# Patient Record
Sex: FEMALE | Race: WHITE | NOT HISPANIC OR LATINO | Employment: UNEMPLOYED | URBAN - METROPOLITAN AREA
[De-identification: names, ages, dates, MRNs, and addresses within clinical notes are randomized per-mention and may not be internally consistent; named-entity substitution may affect disease eponyms.]

---

## 2017-07-05 ENCOUNTER — TRANSCRIBE ORDERS (OUTPATIENT)
Dept: ADMINISTRATIVE | Facility: HOSPITAL | Age: 47
End: 2017-07-05

## 2017-07-06 ENCOUNTER — TRANSCRIBE ORDERS (OUTPATIENT)
Dept: ADMINISTRATIVE | Facility: HOSPITAL | Age: 47
End: 2017-07-06

## 2017-07-06 ENCOUNTER — LAB (OUTPATIENT)
Dept: LAB | Facility: HOSPITAL | Age: 47
End: 2017-07-06

## 2017-07-06 DIAGNOSIS — E55.9 VITAMIN D DEFICIENCY: ICD-10-CM

## 2017-07-06 DIAGNOSIS — E55.9 VITAMIN D DEFICIENCY: Primary | ICD-10-CM

## 2017-07-06 LAB
25(OH)D3 SERPL-MCNC: 35.2 NG/ML (ref 30–100)
ALBUMIN SERPL-MCNC: 4.2 G/DL (ref 3.5–5.2)
ALBUMIN/GLOB SERPL: 1.4 G/DL
ALP SERPL-CCNC: 59 U/L (ref 39–117)
ALT SERPL W P-5'-P-CCNC: 30 U/L (ref 1–33)
ANION GAP SERPL CALCULATED.3IONS-SCNC: 9.7 MMOL/L
AST SERPL-CCNC: 21 U/L (ref 1–32)
BACTERIA UR QL AUTO: NORMAL /HPF
BASOPHILS # BLD AUTO: 0.07 10*3/MM3 (ref 0–0.2)
BASOPHILS NFR BLD AUTO: 0.9 % (ref 0–1.5)
BILIRUB SERPL-MCNC: 0.4 MG/DL (ref 0.1–1.2)
BILIRUB UR QL STRIP: NEGATIVE
BUN BLD-MCNC: 14 MG/DL (ref 6–20)
BUN/CREAT SERPL: 17.7 (ref 7–25)
CALCIUM SPEC-SCNC: 9.1 MG/DL (ref 8.6–10.5)
CHLORIDE SERPL-SCNC: 104 MMOL/L (ref 98–107)
CHOLEST SERPL-MCNC: 199 MG/DL (ref 0–200)
CLARITY UR: CLEAR
CO2 SERPL-SCNC: 23.3 MMOL/L (ref 22–29)
COLOR UR: YELLOW
CREAT BLD-MCNC: 0.79 MG/DL (ref 0.57–1)
DEPRECATED RDW RBC AUTO: 41.1 FL (ref 37–54)
EOSINOPHIL # BLD AUTO: 0.24 10*3/MM3 (ref 0–0.7)
EOSINOPHIL NFR BLD AUTO: 3.1 % (ref 0.3–6.2)
ERYTHROCYTE [DISTWIDTH] IN BLOOD BY AUTOMATED COUNT: 12.4 % (ref 11.7–13)
GFR SERPL CREATININE-BSD FRML MDRD: 79 ML/MIN/1.73
GLOBULIN UR ELPH-MCNC: 2.9 GM/DL
GLUCOSE BLD-MCNC: 105 MG/DL (ref 65–99)
GLUCOSE UR STRIP-MCNC: NEGATIVE MG/DL
HCT VFR BLD AUTO: 39.5 % (ref 35.6–45.5)
HDLC SERPL-MCNC: 71 MG/DL (ref 40–60)
HGB BLD-MCNC: 13.2 G/DL (ref 11.9–15.5)
HGB UR QL STRIP.AUTO: ABNORMAL
HYALINE CASTS UR QL AUTO: NORMAL /LPF
IMM GRANULOCYTES # BLD: 0.07 10*3/MM3 (ref 0–0.03)
IMM GRANULOCYTES NFR BLD: 0.9 % (ref 0–0.5)
KETONES UR QL STRIP: NEGATIVE
LDLC SERPL CALC-MCNC: 110 MG/DL (ref 0–100)
LDLC/HDLC SERPL: 1.55 {RATIO}
LEUKOCYTE ESTERASE UR QL STRIP.AUTO: ABNORMAL
LYMPHOCYTES # BLD AUTO: 2.43 10*3/MM3 (ref 0.9–4.8)
LYMPHOCYTES NFR BLD AUTO: 31.2 % (ref 19.6–45.3)
MCH RBC QN AUTO: 30.4 PG (ref 26.9–32)
MCHC RBC AUTO-ENTMCNC: 33.4 G/DL (ref 32.4–36.3)
MCV RBC AUTO: 91 FL (ref 80.5–98.2)
MONOCYTES # BLD AUTO: 0.54 10*3/MM3 (ref 0.2–1.2)
MONOCYTES NFR BLD AUTO: 6.9 % (ref 5–12)
NEUTROPHILS # BLD AUTO: 4.43 10*3/MM3 (ref 1.9–8.1)
NEUTROPHILS NFR BLD AUTO: 57 % (ref 42.7–76)
NITRITE UR QL STRIP: NEGATIVE
NRBC BLD MANUAL-RTO: 0 /100 WBC (ref 0–0)
PH UR STRIP.AUTO: 6 [PH] (ref 5–8)
PLATELET # BLD AUTO: 280 10*3/MM3 (ref 140–500)
PMV BLD AUTO: 9.6 FL (ref 6–12)
POTASSIUM BLD-SCNC: 4.4 MMOL/L (ref 3.5–5.2)
PROT SERPL-MCNC: 7.1 G/DL (ref 6–8.5)
PROT UR QL STRIP: NEGATIVE
RBC # BLD AUTO: 4.34 10*6/MM3 (ref 3.9–5.2)
RBC # UR: NORMAL /HPF
REF LAB TEST METHOD: NORMAL
SODIUM BLD-SCNC: 137 MMOL/L (ref 136–145)
SP GR UR STRIP: <=1.005 (ref 1–1.03)
SQUAMOUS #/AREA URNS HPF: NORMAL /HPF
TRIGL SERPL-MCNC: 89 MG/DL (ref 0–150)
TSH SERPL DL<=0.05 MIU/L-ACNC: 0.51 MIU/ML (ref 0.27–4.2)
UROBILINOGEN UR QL STRIP: ABNORMAL
VLDLC SERPL-MCNC: 17.8 MG/DL (ref 5–40)
WBC NRBC COR # BLD: 7.78 10*3/MM3 (ref 4.5–10.7)
WBC UR QL AUTO: NORMAL /HPF

## 2017-07-06 PROCEDURE — 81001 URINALYSIS AUTO W/SCOPE: CPT | Performed by: INTERNAL MEDICINE

## 2017-07-06 PROCEDURE — 85025 COMPLETE CBC W/AUTO DIFF WBC: CPT

## 2017-07-06 PROCEDURE — 80061 LIPID PANEL: CPT | Performed by: INTERNAL MEDICINE

## 2017-07-06 PROCEDURE — 84443 ASSAY THYROID STIM HORMONE: CPT

## 2017-07-06 PROCEDURE — 82306 VITAMIN D 25 HYDROXY: CPT | Performed by: INTERNAL MEDICINE

## 2017-07-06 PROCEDURE — 80053 COMPREHEN METABOLIC PANEL: CPT

## 2017-07-06 PROCEDURE — 36415 COLL VENOUS BLD VENIPUNCTURE: CPT

## 2018-01-19 ENCOUNTER — TRANSCRIBE ORDERS (OUTPATIENT)
Dept: ADMINISTRATIVE | Facility: HOSPITAL | Age: 48
End: 2018-01-19

## 2018-01-19 DIAGNOSIS — Z12.31 VISIT FOR SCREENING MAMMOGRAM: Primary | ICD-10-CM

## 2018-01-26 ENCOUNTER — HOSPITAL ENCOUNTER (OUTPATIENT)
Dept: MAMMOGRAPHY | Facility: HOSPITAL | Age: 48
Discharge: HOME | End: 2018-01-26
Admitting: SPECIALIST

## 2018-01-26 DIAGNOSIS — Z12.31 VISIT FOR SCREENING MAMMOGRAM: ICD-10-CM

## 2018-01-26 PROCEDURE — 77067 SCR MAMMO BI INCL CAD: CPT

## 2018-01-29 ENCOUNTER — TRANSCRIBE ORDERS (OUTPATIENT)
Dept: ADMINISTRATIVE | Facility: HOSPITAL | Age: 48
End: 2018-01-29

## 2018-01-29 DIAGNOSIS — R92.8 ABNORMAL MAMMOGRAM OF LEFT BREAST: Primary | ICD-10-CM

## 2018-02-02 ENCOUNTER — HOSPITAL ENCOUNTER (OUTPATIENT)
Dept: MAMMOGRAPHY | Facility: HOSPITAL | Age: 48
Discharge: HOME | End: 2018-02-02
Admitting: SPECIALIST

## 2018-02-02 DIAGNOSIS — R92.8 ABNORMAL MAMMOGRAM OF LEFT BREAST: ICD-10-CM

## 2018-02-02 PROCEDURE — G0279 TOMOSYNTHESIS, MAMMO: HCPCS

## 2018-02-02 PROCEDURE — 77065 DX MAMMO INCL CAD UNI: CPT

## 2018-02-08 ENCOUNTER — TRANSCRIBE ORDERS (OUTPATIENT)
Dept: ADMINISTRATIVE | Facility: HOSPITAL | Age: 48
End: 2018-02-08

## 2018-02-08 DIAGNOSIS — R92.8 ABNORMAL MAMMOGRAM: Primary | ICD-10-CM

## 2018-02-12 ENCOUNTER — HOSPITAL ENCOUNTER (OUTPATIENT)
Dept: MAMMOGRAPHY | Facility: HOSPITAL | Age: 48
Discharge: HOME | End: 2018-02-12
Admitting: SPECIALIST

## 2018-02-12 DIAGNOSIS — R92.8 ABNORMAL MAMMOGRAM: ICD-10-CM

## 2018-02-12 RX ORDER — LIDOCAINE HYDROCHLORIDE 10 MG/ML
10 INJECTION, SOLUTION INFILTRATION; PERINEURAL ONCE
Status: COMPLETED | OUTPATIENT
Start: 2018-02-12 | End: 2018-02-12

## 2018-02-12 RX ADMIN — LIDOCAINE HYDROCHLORIDE 10 ML: 10 INJECTION, SOLUTION INFILTRATION; PERINEURAL at 12:38

## 2018-02-14 ENCOUNTER — APPOINTMENT (OUTPATIENT)
Dept: WOMENS IMAGING | Facility: HOSPITAL | Age: 48
End: 2018-02-14

## 2018-02-14 PROCEDURE — 19081 BX BREAST 1ST LESION STRTCTC: CPT | Performed by: RADIOLOGY

## 2018-03-01 ENCOUNTER — OFFICE VISIT (OUTPATIENT)
Dept: MAMMOGRAPHY | Facility: CLINIC | Age: 48
End: 2018-03-01

## 2018-03-01 VITALS
WEIGHT: 148 LBS | RESPIRATION RATE: 16 BRPM | HEART RATE: 65 BPM | SYSTOLIC BLOOD PRESSURE: 90 MMHG | OXYGEN SATURATION: 98 % | TEMPERATURE: 98.6 F | BODY MASS INDEX: 27.23 KG/M2 | DIASTOLIC BLOOD PRESSURE: 64 MMHG | HEIGHT: 62 IN

## 2018-03-01 DIAGNOSIS — Z17.0 MALIGNANT NEOPLASM OF UPPER-INNER QUADRANT OF LEFT BREAST IN FEMALE, ESTROGEN RECEPTOR POSITIVE (HCC): Primary | ICD-10-CM

## 2018-03-01 DIAGNOSIS — C50.212 MALIGNANT NEOPLASM OF UPPER-INNER QUADRANT OF LEFT BREAST IN FEMALE, ESTROGEN RECEPTOR POSITIVE (HCC): Primary | ICD-10-CM

## 2018-03-01 PROCEDURE — 99205 OFFICE O/P NEW HI 60 MIN: CPT | Performed by: SURGERY

## 2018-03-01 RX ORDER — IBUPROFEN 600 MG/1
600 TABLET ORAL EVERY 6 HOURS PRN
COMMUNITY
End: 2022-03-07

## 2018-03-01 RX ORDER — ERGOCALCIFEROL 1.25 MG/1
50000 CAPSULE ORAL
COMMUNITY
End: 2021-03-22 | Stop reason: ALTCHOICE

## 2018-03-01 RX ORDER — ALPRAZOLAM 0.25 MG/1
0.25 TABLET ORAL 2 TIMES DAILY PRN
COMMUNITY
End: 2022-05-21 | Stop reason: HOSPADM

## 2018-03-01 NOTE — PROGRESS NOTES
Chief Complaint: Cesia Workman is a 47 y.o.. female here today for Breast Cancer (Left Breast)        History of Present Illness:  Patient presents with a newly diagnosed left breast cancer.  She has been obtaining her yearly mammograms.  Some suspicious microcalcifications were noted in the 11 o'clock position of the left breast.  There was also the suggestion of a 5 mm mass associated with these calcifications.  These had seemed to have increased from previous mammograms.  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  It was ER positive at 65%, IL positive at 96%, and HER-2 negative.  The Ki-67 was 1.9%.  The patient has no prior history of breast biopsies.  She does have 3 sons and is a nonsmoker.  She has not taken any hormone replacement therapy and is currently still having menstrual periods.      Review of Systems:  Review of Systems   Constitutional: Negative.    HENT:  Negative.    Eyes: Negative.    Respiratory: Negative.    Cardiovascular: Negative.    Gastrointestinal: Positive for abdominal pain.   Endocrine: Negative.    Genitourinary: Positive for bladder incontinence, frequency, nocturia and pelvic pain.    Musculoskeletal: Negative.    Skin:        The patient denies any noticeable changes to the skin of the breast.   Neurological: Negative.    Hematological: Negative.    Psychiatric/Behavioral: The patient is nervous/anxious.         Past Medical and Surgical History:  Breast Biopsy History:  Patient has had the following breast biopsies:Left Breast 2/2018  Breast Cancer HIstory:  Patient does not have a past medical history of breast cancer.  Breast Operations, and year:  None  History   Smoking Status   • Former Smoker   • Packs/day: 0.50   • Years: 3.00   • Types: Cigarettes   • Start date: 1987   • Quit date: 1990   Smokeless Tobacco   • Not on file     Obstetric History:  Patient is premenopausal, first day of last period:   2/24/2018  Number of pregnancies:3  Number of live births: 3  Number of abortions or miscarriages: 0  Age of delivery of first child: 28  Patient breast fed, for the following lenth of time: 20months  Length of time taking birth control pills:15-16years  Patient has never taken hormone replacement    History reviewed. No pertinent surgical history.    Past Medical History:   Diagnosis Date   • Pre-diabetes        Prior Hospitalizations, other than for surgery or childbirth, and year:  None    Social History:  Patient is .  Patient has 3 sons.    Family History:  Family History   Problem Relation Age of Onset   • Breast cancer Maternal Aunt    • Breast cancer Paternal Aunt    • Hyperlipidemia Mother    • Asthma Father    • Diabetes Father    • Hearing loss Father    • Heart disease Father    • Hypertension Father    • Thyroid cancer Father    • Alcohol abuse Sister    • Depression Sister        Vital Signs:  Vitals:    03/01/18 0918   BP: 90/64   Pulse: 65   Resp: 16   Temp: 98.6 °F (37 °C)   SpO2: 98%       Medications:    Current Outpatient Prescriptions:     Current Outpatient Prescriptions:   •  ALPRAZolam (XANAX) 0.25 MG tablet, Take 0.25 mg by mouth 2 (Two) Times a Day As Needed for Anxiety., Disp: , Rfl:   •  ibuprofen (ADVIL,MOTRIN) 600 MG tablet, Take 600 mg by mouth Every 6 (Six) Hours As Needed for Mild Pain ., Disp: , Rfl:   •  vitamin D (ERGOCALCIFEROL) 33114 units capsule capsule, Take 50,000 Units by mouth 1 (One) Time Per Week., Disp: , Rfl:     Physical Examination:  General Appearance:   Patient is in no distress.  She is well kept and has an average build.   Psychiatric:  Patient with appropriate mood and affect. Alert and oriented to self, time, and place.    Breast, RIGHT:  medium sized, symmetric with the contralateral side.  Breast skin is without erythema, edema, rashes.  There are no visible abnormalities upon inspection during the arm-raising maneuver or with hands on hips in the  sitting position. There is no nipple retraction, discharge or nipple/areolar skin changes.There are no masses palpable in the sitting or supine positions.    Breast, LEFT:  medium sized, symmetric with the contralateral side.  Breast skin is without erythema, edema, rashes.  There are no visible abnormalities upon inspection during the arm-raising maneuver or with hands on hips in the sitting position. There is no nipple retraction, discharge or nipple/areolar skin changes.There is a small incision in the 12 o'clock position.  Immediately inferior to this there is an ill-defined firmness likely related to the biopsy.    Lymphatic:  There is no axillary, cervical, infraclavicular, or supraclavicular adenopathy bilaterally.  Eyes:  Pupils are round and reactive to light.  Cardiovascular:  Heart rate and rhythm are regular.  Respiratory:  Lungs are clear bilaterally with no crackles or wheezes in any lung field.  Gastrointestinal:  Abdomen is soft, nondistended, and nontender.  There was no evidence of hepatosplenomegaly or abdominal mass.  There are no scars from previous surgery.    Musculoskeletal:  Good strength in all 4 extremities.   There is good range of motion in both shoulders.    Skin:  No new skin lesions or rashes on the skin excluding the breast (see breast exam above).    Assessment:  1. Malignant neoplasm of upper-inner quadrant of left breast in female, estrogen receptor positive          Plan:  She was accompanied today by her .  The face-to-face office visit lasted 1 hour with 50 minutes spent in consultation regarding her options.    We began the conversation discussing her pathology report.  We talked about the origin of most of breast cancers from either the ducts or the lobules.  The difference between invasive and in situ disease was explained and the visual was drawn for her.  When invasion has occurred, the lymph nodes need to be evaluated.  When there is in situ disease the lymph nodes  should be considered if the area of concern is widespread or it is high-grade.  We also discussed the significance of hormone receptors.  They often can give us some idea how the breast cancer will behave and potentially lead us to offer neoadjuvant chemotherapy.    Next we discussed the surgical options which include breast conserving therapy versus a mastectomy.  With breast conserving therapy we are talking about a lumpectomy with margins, lymph node evaluation, and radiation treatment.  The radiation treatment is generally given to the entire breast for 6 weeks.  The side effects consist of local skin change and potential injury to nearby structures such as the lung or the heart.  A mastectomy would involve removing the breast tissues with preservation of the pectoral muscles and evaluation of the lymph nodes if indicated.  The potential for reconstruction by either an implant or autologous tissue was discussed.  This could be performed on a delayed basis or immediately depending on a multitude of factors.  The survival rates for these 2 procedures are equivalent but there are incidences where one may be favored over the other.  There are times when a lumpectomy is not possible due to the large tumor to breast ratio or the location of the tumor.  Previous radiation to the chest wall or collagen disorders such as scleroderma would make radiation treatment and possible and therefore exclude breast conserving therapy as an option.    I do think that most of this mammogram abnormality is DCIS.  It measures approximately 2.5 cm on her studies.  I would like to obtain an MRI to see if that can help us better assess the extent of involvement.  The patient is currently leaning towards breast conserving surgery if that is her best option.  I will be speaking with her after the MRI returns.  CPT coding:    Next Appointment:  No Follow-up on file.            EMR Dragon/transcription disclaimer:    Much of this encounter  note is an electronic transcription/translocation of spoken language to printed text.  The electronic translation of spoken language may permit erroneous, or at times, nonsensical words or phrases to be inadvertently transcribed.  Although I have reviewed the note from such areas, some may still exist.

## 2018-03-05 ENCOUNTER — HOSPITAL ENCOUNTER (OUTPATIENT)
Dept: MRI IMAGING | Facility: HOSPITAL | Age: 48
Discharge: HOME | End: 2018-03-05
Attending: SURGERY | Admitting: SURGERY

## 2018-03-05 PROCEDURE — 0159T HC MRI BREAST COMPUTER ANALYSIS: CPT

## 2018-03-05 PROCEDURE — A9577 INJ MULTIHANCE: HCPCS | Performed by: SURGERY

## 2018-03-05 PROCEDURE — C8908 MRI W/O FOL W/CONT, BREAST,: HCPCS

## 2018-03-05 PROCEDURE — 0 GADOBENATE DIMEGLUMINE 529 MG/ML SOLUTION: Performed by: SURGERY

## 2018-03-05 RX ADMIN — GADOBENATE DIMEGLUMINE 13 ML: 529 INJECTION, SOLUTION INTRAVENOUS at 15:11

## 2018-03-07 ENCOUNTER — TELEPHONE (OUTPATIENT)
Dept: MAMMOGRAPHY | Facility: CLINIC | Age: 48
End: 2018-03-07

## 2018-03-09 ENCOUNTER — TELEPHONE (OUTPATIENT)
Dept: MAMMOGRAPHY | Facility: CLINIC | Age: 48
End: 2018-03-09

## 2018-03-09 DIAGNOSIS — Z17.0 MALIGNANT NEOPLASM OF UPPER-INNER QUADRANT OF LEFT BREAST IN FEMALE, ESTROGEN RECEPTOR POSITIVE (HCC): Primary | ICD-10-CM

## 2018-03-09 DIAGNOSIS — C50.212 MALIGNANT NEOPLASM OF UPPER-INNER QUADRANT OF LEFT BREAST IN FEMALE, ESTROGEN RECEPTOR POSITIVE (HCC): Primary | ICD-10-CM

## 2018-03-09 NOTE — TELEPHONE ENCOUNTER
We had a long discussion today and I answered many of her questions.  She would like to see a plastic surgeon and I have made a request for her to see Dr. Mchugh.

## 2018-03-14 ENCOUNTER — OFFICE VISIT (OUTPATIENT)
Dept: SURGERY | Facility: CLINIC | Age: 48
End: 2018-03-14

## 2018-03-14 VITALS — WEIGHT: 150 LBS | HEIGHT: 62 IN | BODY MASS INDEX: 27.6 KG/M2 | HEART RATE: 70 BPM | OXYGEN SATURATION: 99 %

## 2018-03-14 DIAGNOSIS — C50.212 MALIGNANT NEOPLASM OF UPPER-INNER QUADRANT OF LEFT BREAST IN FEMALE, ESTROGEN RECEPTOR POSITIVE (HCC): Primary | ICD-10-CM

## 2018-03-14 DIAGNOSIS — Z17.0 MALIGNANT NEOPLASM OF UPPER-INNER QUADRANT OF LEFT BREAST IN FEMALE, ESTROGEN RECEPTOR POSITIVE (HCC): Primary | ICD-10-CM

## 2018-03-14 PROBLEM — C50.919 BREAST CANCER: Status: ACTIVE | Noted: 2018-02-14

## 2018-03-14 PROCEDURE — 99215 OFFICE O/P EST HI 40 MIN: CPT | Performed by: SURGERY

## 2018-03-14 NOTE — PROGRESS NOTES
SURGERY  Cesia Workman   1970 03/14/18    Chief Complaint:  Left upper breast cancer    HPI    Patient is a very nice 47 y.o. female who comes in as a second opinion for a breast cancer diagnosis.  She's been seen Dr. Pereyra, and is very happy with his care.  She is still undecided however about a lumpectomy versus a mastectomy.    She went for a screening mammogram showing calcifications 1/26/18, possibly with a soft tissue mass in the superior left breast, to my review about 2 cm, pretty far posteriorly.  She had a diagnostic mammogram thereafter showing the area to be about 2.3 cm, followed by an attempted biopsy on 212 that was canceled due to a vagal response    [facility] where she was diagnosed to have a high-grade DCIS with comedonecrosis and an intermediate grade invasive mammary carcinoma, ER/OH positive, HER-2/natalie negative.  She wears a C cup bra.    She has both a paternal and maternal aunt with breast cancer.    Past Medical History:   Diagnosis Date   • Anxiety    • Asthma    • Breast cancer 02/14/2018    Left breast, Upper Inner Quadrant, Invasive Mammary Carcinoma, Intermediate grade, measuring at least 0.2 cm in dimension, with associated high grade solid and cribiform ductal carcinoma in situ with comedonecrosis, ER/OH positive, QNM0cok negative   • Migraines    • Pre-diabetes      Past Surgical History:   Procedure Laterality Date   • BREAST BIOPSY Left 02/14/2018    Left breast stereotactic biopsy w/ marker clip placement & positive radiograph-Dr. Lashaun Gamble, [facility]   • BREAST BIOPSY Left 02/12/2018    INCOMPLETE - Biopsy could not be completed as patient had severe vasovagal reaction & subsequent syncope-[facility]   • VAGINAL DELIVERY N/A 06/22/2005    Dr. Jered Lucero   • VAGINAL DELIVERY N/A 02/04/2002    Dr. Jessica Mcdaniel   • VAGINAL DELIVERY N/A 1998     Family History   Problem Relation Age of Onset   • Breast cancer  "Maternal Aunt    • Breast cancer Paternal Aunt    • Hyperlipidemia Mother    • Asthma Father    • Diabetes Father    • Hearing loss Father    • Heart disease Father    • Hypertension Father    • Thyroid cancer Father      Follicular Tyroid Cancer   • Alcohol abuse Sister    • Depression Sister      Social History     Social History   • Marital status:      Spouse name: Nikita Workman   • Number of children: 3   • Years of education: N/A     Occupational History   • Occupational Therapist      Social History Main Topics   • Smoking status: Former Smoker     Packs/day: 0.50     Years: 2.00     Types: Cigarettes     Start date: 1988     Quit date: 1990   • Smokeless tobacco: Never Used   • Alcohol use Yes      Comment: occassional   • Drug use: No   • Sexual activity: Defer     Other Topics Concern   • Not on file     Social History Narrative   • No narrative on file     Occupation/Additional Social Hx: Occupational therapist      Current Outpatient Prescriptions:   •  ALPRAZolam (XANAX) 0.25 MG tablet, Take 0.125-0.25 mg by mouth 2 (Two) Times a Day As Needed for Anxiety., Disp: , Rfl:   •  ibuprofen (ADVIL,MOTRIN) 600 MG tablet, Take 600 mg by mouth Every 6 (Six) Hours As Needed for Mild Pain ., Disp: , Rfl:   •  vitamin D (ERGOCALCIFEROL) 04933 units capsule capsule, Take 50,000 Units by mouth Every 7 (Seven) Days., Disp: , Rfl:     No Known Allergies  Preventative Medicine  Colonoscopy: Not applicable    Review of Systems   Constitutional: Positive for appetite change and unexpected weight change.   Genitourinary: Positive for urgency.   Psychiatric/Behavioral: Positive for sleep disturbance. The patient is nervous/anxious.    All other systems reviewed and are negative.      Vitals:    03/14/18 1216   Pulse: 70   SpO2: 99%   Weight: 68 kg (150 lb)   Height: 157.5 cm (62\")       PHYSICAL EXAM:    Pulse 70   Ht 157.5 cm (62\")   Wt 68 kg (150 lb)   SpO2 99%   BMI 27.44 kg/m²   Body mass index is 27.44 " kg/m².    Constitutional: well developed, well nourished, Appears stated age or younger  Eyes: sclera nonicteric, conjunctiva not injected or edematous  ENMT: Hearing intact, trachea midline, thyroid without masses  CVS: RRR, no murmur, peripheral edema not present  Respiratory: CTA, normal respiratory effort   Gastrointestinal: no hepatosplenomegaly, abdomen soft, abdominal hernia not present  Genitourinary: inguinal hernia not present  Musculoskeletal: gait normal, muscle mass normal  Skin: warm and dry, no rashes visible  Neurological: awake and alert, seems to have reasonable capacity for understanding for medical decision making  Psychiatric: appears to have reasonable judgement, pleasant, well composed  Lymphatics: no cervical adenopathy or axillary adenopathy  Breast: Firmness in the left upper breast, in an area where the breast tissue itself is not very thick, extending over an area about 2 cm    Radiographic Findings: I reviewed the films and the presence of the patient and showed her the area of the calcifications    Lab Findings: None    Pamphlet reviewed: None    IMPRESSION:  · Intermediate grade left breast cancer, with high-grade comedonecrosis and DCIS, ER/NE positive, HER-2/natalie negative.  The size of this is marginal for an ideal lumpectomy, but still possible.  · Anxiety, heightened with her recent diagnosis    PLAN:  · We discussed the option of a mastectomy versus a lumpectomy with radiation.  I told her that I thought a lumpectomy was her best option, but noted that it's very possible she may have to return for a reexcision of the margins due to the size.  She seems relieved by this and begins to cry.  At first I thought she was disappointed and quickly hastened that she could have a mastectomy if she desired, but she confides that she really just wanted to hear that it was okay to go in that direction.  She did reiterate that Dr. Pereyra had told her she could do either method.  · We talked  about the need for sentinel node biopsy  · She asked about taking her Xanax the day of surgery, and I suggested she take only half, as we commonly give her Valium on the day of surgery.      Jenna Wood M.D.  5:18 PM  3/14/18    Greater than 40 minutes spent with over half in counseling

## 2018-03-15 ENCOUNTER — TELEPHONE (OUTPATIENT)
Dept: MAMMOGRAPHY | Facility: CLINIC | Age: 48
End: 2018-03-15

## 2018-03-15 ENCOUNTER — PREP FOR SURGERY (OUTPATIENT)
Dept: OTHER | Facility: HOSPITAL | Age: 48
End: 2018-03-15

## 2018-03-15 DIAGNOSIS — C50.212 MALIGNANT NEOPLASM OF UPPER-INNER QUADRANT OF LEFT BREAST IN FEMALE, ESTROGEN RECEPTOR POSITIVE (HCC): Primary | ICD-10-CM

## 2018-03-15 DIAGNOSIS — Z17.0 MALIGNANT NEOPLASM OF UPPER-INNER QUADRANT OF LEFT BREAST IN FEMALE, ESTROGEN RECEPTOR POSITIVE (HCC): Primary | ICD-10-CM

## 2018-03-15 RX ORDER — DIAZEPAM 5 MG/1
10 TABLET ORAL ONCE
Status: CANCELLED | OUTPATIENT
Start: 2018-04-11 | End: 2018-04-11

## 2018-03-15 RX ORDER — LIDOCAINE AND PRILOCAINE 25; 25 MG/G; MG/G
CREAM TOPICAL ONCE
Status: CANCELLED | OUTPATIENT
Start: 2018-04-11 | End: 2018-04-11

## 2018-03-15 RX ORDER — CEFAZOLIN SODIUM 2 G/100ML
2 INJECTION, SOLUTION INTRAVENOUS ONCE
Status: CANCELLED | OUTPATIENT
Start: 2018-04-11 | End: 2018-04-11

## 2018-03-15 RX ORDER — ACETAMINOPHEN 500 MG
1000 TABLET ORAL ONCE
Status: CANCELLED | OUTPATIENT
Start: 2018-04-11 | End: 2018-04-11

## 2018-03-15 RX ORDER — CELECOXIB 200 MG/1
400 CAPSULE ORAL ONCE
Status: CANCELLED | OUTPATIENT
Start: 2018-04-11 | End: 2018-04-11

## 2018-03-15 NOTE — TELEPHONE ENCOUNTER
Spoke with patient she has made the decision to move forward with a lumpectomy - she feels confident in her decision and wants to move forward with the date we have held 4/11/18

## 2018-03-19 PROBLEM — Z17.0 MALIGNANT NEOPLASM OF UPPER-INNER QUADRANT OF LEFT BREAST IN FEMALE, ESTROGEN RECEPTOR POSITIVE: Status: ACTIVE | Noted: 2018-03-19

## 2018-03-19 PROBLEM — C50.212 MALIGNANT NEOPLASM OF UPPER-INNER QUADRANT OF LEFT BREAST IN FEMALE, ESTROGEN RECEPTOR POSITIVE (HCC): Status: ACTIVE | Noted: 2018-03-19

## 2018-03-28 ENCOUNTER — APPOINTMENT (OUTPATIENT)
Dept: PREADMISSION TESTING | Facility: HOSPITAL | Age: 48
End: 2018-03-28

## 2018-03-28 ENCOUNTER — HOSPITAL ENCOUNTER (OUTPATIENT)
Dept: GENERAL RADIOLOGY | Facility: HOSPITAL | Age: 48
Discharge: HOME | End: 2018-03-28
Admitting: SURGERY

## 2018-03-28 VITALS
OXYGEN SATURATION: 99 % | DIASTOLIC BLOOD PRESSURE: 68 MMHG | HEIGHT: 62 IN | HEART RATE: 65 BPM | WEIGHT: 150 LBS | TEMPERATURE: 97.6 F | RESPIRATION RATE: 18 BRPM | BODY MASS INDEX: 27.6 KG/M2 | SYSTOLIC BLOOD PRESSURE: 109 MMHG

## 2018-03-28 DIAGNOSIS — C50.212 MALIGNANT NEOPLASM OF UPPER-INNER QUADRANT OF LEFT BREAST IN FEMALE, ESTROGEN RECEPTOR POSITIVE (HCC): ICD-10-CM

## 2018-03-28 DIAGNOSIS — Z17.0 MALIGNANT NEOPLASM OF UPPER-INNER QUADRANT OF LEFT BREAST IN FEMALE, ESTROGEN RECEPTOR POSITIVE (HCC): ICD-10-CM

## 2018-03-28 LAB
ALBUMIN SERPL-MCNC: 4.1 G/DL (ref 3.5–5.2)
ALBUMIN/GLOB SERPL: 1.5 G/DL
ALP SERPL-CCNC: 58 U/L (ref 39–117)
ALT SERPL W P-5'-P-CCNC: 31 U/L (ref 1–33)
ANION GAP SERPL CALCULATED.3IONS-SCNC: 13.2 MMOL/L
AST SERPL-CCNC: 25 U/L (ref 1–32)
BILIRUB SERPL-MCNC: 0.3 MG/DL (ref 0.1–1.2)
BUN BLD-MCNC: 14 MG/DL (ref 6–20)
BUN/CREAT SERPL: 17.5 (ref 7–25)
CALCIUM SPEC-SCNC: 9.8 MG/DL (ref 8.6–10.5)
CHLORIDE SERPL-SCNC: 102 MMOL/L (ref 98–107)
CO2 SERPL-SCNC: 24.8 MMOL/L (ref 22–29)
CREAT BLD-MCNC: 0.8 MG/DL (ref 0.57–1)
DEPRECATED RDW RBC AUTO: 43 FL (ref 37–54)
ERYTHROCYTE [DISTWIDTH] IN BLOOD BY AUTOMATED COUNT: 12.7 % (ref 11.7–13)
GFR SERPL CREATININE-BSD FRML MDRD: 77 ML/MIN/1.73
GLOBULIN UR ELPH-MCNC: 2.7 GM/DL
GLUCOSE BLD-MCNC: 109 MG/DL (ref 65–99)
HCT VFR BLD AUTO: 40.3 % (ref 35.6–45.5)
HGB BLD-MCNC: 13.3 G/DL (ref 11.9–15.5)
MCH RBC QN AUTO: 30.5 PG (ref 26.9–32)
MCHC RBC AUTO-ENTMCNC: 33 G/DL (ref 32.4–36.3)
MCV RBC AUTO: 92.4 FL (ref 80.5–98.2)
PLATELET # BLD AUTO: 279 10*3/MM3 (ref 140–500)
PMV BLD AUTO: 10 FL (ref 6–12)
POTASSIUM BLD-SCNC: 4.2 MMOL/L (ref 3.5–5.2)
PROT SERPL-MCNC: 6.8 G/DL (ref 6–8.5)
RBC # BLD AUTO: 4.36 10*6/MM3 (ref 3.9–5.2)
SODIUM BLD-SCNC: 140 MMOL/L (ref 136–145)
WBC NRBC COR # BLD: 7.58 10*3/MM3 (ref 4.5–10.7)

## 2018-03-28 PROCEDURE — 36415 COLL VENOUS BLD VENIPUNCTURE: CPT

## 2018-03-28 PROCEDURE — 80053 COMPREHEN METABOLIC PANEL: CPT | Performed by: SURGERY

## 2018-03-28 PROCEDURE — 85027 COMPLETE CBC AUTOMATED: CPT | Performed by: SURGERY

## 2018-03-28 PROCEDURE — 71046 X-RAY EXAM CHEST 2 VIEWS: CPT

## 2018-03-28 PROCEDURE — 93010 ELECTROCARDIOGRAM REPORT: CPT | Performed by: INTERNAL MEDICINE

## 2018-03-28 PROCEDURE — 93005 ELECTROCARDIOGRAM TRACING: CPT

## 2018-03-28 NOTE — DISCHARGE INSTRUCTIONS
Take the following medications the morning of surgery with a small sip of water: NONE    ARRIVE     General Instructions:  • Do not eat solid food after midnight the night before surgery.  • You may drink clear liquids day of surgery but must stop at least one hour before your hospital arrival time.  • It is beneficial for you to have a clear drink that contains carbohydrates the day of surgery.  We suggest a 12 to 20 ounce bottle of Gatorade or Powerade for non-diabetic patients or a 12 to 20 ounce bottle of G2 or Powerade Zero for diabetic patients. (Pediatric patients, are not advised to drink a 12 to 20 ounce carbohydrate drink)    Clear liquids are liquids you can see through.  Nothing red in color.     Plain water                               Sports drinks  Sodas                                   Gelatin (Jell-O)  Fruit juices without pulp such as white grape juice and apple juice  Popsicles that contain no fruit or yogurt  Tea or coffee (no cream or milk added)  Gatorade / Powerade  G2 / Powerade Zero    • Infants may have breast milk up to four hours before surgery.  • Infants drinking formula may drink formula up to six hours before surgery.   • Patients who avoid smoking, chewing tobacco and alcohol for 4 weeks prior to surgery have a reduced risk of post-operative complications.  Quit smoking as many days before surgery as you can.  • Do not smoke, use chewing tobacco or drink alcohol the day of surgery.   • If applicable bring your C-PAP/ BI-PAP machine.  • Bring any papers given to you in the doctor’s office.  • Wear clean comfortable clothes and socks.  • Do not wear contact lenses or make-up.  Bring a case for your glasses.   • Bring crutches or walker if applicable.  • Remove all piercings.  Leave jewelry and any other valuables at home.  • Hair extensions with metal clips must be removed prior to surgery.  • The Pre-Admission Testing nurse will instruct you to bring medications if unable to  obtain an accurate list in Pre-Admission Testing.        If you were given a blood bank ID arm band remember to bring it with you the day of surgery.    Preventing a Surgical Site Infection:  • For 2 to 3 days before surgery, avoid shaving with a razor because the razor can irritate skin and make it easier to develop an infection.  • The night prior to surgery sleep in a clean bed with clean clothing.  Do not allow pets to sleep with you.  • Shower on the morning of surgery using a fresh bar of anti-bacterial soap (such as Dial) and clean washcloth.  Dry with a clean towel and dress in clean clothing.  • Ask your surgeon if you will be receiving antibiotics prior to surgery.  • Make sure you, your family, and all healthcare providers clean their hands with soap and water or an alcohol based hand  before caring for you or your wound.    Day of surgery:  Upon arrival, a Pre-op nurse and Anesthesiologist will review your health history, obtain vital signs, and answer questions you may have.  The only belongings needed at this time will be your home medications and if applicable your C-PAP/BI-PAP machine.  If you are staying overnight your family can leave the rest of your belongings in the car and bring them to your room later.  A Pre-op nurse will start an IV and you may receive medication in preparation for surgery, including something to help you relax.  Your family will be able to see you in the Pre-op area.  While you are in surgery your family should notify the waiting room  if they leave the waiting room area and provide a contact phone number.    Please be aware that surgery does come with discomfort.  We want to make every effort to control your discomfort so please discuss any uncontrolled symptoms with your nurse.   Your doctor will most likely have prescribed pain medications.      If you are going home after surgery you will receive individualized written care instructions before being  discharged.  A responsible adult must drive you to and from the hospital on the day of your surgery and stay with you for 24 hours.    If you are staying overnight following surgery, you will be transported to your hospital room following the recovery period.  [facility] has all private rooms.    If you have any questions please call Pre-Admission Testing at [phone].  Deductibles and co-payments are collected on the day of service. Please be prepared to pay the required co-pay, deductible or deposit on the day of service as defined by your plan.

## 2018-04-03 ENCOUNTER — APPOINTMENT (OUTPATIENT)
Dept: PREADMISSION TESTING | Facility: HOSPITAL | Age: 48
End: 2018-04-03

## 2018-04-11 ENCOUNTER — ANESTHESIA EVENT (OUTPATIENT)
Dept: PERIOP | Facility: HOSPITAL | Age: 48
End: 2018-04-11

## 2018-04-11 ENCOUNTER — HOSPITAL ENCOUNTER (OUTPATIENT)
Dept: MAMMOGRAPHY | Facility: HOSPITAL | Age: 48
Discharge: HOME | End: 2018-04-11
Attending: SURGERY

## 2018-04-11 ENCOUNTER — HOSPITAL ENCOUNTER (OUTPATIENT)
Dept: NUCLEAR MEDICINE | Facility: HOSPITAL | Age: 48
Discharge: HOME | End: 2018-04-11
Attending: SURGERY

## 2018-04-11 ENCOUNTER — ANESTHESIA (OUTPATIENT)
Dept: PERIOP | Facility: HOSPITAL | Age: 48
End: 2018-04-11

## 2018-04-11 ENCOUNTER — HOSPITAL ENCOUNTER (OUTPATIENT)
Facility: HOSPITAL | Age: 48
Setting detail: HOSPITAL OUTPATIENT SURGERY
Discharge: HOME | End: 2018-04-11
Attending: SURGERY | Admitting: SURGERY

## 2018-04-11 ENCOUNTER — APPOINTMENT (OUTPATIENT)
Dept: GENERAL RADIOLOGY | Facility: HOSPITAL | Age: 48
End: 2018-04-11

## 2018-04-11 VITALS
HEIGHT: 62 IN | TEMPERATURE: 98.1 F | BODY MASS INDEX: 27.46 KG/M2 | SYSTOLIC BLOOD PRESSURE: 128 MMHG | HEART RATE: 88 BPM | OXYGEN SATURATION: 100 % | RESPIRATION RATE: 16 BRPM | WEIGHT: 149.2 LBS | DIASTOLIC BLOOD PRESSURE: 88 MMHG

## 2018-04-11 DIAGNOSIS — C50.212 MALIGNANT NEOPLASM OF UPPER-INNER QUADRANT OF LEFT BREAST IN FEMALE, ESTROGEN RECEPTOR POSITIVE (HCC): ICD-10-CM

## 2018-04-11 DIAGNOSIS — C80.1 CANCER (HCC): ICD-10-CM

## 2018-04-11 DIAGNOSIS — Z17.0 MALIGNANT NEOPLASM OF UPPER-INNER QUADRANT OF LEFT BREAST IN FEMALE, ESTROGEN RECEPTOR POSITIVE (HCC): ICD-10-CM

## 2018-04-11 LAB
B-HCG UR QL: NEGATIVE
INTERNAL NEGATIVE CONTROL: NEGATIVE
INTERNAL POSITIVE CONTROL: POSITIVE
Lab: NORMAL

## 2018-04-11 PROCEDURE — A9541 TC99M SULFUR COLLOID: HCPCS | Performed by: SURGERY

## 2018-04-11 PROCEDURE — 25010000002 PROPOFOL 10 MG/ML EMULSION: Performed by: NURSE ANESTHETIST, CERTIFIED REGISTERED

## 2018-04-11 PROCEDURE — 0 TECHNETIUM FILTERED SULFUR COLLOID: Performed by: SURGERY

## 2018-04-11 PROCEDURE — 25010000002 MIDAZOLAM PER 1 MG: Performed by: ANESTHESIOLOGY

## 2018-04-11 PROCEDURE — 38792 RA TRACER ID OF SENTINL NODE: CPT

## 2018-04-11 PROCEDURE — 25010000002 DEXAMETHASONE PER 1 MG: Performed by: NURSE ANESTHETIST, CERTIFIED REGISTERED

## 2018-04-11 PROCEDURE — 88307 TISSUE EXAM BY PATHOLOGIST: CPT | Performed by: SURGERY

## 2018-04-11 PROCEDURE — 38525 BIOPSY/REMOVAL LYMPH NODES: CPT | Performed by: SURGERY

## 2018-04-11 PROCEDURE — 25010000002 ONDANSETRON PER 1 MG: Performed by: NURSE ANESTHETIST, CERTIFIED REGISTERED

## 2018-04-11 PROCEDURE — 25010000002 HYDROMORPHONE PER 4 MG: Performed by: NURSE ANESTHETIST, CERTIFIED REGISTERED

## 2018-04-11 PROCEDURE — 25010000003 CEFAZOLIN IN DEXTROSE 2-4 GM/100ML-% SOLUTION: Performed by: SURGERY

## 2018-04-11 PROCEDURE — 25010000002 PROMETHAZINE PER 50 MG: Performed by: NURSE ANESTHETIST, CERTIFIED REGISTERED

## 2018-04-11 PROCEDURE — 25010000002 FENTANYL CITRATE (PF) 100 MCG/2ML SOLUTION: Performed by: NURSE ANESTHETIST, CERTIFIED REGISTERED

## 2018-04-11 PROCEDURE — 38900 IO MAP OF SENT LYMPH NODE: CPT | Performed by: SURGERY

## 2018-04-11 PROCEDURE — 19301 PARTIAL MASTECTOMY: CPT | Performed by: SURGERY

## 2018-04-11 PROCEDURE — 76098 X-RAY EXAM SURGICAL SPECIMEN: CPT

## 2018-04-11 PROCEDURE — 25010000002 FENTANYL CITRATE (PF) 100 MCG/2ML SOLUTION: Performed by: ANESTHESIOLOGY

## 2018-04-11 RX ORDER — HYDROCODONE BITARTRATE AND ACETAMINOPHEN 7.5; 325 MG/1; MG/1
1 TABLET ORAL ONCE AS NEEDED
Status: DISCONTINUED | OUTPATIENT
Start: 2018-04-11 | End: 2018-04-11 | Stop reason: HOSPADM

## 2018-04-11 RX ORDER — HYDRALAZINE HYDROCHLORIDE 20 MG/ML
5 INJECTION INTRAMUSCULAR; INTRAVENOUS
Status: DISCONTINUED | OUTPATIENT
Start: 2018-04-11 | End: 2018-04-11 | Stop reason: HOSPADM

## 2018-04-11 RX ORDER — ONDANSETRON 2 MG/ML
4 INJECTION INTRAMUSCULAR; INTRAVENOUS ONCE AS NEEDED
Status: COMPLETED | OUTPATIENT
Start: 2018-04-11 | End: 2018-04-11

## 2018-04-11 RX ORDER — PROPOFOL 10 MG/ML
VIAL (ML) INTRAVENOUS AS NEEDED
Status: DISCONTINUED | OUTPATIENT
Start: 2018-04-11 | End: 2018-04-11 | Stop reason: SURG

## 2018-04-11 RX ORDER — HYDROCODONE BITARTRATE AND ACETAMINOPHEN 5; 325 MG/1; MG/1
TABLET ORAL
Qty: 25 TABLET | Refills: 0 | Status: SHIPPED | OUTPATIENT
Start: 2018-04-11 | End: 2019-03-28

## 2018-04-11 RX ORDER — LIDOCAINE AND PRILOCAINE 25; 25 MG/G; MG/G
CREAM TOPICAL ONCE
Status: DISCONTINUED | OUTPATIENT
Start: 2018-04-11 | End: 2018-04-11 | Stop reason: HOSPADM

## 2018-04-11 RX ORDER — PROMETHAZINE HYDROCHLORIDE 25 MG/1
25 SUPPOSITORY RECTAL ONCE AS NEEDED
Status: COMPLETED | OUTPATIENT
Start: 2018-04-11 | End: 2018-04-11

## 2018-04-11 RX ORDER — DIAZEPAM 5 MG/1
10 TABLET ORAL ONCE
Status: COMPLETED | OUTPATIENT
Start: 2018-04-11 | End: 2018-04-11

## 2018-04-11 RX ORDER — PROMETHAZINE HYDROCHLORIDE 25 MG/1
25 TABLET ORAL EVERY 6 HOURS PRN
Qty: 10 TABLET | Refills: 0 | Status: SHIPPED | OUTPATIENT
Start: 2018-04-11 | End: 2018-05-18 | Stop reason: SDUPTHER

## 2018-04-11 RX ORDER — PROMETHAZINE HYDROCHLORIDE 25 MG/1
25 TABLET ORAL ONCE AS NEEDED
Status: COMPLETED | OUTPATIENT
Start: 2018-04-11 | End: 2018-04-11

## 2018-04-11 RX ORDER — FENTANYL CITRATE 50 UG/ML
50 INJECTION, SOLUTION INTRAMUSCULAR; INTRAVENOUS
Status: DISCONTINUED | OUTPATIENT
Start: 2018-04-11 | End: 2018-04-11 | Stop reason: HOSPADM

## 2018-04-11 RX ORDER — MAGNESIUM HYDROXIDE 1200 MG/15ML
LIQUID ORAL AS NEEDED
Status: DISCONTINUED | OUTPATIENT
Start: 2018-04-11 | End: 2018-04-11 | Stop reason: HOSPADM

## 2018-04-11 RX ORDER — LIDOCAINE HYDROCHLORIDE 10 MG/ML
3 INJECTION, SOLUTION INFILTRATION; PERINEURAL ONCE
Status: COMPLETED | OUTPATIENT
Start: 2018-04-11 | End: 2018-04-11

## 2018-04-11 RX ORDER — HYDROMORPHONE HCL 110MG/55ML
0.5 PATIENT CONTROLLED ANALGESIA SYRINGE INTRAVENOUS
Status: DISCONTINUED | OUTPATIENT
Start: 2018-04-11 | End: 2018-04-11 | Stop reason: HOSPADM

## 2018-04-11 RX ORDER — SODIUM CHLORIDE, SODIUM LACTATE, POTASSIUM CHLORIDE, CALCIUM CHLORIDE 600; 310; 30; 20 MG/100ML; MG/100ML; MG/100ML; MG/100ML
9 INJECTION, SOLUTION INTRAVENOUS CONTINUOUS
Status: DISCONTINUED | OUTPATIENT
Start: 2018-04-11 | End: 2018-04-11 | Stop reason: HOSPADM

## 2018-04-11 RX ORDER — EPHEDRINE SULFATE 50 MG/ML
5 INJECTION, SOLUTION INTRAVENOUS ONCE AS NEEDED
Status: DISCONTINUED | OUTPATIENT
Start: 2018-04-11 | End: 2018-04-11 | Stop reason: HOSPADM

## 2018-04-11 RX ORDER — NALOXONE HCL 0.4 MG/ML
0.2 VIAL (ML) INJECTION AS NEEDED
Status: DISCONTINUED | OUTPATIENT
Start: 2018-04-11 | End: 2018-04-11 | Stop reason: HOSPADM

## 2018-04-11 RX ORDER — CEFAZOLIN SODIUM 2 G/100ML
2 INJECTION, SOLUTION INTRAVENOUS ONCE
Status: COMPLETED | OUTPATIENT
Start: 2018-04-11 | End: 2018-04-11

## 2018-04-11 RX ORDER — ACETAMINOPHEN 500 MG
1000 TABLET ORAL ONCE
Status: COMPLETED | OUTPATIENT
Start: 2018-04-11 | End: 2018-04-11

## 2018-04-11 RX ORDER — PROMETHAZINE HYDROCHLORIDE 25 MG/ML
12.5 INJECTION, SOLUTION INTRAMUSCULAR; INTRAVENOUS ONCE AS NEEDED
Status: COMPLETED | OUTPATIENT
Start: 2018-04-11 | End: 2018-04-11

## 2018-04-11 RX ORDER — LIDOCAINE HYDROCHLORIDE 20 MG/ML
INJECTION, SOLUTION INFILTRATION; PERINEURAL AS NEEDED
Status: DISCONTINUED | OUTPATIENT
Start: 2018-04-11 | End: 2018-04-11 | Stop reason: SURG

## 2018-04-11 RX ORDER — BUPIVACAINE HYDROCHLORIDE 2.5 MG/ML
INJECTION, SOLUTION INFILTRATION; PERINEURAL AS NEEDED
Status: DISCONTINUED | OUTPATIENT
Start: 2018-04-11 | End: 2018-04-11 | Stop reason: HOSPADM

## 2018-04-11 RX ORDER — CELECOXIB 200 MG/1
400 CAPSULE ORAL ONCE
Status: COMPLETED | OUTPATIENT
Start: 2018-04-11 | End: 2018-04-11

## 2018-04-11 RX ORDER — HYDROCODONE BITARTRATE AND ACETAMINOPHEN 5; 325 MG/1; MG/1
1-2 TABLET ORAL EVERY 4 HOURS PRN
Qty: 20 TABLET | Refills: 0 | Status: SHIPPED | OUTPATIENT
Start: 2018-04-11 | End: 2018-04-24

## 2018-04-11 RX ORDER — DIAZEPAM 5 MG/ML
5 INJECTION, SOLUTION INTRAMUSCULAR; INTRAVENOUS ONCE
Status: DISCONTINUED | OUTPATIENT
Start: 2018-04-11 | End: 2018-04-11 | Stop reason: HOSPADM

## 2018-04-11 RX ORDER — DIPHENHYDRAMINE HYDROCHLORIDE 50 MG/ML
12.5 INJECTION INTRAMUSCULAR; INTRAVENOUS
Status: DISCONTINUED | OUTPATIENT
Start: 2018-04-11 | End: 2018-04-11 | Stop reason: HOSPADM

## 2018-04-11 RX ORDER — SODIUM CHLORIDE 0.9 % (FLUSH) 0.9 %
1-10 SYRINGE (ML) INJECTION AS NEEDED
Status: DISCONTINUED | OUTPATIENT
Start: 2018-04-11 | End: 2018-04-11 | Stop reason: HOSPADM

## 2018-04-11 RX ORDER — LABETALOL HYDROCHLORIDE 5 MG/ML
5 INJECTION, SOLUTION INTRAVENOUS
Status: DISCONTINUED | OUTPATIENT
Start: 2018-04-11 | End: 2018-04-11 | Stop reason: HOSPADM

## 2018-04-11 RX ORDER — PROMETHAZINE HYDROCHLORIDE 25 MG/1
12.5 TABLET ORAL ONCE AS NEEDED
Status: DISCONTINUED | OUTPATIENT
Start: 2018-04-11 | End: 2018-04-11 | Stop reason: HOSPADM

## 2018-04-11 RX ORDER — MEPERIDINE HYDROCHLORIDE 25 MG/ML
12.5 INJECTION INTRAMUSCULAR; INTRAVENOUS; SUBCUTANEOUS
Status: DISCONTINUED | OUTPATIENT
Start: 2018-04-11 | End: 2018-04-11 | Stop reason: HOSPADM

## 2018-04-11 RX ORDER — OXYCODONE AND ACETAMINOPHEN 7.5; 325 MG/1; MG/1
1 TABLET ORAL ONCE AS NEEDED
Status: DISCONTINUED | OUTPATIENT
Start: 2018-04-11 | End: 2018-04-11 | Stop reason: HOSPADM

## 2018-04-11 RX ORDER — MIDAZOLAM HYDROCHLORIDE 1 MG/ML
2 INJECTION INTRAMUSCULAR; INTRAVENOUS
Status: DISCONTINUED | OUTPATIENT
Start: 2018-04-11 | End: 2018-04-11 | Stop reason: HOSPADM

## 2018-04-11 RX ORDER — LIDOCAINE HYDROCHLORIDE 10 MG/ML
0.5 INJECTION, SOLUTION EPIDURAL; INFILTRATION; INTRACAUDAL; PERINEURAL ONCE AS NEEDED
Status: DISCONTINUED | OUTPATIENT
Start: 2018-04-11 | End: 2018-04-11 | Stop reason: HOSPADM

## 2018-04-11 RX ORDER — MIDAZOLAM HYDROCHLORIDE 1 MG/ML
1 INJECTION INTRAMUSCULAR; INTRAVENOUS
Status: DISCONTINUED | OUTPATIENT
Start: 2018-04-11 | End: 2018-04-11 | Stop reason: HOSPADM

## 2018-04-11 RX ORDER — FLUMAZENIL 0.1 MG/ML
0.2 INJECTION INTRAVENOUS AS NEEDED
Status: DISCONTINUED | OUTPATIENT
Start: 2018-04-11 | End: 2018-04-11 | Stop reason: HOSPADM

## 2018-04-11 RX ORDER — DEXAMETHASONE SODIUM PHOSPHATE 10 MG/ML
INJECTION INTRAMUSCULAR; INTRAVENOUS AS NEEDED
Status: DISCONTINUED | OUTPATIENT
Start: 2018-04-11 | End: 2018-04-11 | Stop reason: SURG

## 2018-04-11 RX ORDER — FAMOTIDINE 10 MG/ML
20 INJECTION, SOLUTION INTRAVENOUS ONCE
Status: COMPLETED | OUTPATIENT
Start: 2018-04-11 | End: 2018-04-11

## 2018-04-11 RX ORDER — FENTANYL CITRATE 50 UG/ML
INJECTION, SOLUTION INTRAMUSCULAR; INTRAVENOUS AS NEEDED
Status: DISCONTINUED | OUTPATIENT
Start: 2018-04-11 | End: 2018-04-11 | Stop reason: SURG

## 2018-04-11 RX ADMIN — CEFAZOLIN SODIUM 2 G: 2 INJECTION, SOLUTION INTRAVENOUS at 11:45

## 2018-04-11 RX ADMIN — HYDROMORPHONE HYDROCHLORIDE 0.5 MG: 2 INJECTION INTRAMUSCULAR; INTRAVENOUS; SUBCUTANEOUS at 14:44

## 2018-04-11 RX ADMIN — PROPOFOL 200 MG: 10 INJECTION, EMULSION INTRAVENOUS at 11:41

## 2018-04-11 RX ADMIN — MIDAZOLAM 1 MG: 1 INJECTION INTRAMUSCULAR; INTRAVENOUS at 11:22

## 2018-04-11 RX ADMIN — HYDROMORPHONE HYDROCHLORIDE 0.5 MG: 2 INJECTION INTRAMUSCULAR; INTRAVENOUS; SUBCUTANEOUS at 14:15

## 2018-04-11 RX ADMIN — LIDOCAINE HYDROCHLORIDE 100 MG: 20 INJECTION, SOLUTION INFILTRATION; PERINEURAL at 11:41

## 2018-04-11 RX ADMIN — FENTANYL CITRATE 25 MCG: 50 INJECTION INTRAMUSCULAR; INTRAVENOUS at 12:01

## 2018-04-11 RX ADMIN — DIAZEPAM 10 MG: 5 TABLET ORAL at 09:23

## 2018-04-11 RX ADMIN — SODIUM CHLORIDE, POTASSIUM CHLORIDE, SODIUM LACTATE AND CALCIUM CHLORIDE 9 ML/HR: 600; 310; 30; 20 INJECTION, SOLUTION INTRAVENOUS at 17:54

## 2018-04-11 RX ADMIN — DEXAMETHASONE SODIUM PHOSPHATE 6 MG: 10 INJECTION INTRAMUSCULAR; INTRAVENOUS at 11:57

## 2018-04-11 RX ADMIN — LIDOCAINE HYDROCHLORIDE 3 ML: 10 INJECTION, SOLUTION INFILTRATION; PERINEURAL at 10:02

## 2018-04-11 RX ADMIN — FENTANYL CITRATE 50 MCG: 50 INJECTION INTRAMUSCULAR; INTRAVENOUS at 11:38

## 2018-04-11 RX ADMIN — FENTANYL CITRATE 25 MCG: 50 INJECTION INTRAMUSCULAR; INTRAVENOUS at 13:09

## 2018-04-11 RX ADMIN — HYDROMORPHONE HYDROCHLORIDE 0.5 MG: 2 INJECTION INTRAMUSCULAR; INTRAVENOUS; SUBCUTANEOUS at 14:38

## 2018-04-11 RX ADMIN — ACETAMINOPHEN 1000 MG: 500 TABLET ORAL at 09:23

## 2018-04-11 RX ADMIN — MEPERIDINE HYDROCHLORIDE 12.5 MG: 25 INJECTION INTRAMUSCULAR; INTRAVENOUS; SUBCUTANEOUS at 16:23

## 2018-04-11 RX ADMIN — FAMOTIDINE 20 MG: 10 INJECTION INTRAVENOUS at 10:56

## 2018-04-11 RX ADMIN — ONDANSETRON 4 MG: 2 INJECTION INTRAMUSCULAR; INTRAVENOUS at 14:45

## 2018-04-11 RX ADMIN — CELECOXIB 400 MG: 200 CAPSULE ORAL at 09:23

## 2018-04-11 RX ADMIN — SODIUM CHLORIDE, POTASSIUM CHLORIDE, SODIUM LACTATE AND CALCIUM CHLORIDE 9 ML/HR: 600; 310; 30; 20 INJECTION, SOLUTION INTRAVENOUS at 10:56

## 2018-04-11 RX ADMIN — FENTANYL CITRATE 50 MCG: 50 INJECTION INTRAMUSCULAR; INTRAVENOUS at 13:55

## 2018-04-11 RX ADMIN — PROMETHAZINE HYDROCHLORIDE 12.5 MG: 25 INJECTION INTRAMUSCULAR; INTRAVENOUS at 15:50

## 2018-04-11 RX ADMIN — FENTANYL CITRATE 50 MCG: 50 INJECTION INTRAMUSCULAR; INTRAVENOUS at 14:05

## 2018-04-11 RX ADMIN — TECHNETIUM TC 99M SULFUR COLLOID 1 DOSE: KIT at 10:10

## 2018-04-11 NOTE — ANESTHESIA POSTPROCEDURE EVALUATION
"Patient: Cesia Workman    Procedure Summary     Date:  04/11/18 Room / Location:  [facility] OR 06 / [facility] MAIN OR    Anesthesia Start:  1135 Anesthesia Stop:  1341    Procedure:  BREAST LUMPECTOMY WITH SENTINEL NODE BIOPSY AND NEEDLE LOCALIZATION (Left Breast) Diagnosis:       Malignant neoplasm of upper-inner quadrant of left breast in female, estrogen receptor positive      (Malignant neoplasm of upper-inner quadrant of left breast in female, estrogen receptor positive [C50.212, Z17.0])    Surgeon:  Adrián Pereyra MD Provider:  Jame Bagley MD    Anesthesia Type:  general ASA Status:  2          Anesthesia Type: general  Last vitals  BP   128/88 (04/11/18 1830)   Temp   36.7 °C (98.1 °F) (04/11/18 1800)   Pulse   88 (04/11/18 1830)   Resp   16 (04/11/18 1830)     SpO2   100 % (04/11/18 1830)     Post Anesthesia Care and Evaluation    Patient location during evaluation: bedside  Patient participation: complete - patient participated  Level of consciousness: awake and alert  Pain management: adequate  Airway patency: patent  Anesthetic complications: No anesthetic complications    Cardiovascular status: acceptable  Respiratory status: acceptable  Hydration status: acceptable    Comments: /88   Pulse 88   Temp 36.7 °C (98.1 °F) (Oral)   Resp 16   Ht 157.5 cm (62\")   Wt 67.7 kg (149 lb 3.2 oz)   LMP 03/26/2018   SpO2 100%   BMI 27.29 kg/m²           "

## 2018-04-11 NOTE — ANESTHESIA PREPROCEDURE EVALUATION
Anesthesia Evaluation     Patient summary reviewed and Nursing notes reviewed   no history of anesthetic complications:  NPO Solid Status: > 8 hours  NPO Liquid Status: > 2 hours           Airway   Mallampati: II  TM distance: >3 FB  Neck ROM: full  Dental - normal exam     Pulmonary - normal exam   (+) a smoker Former,   Cardiovascular - normal exam    ECG reviewed    (+) dysrhythmias PVC,       Neuro/Psych  (+) headaches,     GI/Hepatic/Renal/Endo - negative ROS     Musculoskeletal (-) negative ROS    Abdominal    Substance History      OB/GYN negative ob/gyn ROS         Other      history of cancer                    Anesthesia Plan    ASA 2     general     Anesthetic plan and risks discussed with patient.

## 2018-04-11 NOTE — H&P
History of Present Illness:  Patient presents with a newly diagnosed left breast cancer.  She has been obtaining her yearly mammograms.  Some suspicious microcalcifications were noted in the 11 o'clock position of the left breast.  There was also the suggestion of a 5 mm mass associated with these calcifications.  These had seemed to have increased from previous mammograms.  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  It was ER positive at 65%, MS positive at 96%, and HER-2 negative.  The Ki-67 was 1.9%.  The patient has no prior history of breast biopsies.  She does have 3 sons and is a nonsmoker.  She has not taken any hormone replacement therapy and is currently still having menstrual periods.        Review of Systems:      Skin:        The patient denies any noticeable changes to the skin of the breast.                    History   Smoking Status   • Former Smoker   • Packs/day: 0.50   • Years: 3.00   • Types: Cigarettes   • Start date: 1987   • Quit date: 1990   Smokeless Tobacco   • Not on file           Surgical History   History reviewed. No pertinent surgical history.        Medical History        Past Medical History:   Diagnosis Date   • Pre-diabetes              Prior Hospitalizations, other than for surgery or childbirth, and year:  None     Social History:  Patient is .  Patient has 3 sons.          Vital Signs:Blood pressure 104/71, pulse 69, temperature 97.9    Medications:     Current Outpatient Prescriptions:      Current Outpatient Prescriptions:   •  ALPRAZolam (XANAX) 0.25 MG tablet, Take 0.25 mg by mouth 2 (Two) Times a Day As Needed for Anxiety., Disp: , Rfl:   •  ibuprofen (ADVIL,MOTRIN) 600 MG tablet, Take 600 mg by mouth Every 6 (Six) Hours As Needed for Mild Pain ., Disp: , Rfl:   •  vitamin D (ERGOCALCIFEROL) 88565 units capsule capsule, Take 50,000 Units by mouth 1 (One) Time Per Week., Disp: , Rfl:      Physical  Examination:  General Appearance:   Patient is in no distress.  She is well kept and has an average build.   Psychiatric:  Patient with appropriate mood and affect. Alert and oriented to self, time, and place.          Breast, LEFT:  medium sized, she has a guidewire in place with a cup over this.  There is a small incision in the 12 o'clock position.  Immediately inferior to this there is an ill-defined firmness likely related to the biopsy.         Cardiovascular:  Heart rate and rhythm are regular.  Respiratory:  Lungs are clear bilaterally with no crackles or wheezes in any lung field.  Gastrointestinal:  Abdomen is soft, nondistended, and nontender.  There was no evidence of hepatosplenomegaly or abdominal mass.  There are no scars from previous surgery.          Assessment:  1. Malignant neoplasm of upper-inner quadrant of left breast in female, estrogen receptor positive -The patient has undergone an MRI which suggests suspicious enhancement measuring 1.6 cm in the superior to inferior direction.  There are no other masses in the left breast on the right side.  The axilla also looks okay.       Plan-the patient appears to be a candidate for breast conserving surgery which she desires.  The patient does understand the small chance of returning to surgery for positive margin or potentially taking more lymph nodes in several days after the path report returns.  She also understands the small risk of infection, bleeding, and anesthesia.

## 2018-04-11 NOTE — ANESTHESIA PROCEDURE NOTES
Airway  Urgency: elective    Airway not difficult    General Information and Staff    Patient location during procedure: OR  Anesthesiologist: SUBHASH VICENTE  CRNA: ANGY CABALLERO    Indications and Patient Condition  Indications for airway management: airway protection    Preoxygenated: yes  Mask difficulty assessment: 0 - not attempted    Final Airway Details  Final airway type: supraglottic airway      Successful airway: classic  Size 4    Number of attempts at approach: 1    Additional Comments  Smooth IV induction. LMA inserted with ease. Cuff up. LMA secured BEBS

## 2018-04-11 NOTE — OP NOTE
Needle Localization Breast lumpectomy, [place] Node Biopsy Procedure Note    Name: Cesia Workman  Age: 47 y.o.  Sex: female  :  1970  MRN: [id number]      Pre-operative Diagnosis:leftBreast cancer, clinical  stage IA    Post-operative Diagnosis: Same    Procedure:left Needle Localization Breast lumpectomy, [place] Node Biopsy with blue dye and radioactive sulfur colloid injection    Surgeon: Adrián Pereyra MD    Assistants: None    Anesthesia: General    Indications: This patient recently was diagnosed with left breast cancer after presenting with a mammogram abnormality.  Biopsy has revealed invasive ductal cancer measuring approximately 1.6 cm in greatest dimension by MRI.  She appears to be a good candidate for breast conserving surgery and is here for that.      Procedure Details   The patient was seen in the Holding Room. The risks, benefits, complications, treatment options, and expected outcomes were discussed with the patient. The possibilities of reaction to medication, pulmonary aspiration, bleeding, infection, the need for additional procedures, failure to diagnose a condition, and creating a complication requiring transfusion or operation were discussed with the patient. The patient concurred with the proposed plan, giving informed consent.  The site of surgery properly noted/marked.    The patient underwent preoperative guidewire localization of a mammographic abnormality in the  upper inner aspect of the left breast.  The patient was also taken to the nuclear med department where a radioisotope injection was performed in the periareolar area of the affected breast  in the usual fashion.     The patient was then taken to Operating Room; identified patient as Cesia Workman  and the procedure verified as leftNeedle Localization  Breast lumpectomy, with sentinel node biopsy, possible axillary dissection.   A Time Out was held and the above information confirmed.    5 cc of blue dye  were injected into the breast near the localization site.     The breast was prepped and draped in standard fashion. Marcaine  0.50% with epinephrine was used to anesthetize the skin at the site of the guidewire placement and in the axilla.  A total of 10 cc was used.  An axillary incision was made in the area of the hotspot, found with the neoprobe.  2 sentinel node(s) was/were found.  The lymph node with the highest counts was 1030 and it was blue.  The second lymph node was also blue and had counts as high as 121. . No other nodes were found with counts over 100 and there were no other blue nodes.  Frozen section was performed.   Skin closure was performed with vicryl.     A curvilinear incision was created on the breast near the localization site.  Dissection was carried down to the localized area which was completely excised.  A specimen radiograph confirmed inclusion of the preoperatively identified mammographic lesion/ clip.  Additional shave margins were obtained from areas thought to be close.  This included the anterior, superior, and lateral margins.. Hemostasis was achieved with cautery.  Closure was performed  with interrupted 3-0 Vicryl sutures for the deeper layers and a 4-0 Vicryl subcuticular closure.      Steri-Strips were applied. At the end of the operation, all sponge, instrument, and needle counts were correct.    Findings:  There were no unexpected findings.  Estimated Blood Loss:  Minimal           Drains: none          Specimens:   ID Type Source Tests Collected by Time   A : LEFT BREAST MASS Tissue Breast, Left TISSUE PATHOLOGY EXAM Adrián Pereyra MD 4/11/2018 1219   B : ANTERIOR MARGIN-STITCH ИРИНА NEW MARGIN Tissue Breast, Left TISSUE PATHOLOGY EXAM Adrián Pereyra MD 4/11/2018 1225   C : SUPERIOR MARGIN-STITCH ИРИНА NEW MARGIN Tissue Breast, Left TISSUE PATHOLOGY EXAM Adrián Pereyra MD 4/11/2018 1227   D : LATERAL MARGIN-STITCH ИРИНА NEW MARGIN Tissue Breast, Left TISSUE  PATHOLOGY EXAM Adrián Pereyra MD 4/11/2018 1229   E : SENTINEL NODE #1 Tissue [place] Lymph Node TISSUE PATHOLOGY EXAM Adrián Pereyra MD 4/11/2018 1304   F : SENTINEL NODE #2 Tissue [place] Lymph Node TISSUE PATHOLOGY EXAM Adrián Pereyra MD 4/11/2018 1310       Complications:  None; patient tolerated the procedure well.           Condition: stable

## 2018-04-13 ENCOUNTER — TELEPHONE (OUTPATIENT)
Dept: MAMMOGRAPHY | Facility: CLINIC | Age: 48
End: 2018-04-13

## 2018-04-16 ENCOUNTER — TELEPHONE (OUTPATIENT)
Dept: MAMMOGRAPHY | Facility: CLINIC | Age: 48
End: 2018-04-16

## 2018-04-16 DIAGNOSIS — C50.212 MALIGNANT NEOPLASM OF UPPER-INNER QUADRANT OF LEFT BREAST IN FEMALE, ESTROGEN RECEPTOR POSITIVE (HCC): Primary | ICD-10-CM

## 2018-04-16 DIAGNOSIS — Z17.0 MALIGNANT NEOPLASM OF UPPER-INNER QUADRANT OF LEFT BREAST IN FEMALE, ESTROGEN RECEPTOR POSITIVE (HCC): Primary | ICD-10-CM

## 2018-04-16 NOTE — TELEPHONE ENCOUNTER
We talked a little bit more about her path report today.  We have nice margins around her invasive cancer.  The medial margin for her DCIS is 5 mm.  I do not feel that it would be beneficial to improve upon that margin at the risk of creating a poor cosmetic outcome

## 2018-04-24 ENCOUNTER — OFFICE VISIT (OUTPATIENT)
Dept: MAMMOGRAPHY | Facility: CLINIC | Age: 48
End: 2018-04-24

## 2018-04-24 VITALS
TEMPERATURE: 98.2 F | OXYGEN SATURATION: 98 % | HEART RATE: 81 BPM | DIASTOLIC BLOOD PRESSURE: 65 MMHG | SYSTOLIC BLOOD PRESSURE: 125 MMHG

## 2018-04-24 DIAGNOSIS — C50.212 MALIGNANT NEOPLASM OF UPPER-INNER QUADRANT OF LEFT BREAST IN FEMALE, ESTROGEN RECEPTOR POSITIVE (HCC): Primary | ICD-10-CM

## 2018-04-24 DIAGNOSIS — Z17.0 MALIGNANT NEOPLASM OF UPPER-INNER QUADRANT OF LEFT BREAST IN FEMALE, ESTROGEN RECEPTOR POSITIVE (HCC): Primary | ICD-10-CM

## 2018-04-24 PROCEDURE — 99024 POSTOP FOLLOW-UP VISIT: CPT | Performed by: SURGERY

## 2018-04-24 NOTE — PROGRESS NOTES
Chief Complaint: Cesia Workman is a  47 y.o. female, initially referred by No ref. provider found , who is here today for a postoperative visit.    History of Present Illness:  In the interim,Cesia Workman has had the following procedure and resultant pathology report: She has undergone breast conserving surgery.  The lumpectomy specimen reveals an 8 mm grade 2 invasive ductal cancer.  The closest margin to the invasive cancer is the inferior margin at 1.5 mm.  Also has some high-grade associated DCIS spanning an area of 16 mm.  The medial and lateral margins were both close but clear.  An additional lateral margin was taken at the time of surgery and that was benign.  We have already taken out a 4 cm lumpectomy specimen in the upper inner quadrant.  I'm fearful that additional surgery will create a cosmetic deformity.  She continues to have a fair amount of discomfort at the operative site but is very slowly improving.    She has noted no redness, warmth,drainage, swelling at the incision site. Denies fever or chills.      Current Outpatient Prescriptions:   •  ALPRAZolam (XANAX) 0.25 MG tablet, Take 0.25 mg by mouth 2 (Two) Times a Day As Needed for Anxiety., Disp: , Rfl:   •  HYDROcodone-acetaminophen (NORCO) 5-325 MG per tablet, 1-2 tabs q 6 hours prn pain.  Maximum tabs 6 daily, Disp: 25 tablet, Rfl: 0  •  ibuprofen (ADVIL,MOTRIN) 600 MG tablet, Take 600 mg by mouth Every 6 (Six) Hours As Needed for Mild Pain . HELD FOR SURGERY, Disp: , Rfl:   •  promethazine (PHENERGAN) 25 MG tablet, Take 1 tablet by mouth Every 6 (Six) Hours As Needed for Nausea or Vomiting for up to 10 doses., Disp: 10 tablet, Rfl: 0  •  vitamin D (ERGOCALCIFEROL) 00030 units capsule capsule, Take 50,000 Units by mouth Every 7 (Seven) Days., Disp: , Rfl:   Physical examination  Left breast-the lumpectomy scar is healing nicely with the usual amount of firmness all around this site.  The axillary incision is also doing well again with  the typical firmness but no signs of infection.  Assessment:  Left breast cancer status post breast conserving surgery.  The patient will be sent to medical oncology and already has an appointment.  From there she will go to radiation.    Plan:  I would like to see her back in the office in 2 months.          EMR Dragon/transcription disclaimer:    Much of this encounter note is an electronic transcription/translocation of spoken language to printed text.  The electronic translation of spoken language may permit erroneous, or at times, nonsensical words or phrases to be inadvertently transcribed.  Although I have reviewed the note from such areas, some may still exist.

## 2018-04-26 ENCOUNTER — LAB (OUTPATIENT)
Dept: LAB | Facility: HOSPITAL | Age: 48
End: 2018-04-26

## 2018-04-26 ENCOUNTER — APPOINTMENT (OUTPATIENT)
Dept: ONCOLOGY | Facility: CLINIC | Age: 48
End: 2018-04-26

## 2018-04-26 ENCOUNTER — CONSULT (OUTPATIENT)
Dept: ONCOLOGY | Facility: CLINIC | Age: 48
End: 2018-04-26

## 2018-04-26 VITALS
OXYGEN SATURATION: 100 % | HEIGHT: 63 IN | TEMPERATURE: 97.8 F | SYSTOLIC BLOOD PRESSURE: 106 MMHG | BODY MASS INDEX: 26.75 KG/M2 | WEIGHT: 151 LBS | DIASTOLIC BLOOD PRESSURE: 70 MMHG | HEART RATE: 69 BPM

## 2018-04-26 DIAGNOSIS — Z17.0 MALIGNANT NEOPLASM OF UPPER-INNER QUADRANT OF LEFT BREAST IN FEMALE, ESTROGEN RECEPTOR POSITIVE (HCC): Primary | ICD-10-CM

## 2018-04-26 DIAGNOSIS — C50.212 MALIGNANT NEOPLASM OF UPPER-INNER QUADRANT OF LEFT BREAST IN FEMALE, ESTROGEN RECEPTOR POSITIVE (HCC): Primary | ICD-10-CM

## 2018-04-26 LAB
BASOPHILS # BLD AUTO: 0.09 10*3/MM3 (ref 0–0.1)
BASOPHILS NFR BLD AUTO: 0.7 % (ref 0–1.1)
DEPRECATED RDW RBC AUTO: 39.8 FL (ref 37–49)
EOSINOPHIL # BLD AUTO: 0.21 10*3/MM3 (ref 0–0.36)
EOSINOPHIL NFR BLD AUTO: 1.6 % (ref 1–5)
ERYTHROCYTE [DISTWIDTH] IN BLOOD BY AUTOMATED COUNT: 12.1 % (ref 11.7–14.5)
HCT VFR BLD AUTO: 39.3 % (ref 34–45)
HGB BLD-MCNC: 13 G/DL (ref 11.5–14.9)
IMM GRANULOCYTES # BLD: 0.06 10*3/MM3 (ref 0–0.03)
IMM GRANULOCYTES NFR BLD: 0.5 % (ref 0–0.5)
LYMPHOCYTES # BLD AUTO: 2.76 10*3/MM3 (ref 1–3.5)
LYMPHOCYTES NFR BLD AUTO: 21.6 % (ref 20–49)
MCH RBC QN AUTO: 29.9 PG (ref 27–33)
MCHC RBC AUTO-ENTMCNC: 33.1 G/DL (ref 32–35)
MCV RBC AUTO: 90.3 FL (ref 83–97)
MONOCYTES # BLD AUTO: 0.85 10*3/MM3 (ref 0.25–0.8)
MONOCYTES NFR BLD AUTO: 6.7 % (ref 4–12)
NEUTROPHILS # BLD AUTO: 8.79 10*3/MM3 (ref 1.5–7)
NEUTROPHILS NFR BLD AUTO: 68.9 % (ref 39–75)
NRBC BLD MANUAL-RTO: 0 /100 WBC (ref 0–0)
PLATELET # BLD AUTO: 274 10*3/MM3 (ref 150–375)
PMV BLD AUTO: 10.3 FL (ref 8.9–12.1)
RBC # BLD AUTO: 4.35 10*6/MM3 (ref 3.9–5)
WBC NRBC COR # BLD: 12.76 10*3/MM3 (ref 4–10)

## 2018-04-26 PROCEDURE — 99245 OFF/OP CONSLTJ NEW/EST HI 55: CPT | Performed by: INTERNAL MEDICINE

## 2018-04-26 PROCEDURE — 85025 COMPLETE CBC W/AUTO DIFF WBC: CPT | Performed by: INTERNAL MEDICINE

## 2018-04-26 PROCEDURE — 36415 COLL VENOUS BLD VENIPUNCTURE: CPT | Performed by: INTERNAL MEDICINE

## 2018-04-26 NOTE — PROGRESS NOTES
Subjective     REASON FOR CONSULTATION:  T1bN0 M0 strongly ER/RI positive HER-2 negative grade 2 left breast cancer post lumpectomy with close margins for DCIS  Provide an opinion on any further workup or treatment                             REQUESTING PHYSICIAN:  Adrián Pereyra M.D. and Nikita Gallegos M.D.    RECORDS OBTAINED:  Records of the patients history including those obtained from the referring provider were reviewed and summarized in detail.    HISTORY OF PRESENT ILLNESS:  The patient is a 47 y.o. year old female who is here for an opinion about the above issue.    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  A diagnostic mammogram gram showed a 2.3 cm area of clustered microcalcifications very suspicious for malignancy and the patient underwent a stereotactic biopsy on 2/14/18 with the findings of intermediate grade invasive mammary carcinoma measuring 2 mm with associated high-grade and solid DCIS--ER was 65% RI was 96% HER-2 0 Ki-67 was 2%.  The patient was referred to Dr. Pereyra and underwent bilateral breast MRIs with the findings of a 2.1 cm abnormality in the left breast with no axillary adenopathy and she was taken for surgery when lumpectomy and sentinel node biopsy was performed on 4/11/18.  This revealed a residual 8 mm area of invasive carcinoma grade 2 with associated high-grade DCIS spanning 16 mm-the lateral and medial margins were 0.5 mm from the DCIS-new anterior, superior and lateral margins were obtained which were negative but the medial margin was still felt to be close.  She went to see Dr. Pereyra to wanted to discuss further the status of her margins but sent her to us also to discuss adjuvant treatment for her invasive cancer.    She is healing well  from surgery.  She is obviously anxious about the margins but has no qualms about having further surgery even if the cosmesis is not perfect because she is more worried about recurrence of her cancer.  She is  3 para 3 -Menarche was age 14 she is premenopausal first childbirth was age 28 she breast-fed for 9 months she's been on no hormonal replacement since her last child 13 years ago  She is having bladder issues and her gynecologist just prescribed vaginal estrogens which she has not yet started.    Family history is positive for her father having thyroid cancer age 64 and he is currently dying from this is a 78 she's one sister is healthy her mother's 72 and healthy she has a maternal aunt with breast cancers at an unknown age paternal aunt with breast cancer in her 60s paternal grandfather with bone cancer and a maternal grandfather with bone cancer is no ovarian cancer in the family.      Past Medical History:   Diagnosis Date   • Anxiety    • Breast cancer 2018    Left breast, Upper Inner Quadrant, Invasive Mammary Carcinoma, Intermediate grade, measuring at least 0.2 cm in dimension, with associated high grade solid and cribiform ductal carcinoma in situ with comedonecrosis, ER/[place] positive, [id number] negative   • Heartburn    • Migraines    • Pre-diabetes    • PVC's (premature ventricular contractions)         Past Surgical History:   Procedure Laterality Date   • BREAST BIOPSY Left 2018    Left breast stereotactic biopsy w/ marker clip placement & positive radiograph-Dr. Lashaun Gamble, [facility]   • BREAST BIOPSY Left 2018    INCOMPLETE - Biopsy could not be completed as patient had severe vasovagal reaction & subsequent syncope-[facility]   • BREAST LUMPECTOMY WITH SENTINEL NODE BIOPSY Left 2018    Procedure: BREAST LUMPECTOMY WITH SENTINEL NODE BIOPSY AND NEEDLE LOCALIZATION;  Surgeon: Adrián Peeryra MD;  Location: [facility] OR;  Service: General        Current Outpatient  Prescriptions on File Prior to Visit   Medication Sig Dispense Refill   • ALPRAZolam (XANAX) 0.25 MG tablet Take 0.25 mg by mouth 2 (Two) Times a Day As Needed for Anxiety.     • ibuprofen (ADVIL,MOTRIN) 600 MG tablet Take 600 mg by mouth Every 6 (Six) Hours As Needed for Mild Pain . HELD FOR SURGERY     • vitamin D (ERGOCALCIFEROL) 62351 units capsule capsule Take 50,000 Units by mouth Every 7 (Seven) Days.     • HYDROcodone-acetaminophen (NORCO) 5-325 MG per tablet 1-2 tabs q 6 hours prn pain.  Maximum tabs 6 daily 25 tablet 0   • promethazine (PHENERGAN) 25 MG tablet Take 1 tablet by mouth Every 6 (Six) Hours As Needed for Nausea or Vomiting for up to 10 doses. 10 tablet 0     No current facility-administered medications on file prior to visit.         ALLERGIES:  No Known Allergies     Social History     Social History   • Marital status:      Spouse name: Nikita Workman   • Number of children: 3     Occupational History   • Occupational Therapist      Social History Main Topics   • Smoking status: Former Smoker     Packs/day: 0.50     Years: 2.00     Types: Cigarettes     Start date: 1988     Quit date: 1990   • Smokeless tobacco: Never Used   • Alcohol use Yes      Comment: occassional   • Drug use: No   • Sexual activity: Defer     Other Topics Concern   • Not on file        Family History   Problem Relation Age of Onset   • Breast cancer Maternal Aunt    • Breast cancer Paternal Aunt    • Hyperlipidemia Mother    • Asthma Father    • Diabetes Father    • Hearing loss Father    • Heart disease Father    • Hypertension Father    • Thyroid cancer Father      Follicular Tyroid Cancer   • Alcohol abuse Sister    • Depression Sister    • Malig Hyperthermia Neg Hx         Review of Systems   Gastrointestinal: Positive for abdominal pain (Intermittent felt to be gallbladder-related).   Genitourinary: Positive for difficulty urinating (Early prolapse) and urgency (Bladder pain and possible interstitial  "cystitis).   Neurological: Positive for dizziness (Occasional vertigo) and headaches (Chronic migraine headaches).        Objective     Vitals:    04/26/18 1434   BP: 106/70   Pulse: 69   Temp: 97.8 °F (36.6 °C)   SpO2: 100%  Comment: at rest   Weight: 68.5 kg (151 lb)   Height: 160 cm (63\")   PainSc: 5  Comment: pain from surgery     Current Status 4/26/2018   ECOG score 0       Physical Exam    GENERAL:  Well-developed, well-nourished in no acute distress.   SKIN:  Warm, dry without rashes, purpura or petechiae.  EYES:  Pupils equal, round and reactive to light.  EOMs intact.  Conjunctivae normal.  EARS:  Hearing intact.  NOSE:  Septum midline.  No excoriations or nasal discharge.  MOUTH:  Tongue is well-papillated; no stomatitis or ulcers.  Lips normal.  THROAT:  Oropharynx without lesions or exudates.  NECK:  Supple with good range of motion; no thyromegaly or masses, no JVD.  LYMPHATICS:  No cervical, supraclavicular, axillary or inguinal adenopathy.  CHEST:  Lungs clear to auscultation. Good airflow.  BREASTS: Right breast is benign left for shows a lumpectomy scar with resolving hematoma and no signs of infection  CARDIAC:  Regular rate and rhythm without murmurs, rubs or gallops. Normal S1,S2.  ABDOMEN:  Soft, nontender with no hepatosplenomegaly or masses.  EXTREMITIES:  No clubbing, cyanosis or edema.  NEUROLOGICAL:  Cranial Nerves II-XII grossly intact.  No focal neurological deficits.  PSYCHIATRIC:  Normal affect and mood.        RECENT LABS:  Hematology WBC   Date Value Ref Range Status   04/26/2018 12.76 (H) 4.00 - 10.00 10*3/mm3 Final     RBC   Date Value Ref Range Status   04/26/2018 4.35 3.90 - 5.00 10*6/mm3 Final     Hemoglobin   Date Value Ref Range Status   04/26/2018 13.0 11.5 - 14.9 g/dL Final     Hematocrit   Date Value Ref Range Status   04/26/2018 39.3 34.0 - 45.0 % Final     Platelets   Date Value Ref Range Status   04/26/2018 274 150 - 375 10*3/mm3 Final         IMPRESSION:  1. " "Biopsy-proven malignancy in the left breast at the 11:30 position  measuring on the order of 2.1 cm in greatest dimension. No other  suspicious findings are seen within the left breast and there is no  evidence for left axillary adenopathy.  2. There are no findings suspicious for malignancy in the right breast.     BI-RADS Category 6: Known biopsy-proven malignancy.     4/11/18  1. \"LEFT BREAST MASS,\" WIRE GUIDED LUMPECTOMY:             INVASIVE DUCTAL CARCINOMA, INTERMEDIATE GRADE, 8 MM, MARGINS CLEAR.             CLOSEST MARGIN TO INVASIVE TUMOR: 1.5 MM (INFERIOR).             ASSOCIATED HIGH GRADE DUCTAL CARCINOMA IN SITU SPANNING APPROXIMATELY 16 MM (FOUR                    BLOCKS x 4 MM PER BLOCK).             CLOSEST MARGIN TO DUCTAL CARCINOMA IN SITU: 0.5 MM (LATERAL AND MEDIAL MARGINS).             HORMONE RECEPTORS REPORTED ON PRIOR BIOPSY, [facility] [id number]. REPEAT/                   CONFIRMATORY STUDIES ARE AVAILABLE UPON REQUEST.                         BIOPSY SITE CHANGES.     NOTE: ADDITIONAL MARGINS SUBMITTED AS PARTS 2, 3, AND 4.        2. \"LEFT BREAST NEW ANTERIOR MARGIN\":              BENIGN FIBROADIPOSE TISSUE.              NEW MARGIN - NO ATYPIA.     3. \"LEFT BREAST NEW SUPERIOR MARGIN\":             BENIGN BREAST TISSUE.             NEW MARGIN - NO ATYPIA.     4. \"LEFT BREAST NEW LATERAL MARGIN\":             BENIGN BREAST TISSUE.             NEW MARGIN - NO ATYPIA.     5. \"SENTINEL NODE #1\":             LYMPH NODE, MULTIPLE STEP SECTIONS: NO METASTATIC CARCINOMA SEEN.     6. \"SENTINEL NODE #2\":             LYMPH NODE, MULTIPLE STEP SECTIONS: NO METASTATIC CARCINOMA SEEN.     [facility]/ IHC/a/CMK     CPT CODES:   1. 25095, 3260F  2. 48887  3. 24363  4. 37694  5. 79941  6. 33822  7. 45262                               Electronically signed by Darin Claire MD on 4/12/2018 at 1511   Synoptic Checklist   INVASIVE CARCINOMA OF THE BREAST   Breast Invasive - All Specimens   SPECIMEN "   Procedure  Excision (less than total mastectomy)   Specimen Laterality  Left   TUMOR   Histologic Type  Invasive carcinoma of no special type (ductal, not otherwise specified)   Histologic Grade (Jessie Histologic Score)     Glandular (Acinar) / Tubular Differentiation  Score 3 (< 10% of tumor area forming glandular / tubular structures)   Nuclear Pleomorphism  Score 2 (Cells larger than normal with open vesicular nuclei, visible nucleoli, and moderate variability in both size and shape)   Mitotic Rate  Score 1 (<=3 mitoses per mm2)   Overall Grade  Grade 2 (scores of 6 or 7)   Tumor Size  Greatest dimension of largest invasive focus > 1 mm (indicate exact measurement in Millimeters):: 8 mm   Tumor Focality  Single focus of invasive carcinoma   Ductal Carcinoma In Situ (DCIS)  DCIS is present in specimen   Ductal Carcinoma In Situ (DCIS)  Positive for EIC   Size (Extent) of DCIS  Estimated size (extent) of DCIS (greatest dimension using gross and microscopic evaluation) in Millimeters (mm) is at least: 16  mm   Nuclear Grade  Grade III (high)   Lymphovascular Invasion  Not identified   Treatment Effect  No known presurgical therapy   MARGINS   Invasive Carcinoma Margins  Uninvolved by invasive carcinoma   Distance from Closest Margin in Millimeters (mm)  Specify distance: 1.5 mm   Closest Margin  Inferior   DCIS Margins  Uninvolved by DCIS (DCIS present in specimen)   Distance of DCIS from Closest Margin in Millimeters (mm)  Specify distance: 0.5 mm   Closest Margin  Medial     Lateral   LYMPH NODES   Regional Lymph Nodes     Number of Lymph Nodes with Macrometastases (> 2 mm)  Specify number: 0   Number of Lymph Nodes with Micrometastases (> 0.2 mm to 2 mm and / or > 200 cells)  Specify number: 0   Number of Lymph Nodes with Isolated Tumor Cells (<= 0.2 mm and <= 200 cells)  Specify number: 0   Number of Lymph Nodes Examined  Specify number: 2   Number of Callao Nodes Examined  Specify number: 2    PATHOLOGIC STAGE CLASSIFICATION (pTNM)   Primary Tumor (Invasive Carcinoma) (pT)  pT1c: Tumor > 10 mm but <= 20 mm in greatest dimension   Modifier  (sn): Only sentinel node(s) evaluated. If 6 or more nodes (sentinel or nonsentinel) are removed, this modifier should not be used.   Category (pN)  pN0: No regional lymph node metastasis identified or ITCs only   .            Assessment/Plan   1.[id number] HER-2 negative grade 2 left breast cancer post lumpectomy and sentinel node biopsy-with close margins from high-grade DCIS medially  2.  Interstitial cystitis undergoing instillation of lidocaine/heparin in  the bladder intermittently  3.  Family history of thyroid and breast cancer?  Chek 2 mutation    Plan  1.  Oncotype DX score  2.  Genetic testing  3.  Discuss in our multi-disciplinary conference to see if further resection of the margins are needed or radiation would be adequate  4.  Discontinue Zoloft which he takes when necessary as it would interfere with the use of tamoxifen which would be the mainstay of treatment    I explained to Cesia and her  that her tumor is very unlikely to have a high Oncotype score and it is very likely that the mainstay of her treatment would be tamoxifen.  But we will send an Oncotype score because of the small chance that there is a higher recurrence risk than expected based on her pathology in tumor size.  I think it is very reasonable to get genetic testing because of her family history although it is unlikely to change our treatment.  I've asked her to hold off and vaginal estrogens for the time being and to discuss this with her gynecologist.  She will stop her Zoloft and we can prescribe Effexor if needed.  Follow-up in 2-3 weeks as been scheduled

## 2018-04-27 ENCOUNTER — TELEPHONE (OUTPATIENT)
Dept: ONCOLOGY | Facility: CLINIC | Age: 48
End: 2018-04-27

## 2018-04-27 NOTE — TELEPHONE ENCOUNTER
----- Message from Dick Gutierrez MD sent at 4/27/2018  4:12 PM EDT -----  Regarding: RE: TUMOR BOARD MEETING  She will be presented next week - cases are fixed by wed pm for Friday am conference  ----- Message -----  From: Romina Higgins RN  Sent: 4/27/2018   4:05 PM  To: Dick Gutierrez MD  Subject: TUMOR BOARD MEETING                              PT WANTING TO KNOW WHAT WAS DICUSSED AT THE TUMOR BOARD MEETING THIS AM. CAN YOU CALL HER OR LET TRIAGE KNOW AND WE CAN CALL HER NEXT WK. THANKS!

## 2018-04-27 NOTE — TELEPHONE ENCOUNTER
PT CALLING TO SEE WHAT WAS SAID AT THE TUMOR BOARD MEETING THIS AM. TOLD PT THAT MESSAGE WOULD BE SENT TO DR. BRADLEY AND WE WOULD GET BACK TO HER. PT V/U.

## 2018-04-27 NOTE — TELEPHONE ENCOUNTER
CALLED PT BACK TO LET HER KNOW THAT HER CASE WAS ACTUALLY NOT PRESENTED TODAY THAT IT WOULD BE NEXT WK. PT V/U.

## 2018-05-07 ENCOUNTER — TELEPHONE (OUTPATIENT)
Dept: MAMMOGRAPHY | Facility: CLINIC | Age: 48
End: 2018-05-07

## 2018-05-07 NOTE — TELEPHONE ENCOUNTER
The patient was presented at breast cancer conference last week and the consensus was to obtain a better medial margin for this high-grade DCIS.  The patient has already had a 4 cm specimen removed and it is also been about 4 weeks since her surgery.  I'm concerned about the accuracy of identifying that medial margin and this may require removing the old lumpectomy cavity.  If that is the case I think we would need plastics involved to help with closure.  I told her I would speak with Dr. Mchugh about the situation since he is already seen her before and knows her anatomy well.  In the meantime, we are awaiting the results of her Oncotype test.  If the patient does require chemotherapy we could consider doing the MediPort placement at the time of her breast surgery.

## 2018-05-09 ENCOUNTER — OFFICE VISIT (OUTPATIENT)
Dept: OTHER | Facility: HOSPITAL | Age: 48
End: 2018-05-09

## 2018-05-09 DIAGNOSIS — C50.212 MALIGNANT NEOPLASM OF UPPER-INNER QUADRANT OF LEFT BREAST IN FEMALE, ESTROGEN RECEPTOR POSITIVE (HCC): Primary | ICD-10-CM

## 2018-05-09 DIAGNOSIS — Z17.0 MALIGNANT NEOPLASM OF UPPER-INNER QUADRANT OF LEFT BREAST IN FEMALE, ESTROGEN RECEPTOR POSITIVE (HCC): Primary | ICD-10-CM

## 2018-05-09 NOTE — PROGRESS NOTES
"Oncology Nutrition Screening    Patient Name:  Cesia Workman  YOB: 1970  MRN: [id number]  Date:  05/09/18  Physician:  Hafsa Gutierrez    Type of Cancer Treatment:   Surgery:   Type:  S/p lumpectomy  Radiation/Cyberknife: pending  Chemotherapy:  pending    Patient Active Problem List   Diagnosis   • Malignant neoplasm of upper-inner quadrant of left breast in female, estrogen receptor positive       Current Outpatient Prescriptions   Medication Sig Dispense Refill   • ALPRAZolam (XANAX) 0.25 MG tablet Take 0.25 mg by mouth 2 (Two) Times a Day As Needed for Anxiety.     • Cetirizine HCl (ZYRTEC PO) Take  by mouth Daily.     • HYDROcodone-acetaminophen (NORCO) 5-325 MG per tablet 1-2 tabs q 6 hours prn pain.  Maximum tabs 6 daily 25 tablet 0   • ibuprofen (ADVIL,MOTRIN) 600 MG tablet Take 600 mg by mouth Every 6 (Six) Hours As Needed for Mild Pain . HELD FOR SURGERY     • promethazine (PHENERGAN) 25 MG tablet Take 1 tablet by mouth Every 6 (Six) Hours As Needed for Nausea or Vomiting for up to 10 doses. 10 tablet 0   • vitamin D (ERGOCALCIFEROL) 24906 units capsule capsule Take 50,000 Units by mouth Every 7 (Seven) Days.       No current facility-administered medications for this visit.          Weight:   Height: 63\"  Weight: 151 lbs.  Usual Body Weight:155 lbs.   BMI: 26.8   Weight has remained stable.     Oral Food Intake:  Regular Diet - No Restrictions    Hydration Status:   How many 8 ounce glass of water of fluid do you drink per day?  8    Nutrition Symptoms:   No Problems with Eating    Activity:   Normal with no limitations     reports that she quit smoking about 28 years ago. Her smoking use included Cigarettes. She started smoking about 30 years ago. She has a 1.00 pack-year smoking history. She has never used smokeless tobacco. She reports that she drinks alcohol. She reports that she does not use drugs.    Evaluation of Nutritional Risk:   Patient identified to be at nutritional risk " and/or for nutritional consultation; will follow patient through course of treatment.   Patient Education    Notes:  Met with patient in the cancer resource center.  Answered questions about nutrition and breast cancer during treatment and after treatment with emphasis on a plant based diet.  Provided a packet of info and reviewed it, including a plant based grocery list and menu, info about sugar and organic foods. Discussed the need to increase intake of fruits and vegetables, plant based protein sources, including beans, seeds and nuts, increasing whole grains, low fat dairy or plant based alternatives, decreasing intake of fat and refined sugars, processed foods.  Discussed anti-inflammatory foods, and the importance of weight management. Encouraged physical activity daily. Will be available as needed for any questions.      Electronically signed by:  Maryam Herrera RD  3:46 PM

## 2018-05-10 ENCOUNTER — TELEPHONE (OUTPATIENT)
Dept: ONCOLOGY | Facility: CLINIC | Age: 48
End: 2018-05-10

## 2018-05-10 NOTE — TELEPHONE ENCOUNTER
Pt calling our office wanting to possibly move her appt up to tomorrow or talk with Dr. Gutierrez on the phone.  Pt is wanting to know if she can go ahead and schedule surgery or if she is going to start chemotherapy.    Reviewed with Dr. Gutierrez.  Dr. Gutierrez will call patient and discuss with her.

## 2018-05-14 ENCOUNTER — PREP FOR SURGERY (OUTPATIENT)
Dept: OTHER | Facility: HOSPITAL | Age: 48
End: 2018-05-14

## 2018-05-14 ENCOUNTER — APPOINTMENT (OUTPATIENT)
Dept: OTHER | Facility: HOSPITAL | Age: 48
End: 2018-05-14

## 2018-05-14 ENCOUNTER — APPOINTMENT (OUTPATIENT)
Dept: ONCOLOGY | Facility: CLINIC | Age: 48
End: 2018-05-14

## 2018-05-14 DIAGNOSIS — C50.212 MALIGNANT NEOPLASM OF UPPER-INNER QUADRANT OF LEFT BREAST IN FEMALE, ESTROGEN RECEPTOR POSITIVE (HCC): Primary | ICD-10-CM

## 2018-05-14 DIAGNOSIS — Z17.0 MALIGNANT NEOPLASM OF UPPER-INNER QUADRANT OF LEFT BREAST IN FEMALE, ESTROGEN RECEPTOR POSITIVE (HCC): Primary | ICD-10-CM

## 2018-05-14 RX ORDER — CEFAZOLIN SODIUM 2 G/100ML
2 INJECTION, SOLUTION INTRAVENOUS ONCE
Status: CANCELLED | OUTPATIENT
Start: 2018-05-23 | End: 2018-05-23

## 2018-05-18 ENCOUNTER — APPOINTMENT (OUTPATIENT)
Dept: PREADMISSION TESTING | Facility: HOSPITAL | Age: 48
End: 2018-05-18

## 2018-05-18 ENCOUNTER — TELEPHONE (OUTPATIENT)
Dept: MAMMOGRAPHY | Facility: CLINIC | Age: 48
End: 2018-05-18

## 2018-05-18 VITALS
HEIGHT: 62 IN | TEMPERATURE: 98.2 F | SYSTOLIC BLOOD PRESSURE: 106 MMHG | WEIGHT: 149.9 LBS | HEART RATE: 60 BPM | DIASTOLIC BLOOD PRESSURE: 69 MMHG | BODY MASS INDEX: 27.58 KG/M2 | RESPIRATION RATE: 20 BRPM | OXYGEN SATURATION: 100 %

## 2018-05-18 RX ORDER — PROMETHAZINE HYDROCHLORIDE 25 MG/1
25 TABLET ORAL EVERY 6 HOURS PRN
Qty: 10 TABLET | Refills: 0 | Status: SHIPPED | OUTPATIENT
Start: 2018-05-18 | End: 2019-03-28

## 2018-05-18 NOTE — TELEPHONE ENCOUNTER
Pt requesting nausea medicine to be ordered before her next surgery next week. Pt needs to take before she can take her pain meds. Her side effects of pain meds include severe nausea and vomiting.     Dr. Pereyra agreed to write script and send to the pharmacy.     Pt notified.     Dimple Simon RN

## 2018-05-23 ENCOUNTER — ANESTHESIA EVENT (OUTPATIENT)
Dept: PERIOP | Facility: HOSPITAL | Age: 48
End: 2018-05-23

## 2018-05-23 ENCOUNTER — HOSPITAL ENCOUNTER (OUTPATIENT)
Facility: HOSPITAL | Age: 48
Setting detail: HOSPITAL OUTPATIENT SURGERY
Discharge: HOME | End: 2018-05-23
Attending: SURGERY | Admitting: SURGERY

## 2018-05-23 ENCOUNTER — ANESTHESIA (OUTPATIENT)
Dept: PERIOP | Facility: HOSPITAL | Age: 48
End: 2018-05-23

## 2018-05-23 VITALS
WEIGHT: 147.5 LBS | HEIGHT: 62 IN | DIASTOLIC BLOOD PRESSURE: 78 MMHG | HEART RATE: 68 BPM | RESPIRATION RATE: 16 BRPM | BODY MASS INDEX: 27.14 KG/M2 | OXYGEN SATURATION: 98 % | SYSTOLIC BLOOD PRESSURE: 110 MMHG | TEMPERATURE: 97.8 F

## 2018-05-23 DIAGNOSIS — C50.212 MALIGNANT NEOPLASM OF UPPER-INNER QUADRANT OF LEFT BREAST IN FEMALE, ESTROGEN RECEPTOR POSITIVE (HCC): ICD-10-CM

## 2018-05-23 DIAGNOSIS — Z17.0 MALIGNANT NEOPLASM OF UPPER-INNER QUADRANT OF LEFT BREAST IN FEMALE, ESTROGEN RECEPTOR POSITIVE (HCC): ICD-10-CM

## 2018-05-23 PROCEDURE — 25010000002 ONDANSETRON PER 1 MG: Performed by: NURSE ANESTHETIST, CERTIFIED REGISTERED

## 2018-05-23 PROCEDURE — 25010000002 DEXAMETHASONE PER 1 MG: Performed by: NURSE ANESTHETIST, CERTIFIED REGISTERED

## 2018-05-23 PROCEDURE — 25010000002 PROPOFOL 10 MG/ML EMULSION: Performed by: NURSE ANESTHETIST, CERTIFIED REGISTERED

## 2018-05-23 PROCEDURE — 25010000003 CEFAZOLIN IN DEXTROSE 2-4 GM/100ML-% SOLUTION: Performed by: SURGERY

## 2018-05-23 PROCEDURE — 19301 PARTIAL MASTECTOMY: CPT | Performed by: SURGERY

## 2018-05-23 PROCEDURE — 25010000002 MIDAZOLAM PER 1 MG: Performed by: ANESTHESIOLOGY

## 2018-05-23 PROCEDURE — 63710000001 PROMETHAZINE PER 25 MG: Performed by: NURSE ANESTHETIST, CERTIFIED REGISTERED

## 2018-05-23 PROCEDURE — 25010000002 PROPOFOL 1000 MG/ML EMULSION: Performed by: NURSE ANESTHETIST, CERTIFIED REGISTERED

## 2018-05-23 PROCEDURE — 88307 TISSUE EXAM BY PATHOLOGIST: CPT | Performed by: SURGERY

## 2018-05-23 PROCEDURE — 25010000002 KETOROLAC TROMETHAMINE PER 15 MG: Performed by: NURSE ANESTHETIST, CERTIFIED REGISTERED

## 2018-05-23 PROCEDURE — 25010000002 FENTANYL CITRATE (PF) 100 MCG/2ML SOLUTION: Performed by: NURSE ANESTHETIST, CERTIFIED REGISTERED

## 2018-05-23 RX ORDER — HYDROMORPHONE HYDROCHLORIDE 1 MG/ML
0.5 INJECTION, SOLUTION INTRAMUSCULAR; INTRAVENOUS; SUBCUTANEOUS
Status: DISCONTINUED | OUTPATIENT
Start: 2018-05-23 | End: 2018-05-23 | Stop reason: HOSPADM

## 2018-05-23 RX ORDER — MIDAZOLAM HYDROCHLORIDE 1 MG/ML
2 INJECTION INTRAMUSCULAR; INTRAVENOUS
Status: DISCONTINUED | OUTPATIENT
Start: 2018-05-23 | End: 2018-05-23 | Stop reason: HOSPADM

## 2018-05-23 RX ORDER — FENTANYL CITRATE 50 UG/ML
50 INJECTION, SOLUTION INTRAMUSCULAR; INTRAVENOUS
Status: DISCONTINUED | OUTPATIENT
Start: 2018-05-23 | End: 2018-05-23 | Stop reason: HOSPADM

## 2018-05-23 RX ORDER — LIDOCAINE HYDROCHLORIDE 20 MG/ML
INJECTION, SOLUTION INFILTRATION; PERINEURAL AS NEEDED
Status: DISCONTINUED | OUTPATIENT
Start: 2018-05-23 | End: 2018-05-23 | Stop reason: SURG

## 2018-05-23 RX ORDER — HYDROCODONE BITARTRATE AND ACETAMINOPHEN 7.5; 325 MG/1; MG/1
1 TABLET ORAL ONCE AS NEEDED
Status: DISCONTINUED | OUTPATIENT
Start: 2018-05-23 | End: 2018-05-23 | Stop reason: HOSPADM

## 2018-05-23 RX ORDER — LABETALOL HYDROCHLORIDE 5 MG/ML
5 INJECTION, SOLUTION INTRAVENOUS
Status: DISCONTINUED | OUTPATIENT
Start: 2018-05-23 | End: 2018-05-23 | Stop reason: HOSPADM

## 2018-05-23 RX ORDER — NALOXONE HCL 0.4 MG/ML
0.2 VIAL (ML) INJECTION AS NEEDED
Status: DISCONTINUED | OUTPATIENT
Start: 2018-05-23 | End: 2018-05-23 | Stop reason: HOSPADM

## 2018-05-23 RX ORDER — MIDAZOLAM HYDROCHLORIDE 1 MG/ML
1 INJECTION INTRAMUSCULAR; INTRAVENOUS
Status: DISCONTINUED | OUTPATIENT
Start: 2018-05-23 | End: 2018-05-23 | Stop reason: HOSPADM

## 2018-05-23 RX ORDER — SODIUM CHLORIDE 0.9 % (FLUSH) 0.9 %
1-10 SYRINGE (ML) INJECTION AS NEEDED
Status: DISCONTINUED | OUTPATIENT
Start: 2018-05-23 | End: 2018-05-23 | Stop reason: HOSPADM

## 2018-05-23 RX ORDER — PROMETHAZINE HYDROCHLORIDE 25 MG/ML
12.5 INJECTION, SOLUTION INTRAMUSCULAR; INTRAVENOUS ONCE AS NEEDED
Status: DISCONTINUED | OUTPATIENT
Start: 2018-05-23 | End: 2018-05-23 | Stop reason: HOSPADM

## 2018-05-23 RX ORDER — FAMOTIDINE 10 MG/ML
20 INJECTION, SOLUTION INTRAVENOUS ONCE
Status: COMPLETED | OUTPATIENT
Start: 2018-05-23 | End: 2018-05-23

## 2018-05-23 RX ORDER — DEXAMETHASONE SODIUM PHOSPHATE 10 MG/ML
INJECTION INTRAMUSCULAR; INTRAVENOUS AS NEEDED
Status: DISCONTINUED | OUTPATIENT
Start: 2018-05-23 | End: 2018-05-23 | Stop reason: SURG

## 2018-05-23 RX ORDER — ONDANSETRON 2 MG/ML
4 INJECTION INTRAMUSCULAR; INTRAVENOUS ONCE AS NEEDED
Status: DISCONTINUED | OUTPATIENT
Start: 2018-05-23 | End: 2018-05-23 | Stop reason: HOSPADM

## 2018-05-23 RX ORDER — FLUMAZENIL 0.1 MG/ML
0.2 INJECTION INTRAVENOUS AS NEEDED
Status: DISCONTINUED | OUTPATIENT
Start: 2018-05-23 | End: 2018-05-23 | Stop reason: HOSPADM

## 2018-05-23 RX ORDER — PROPOFOL 10 MG/ML
VIAL (ML) INTRAVENOUS AS NEEDED
Status: DISCONTINUED | OUTPATIENT
Start: 2018-05-23 | End: 2018-05-23 | Stop reason: SURG

## 2018-05-23 RX ORDER — KETOROLAC TROMETHAMINE 30 MG/ML
INJECTION, SOLUTION INTRAMUSCULAR; INTRAVENOUS AS NEEDED
Status: DISCONTINUED | OUTPATIENT
Start: 2018-05-23 | End: 2018-05-23 | Stop reason: SURG

## 2018-05-23 RX ORDER — SODIUM CHLORIDE, SODIUM LACTATE, POTASSIUM CHLORIDE, CALCIUM CHLORIDE 600; 310; 30; 20 MG/100ML; MG/100ML; MG/100ML; MG/100ML
9 INJECTION, SOLUTION INTRAVENOUS CONTINUOUS
Status: DISCONTINUED | OUTPATIENT
Start: 2018-05-23 | End: 2018-05-23 | Stop reason: HOSPADM

## 2018-05-23 RX ORDER — LIDOCAINE HYDROCHLORIDE 10 MG/ML
0.5 INJECTION, SOLUTION EPIDURAL; INFILTRATION; INTRACAUDAL; PERINEURAL ONCE AS NEEDED
Status: DISCONTINUED | OUTPATIENT
Start: 2018-05-23 | End: 2018-05-23 | Stop reason: HOSPADM

## 2018-05-23 RX ORDER — ACETAMINOPHEN 500 MG
1000 TABLET ORAL EVERY 6 HOURS PRN
COMMUNITY
End: 2019-03-28

## 2018-05-23 RX ORDER — DEXMEDETOMIDINE HYDROCHLORIDE 100 UG/ML
INJECTION, SOLUTION INTRAVENOUS AS NEEDED
Status: DISCONTINUED | OUTPATIENT
Start: 2018-05-23 | End: 2018-05-23

## 2018-05-23 RX ORDER — HYDRALAZINE HYDROCHLORIDE 20 MG/ML
5 INJECTION INTRAMUSCULAR; INTRAVENOUS
Status: DISCONTINUED | OUTPATIENT
Start: 2018-05-23 | End: 2018-05-23 | Stop reason: HOSPADM

## 2018-05-23 RX ORDER — PROMETHAZINE HYDROCHLORIDE 25 MG/1
12.5 TABLET ORAL ONCE AS NEEDED
Status: COMPLETED | OUTPATIENT
Start: 2018-05-23 | End: 2018-05-23

## 2018-05-23 RX ORDER — OXYCODONE AND ACETAMINOPHEN 7.5; 325 MG/1; MG/1
1 TABLET ORAL ONCE AS NEEDED
Status: COMPLETED | OUTPATIENT
Start: 2018-05-23 | End: 2018-05-23

## 2018-05-23 RX ORDER — PROMETHAZINE HYDROCHLORIDE 25 MG/1
25 SUPPOSITORY RECTAL ONCE AS NEEDED
Status: DISCONTINUED | OUTPATIENT
Start: 2018-05-23 | End: 2018-05-23 | Stop reason: HOSPADM

## 2018-05-23 RX ORDER — SCOLOPAMINE TRANSDERMAL SYSTEM 1 MG/1
1 PATCH, EXTENDED RELEASE TRANSDERMAL ONCE
Status: DISCONTINUED | OUTPATIENT
Start: 2018-05-23 | End: 2018-05-23 | Stop reason: HOSPADM

## 2018-05-23 RX ORDER — ONDANSETRON 2 MG/ML
INJECTION INTRAMUSCULAR; INTRAVENOUS AS NEEDED
Status: DISCONTINUED | OUTPATIENT
Start: 2018-05-23 | End: 2018-05-23 | Stop reason: SURG

## 2018-05-23 RX ORDER — EPHEDRINE SULFATE 50 MG/ML
5 INJECTION, SOLUTION INTRAVENOUS ONCE AS NEEDED
Status: DISCONTINUED | OUTPATIENT
Start: 2018-05-23 | End: 2018-05-23 | Stop reason: HOSPADM

## 2018-05-23 RX ORDER — PROMETHAZINE HYDROCHLORIDE 25 MG/1
25 TABLET ORAL ONCE AS NEEDED
Status: DISCONTINUED | OUTPATIENT
Start: 2018-05-23 | End: 2018-05-23 | Stop reason: HOSPADM

## 2018-05-23 RX ORDER — MAGNESIUM HYDROXIDE 1200 MG/15ML
LIQUID ORAL AS NEEDED
Status: DISCONTINUED | OUTPATIENT
Start: 2018-05-23 | End: 2018-05-23 | Stop reason: HOSPADM

## 2018-05-23 RX ORDER — CEFAZOLIN SODIUM 2 G/100ML
2 INJECTION, SOLUTION INTRAVENOUS ONCE
Status: COMPLETED | OUTPATIENT
Start: 2018-05-23 | End: 2018-05-23

## 2018-05-23 RX ORDER — DIPHENHYDRAMINE HYDROCHLORIDE 50 MG/ML
12.5 INJECTION INTRAMUSCULAR; INTRAVENOUS
Status: DISCONTINUED | OUTPATIENT
Start: 2018-05-23 | End: 2018-05-23 | Stop reason: HOSPADM

## 2018-05-23 RX ADMIN — SCOPALAMINE 1 PATCH: 1 PATCH, EXTENDED RELEASE TRANSDERMAL at 13:54

## 2018-05-23 RX ADMIN — LIDOCAINE HYDROCHLORIDE 100 MG: 20 INJECTION, SOLUTION INFILTRATION; PERINEURAL at 16:41

## 2018-05-23 RX ADMIN — PROMETHAZINE HYDROCHLORIDE 12.5 MG: 25 TABLET ORAL at 18:17

## 2018-05-23 RX ADMIN — SODIUM CHLORIDE 0.5 MCG/KG/HR: 900 INJECTION, SOLUTION INTRAVENOUS at 16:40

## 2018-05-23 RX ADMIN — KETOROLAC TROMETHAMINE 30 MG: 30 INJECTION, SOLUTION INTRAMUSCULAR; INTRAVENOUS at 17:15

## 2018-05-23 RX ADMIN — MIDAZOLAM 2 MG: 1 INJECTION INTRAMUSCULAR; INTRAVENOUS at 13:54

## 2018-05-23 RX ADMIN — SODIUM CHLORIDE, POTASSIUM CHLORIDE, SODIUM LACTATE AND CALCIUM CHLORIDE 9 ML/HR: 600; 310; 30; 20 INJECTION, SOLUTION INTRAVENOUS at 13:53

## 2018-05-23 RX ADMIN — PROPOFOL 200 MCG/KG/MIN: 10 INJECTION, EMULSION INTRAVENOUS at 16:41

## 2018-05-23 RX ADMIN — CEFAZOLIN SODIUM 2 G: 2 INJECTION, SOLUTION INTRAVENOUS at 16:45

## 2018-05-23 RX ADMIN — PROPOFOL 200 MG: 10 INJECTION, EMULSION INTRAVENOUS at 16:41

## 2018-05-23 RX ADMIN — FENTANYL CITRATE 50 MCG: 50 INJECTION INTRAMUSCULAR; INTRAVENOUS at 18:22

## 2018-05-23 RX ADMIN — FAMOTIDINE 20 MG: 10 INJECTION, SOLUTION INTRAVENOUS at 13:54

## 2018-05-23 RX ADMIN — OXYCODONE HYDROCHLORIDE AND ACETAMINOPHEN 1 TABLET: 7.5; 325 TABLET ORAL at 19:02

## 2018-05-23 RX ADMIN — DEXAMETHASONE SODIUM PHOSPHATE 6 MG: 10 INJECTION INTRAMUSCULAR; INTRAVENOUS at 16:45

## 2018-05-23 RX ADMIN — ONDANSETRON 4 MG: 2 INJECTION INTRAMUSCULAR; INTRAVENOUS at 17:15

## 2018-05-23 NOTE — ANESTHESIA PREPROCEDURE EVALUATION
Anesthesia Evaluation     Patient summary reviewed and Nursing notes reviewed   history of anesthetic complications: PONV  NPO Solid Status: > 8 hours  NPO Liquid Status: > 2 hours           Airway   Mallampati: II  Neck ROM: full  No difficulty expected  Dental - normal exam     Pulmonary     breath sounds clear to auscultation  (+) a smoker Former,   Cardiovascular     Rhythm: regular        Neuro/Psych  (+) headaches, psychiatric history Anxiety,     GI/Hepatic/Renal/Endo      Musculoskeletal     Abdominal    Substance History      OB/GYN          Other      history of cancer                  Anesthesia Plan    ASA 2     MAC     intravenous induction   Anesthetic plan and risks discussed with patient.

## 2018-05-23 NOTE — H&P
History of Present Illness:  Patient presents with a newly diagnosed left breast cancer.  She has been obtaining her yearly mammograms.  Some suspicious microcalcifications were noted in the 11 o'clock position of the left breast.  There was also the suggestion of a 5 mm mass associated with these calcifications.  These had seemed to have increased from previous mammograms.  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  It was ER positive at 65%, [place] positive at 96%, and HER-2 negative.  The Ki-67 was 1.9%.  The patient has no prior history of breast biopsies.  She does have 3 sons and is a nonsmoker.  She has not taken any hormone replacement therapy and is currently still having menstrual periods.   The medial margin is < .5 mm from the DCIS and oncology recommends reexcision of the margin     Review of Systems:      Skin:        The patient denies any noticeable changes to the skin of the breast.                        History   Smoking Status   • Former Smoker   • Packs/day: 0.50   • Years: 3.00   • Types: Cigarettes   • Start date: 1987   • Quit date: 1990   Smokeless Tobacco   • Not on file            Surgical History   History reviewed. No pertinent surgical history.         Medical History           Past Medical History:   Diagnosis Date   • Pre-diabetes              Prior Hospitalizations, other than for surgery or childbirth, and year:  None     Social History:  Patient is .  Patient has 3 sons.           Vital Signs:Blood pressure 118/75, pulse 65, temperature 98.2     Medications:     Current Outpatient Prescriptions:      Current Outpatient Prescriptions:   •  ALPRAZolam (XANAX) 0.25 MG tablet, Take 0.25 mg by mouth 2 (Two) Times a Day As Needed for Anxiety., Disp: , Rfl:   •  ibuprofen (ADVIL,MOTRIN) 600 MG tablet, Take 600 mg by mouth Every 6 (Six) Hours As Needed for Mild Pain ., Disp: , Rfl:   •  vitamin D (ERGOCALCIFEROL) 68638  units capsule capsule, Take 50,000 Units by mouth 1 (One) Time Per Week., Disp: , Rfl:      Physical Examination:  General Appearance:   Patient is in no distress.  She is well kept and has an average build.   Psychiatric:  Patient with appropriate mood and affect. Alert and oriented to self, time, and place.           Breast, LEFT:  medium sized, she has a guidewire in place with a cup over this.  There is a small incision in the 12 o'clock position.  Immediately inferior to this there is an ill-defined firmness likely related to the biopsy.           Cardiovascular:  Heart rate and rhythm are regular.  Respiratory:  Lungs are clear bilaterally with no crackles or wheezes in any lung field.  Gastrointestinal:  Abdomen is soft, nondistended, and nontender.  There was no evidence of hepatosplenomegaly or abdominal mass.  There are no scars from previous surgery.           Assessment:  1. Malignant neoplasm of upper-inner quadrant of left breast in female, estrogen receptor positive     Plan- Re-excision of the medial margin.

## 2018-05-23 NOTE — DISCHARGE INSTRUCTIONS
Scopolamine Patch  This patch has been applied to the skin behind one of your ears.  It may stay in place up to 24 hours. You may remove it at any time after your surgery; however, it should be removed after you are up and walking around the next day.  This medicine reduces stomach upset. Side effects may include: dry mouth, dizziness, sleepiness, constipation, or upset stomach.  An allergy would show up as: a rash, itching, wheezing or shortness of breath.  Follow these instructions:  1. Do not drink alcohol, drive or operate machinery while taking this medicine.  2. Wear only 1 patch at a time. You can leave the patch on for up to 24 hours.  3. When you remove the patch, fold it in half with the sticky sides together and throw it away. Wash your hands and the area under the patch.  4. Do not touch your eye with your hand if it has touched the patch.  5. Wash your hands well before and after touching the patch.  6. Sit or stand slowly to avoid dizziness.  Call your doctor if you have:  1. Any sign of allergy  2. No relief  3. Trouble passing urine  4. Any new or severe symptoms      **Please remove the patch behind your ear tonight or tomorrow.**

## 2018-05-23 NOTE — OP NOTE
Operative note    Preoperative Diagnosis: Breast cancer with positive margin    Postoperative Diagnosis: Same    Procedure Performed:left breast  excisional biopsy    Dictating physician and surgeon: Adrián Pereyra MD    Indications-the patient recently underwent breast conserving surgery for left breast cancer.  Her lumpectomy pathology reveals margins around the invasive component but she has high-grade DCIS less than a half a millimeter from the medial margin.  It was felt that she needed reexcision of this margin and she is here for that.  EBL: 5 cc    FINDINGS AND DESCRIPTION OF PROCEDURE:     The patient was taken to the operating room and placed on the operating table in the supine position and given adequate general  anesthesia.  The ultrasound machine was then used to document the location of her lumpectomy cavity which fortunately was right beneath her incision.  The left was then prepped and draped in sterile fashion.  The involved area was anesthetized with a combination of quarter percent Marcaine plain and 1% Xylocaine with epinephrine.  A total of 30 cc was used.  The old incision was excised.  We dissected down through the subcutaneous tissues and then encountered the old lumpectomy site.  We were able to open this cavity and accurately identify the medial margin.  We excised this margin and then took a second margin just to be certain we had excised enough tissue.  These 2 specimens had orienting sutures placed and were sent to pathology for permanent sections..          The wound was irrigated and hemostasis obtained using electrocautery unit.  The incision was then closed in layers using 3-0 Vicryl suture for the subcutaneous layer and a running 4-0 Vicryl subcuticular stitch for the skin.   Steri-Strips and a Tegaderm dressing were applied.     Sponge and needle counts were correct and the patient was taken to the recovery area in stable condition.    Specimens-medial margin ×2

## 2018-05-25 ENCOUNTER — TELEPHONE (OUTPATIENT)
Dept: MAMMOGRAPHY | Facility: CLINIC | Age: 48
End: 2018-05-25

## 2018-05-25 LAB
CYTO UR: NORMAL
LAB AP CASE REPORT: NORMAL
Lab: NORMAL
PATH REPORT.FINAL DX SPEC: NORMAL
PATH REPORT.GROSS SPEC: NORMAL

## 2018-05-25 NOTE — TELEPHONE ENCOUNTER
I told her the path report showed no residual DCIS or invasive cancer.  She will call the radiation oncologist to set up her appointment again.

## 2018-05-31 ENCOUNTER — TELEPHONE (OUTPATIENT)
Dept: MAMMOGRAPHY | Facility: CLINIC | Age: 48
End: 2018-05-31

## 2018-05-31 NOTE — TELEPHONE ENCOUNTER
Pt called with c/o fatigue for 3 days, wanted to know if this was normal. Told the patient it can be normal for some patients and since she just had two surgeries within a month and a half, it can make you even more fatigued. Patient denies any signs or symptoms of infection and has not noticed a fever. She is no longer taking pain medication thinking maybe that was the cause. She is still experiencing fatigue 3 days after stopping pain meds. Has post op appt June 7th. Patient will see how she feels over the weekend and call back on Monday if symptoms have not improved.     Dimple Simon RN

## 2018-06-07 ENCOUNTER — OFFICE VISIT (OUTPATIENT)
Dept: MAMMOGRAPHY | Facility: CLINIC | Age: 48
End: 2018-06-07

## 2018-06-07 VITALS
DIASTOLIC BLOOD PRESSURE: 70 MMHG | SYSTOLIC BLOOD PRESSURE: 116 MMHG | TEMPERATURE: 98.3 F | OXYGEN SATURATION: 97 % | HEART RATE: 61 BPM

## 2018-06-07 DIAGNOSIS — Z17.0 MALIGNANT NEOPLASM OF UPPER-INNER QUADRANT OF LEFT BREAST IN FEMALE, ESTROGEN RECEPTOR POSITIVE (HCC): Primary | ICD-10-CM

## 2018-06-07 DIAGNOSIS — C50.212 MALIGNANT NEOPLASM OF UPPER-INNER QUADRANT OF LEFT BREAST IN FEMALE, ESTROGEN RECEPTOR POSITIVE (HCC): Primary | ICD-10-CM

## 2018-06-07 PROCEDURE — 99024 POSTOP FOLLOW-UP VISIT: CPT | Performed by: SURGERY

## 2018-06-07 NOTE — PROGRESS NOTES
Chief Complaint: Cesia Workman is a  47 y.o. female, initially referred by No ref. provider found , who is here today for a postoperative visit.    History of Present Illness:  In the interim,Cesia Workman has had the following procedure and resultant pathology report: The reexcision of the medial margin does not show any residual cancer.    She has noted no redness, warmth,drainage, swelling at the incision site. Denies fever or chills.      Current Outpatient Prescriptions:   •  acetaminophen (TYLENOL) 500 MG tablet, Take 1,000 mg by mouth Every 6 (Six) Hours As Needed for Mild Pain ., Disp: , Rfl:   •  ALPRAZolam (XANAX) 0.25 MG tablet, Take 0.25 mg by mouth 2 (Two) Times a Day As Needed for Anxiety., Disp: , Rfl:   •  Cetirizine HCl (ZYRTEC PO), Take 10 mg by mouth Daily As Needed., Disp: , Rfl:   •  HYDROcodone-acetaminophen (NORCO) 5-325 MG per tablet, 1-2 tabs q 6 hours prn pain.  Maximum tabs 6 daily (Patient taking differently: Take 1-2 tablets by mouth Every 6 (Six) Hours As Needed. 1-2 tabs q 6 hours prn pain.  Maximum tabs 6 daily), Disp: 25 tablet, Rfl: 0  •  ibuprofen (ADVIL,MOTRIN) 600 MG tablet, Take 600 mg by mouth Every 6 (Six) Hours As Needed for Mild Pain . HELD FOR SURGERY, Disp: , Rfl:   •  promethazine (PHENERGAN) 25 MG tablet, Take 1 tablet by mouth Every 6 (Six) Hours As Needed for Nausea or Vomiting for up to 10 doses., Disp: 10 tablet, Rfl: 0  •  vitamin D (ERGOCALCIFEROL) 24115 units capsule capsule, Take 50,000 Units by mouth Every 7 (Seven) Days. MONDAYS, Disp: , Rfl:   Physical examination  Left breast-the incision is healing nicely without signs of infection or drainage.  Assessment:  Left breast cancer status post breast conserving surgery.  She will not require chemotherapy but will be taking tamoxifen.  She has an appointment with the radiation oncologist next week.    Plan:  I will see her back in the office in 2 months.          EMR Dragon/transcription disclaimer:    Much  of this encounter note is an electronic transcription/translocation of spoken language to printed text.  The electronic translation of spoken language may permit erroneous, or at times, nonsensical words or phrases to be inadvertently transcribed.  Although I have reviewed the note from such areas, some may still exist.

## 2018-06-14 ENCOUNTER — APPOINTMENT (OUTPATIENT)
Dept: RADIATION ONCOLOGY | Facility: HOSPITAL | Age: 48
End: 2018-06-14

## 2018-06-14 ENCOUNTER — CONSULT (OUTPATIENT)
Dept: RADIATION ONCOLOGY | Facility: CLINIC | Age: 48
End: 2018-06-14

## 2018-06-14 VITALS
HEIGHT: 62 IN | DIASTOLIC BLOOD PRESSURE: 70 MMHG | WEIGHT: 149 LBS | BODY MASS INDEX: 27.42 KG/M2 | SYSTOLIC BLOOD PRESSURE: 105 MMHG | HEART RATE: 70 BPM | OXYGEN SATURATION: 99 % | TEMPERATURE: 97.6 F

## 2018-06-14 DIAGNOSIS — C50.212 MALIGNANT NEOPLASM OF UPPER-INNER QUADRANT OF LEFT BREAST IN FEMALE, ESTROGEN RECEPTOR POSITIVE (HCC): Primary | ICD-10-CM

## 2018-06-14 DIAGNOSIS — Z17.0 MALIGNANT NEOPLASM OF UPPER-INNER QUADRANT OF LEFT BREAST IN FEMALE, ESTROGEN RECEPTOR POSITIVE (HCC): Primary | ICD-10-CM

## 2018-06-14 PROCEDURE — 77332 RADIATION TREATMENT AID(S): CPT | Performed by: RADIOLOGY

## 2018-06-14 PROCEDURE — 77263 THER RADIOLOGY TX PLNG CPLX: CPT | Performed by: RADIOLOGY

## 2018-06-14 PROCEDURE — G0463 HOSPITAL OUTPT CLINIC VISIT: HCPCS | Performed by: RADIOLOGY

## 2018-06-14 PROCEDURE — 77290 THER RAD SIMULAJ FIELD CPLX: CPT | Performed by: RADIOLOGY

## 2018-06-14 PROCEDURE — 99243 OFF/OP CNSLTJ NEW/EST LOW 30: CPT | Performed by: RADIOLOGY

## 2018-06-14 NOTE — PROGRESS NOTES
DIAGNOSIS and REASON FOR CONSULTATION: Malignant neoplasm of upper-inner quadrant of left breast in female, estrogen receptor positive - for advice and recommendations regarding the diagnosis    Referring Provider:  Dick Gutierrez MD  Patient Care Team:  Nikita Gallegos MD as PCP - General (Internal Medicine)  Adrián Pereyra MD as Referring Physician (Breast Surgery)  Dick Gutierrez MD as Consulting Physician (Hematology and Oncology)    CHIEF COMPLAINT:  For advice and recommendations regarding Malignant neoplasm of upper-inner quadrant of left breast in female, estrogen receptor positive  HISTORY OF PRESENT ILLNESS:  The patient is a 47 y.o. year old female who was found to have an area of  microcalcifications in the 11 to 12:00 portion of the left breast on screening mammogram dated January 26, 2018.  Diagnostic mammogram of the left breast on February 2, 2018 confirmed clustered calcifications in the deep upper inner left breast and biopsy was recommended.    She underwent an attempted stereotactic biopsy on February 12, 2018 which was unsuccessful due to patient syncope.  Repeat stereotactic biopsy and clip placement was performed on February 14, 2018 which revealed an invasive ductal carcinoma of intermediate grade measuring 2 mm in greatest dimension, associated high-grade ductal carcinoma in situ noted.  The tissue was found to be positive for both estrogen and progesterone receptors and negative for the HER-2/natalie oncogene.    She was referred on to Dr. Pereyra who obtained a bilateral breast MRI on March 5, 2018.  This showed the biopsy-proven malignancy in the 11:30 o'clock position of the left breast measuring 1.3 x 1.6 x 1.3 cm with some adjacent linear enhancement measuring 8 mm bringing the worrisome area to 2.1 cm.  No other evidence of axillary or breast abnormality was appreciated.      She proceeded on to surgery with Dr. Pereyra on April 11, 2018.  The pathology revealed an invasive  ductal carcinoma and carcinoma of intermediate grade measuring 8 mm with associated high-grade ductal carcinoma in situ noted.  The margins were all negative though there was a 0.5 mm margin with ductal carcinoma in situ laterally and medially.  Further tissue from the anterior superior and lateral margins all showed no evidence of further disease and 0 of 2 sentinel nodes were involved.    Given the concern regarding the margin she went back to surgery and underwent reexcision on May 23, 2018 which showed no residual invasive or intraductal disease.  Additionally her Oncotype DX testing was sent off which returned with a recurrence score of 18.  Therefore she appears to have a T1b N0 M0 intermediate grade invasive ductal carcinoma of the left breast.    She has visited with the [facility] physicians and no chemotherapy is planned. They have discussed Tamoxifen use alone and she returns there on June 25th.  I was asked to see the patient at the request of the referring provider noted above for advice and recommendations regarding this diagnosis.     Clinically, she is recovering well though still fatigued. She does feel this is improving slowly and denies any difficulties with the breast.     Past Medical History: she  has a past medical history of Anxiety; Breast cancer (02/14/2018); Heartburn; Infectious mononucleosis; Migraines; PONV (postoperative nausea and vomiting); Pre-diabetes; PVC's (premature ventricular contractions); and Wears contact lenses.    Past Surgical History:  she has a past surgical history that includes Breast biopsy (Left, 02/14/2018); Breast biopsy (Left, 02/12/2018); breast lumpectomy with sentinel node biopsy (Left, 4/11/2018); and Breast biopsy (Left, 5/23/2018).    Meds:    Current Outpatient Prescriptions:   •  acetaminophen (TYLENOL) 500 MG tablet, Take 1,000 mg by mouth Every 6 (Six) Hours As Needed for Mild Pain ., Disp: , Rfl:   •  ALPRAZolam (XANAX) 0.25 MG tablet, Take 0.25 mg by mouth  2 (Two) Times a Day As Needed for Anxiety., Disp: , Rfl:   •  Cetirizine HCl (ZYRTEC PO), Take 10 mg by mouth Daily As Needed., Disp: , Rfl:   •  HYDROcodone-acetaminophen (NORCO) 5-325 MG per tablet, 1-2 tabs q 6 hours prn pain.  Maximum tabs 6 daily (Patient taking differently: Take 1-2 tablets by mouth Every 6 (Six) Hours As Needed. 1-2 tabs q 6 hours prn pain.  Maximum tabs 6 daily), Disp: 25 tablet, Rfl: 0  •  ibuprofen (ADVIL,MOTRIN) 600 MG tablet, Take 600 mg by mouth Every 6 (Six) Hours As Needed for Mild Pain . HELD FOR SURGERY, Disp: , Rfl:   •  promethazine (PHENERGAN) 25 MG tablet, Take 1 tablet by mouth Every 6 (Six) Hours As Needed for Nausea or Vomiting for up to 10 doses., Disp: 10 tablet, Rfl: 0  •  vitamin D (ERGOCALCIFEROL) 64171 units capsule capsule, Take 50,000 Units by mouth Every 7 (Seven) Days. MONDAYS, Disp: , Rfl:     Allergies:  No Known Allergies    Family History:  her family history includes Alcohol abuse in her sister; Asthma in her father; Bone cancer in her maternal grandfather; Breast cancer in her cousin, maternal aunt, and paternal aunt; Depression in her sister; Diabetes in her father and maternal grandmother; Hearing loss in her father; Heart disease in her father; Hyperlipidemia in her mother; Hypertension in her father; Thyroid cancer (age of onset: 64) in her father.    Social History:  she  reports that she quit smoking about 28 years ago. Her smoking use included Cigarettes. She started smoking about 30 years ago. She has a 1.00 pack-year smoking history. She has never used smokeless tobacco. She reports that she drinks alcohol. She reports that she does not use drugs.    Pertinent Findings on   Review of Systems   Constitutional: Positive for fatigue. Negative for appetite change, chills, diaphoresis, fever and unexpected weight change.   HENT:   Negative for hearing loss, lump/mass, mouth sores, nosebleeds, sore throat, tinnitus, trouble swallowing and voice change.   "  Eyes: Negative for eye problems and icterus.   Respiratory: Negative for chest tightness, cough, hemoptysis, shortness of breath and wheezing.    Cardiovascular: Negative for chest pain, leg swelling and palpitations.   Gastrointestinal: Negative for abdominal distention, abdominal pain, blood in stool, constipation, diarrhea, nausea, rectal pain and vomiting.   Endocrine: Negative for hot flashes.   Genitourinary: Positive for frequency. Negative for bladder incontinence, difficulty urinating, dyspareunia, dysuria, hematuria, menstrual problem, nocturia, pelvic pain, vaginal bleeding and vaginal discharge.    Musculoskeletal: Negative for arthralgias, back pain, flank pain, gait problem, myalgias, neck pain and neck stiffness.   Skin: Negative for itching, rash and wound.   Neurological: Negative for dizziness, extremity weakness, gait problem, headaches, light-headedness, numbness, seizures and speech difficulty.   Hematological: Negative for adenopathy. Does not bruise/bleed easily.   Psychiatric/Behavioral: Negative for confusion, decreased concentration, depression, sleep disturbance and suicidal ideas. The patient is not nervous/anxious.    :  Vitals:    06/14/18 0859   BP: 105/70   Pulse: 70   Temp: 97.6 °F (36.4 °C)   TempSrc: Oral   SpO2: 99%   Weight: 67.6 kg (149 lb)   Height: 157.5 cm (62.01\")   PainSc: 0-No pain       Performance Status: (0) Fully active, able to carry on all predisease performance without restriction    Pertinent Findings on:  Physical Exam   Constitutional: She appears well-developed and well-nourished. No distress.   HENT:   Head: Normocephalic.   Neck: Normal range of motion. Neck supple.   Pulmonary/Chest: She exhibits no mass and no tenderness. Right breast exhibits no inverted nipple, no mass, no nipple discharge, no skin change and no tenderness. Left breast exhibits no inverted nipple, no mass, no nipple discharge, no skin change and no tenderness.   Right breast is without " abnormality as is the right axilla. The left breast shows a healing lumpectomy incision over the superior outer aspect of the breast. There is no skin or nipple abnormality, no warmth or erythema.   Musculoskeletal: Normal range of motion.   Lymphadenopathy:     She has no cervical adenopathy.     She has no axillary adenopathy.        Right axillary: No pectoral adenopathy present.        Left axillary: No pectoral adenopathy present.       Right: No supraclavicular adenopathy present.        Left: No supraclavicular adenopathy present.   Left axillary incision is healing well. There is no palpable axillary, supraclavicular nor cervical lymphadenopathy appreciated. No lymphedema is noted in either upper extremity.   Skin: Skin is intact. She is not diaphoretic.   Psychiatric: She has a normal mood and affect. Her speech is normal and behavior is normal. Judgment and thought content normal. Cognition and memory are normal.       Assessment:   1. Malignant neoplasm of upper-inner quadrant of left breast in female, estrogen receptor positive     Stage IA (cT1b, cN0, cM0, G2, ER: Positive, [place]: Positive, HER2: Negative)   This assessment comes from my review of the imaging, pathology, physician notes and other pertinent information as mentioned.    Plan:   We reviewed the specifics of her clinical, imaging and pathology findings today and also talked through the role of radiation therapy in her breast conservation treatment. She has a good understanding of the stage and treatment recommendations thus far.    I recommended a course of postoperative whole breast radiation therapy consisting of 4256 cGy aimed at the breast given over 16 treatments. We will then plan on boosting the tumor bed region as I will delineate it on her treatment planning scan with an additional 1000 cGy given in five treatments. The above course of treatment should be completed in 4 1/2 weeks.    We discussed the specifics, logistics and side  effects of this course of treatment  which may involve acutely, irritation of the skin, including erythema and possibly moist desquamation, tenderness of the musculature of the chest wall and mild fatigue. We discussed the long term possibility of mild hyperpigmentation and fibrosis of the breast, mild increased incidence of rib fracture and radiation pneumonitis.  We also discussed the importance of our treatment planning process in protecting these underlying structures as much as possible, specifically heart, ribs and lung and I believe all her questions were answered to her satisfaction.      We will start our treatment planning process with a CT scan, which we were able to complete today. I am planning on getting her treatment planning finalized and treatments underway early next week.     I spent greater than 40 minutes in face-to-face time with the patient and 25 minutes of that time was spent in counseling and coordination of care, including review of imaging and pathology; prognosis and differential diagnosis; indications, goals, logistics, benefits and risks of treatment as well as alternatives and surveillance options.

## 2018-06-15 PROCEDURE — 77295 3-D RADIOTHERAPY PLAN: CPT | Performed by: RADIOLOGY

## 2018-06-15 PROCEDURE — 77334 RADIATION TREATMENT AID(S): CPT | Performed by: RADIOLOGY

## 2018-06-15 PROCEDURE — 77300 RADIATION THERAPY DOSE PLAN: CPT | Performed by: RADIOLOGY

## 2018-06-18 PROCEDURE — 77300 RADIATION THERAPY DOSE PLAN: CPT | Performed by: RADIOLOGY

## 2018-06-18 PROCEDURE — 77334 RADIATION TREATMENT AID(S): CPT | Performed by: RADIOLOGY

## 2018-06-19 ENCOUNTER — DOCUMENTATION (OUTPATIENT)
Dept: ONCOLOGY | Facility: CLINIC | Age: 48
End: 2018-06-19

## 2018-06-19 NOTE — PROGRESS NOTES
The pt came to [facility] on 6/14/18 for an initial radiation oncology consultation with Dr. Lam for stage IA breast cancer. She completed the Distress Questionnaire and scored 3/10. OSW remains available as needs arise.    Peyton Wilburn, [facility]  Oncology Social Worker

## 2018-06-20 PROCEDURE — 77427 RADIATION TX MANAGEMENT X5: CPT | Performed by: RADIOLOGY

## 2018-06-20 PROCEDURE — 77280 THER RAD SIMULAJ FIELD SMPL: CPT | Performed by: RADIOLOGY

## 2018-06-20 PROCEDURE — 77412 RADIATION TX DELIVERY LVL 3: CPT | Performed by: RADIOLOGY

## 2018-06-20 PROCEDURE — 77417 THER RADIOLOGY PORT IMAGE(S): CPT | Performed by: RADIOLOGY

## 2018-06-20 NOTE — PROGRESS NOTES
OSW met with the pt and her mother today following the pt's first XRT treatment. The pt is a 47 year old  female diagnosed with stage IA breast cancer. The pt has had a lumpectomy. She will be having 21 XRT tx's followed by tamoxifen.     The pt is  with three sons, ages 19, 16 and 13. 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     The pt was intermittently tearful during the consultation. She has a very pleasant demeanor. She stated that she had been coping well and has an excellent support system and is in a Bible study group. Her parents are visiting from [place]; her father has late stage cancer and is not expected to live long.     OSW offered the pt a list of local therapists in the community. However, the pt thought her tearfulness was fleeting and did not feel needful at this time. The pt has [employer] information and was provided a brochure for [facility] [employer].    OSW provided her contact information and remains available as needs arise.    Peyton Wilburn, [facility]  Oncology Social Worker

## 2018-06-21 PROCEDURE — 77412 RADIATION TX DELIVERY LVL 3: CPT | Performed by: RADIOLOGY

## 2018-06-22 PROCEDURE — 77412 RADIATION TX DELIVERY LVL 3: CPT | Performed by: RADIOLOGY

## 2018-06-25 ENCOUNTER — APPOINTMENT (OUTPATIENT)
Dept: OTHER | Facility: HOSPITAL | Age: 48
End: 2018-06-25

## 2018-06-25 ENCOUNTER — OFFICE VISIT (OUTPATIENT)
Dept: ONCOLOGY | Facility: CLINIC | Age: 48
End: 2018-06-25

## 2018-06-25 VITALS
WEIGHT: 147.4 LBS | BODY MASS INDEX: 26.12 KG/M2 | SYSTOLIC BLOOD PRESSURE: 99 MMHG | HEIGHT: 63 IN | OXYGEN SATURATION: 99 % | HEART RATE: 53 BPM | DIASTOLIC BLOOD PRESSURE: 70 MMHG | TEMPERATURE: 98 F | RESPIRATION RATE: 16 BRPM

## 2018-06-25 DIAGNOSIS — C50.212 MALIGNANT NEOPLASM OF UPPER-INNER QUADRANT OF LEFT BREAST IN FEMALE, ESTROGEN RECEPTOR POSITIVE (HCC): Primary | ICD-10-CM

## 2018-06-25 DIAGNOSIS — Z17.0 MALIGNANT NEOPLASM OF UPPER-INNER QUADRANT OF LEFT BREAST IN FEMALE, ESTROGEN RECEPTOR POSITIVE (HCC): Primary | ICD-10-CM

## 2018-06-25 LAB
BASOPHILS # BLD AUTO: 0.07 10*3/MM3 (ref 0–0.2)
BASOPHILS NFR BLD AUTO: 0.9 % (ref 0–1.5)
DEPRECATED RDW RBC AUTO: 40 FL (ref 37–54)
EOSINOPHIL # BLD AUTO: 0.16 10*3/MM3 (ref 0–0.7)
EOSINOPHIL NFR BLD AUTO: 2 % (ref 0.3–6.2)
ERYTHROCYTE [DISTWIDTH] IN BLOOD BY AUTOMATED COUNT: 12.5 % (ref 11.7–13)
HCT VFR BLD AUTO: 39.9 % (ref 35.6–45.5)
HGB BLD-MCNC: 13.6 G/DL (ref 11.9–15.5)
IMM GRANULOCYTES # BLD: 0.05 10*3/MM3 (ref 0–0.03)
IMM GRANULOCYTES NFR BLD: 0.6 % (ref 0–0.5)
LYMPHOCYTES # BLD AUTO: 2.54 10*3/MM3 (ref 0.9–4.8)
LYMPHOCYTES NFR BLD AUTO: 31 % (ref 19.6–45.3)
MCH RBC QN AUTO: 29.8 PG (ref 26.9–32)
MCHC RBC AUTO-ENTMCNC: 34.1 G/DL (ref 32.4–36.3)
MCV RBC AUTO: 87.5 FL (ref 80.5–98.2)
MONOCYTES # BLD AUTO: 0.73 10*3/MM3 (ref 0.2–1.2)
MONOCYTES NFR BLD AUTO: 8.9 % (ref 5–12)
NEUTROPHILS # BLD AUTO: 4.65 10*3/MM3 (ref 1.9–8.1)
NEUTROPHILS NFR BLD AUTO: 56.6 % (ref 42.7–76)
NRBC BLD MANUAL-RTO: 0 /100 WBC (ref 0–0)
PLATELET # BLD AUTO: 284 10*3/MM3 (ref 140–500)
PMV BLD AUTO: 10.7 FL (ref 6–12)
RBC # BLD AUTO: 4.56 10*6/MM3 (ref 3.9–5.2)
WBC NRBC COR # BLD: 8.2 10*3/MM3 (ref 4.5–10.7)

## 2018-06-25 PROCEDURE — 36415 COLL VENOUS BLD VENIPUNCTURE: CPT

## 2018-06-25 PROCEDURE — 99214 OFFICE O/P EST MOD 30 MIN: CPT | Performed by: INTERNAL MEDICINE

## 2018-06-25 PROCEDURE — 85025 COMPLETE CBC W/AUTO DIFF WBC: CPT | Performed by: INTERNAL MEDICINE

## 2018-06-25 PROCEDURE — 77412 RADIATION TX DELIVERY LVL 3: CPT | Performed by: RADIOLOGY

## 2018-06-25 RX ORDER — CEPHALEXIN 250 MG/1
250 CAPSULE ORAL 4 TIMES DAILY
Qty: 21 CAPSULE | Refills: 1 | Status: SHIPPED | OUTPATIENT
Start: 2018-06-25 | End: 2018-09-17

## 2018-06-25 RX ORDER — TAMOXIFEN CITRATE 20 MG/1
20 TABLET ORAL DAILY
Qty: 90 TABLET | Refills: 3 | Status: SHIPPED | OUTPATIENT
Start: 2018-06-25 | End: 2019-07-14 | Stop reason: SDUPTHER

## 2018-06-25 NOTE — PROGRESS NOTES
Subjective     REASON FOR CONSULTATION:  1.  T1bN0 M0 strongly ER/WA positive HER-2 negative grade 2 left breast cancer post lumpectomy with close margins for DCIS-Oncotype score of 16 radiation and tamoxifen planned                             REQUESTING PHYSICIAN:  Adrián Pereyra M.D. and Nikita Gallegos M.D.      History of Present Illness is a 47-year-old premenopausal female with a T1b N0 strongly ER/WA positive breast cancer with a low Oncotype score of 18.  Since I saw her last she started radiation 3 days ago treated treatments.  She is having more and more pain in the right breast in female similar to how she felt soon after lumpectomy.  She is a little irritable and tired but otherwise has no complaints     we discussed her case at the multiple this may conference because of the close margins for high-grade DCIS and we all felt that radiation would take care of this and no further surgery was needed.    We discussed the low risk Oncotype in the lack of benefit for chemotherapy in this situation which we had already discussed with her on the phone and we talked about tamoxifen.The side effects and toxicities of Tamoxifen were discussed with the patient, including hot flashes, mood swings,depression, DVT and endometrial cancer. A list of drugs that interfere with the efficacy of tamoxifen and are to be avoided were given to the patient.    Past Medical History:   Diagnosis Date   • Anxiety    • Breast cancer 02/14/2018    Left breast, Upper Inner Quadrant, Invasive Mammary Carcinoma, Intermediate grade, measuring at least 0.2 cm in dimension, with associated high grade solid and cribiform ductal carcinoma in situ with comedonecrosis, ER/WA positive, [id number] negative   • Heartburn    • Infectious mononucleosis    • Migraines    • PONV (postoperative nausea and vomiting)    • Pre-diabetes    • PVC's (premature ventricular contractions)    • Wears contact lenses         Past Surgical History:   Procedure  Laterality Date   • BREAST BIOPSY Left 02/14/2018    Left breast stereotactic biopsy w/ marker clip placement & positive radiograph-Dr. Lashaun Gamble, 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  A diagnostic mammogram gram showed a 2.3 cm area of clustered microcalcifications very suspicious for malignancy and the patient underwent a stereotactic biopsy on 2/14/18 with the findings of intermediate grade invasive mammary carcinoma measuring 2 mm with associated high-grade and solid DCIS--ER was 65% IL was 96% HER-2 0 Ki-67 was 2%.  The patient was referred to Dr. Pereyra and underwent bilateral breast MRIs with the findings of a 2.1 cm abnormality in the left breast with no axillary adenopathy and she was taken for surgery when lumpectomy and sentinel node biopsy was performed on 4/11/18.  This revealed a residual 8 mm area of invasive carcinoma grade 2 with associated high-grade DCIS spanning 16 mm-the lateral and medial margins were 0.5 mm from the DCIS-new anterior, superior and lateral margins were obtained which were negative but the medial margin was still felt to be close.  She went to see Dr. Pereyra to wanted to discuss further the status  of her margins but sent her to us also to discuss adjuvant treatment for her invasive cancer.    She is healing well from surgery.  She is obviously anxious about the margins but has no qualms about having further surgery even if the cosmesis is not perfect because she is more worried about recurrence of her cancer.  She is  3 para 3 -Menarche was age 14 she is premenopausal first childbirth was age 28 she breast-fed for 9 months she's been on no hormonal replacement since her last child 13 years ago  She is having bladder issues and her gynecologist just prescribed vaginal estrogens which she has not yet started.    Family history is positive for her father having thyroid cancer age 64 and he is currently dying from this is a 78 she's one sister is healthy her mother's 72 and healthy she has a maternal aunt with breast cancers at an unknown age paternal aunt with breast cancer in her 60s paternal grandfather with bone cancer and a maternal grandfather with bone cancer is no ovarian cancer in the family.      I explained to Cesia and her  that her tumor is very unlikely to have a high Oncotype score and it is very likely that the mainstay of her treatment would be tamoxifen.  But we will send an Oncotype score because of the small chance that there is a higher recurrence risk than expected based on her pathology in tumor size.  I think it is very reasonable to get genetic testing because of her family history although it is unlikely to change our treatment.  I've asked her to hold off and vaginal estrogens for the time being and to discuss this with her gynecologist.  She will stop her Zoloft and we can prescribe Effexor if needed      Current Outpatient Prescriptions on File Prior to Visit   Medication Sig Dispense Refill   • acetaminophen (TYLENOL) 500 MG tablet Take 1,000 mg by mouth Every 6 (Six) Hours As Needed for Mild Pain .     • ALPRAZolam (XANAX) 0.25 MG tablet Take 0.25 mg by mouth 2 (Two)  Times a Day As Needed for Anxiety.     • Cetirizine HCl (ZYRTEC PO) Take 10 mg by mouth Daily As Needed.     • HYDROcodone-acetaminophen (NORCO) 5-325 MG per tablet 1-2 tabs q 6 hours prn pain.  Maximum tabs 6 daily (Patient taking differently: Take 1-2 tablets by mouth Every 6 (Six) Hours As Needed. 1-2 tabs q 6 hours prn pain.  Maximum tabs 6 daily) 25 tablet 0   • ibuprofen (ADVIL,MOTRIN) 600 MG tablet Take 600 mg by mouth Every 6 (Six) Hours As Needed for Mild Pain . HELD FOR SURGERY     • promethazine (PHENERGAN) 25 MG tablet Take 1 tablet by mouth Every 6 (Six) Hours As Needed for Nausea or Vomiting for up to 10 doses. 10 tablet 0   • vitamin D (ERGOCALCIFEROL) 58379 units capsule capsule Take 50,000 Units by mouth Every 7 (Seven) Days. MONDAYS       No current facility-administered medications on file prior to visit.         ALLERGIES:  No Known Allergies     Social History     Social History   • Marital status:      Spouse name: Nikita Workman   • Number of children: 3   • Years of education: College     Occupational History   • Occupational Therapist      [employer]     Social History Main Topics   • Smoking status: Former Smoker     Packs/day: 0.50     Years: 2.00     Types: Cigarettes     Start date: 1988     Quit date: 1990   • Smokeless tobacco: Never Used   • Alcohol use Yes      Comment: RARE MONTHLY   • Drug use: No   • Sexual activity: Defer     Other Topics Concern   • Not on file        Family History   Problem Relation Age of Onset   • Breast cancer Maternal Aunt         In her 60s   • Breast cancer Paternal Aunt    • Hyperlipidemia Mother    • Asthma Father    • Diabetes Father    • Hearing loss Father    • Heart disease Father    • Hypertension Father    • Thyroid cancer Father 64        Follicular Tyroid Cancer   • Alcohol abuse Sister    • Depression Sister    • Diabetes Maternal Grandmother    • Bone cancer Maternal Grandfather    • Breast cancer Cousin    • Malig Hyperthermia Neg  "Hx         Review of Systems   Gastrointestinal: Positive for abdominal pain (Intermittent felt to be gallbladder-related).   Genitourinary: Positive for difficulty urinating (Early prolapse) and urgency (Bladder pain and possible interstitial cystitis).   Neurological: Positive for dizziness (Occasional vertigo) and headaches (Chronic migraine headaches).        Objective     Vitals:    06/25/18 1254   BP: 99/70   Pulse: 53   Resp: 16   Temp: 98 °F (36.7 °C)   TempSrc: Oral   SpO2: 99%   Weight: 66.9 kg (147 lb 6.4 oz)   Height: 160 cm (62.99\")   PainSc:   4   PainLoc: Chest  Comment: from radaition     Current Status 6/25/2018   ECOG score 0       Physical Exam    GENERAL:  Well-developed, well-nourished in no acute distress.   SKIN:  Warm, dry without rashes, purpura or petechiae.  EYES:  Pupils equal, round and reactive to light.  EOMs intact.  Conjunctivae normal.  EARS:  Hearing intact.  NOSE:  Septum midline.  No excoriations or nasal discharge.  MOUTH:  Tongue is well-papillated; no stomatitis or ulcers.  Lips normal.  THROAT:  Oropharynx without lesions or exudates.  NECK:  Supple with good range of motion; no thyromegaly or masses, no JVD.  LYMPHATICS:  No cervical, supraclavicular, axillary or inguinal adenopathy.  CHEST:  Lungs clear to auscultation. Good airflow.  BREASTS: Right breast is benign left for shows a lumpectomy scar with Persistent firmness and tenderness at the lumpectomy site?  Infection  CARDIAC:  Regular rate and rhythm without murmurs, rubs or gallops. Normal S1,S2.  ABDOMEN:  Soft, nontender with no hepatosplenomegaly or masses.  EXTREMITIES:  No clubbing, cyanosis or edema.  NEUROLOGICAL:  Cranial Nerves II-XII grossly intact.  No focal neurological deficits.  PSYCHIATRIC:  Normal affect and mood.        RECENT LABS:  Hematology WBC   Date Value Ref Range Status   06/25/2018 8.20 4.50 - 10.70 10*3/mm3 Final     RBC   Date Value Ref Range Status   06/25/2018 4.56 3.90 - 5.20 10*6/mm3 " "Final     Hemoglobin   Date Value Ref Range Status   06/25/2018 13.6 11.9 - 15.5 g/dL Final     Hematocrit   Date Value Ref Range Status   06/25/2018 39.9 35.6 - 45.5 % Final     Platelets   Date Value Ref Range Status   06/25/2018 284 140 - 500 10*3/mm3 Final         IMPRESSION:  1. Biopsy-proven malignancy in the left breast at the 11:30 position  measuring on the order of 2.1 cm in greatest dimension. No other  suspicious findings are seen within the left breast and there is no  evidence for left axillary adenopathy.  2. There are no findings suspicious for malignancy in the right breast.     BI-RADS Category 6: Known biopsy-proven malignancy.     4/11/18  1. \"LEFT BREAST MASS,\" WIRE GUIDED LUMPECTOMY:             INVASIVE DUCTAL CARCINOMA, INTERMEDIATE GRADE, 8 MM, MARGINS CLEAR.             CLOSEST MARGIN TO INVASIVE TUMOR: 1.5 MM (INFERIOR).             ASSOCIATED HIGH GRADE DUCTAL CARCINOMA IN SITU SPANNING APPROXIMATELY 16 MM (FOUR                    BLOCKS x 4 MM PER BLOCK).             CLOSEST MARGIN TO DUCTAL CARCINOMA IN SITU: 0.5 MM (LATERAL AND MEDIAL MARGINS).             HORMONE RECEPTORS REPORTED ON PRIOR BIOPSY, [facility] [id number]. REPEAT/                   CONFIRMATORY STUDIES ARE AVAILABLE UPON REQUEST.                         BIOPSY SITE CHANGES.     NOTE: ADDITIONAL MARGINS SUBMITTED AS PARTS 2, 3, AND 4.        2. \"LEFT BREAST NEW ANTERIOR MARGIN\":              BENIGN FIBROADIPOSE TISSUE.              NEW MARGIN - NO ATYPIA.     3. \"LEFT BREAST NEW SUPERIOR MARGIN\":             BENIGN BREAST TISSUE.             NEW MARGIN - NO ATYPIA.     4. \"LEFT BREAST NEW LATERAL MARGIN\":             BENIGN BREAST TISSUE.             NEW MARGIN - NO ATYPIA.     5. \"SENTINEL NODE #1\":             LYMPH NODE, MULTIPLE STEP SECTIONS: NO METASTATIC CARCINOMA SEEN.     6. \"SENTINEL NODE #2\":             LYMPH NODE, MULTIPLE STEP SECTIONS: NO METASTATIC CARCINOMA SEEN.     THM/th IHC/a/CMK                   "              Electronically signed by Darin Claire MD on 4/12/2018 at 1511   Synoptic Checklist   INVASIVE CARCINOMA OF THE BREAST   Breast Invasive - All Specimens   SPECIMEN   Procedure  Excision (less than total mastectomy)   Specimen Laterality  Left   TUMOR   Histologic Type  Invasive carcinoma of no special type (ductal, not otherwise specified)   Histologic Grade ([place] Histologic Score)     Glandular (Acinar) / Tubular Differentiation  Score 3 (< 10% of tumor area forming glandular / tubular structures)   Nuclear Pleomorphism  Score 2 (Cells larger than normal with open vesicular nuclei, visible nucleoli, and moderate variability in both size and shape)   Mitotic Rate  Score 1 (<=3 mitoses per mm2)   Overall Grade  Grade 2 (scores of 6 or 7)   Tumor Size  Greatest dimension of largest invasive focus > 1 mm (indicate exact measurement in Millimeters):: 8 mm   Tumor Focality  Single focus of invasive carcinoma   Ductal Carcinoma In Situ (DCIS)  DCIS is present in specimen   Ductal Carcinoma In Situ (DCIS)  Positive for EIC   Size (Extent) of DCIS  Estimated size (extent) of DCIS (greatest dimension using gross and microscopic evaluation) in Millimeters (mm) is at least: 16  mm   Nuclear Grade  Grade III (high)   Lymphovascular Invasion  Not identified   Treatment Effect  No known presurgical therapy   MARGINS   Invasive Carcinoma Margins  Uninvolved by invasive carcinoma   Distance from Closest Margin in Millimeters (mm)  Specify distance: 1.5 mm   Closest Margin  Inferior   DCIS Margins  Uninvolved by DCIS (DCIS present in specimen)   Distance of DCIS from Closest Margin in Millimeters (mm)  Specify distance: 0.5 mm   Closest Margin  Medial     Lateral   LYMPH NODES   Regional Lymph Nodes     Number of Lymph Nodes with Macrometastases (> 2 mm)  Specify number: 0   Number of Lymph Nodes with Micrometastases (> 0.2 mm to 2 mm and / or > 200 cells)  Specify number: 0   Number of Lymph Nodes with  Isolated Tumor Cells (<= 0.2 mm and <= 200 cells)  Specify number: 0   Number of Lymph Nodes Examined  Specify number: 2   Number of [place] Nodes Examined  Specify number: 2   PATHOLOGIC STAGE CLASSIFICATION (pTNM)   Primary Tumor (Invasive Carcinoma) (pT)  pT1c: Tumor > 10 mm but <= 20 mm in greatest dimension   Modifier  (sn): Only sentinel node(s) evaluated. If 6 or more nodes (sentinel or nonsentinel) are removed, this modifier should not be used.   Category (pN)  pN0: No regional lymph node metastasis identified or ITCs only   .            Assessment/Plan   1.[id number] HER-2 negative grade 2 left breast cancer post lumpectomy and sentinel node biopsy-with close margins from high-grade DCIS medially-Oncotype score 16  2.  Interstitial cystitis undergoing instillation of lidocaine/heparin in  the bladder intermittently  3.  Family history of thyroid and breast cancer?  Chek 2 mutation    Plan  1.Radiation and tamoxifen 20 mg daily to begin after radiation   2.  Keflex 250 3 times a day for possible infection at the lumpectomy site  3.  Genetic testing  4. Discontinue Zoloft which she takes when necessary as it would interfere with the use of tamoxifen which would be the mainstay of treatment  5. .return in 3 months to assess her tolerance of tamoxifen.   .

## 2018-06-26 ENCOUNTER — RADIATION ONCOLOGY WEEKLY ASSESSMENT (OUTPATIENT)
Dept: RADIATION ONCOLOGY | Facility: CLINIC | Age: 48
End: 2018-06-26

## 2018-06-26 VITALS
OXYGEN SATURATION: 100 % | WEIGHT: 147.49 LBS | BODY MASS INDEX: 26.13 KG/M2 | HEIGHT: 63 IN | SYSTOLIC BLOOD PRESSURE: 103 MMHG | TEMPERATURE: 97.6 F | DIASTOLIC BLOOD PRESSURE: 72 MMHG | HEART RATE: 61 BPM

## 2018-06-26 DIAGNOSIS — C50.112 MALIGNANT NEOPLASM OF CENTRAL PORTION OF LEFT BREAST IN FEMALE, ESTROGEN RECEPTOR POSITIVE (HCC): Primary | ICD-10-CM

## 2018-06-26 DIAGNOSIS — Z17.0 MALIGNANT NEOPLASM OF CENTRAL PORTION OF LEFT BREAST IN FEMALE, ESTROGEN RECEPTOR POSITIVE (HCC): Primary | ICD-10-CM

## 2018-06-26 PROCEDURE — 77412 RADIATION TX DELIVERY LVL 3: CPT | Performed by: RADIOLOGY

## 2018-06-26 PROCEDURE — 77336 RADIATION PHYSICS CONSULT: CPT | Performed by: RADIOLOGY

## 2018-06-26 NOTE — PROGRESS NOTES
"Physician Weekly Management Note    Diagnosis:     1. Malignant neoplasm of central portion of left breast in female, estrogen receptor positive    Stage IA (cT1b, cN0, cM0, G2, ER: Positive, NM: Positive, HER2: Negative)    Reason for Visit: Breast Cancer ([place] (5/21))    Concurrent Chemo:   No    Notes on Treatment course, Films, Medical progress and Plan:  Started developing increasing pain mid-last week in breast and more axilla. Also felt generalized achiness and fatigue last couple of days. Very sore with arm raising. On exam axilla is tight, engorged vasculature on chest, no lymphedema noted. Breast ok, no warmth or erythema noted. Started on abx yesterday by Dr. Gutierrez though labs ok. Recommended we get a CT chest soon just to be sure nothing further. Will order that today, cont to watch for other signs of infection.  No other questions, cont on.    ROS - Other than as listed above, as follow:  Constitutional - Normal - Denies lack of appetite, fatigue, fever, night sweats and change in weight.  Neck - Normal - Denies neck masses, muscle weakness, neck pain, decreased range of motion and swelling of the neck.  Breasts - Normal - Denies breast masses, nipple discharge, nipple inversion and pain.  Respiratory - Normal - Denies cough, dyspnea, hemoptysis, hiccoughs, pleuritic chest pain and wheezing.  Gastrointestinal - Normal - Denies abdominal pain, constipation, diarrhea, heartburn / dyspepsia, hematemesis, hemorrhoids, melena / GI bleeding, nausea, pain / cramping, satiety and vomiting.  Musculoskeletal - Normal - Denies arthritis, bone pain, joint pain, muscle weakness and decreased range of motion.   Hematologic/Lymphatic - Normal - Denies easy bruising and tender or enlarged lymph nodes.    PYHSICAL EXAM - Other than listed above, as follows:  Vitals:    06/26/18 0952   BP: 103/72   Pulse: 61   Temp: 97.6 °F (36.4 °C)   TempSrc: Oral   SpO2: 100%   Weight: 66.9 kg (147 lb 7.8 oz)   Height: 160 cm (62.99\") " "  PainSc:   6       Constitutional - Normal - No evidence of impaired alertness, inadequate appearance, premature or advanced chronologic age, uncooperativeness, altered mood and affect and disorientation.  Neck - Normal - No evidence of tender or enlarged lymph nodes, neck abnormalities, restricted range of motion and enlarged thyroid gland.  Breasts - Normal - No change in nipple or incision site.  Chest - Normal - No evidence of chest abnormalities and tender or enlarged lymph nodes.  Hematologic/Lymphatic -  Normal - No evidence of tender or enlarged axillae lymph nodes and tender or enlarged neck lymph nodes.    Performance Status: (1) Restricted in physically strenuous activity, ambulatory and able to do work of light nature  Problem added:  No problems updated.  Medications added: No orders of the defined types were placed in this encounter.    Ancillary referrals made:   Orders Placed This Encounter   Procedures   • CT Chest With Contrast     Standing Status:   Future     Standing Expiration Date:   6/26/2019     Order Specific Question:   Patient Pregnant     Answer:   No       Technical aspects reviewed:  Weekly OBI approved if applicable? Yes  Weekly port films approved?   Yes  Change requests noted if applicable? Yes  Patient setup and plan reviewed?  Yes    Chart Reviewed:  Continue current treatment plan?   Yes  Treatment plan change requested?  No    I have reviewed and marked as \"reviewed\" the current medications, allergies and problem list in the patients EMR.    I have reviewed the patient's medical, surgical  history in detail, reviewed any pertinent lab work  and updated the computerized patient record if needed.    Patient's Care Team:  Patient Care Team:  Nikita Gallegos MD as PCP - General (Internal Medicine)  Adrián Pereyra MD as Referring Physician (Breast Surgery)  Dick Gutierrez MD as Consulting Physician (Hematology and Oncology)    Seen and approved by:  Katarzyna Lam MD, " 06/26/2018

## 2018-06-27 ENCOUNTER — HOSPITAL ENCOUNTER (OUTPATIENT)
Dept: CT IMAGING | Facility: HOSPITAL | Age: 48
Discharge: HOME | End: 2018-06-27
Attending: RADIOLOGY | Admitting: RADIOLOGY

## 2018-06-27 ENCOUNTER — DOCUMENTATION (OUTPATIENT)
Dept: RADIATION ONCOLOGY | Facility: HOSPITAL | Age: 48
End: 2018-06-27

## 2018-06-27 DIAGNOSIS — C50.112 MALIGNANT NEOPLASM OF CENTRAL PORTION OF LEFT BREAST IN FEMALE, ESTROGEN RECEPTOR POSITIVE (HCC): ICD-10-CM

## 2018-06-27 DIAGNOSIS — Z17.0 MALIGNANT NEOPLASM OF CENTRAL PORTION OF LEFT BREAST IN FEMALE, ESTROGEN RECEPTOR POSITIVE (HCC): ICD-10-CM

## 2018-06-27 PROCEDURE — 77412 RADIATION TX DELIVERY LVL 3: CPT | Performed by: RADIOLOGY

## 2018-06-27 PROCEDURE — 71260 CT THORAX DX C+: CPT

## 2018-06-27 PROCEDURE — 25010000002 IOPAMIDOL 61 % SOLUTION: Performed by: RADIOLOGY

## 2018-06-27 PROCEDURE — 77417 THER RADIOLOGY PORT IMAGE(S): CPT | Performed by: RADIOLOGY

## 2018-06-27 PROCEDURE — 77427 RADIATION TX MANAGEMENT X5: CPT | Performed by: RADIOLOGY

## 2018-06-27 RX ADMIN — IOPAMIDOL 75 ML: 612 INJECTION, SOLUTION INTRAVENOUS at 08:43

## 2018-06-27 NOTE — PROGRESS NOTES
CT chest reviewed - no significant findings on left. Phoned and discussed with patient. She is markedly better than yesterday and Monday. Will continue on with treatment and finish antibiotics as precaution.

## 2018-06-28 PROCEDURE — 77417 THER RADIOLOGY PORT IMAGE(S): CPT | Performed by: RADIOLOGY

## 2018-06-28 PROCEDURE — 77412 RADIATION TX DELIVERY LVL 3: CPT | Performed by: RADIOLOGY

## 2018-06-29 PROCEDURE — 77412 RADIATION TX DELIVERY LVL 3: CPT | Performed by: RADIOLOGY

## 2018-07-02 ENCOUNTER — APPOINTMENT (OUTPATIENT)
Dept: RADIATION ONCOLOGY | Facility: HOSPITAL | Age: 48
End: 2018-07-02

## 2018-07-02 ENCOUNTER — RADIATION ONCOLOGY WEEKLY ASSESSMENT (OUTPATIENT)
Dept: RADIATION ONCOLOGY | Facility: CLINIC | Age: 48
End: 2018-07-02

## 2018-07-02 VITALS
HEIGHT: 63 IN | OXYGEN SATURATION: 99 % | HEART RATE: 60 BPM | SYSTOLIC BLOOD PRESSURE: 104 MMHG | WEIGHT: 147.49 LBS | DIASTOLIC BLOOD PRESSURE: 70 MMHG | BODY MASS INDEX: 26.13 KG/M2

## 2018-07-02 DIAGNOSIS — C50.212 MALIGNANT NEOPLASM OF UPPER-INNER QUADRANT OF LEFT BREAST IN FEMALE, ESTROGEN RECEPTOR POSITIVE (HCC): Primary | ICD-10-CM

## 2018-07-02 DIAGNOSIS — Z17.0 MALIGNANT NEOPLASM OF UPPER-INNER QUADRANT OF LEFT BREAST IN FEMALE, ESTROGEN RECEPTOR POSITIVE (HCC): Primary | ICD-10-CM

## 2018-07-02 PROCEDURE — 77412 RADIATION TX DELIVERY LVL 3: CPT | Performed by: RADIOLOGY

## 2018-07-02 NOTE — PROGRESS NOTES
"Physician Weekly Management Note    Diagnosis:     1. Malignant neoplasm of upper-inner quadrant of left breast in female, estrogen receptor positive      Stage IA (cT1b, cN0, cM0, G2, ER: Positive, MT: Positive, HER2: Negative)    Reason for Visit: Tx: 9/21  Concurrent Chemo:   No    Notes on Treatment course, Films, Medical progress and Plan:  Doing fairly well. Problems prior to abx resolving nicely. Nipple is tender and breast feels full. Still fatigued and long discussion about etiologies. SHe feels mood and anxiety are controlled. Encouraged patients. Breast looks good. Nipple slightly prominent, mild erythema but looks great for treatment number. No other problems or questions, cont on.    ROS - Other than as listed above, as follow:  Constitutional - Normal - Denies lack of appetite, fatigue, fever, night sweats and change in weight.  Neck - Normal - Denies neck masses, muscle weakness, neck pain, decreased range of motion and swelling of the neck.  Breasts - Normal - Denies breast masses, nipple discharge, nipple inversion and pain.  Respiratory - Normal - Denies cough, dyspnea, hemoptysis, hiccoughs, pleuritic chest pain and wheezing.  Gastrointestinal - Normal - Denies abdominal pain, constipation, diarrhea, heartburn / dyspepsia, hematemesis, hemorrhoids, melena / GI bleeding, nausea, pain / cramping, satiety and vomiting.  Musculoskeletal - Normal - Denies arthritis, bone pain, joint pain, muscle weakness and decreased range of motion.   Hematologic/Lymphatic - Normal - Denies easy bruising and tender or enlarged lymph nodes.    PYHSICAL EXAM - Other than listed above, as follows:  Vitals:    07/02/18 0950   BP: 104/70   Pulse: 60   SpO2: 99%   Weight: 66.9 kg (147 lb 7.8 oz)   Height: 160 cm (62.99\")   PainSc: 4  Comment: more of a 3       Constitutional - Normal - No evidence of impaired alertness, inadequate appearance, premature or advanced chronologic age, uncooperativeness, altered mood and affect " "and disorientation.  Neck - Normal - No evidence of tender or enlarged lymph nodes, neck abnormalities, restricted range of motion and enlarged thyroid gland.  Breasts - Normal - No change in nipple or incision site.  Chest - Normal - No evidence of chest abnormalities and tender or enlarged lymph nodes.  Hematologic/Lymphatic -  Normal - No evidence of tender or enlarged axillae lymph nodes and tender or enlarged neck lymph nodes.    Performance Status: (1) Restricted in physically strenuous activity, ambulatory and able to do work of light nature  Problem added:  No problems updated.  Medications added: No orders of the defined types were placed in this encounter.    Ancillary referrals made: No orders of the defined types were placed in this encounter.      Technical aspects reviewed:  Weekly OBI approved if applicable? Yes  Weekly port films approved?   Yes  Change requests noted if applicable? Yes  Patient setup and plan reviewed?  Yes    Chart Reviewed:  Continue current treatment plan?   Yes  Treatment plan change requested?  No    I have reviewed and marked as \"reviewed\" the current medications, allergies and problem list in the patients EMR.    I have reviewed the patient's medical, surgical  history in detail, reviewed any pertinent lab work  and updated the computerized patient record if needed.    Patient's Care Team:  Patient Care Team:  Nikita Gallegos MD as PCP - General (Internal Medicine)  Adrián Pereyra MD as Referring Physician (Breast Surgery)  Dcik Gutierrez MD as Consulting Physician (Hematology and Oncology)    Seen and approved by:  Katarzyna Lam MD, 07/02/2018  "

## 2018-07-03 ENCOUNTER — CLINICAL SUPPORT (OUTPATIENT)
Dept: OTHER | Facility: HOSPITAL | Age: 48
End: 2018-07-03

## 2018-07-03 DIAGNOSIS — C50.212 MALIGNANT NEOPLASM OF UPPER-INNER QUADRANT OF LEFT BREAST IN FEMALE, ESTROGEN RECEPTOR POSITIVE (HCC): Primary | ICD-10-CM

## 2018-07-03 DIAGNOSIS — Z17.0 MALIGNANT NEOPLASM OF UPPER-INNER QUADRANT OF LEFT BREAST IN FEMALE, ESTROGEN RECEPTOR POSITIVE (HCC): Primary | ICD-10-CM

## 2018-07-03 PROCEDURE — 77336 RADIATION PHYSICS CONSULT: CPT | Performed by: RADIOLOGY

## 2018-07-03 PROCEDURE — 77412 RADIATION TX DELIVERY LVL 3: CPT | Performed by: RADIOLOGY

## 2018-07-03 PROCEDURE — G0463 HOSPITAL OUTPT CLINIC VISIT: HCPCS

## 2018-07-05 PROCEDURE — 77412 RADIATION TX DELIVERY LVL 3: CPT | Performed by: RADIOLOGY

## 2018-07-05 PROCEDURE — 77417 THER RADIOLOGY PORT IMAGE(S): CPT | Performed by: RADIOLOGY

## 2018-07-05 PROCEDURE — 77427 RADIATION TX MANAGEMENT X5: CPT | Performed by: RADIOLOGY

## 2018-07-06 PROCEDURE — 77412 RADIATION TX DELIVERY LVL 3: CPT | Performed by: RADIOLOGY

## 2018-07-06 PROCEDURE — 77417 THER RADIOLOGY PORT IMAGE(S): CPT | Performed by: RADIOLOGY

## 2018-07-09 ENCOUNTER — RADIATION ONCOLOGY WEEKLY ASSESSMENT (OUTPATIENT)
Dept: RADIATION ONCOLOGY | Facility: CLINIC | Age: 48
End: 2018-07-09

## 2018-07-09 VITALS
HEIGHT: 63 IN | WEIGHT: 147.49 LBS | OXYGEN SATURATION: 99 % | DIASTOLIC BLOOD PRESSURE: 77 MMHG | TEMPERATURE: 97.8 F | HEART RATE: 66 BPM | SYSTOLIC BLOOD PRESSURE: 112 MMHG | BODY MASS INDEX: 26.13 KG/M2

## 2018-07-09 DIAGNOSIS — C50.212 MALIGNANT NEOPLASM OF UPPER-INNER QUADRANT OF LEFT BREAST IN FEMALE, ESTROGEN RECEPTOR POSITIVE (HCC): Primary | ICD-10-CM

## 2018-07-09 DIAGNOSIS — Z17.0 MALIGNANT NEOPLASM OF UPPER-INNER QUADRANT OF LEFT BREAST IN FEMALE, ESTROGEN RECEPTOR POSITIVE (HCC): Primary | ICD-10-CM

## 2018-07-09 PROCEDURE — 77412 RADIATION TX DELIVERY LVL 3: CPT | Performed by: RADIOLOGY

## 2018-07-09 NOTE — PROGRESS NOTES
Physician Weekly Management Note    Diagnosis:     Diagnosis Plan   1. Malignant neoplasm of upper-inner quadrant of left breast in female, estrogen receptor positive (CMS/HCC)         RT Details:  fx 13/16 left breast tangents plus boost      Notes on Treatment course, Films, Medical progress:  Doing pretty well, increased erythema over the weekend, nipple tenderness, advised her to use hydrocortisone cream just in the area where it itches most, mild eyrthema, no skin breakdown, cont on    Weekly Management:  Medication reviewed?   Yes  New medications given?   No  Problemlist reviewed?   Yes  Problem added?   No  Issues raised requiring referral to support services - task assigned to:  reese    Technical aspects reviewed:  Weekly OBI approved?   Yes  Weekly port films approved?   Yes  Change requests noted on port film?   No  Patient setup and plan reviewed?   Yes    Chart Reviewed:  Continue current treatment plan?   Yes  Treatment plan change requested?   No  CBC reviewed?   No  Concurrent Chemo?   No    Objective     Toxicities:   Grade 1 erythema     Review of Systems   Constitutional: Negative.    HENT: Negative.    Skin: Positive for color change.          Vitals:    07/09/18 0953   BP: 112/77   Pulse: 66   Temp: 97.8 °F (36.6 °C)   SpO2: 99%       Current Status 6/25/2018   ECOG score 0       Physical Exam   Constitutional: She appears well-developed and well-nourished.   Pulmonary/Chest:       Mild erythema left breast           Problem Summary List    Diagnosis:     Diagnosis Plan   1. Malignant neoplasm of upper-inner quadrant of left breast in female, estrogen receptor positive (CMS/HCC)       Pathology:   breast    Past Medical History:   Diagnosis Date   • Anxiety    • Breast cancer (CMS/HCC) 02/14/2018    Left breast, Upper Inner Quadrant, Invasive Mammary Carcinoma, Intermediate grade, measuring at least 0.2 cm in dimension, with associated high grade solid and cribiform ductal carcinoma in situ with  comedonecrosis, ER/AR positive, [id number] negative   • Heartburn    • Infectious mononucleosis    • Migraines    • PONV (postoperative nausea and vomiting)    • Pre-diabetes    • PVC's (premature ventricular contractions)    • Wears contact lenses          Past Surgical History:   Procedure Laterality Date   • BREAST BIOPSY Left 02/14/2018    Left breast stereotactic biopsy w/ marker clip placement & positive radiograph-Dr. Lashaun Gamble, [facility]   • BREAST BIOPSY Left 02/12/2018    INCOMPLETE - Biopsy could not be completed as patient had severe vasovagal reaction & subsequent syncope-[facility]   • BREAST BIOPSY Left 5/23/2018    Procedure: REEXCISION OF POSITIVE LUMPECTOMY MARGIN LEFT BREAST;  Surgeon: Adrián Pereyra MD;  Location: [facility];  Service: General   • BREAST LUMPECTOMY WITH SENTINEL NODE BIOPSY Left 4/11/2018    Procedure: BREAST LUMPECTOMY WITH SENTINEL NODE BIOPSY AND NEEDLE LOCALIZATION;  Surgeon: Adrián Pereyra MD;  Location: [facility];  Service: General         Current Outpatient Prescriptions on File Prior to Visit   Medication Sig Dispense Refill   • acetaminophen (TYLENOL) 500 MG tablet Take 1,000 mg by mouth Every 6 (Six) Hours As Needed for Mild Pain .     • ALPRAZolam (XANAX) 0.25 MG tablet Take 0.25 mg by mouth 2 (Two) Times a Day As Needed for Anxiety.     • cephalexin (KEFLEX) 250 MG capsule Take 1 capsule by mouth 4 (Four) Times a Day. 21 capsule 1   • Cetirizine HCl (ZYRTEC PO) Take 10 mg by mouth Daily As Needed.     • HYDROcodone-acetaminophen (NORCO) 5-325 MG per tablet 1-2 tabs q 6 hours prn pain.  Maximum tabs 6 daily (Patient taking differently: Take 1-2 tablets by mouth Every 6 (Six) Hours As Needed. 1-2 tabs q 6 hours prn pain.  Maximum tabs 6 daily) 25 tablet 0   • ibuprofen (ADVIL,MOTRIN) 600 MG tablet Take 600 mg by mouth Every 6 (Six) Hours As Needed for Mild Pain . HELD FOR SURGERY     • promethazine (PHENERGAN) 25 MG tablet Take 1 tablet by mouth Every 6 (Six)  Hours As Needed for Nausea or Vomiting for up to 10 doses. 10 tablet 0   • tamoxifen (NOLVADEX) 20 MG chemo tablet Take 1 tablet by mouth Daily for 90 days. 90 tablet 3   • vitamin D (ERGOCALCIFEROL) 37422 units capsule capsule Take 50,000 Units by mouth Every 7 (Seven) Days. MONDAYS       No current facility-administered medications on file prior to visit.        No Known Allergies      Referring Provider:    No referring provider defined for this encounter.    Oncologist:  No primary care provider on file.      Seen and approved by:  Marlin Yeung MD  07/09/2018

## 2018-07-10 PROCEDURE — 77412 RADIATION TX DELIVERY LVL 3: CPT | Performed by: RADIOLOGY

## 2018-07-10 PROCEDURE — 77336 RADIATION PHYSICS CONSULT: CPT | Performed by: RADIOLOGY

## 2018-07-11 PROCEDURE — 77412 RADIATION TX DELIVERY LVL 3: CPT | Performed by: RADIOLOGY

## 2018-07-12 PROCEDURE — 77412 RADIATION TX DELIVERY LVL 3: CPT | Performed by: RADIOLOGY

## 2018-07-12 PROCEDURE — 77427 RADIATION TX MANAGEMENT X5: CPT | Performed by: RADIOLOGY

## 2018-07-12 PROCEDURE — 77417 THER RADIOLOGY PORT IMAGE(S): CPT | Performed by: RADIOLOGY

## 2018-07-13 PROCEDURE — 77280 THER RAD SIMULAJ FIELD SMPL: CPT | Performed by: RADIOLOGY

## 2018-07-13 PROCEDURE — 77417 THER RADIOLOGY PORT IMAGE(S): CPT | Performed by: RADIOLOGY

## 2018-07-13 PROCEDURE — 77412 RADIATION TX DELIVERY LVL 3: CPT | Performed by: RADIOLOGY

## 2018-07-16 ENCOUNTER — RADIATION ONCOLOGY WEEKLY ASSESSMENT (OUTPATIENT)
Dept: RADIATION ONCOLOGY | Facility: CLINIC | Age: 48
End: 2018-07-16

## 2018-07-16 VITALS
HEART RATE: 58 BPM | DIASTOLIC BLOOD PRESSURE: 71 MMHG | SYSTOLIC BLOOD PRESSURE: 104 MMHG | TEMPERATURE: 97.4 F | OXYGEN SATURATION: 100 %

## 2018-07-16 DIAGNOSIS — Z17.0 MALIGNANT NEOPLASM OF UPPER-INNER QUADRANT OF LEFT BREAST IN FEMALE, ESTROGEN RECEPTOR POSITIVE (HCC): Primary | ICD-10-CM

## 2018-07-16 DIAGNOSIS — C50.212 MALIGNANT NEOPLASM OF UPPER-INNER QUADRANT OF LEFT BREAST IN FEMALE, ESTROGEN RECEPTOR POSITIVE (HCC): Primary | ICD-10-CM

## 2018-07-16 PROCEDURE — 77412 RADIATION TX DELIVERY LVL 3: CPT | Performed by: RADIOLOGY

## 2018-07-16 NOTE — PROGRESS NOTES
Physician Weekly Management Note    Diagnosis:     1. Malignant neoplasm of upper-inner quadrant of left breast in female, estrogen receptor positive (CMS/HCC)      Stage IA (cT1b, cN0, cM0, G2, ER: Positive, NC: Positive, HER2: Negative)    Reason for Visit: Tx: 18/21  Concurrent Chemo:   No    Notes on Treatment course, Films, Medical progress and Plan:  Doing well. Problems prior to abx cleared up. Nipple is still slightly tender but improved. Still fatigued but better. Breast looks good. Mild erythema but looks great for treatment number. No other problems or questions, cont on.    ROS - Other than as listed above, as follow:  Constitutional - Normal - Denies lack of appetite, fatigue, fever, night sweats and change in weight.  Neck - Normal - Denies neck masses, muscle weakness, neck pain, decreased range of motion and swelling of the neck.  Breasts - Normal - Denies breast masses, nipple discharge, nipple inversion and pain.  Respiratory - Normal - Denies cough, dyspnea, hemoptysis, hiccoughs, pleuritic chest pain and wheezing.  Gastrointestinal - Normal - Denies abdominal pain, constipation, diarrhea, heartburn / dyspepsia, hematemesis, hemorrhoids, melena / GI bleeding, nausea, pain / cramping, satiety and vomiting.  Musculoskeletal - Normal - Denies arthritis, bone pain, joint pain, muscle weakness and decreased range of motion.   Hematologic/Lymphatic - Normal - Denies easy bruising and tender or enlarged lymph nodes.    PYHSICAL EXAM - Other than listed above, as follows:  Vitals:    07/16/18 0955   BP: 104/71   Pulse: 58   Temp: 97.4 °F (36.3 °C)   TempSrc: Oral   SpO2: 100%   PainSc:   2       Constitutional - Normal - No evidence of impaired alertness, inadequate appearance, premature or advanced chronologic age, uncooperativeness, altered mood and affect and disorientation.  Neck - Normal - No evidence of tender or enlarged lymph nodes, neck abnormalities, restricted range of motion and enlarged  "thyroid gland.  Breasts - Normal - No change in nipple or incision site.  Chest - Normal - No evidence of chest abnormalities and tender or enlarged lymph nodes.  Hematologic/Lymphatic -  Normal - No evidence of tender or enlarged axillae lymph nodes and tender or enlarged neck lymph nodes.    Performance Status: (1) Restricted in physically strenuous activity, ambulatory and able to do work of light nature  Problem added:  No problems updated.  Medications added: No orders of the defined types were placed in this encounter.    Ancillary referrals made: No orders of the defined types were placed in this encounter.      Technical aspects reviewed:  Weekly OBI approved if applicable? Yes  Weekly port films approved?   Yes  Change requests noted if applicable? Yes  Patient setup and plan reviewed?  Yes    Chart Reviewed:  Continue current treatment plan?   Yes  Treatment plan change requested?  No    I have reviewed and marked as \"reviewed\" the current medications, allergies and problem list in the patients EMR.    I have reviewed the patient's medical, surgical  history in detail, reviewed any pertinent lab work  and updated the computerized patient record if needed.    Patient's Care Team:  Patient Care Team:  Nikita Gallegos MD as PCP - General (Internal Medicine)  Adrián Pereyra MD as Referring Physician (Breast Surgery)  Dick Gutierrez MD as Consulting Physician (Hematology and Oncology)    Seen and approved by:  Katarzyna Lam MD, 07/02/2018  "

## 2018-07-17 PROCEDURE — 77336 RADIATION PHYSICS CONSULT: CPT | Performed by: RADIOLOGY

## 2018-07-17 PROCEDURE — 77412 RADIATION TX DELIVERY LVL 3: CPT | Performed by: RADIOLOGY

## 2018-07-18 PROCEDURE — 77412 RADIATION TX DELIVERY LVL 3: CPT | Performed by: RADIOLOGY

## 2018-07-19 ENCOUNTER — DOCUMENTATION (OUTPATIENT)
Dept: RADIATION ONCOLOGY | Facility: HOSPITAL | Age: 48
End: 2018-07-19

## 2018-07-19 PROCEDURE — 77412 RADIATION TX DELIVERY LVL 3: CPT | Performed by: RADIOLOGY

## 2018-07-19 NOTE — PROGRESS NOTES
RADIATION THERAPY TREATMENT SUMMARY    Diagnosis: Malignant neoplasm of left breast     Patient Care Team:  Nikita Gallegos MD as PCP - General (Internal Medicine)  Adrián Pereyra MD as Referring Physician (Breast Surgery)  Dick Gutierrez MD as Consulting Physician (Hematology and Oncology)    Cesia Workman has recently completed the course of radiation therapy prescribed for the above-mentioned diagnosis.  Below, please find the specifics of the course of treatment delivered:    Radiation Details:      Dates of treatment:  6/20/2018 - 7/19/2018    Details of radiation therapy:  Treatment Site - Left Breast  Treatment Intent - Curative  Total Dose in cGy - 4256  Number of Treatments - 16  Dose per fraction - 266 cGy per fraction  Fractions per day - 1 fx/day  Fractions per week - 5 fx/week  Treatment Type - Tangents, Parallel oppossed obliques, 3D  Energy - 6 MVP    Treatment Site - LV Boost  Treatment Intent - Curative  Total Dose in cGy - 1000  Number of Treatments - 5  Dose per fraction - 200 cGy per fraction  Fractions per day - 1 fx/day  Fractions per week - 5 fx/week  Treatment Type - 3D, See below  Energy - 6 MVP    Tolerance and Toxicities: She tolerated the treatments well, requiring no treatment breaks. She completed the treatments in 29 elapsed days.    Follow-up Plans:  I have asked the patient to return to see me in approximately 4 weeks.  I have also made a referral to our Survivorship Clinic.

## 2018-08-21 ENCOUNTER — OFFICE VISIT (OUTPATIENT)
Dept: RADIATION ONCOLOGY | Facility: CLINIC | Age: 48
End: 2018-08-21

## 2018-08-21 VITALS
HEART RATE: 57 BPM | DIASTOLIC BLOOD PRESSURE: 74 MMHG | SYSTOLIC BLOOD PRESSURE: 109 MMHG | OXYGEN SATURATION: 100 % | BODY MASS INDEX: 26.22 KG/M2 | WEIGHT: 148 LBS | TEMPERATURE: 97.5 F

## 2018-08-21 DIAGNOSIS — C50.212 MALIGNANT NEOPLASM OF UPPER-INNER QUADRANT OF LEFT BREAST IN FEMALE, ESTROGEN RECEPTOR POSITIVE (HCC): Primary | ICD-10-CM

## 2018-08-21 DIAGNOSIS — Z17.0 MALIGNANT NEOPLASM OF UPPER-INNER QUADRANT OF LEFT BREAST IN FEMALE, ESTROGEN RECEPTOR POSITIVE (HCC): Primary | ICD-10-CM

## 2018-08-21 PROCEDURE — 99024 POSTOP FOLLOW-UP VISIT: CPT | Performed by: RADIOLOGY

## 2018-08-21 NOTE — PROGRESS NOTES
DIAGNOSIS and REASON FOR FOLLOW UP:   1. Malignant neoplasm of upper-inner quadrant of left breast in female, estrogen receptor positive (CMS/HCC)      Patient Care Team:  Nikita Gallegos MD as PCP - General (Internal Medicine)  Adrián Pereyra MD as Referring Physician (Breast Surgery)  Dick Gutierrez MD as Consulting Physician (Hematology and Oncology)    CHIEF COMPLAINT:  4 week follow up  I had the pleasure of seeing Cesia Workman  back in the department today, now approximately 4 weeks out from the radiation therapy portion of her treatment for the above mentioned diagnosis. Clinically she is doing wonderfully well.  Her energy has improved and she states her performance status is nearly back to baseline.  She has returned to work and is tolerating that well. She has some residual tightness in the left shoulder region and some mild hot flashes from the Tamoxifen but has no new complaints regarding the breast.    Past Medical History: she  has a past medical history of Anxiety; Breast cancer (CMS/HCC) (02/14/2018); Heartburn; Infectious mononucleosis; Migraines; PONV (postoperative nausea and vomiting); Pre-diabetes; PVC's (premature ventricular contractions); and Wears contact lenses.    Past Surgical History:  she has a past surgical history that includes Breast biopsy (Left, 02/14/2018); Breast biopsy (Left, 02/12/2018); breast lumpectomy with sentinel node biopsy (Left, 4/11/2018); and Breast biopsy (Left, 5/23/2018).    Meds:    Current Outpatient Prescriptions:   •  acetaminophen (TYLENOL) 500 MG tablet, Take 1,000 mg by mouth Every 6 (Six) Hours As Needed for Mild Pain ., Disp: , Rfl:   •  ALPRAZolam (XANAX) 0.25 MG tablet, Take 0.25 mg by mouth 2 (Two) Times a Day As Needed for Anxiety., Disp: , Rfl:   •  Cetirizine HCl (ZYRTEC PO), Take 10 mg by mouth Daily As Needed., Disp: , Rfl:   •  ibuprofen (ADVIL,MOTRIN) 600 MG tablet, Take 600 mg by mouth Every 6 (Six) Hours As Needed for Mild Pain  . HELD FOR SURGERY, Disp: , Rfl:   •  tamoxifen (NOLVADEX) 20 MG chemo tablet, Take 1 tablet by mouth Daily for 90 days., Disp: 90 tablet, Rfl: 3  •  vitamin D (ERGOCALCIFEROL) 20905 units capsule capsule, Take 50,000 Units by mouth Every 7 (Seven) Days. MONDAYS, Disp: , Rfl:   •  cephalexin (KEFLEX) 250 MG capsule, Take 1 capsule by mouth 4 (Four) Times a Day., Disp: 21 capsule, Rfl: 1  •  HYDROcodone-acetaminophen (NORCO) 5-325 MG per tablet, 1-2 tabs q 6 hours prn pain.  Maximum tabs 6 daily (Patient taking differently: Take 1-2 tablets by mouth Every 6 (Six) Hours As Needed. 1-2 tabs q 6 hours prn pain.  Maximum tabs 6 daily), Disp: 25 tablet, Rfl: 0  •  promethazine (PHENERGAN) 25 MG tablet, Take 1 tablet by mouth Every 6 (Six) Hours As Needed for Nausea or Vomiting for up to 10 doses., Disp: 10 tablet, Rfl: 0    Allergies:  No Known Allergies    Family History:  her family history includes Alcohol abuse in her sister; Asthma in her father; Bone cancer in her maternal grandfather; Breast cancer in her cousin, maternal aunt, and paternal aunt; Depression in her sister; Diabetes in her father and maternal grandmother; Hearing loss in her father; Heart disease in her father; Hyperlipidemia in her mother; Hypertension in her father; Thyroid cancer (age of onset: 64) in her father.    Social History:  she  reports that she quit smoking about 28 years ago. Her smoking use included Cigarettes. She started smoking about 30 years ago. She has a 1.00 pack-year smoking history. She has never used smokeless tobacco. She reports that she drinks alcohol. She reports that she does not use drugs.    Pertinent Findings on Review of Systems:  Fourteen systems were reviewed and are negative other than as mentioned above.    Pertinent Findings on Physical Exam:  Vitals:    08/21/18 1043   BP: 109/74   Pulse: 57   Temp: 97.5 °F (36.4 °C)   TempSrc: Oral   SpO2: 100%   Weight: 67.1 kg (148 lb)   PainSc: 0-No pain       Performance  Status:  (0) Fully active, able to carry on all predisease performance without restriction    Physical examination of the right breast reveals no abnormality and the right axilla is benign, without lymphadenopathy.     The left breast shows the well-healed lumpectomy incision and only slight and mild hyperpigmentation from the radiation therapy. The breast is quite soft and easy to examine. There are no worrisome nodules appreciated. The nipple is without change.     The axilla is benign on the left and there is no cervical or supraclavicular lymphadenopathy. There is no lymphedema noted in either upper extremity.    Assessment:    Malignant neoplasm of upper-inner quadrant of left breast in female, estrogen receptor positive (CMS/HCC)  Staging form: Breast, AJCC 8th Edition  - Clinical: Stage IA (cT1b, cN0, cM0, G2, ER: Positive, [place]: Positive, HER2: Negative)   Doing wonderfully well, 4 weeks out from radiation therapy    Plan:   We reviewed today the current NCCN guidelines for her surveillance and follow up, specifically the need for physician visit every 4 to 6 months for 5 years, annual mammogram, annual gynecologic assessment, continued monitoring of bone health and achieving and maintaining an ideal BMI.      She does continue on the Tamoxifen without significant difficulty other than mild hot flashes.  She returns to see the [facility] physicians on September 17, 2018 and understands she will continue follow-up through that office while on the medication. She also returns to see Dr. Pereyra next week.    Regarding her mammographic follow-up, she knows to continue on with her annual bilateral mammogram in January, 2019.  Given the above, I have not given her an appointment to return here, but encouraged her to call if I can be of any further help in her care and thank you again for allowing us to participate in her care.

## 2018-08-28 ENCOUNTER — OFFICE VISIT (OUTPATIENT)
Dept: MAMMOGRAPHY | Facility: CLINIC | Age: 48
End: 2018-08-28

## 2018-08-28 VITALS
OXYGEN SATURATION: 99 % | HEART RATE: 57 BPM | TEMPERATURE: 98.5 F | DIASTOLIC BLOOD PRESSURE: 78 MMHG | SYSTOLIC BLOOD PRESSURE: 102 MMHG

## 2018-08-28 DIAGNOSIS — Z17.0 MALIGNANT NEOPLASM OF UPPER-INNER QUADRANT OF LEFT BREAST IN FEMALE, ESTROGEN RECEPTOR POSITIVE (HCC): Primary | ICD-10-CM

## 2018-08-28 DIAGNOSIS — C50.212 MALIGNANT NEOPLASM OF UPPER-INNER QUADRANT OF LEFT BREAST IN FEMALE, ESTROGEN RECEPTOR POSITIVE (HCC): Primary | ICD-10-CM

## 2018-08-28 PROCEDURE — 99024 POSTOP FOLLOW-UP VISIT: CPT | Performed by: SURGERY

## 2018-08-28 RX ORDER — CHOLECALCIFEROL (VITAMIN D3) 1250 MCG
50000 CAPSULE ORAL WEEKLY
Refills: 3 | COMMUNITY
Start: 2018-06-25 | End: 2018-08-28

## 2018-08-28 NOTE — PROGRESS NOTES
Chief Complaint: Cesia Workman is a  48 y.o. female, initially referred by No ref. provider found , who is here today for a postoperative visit.    History of Present Illness:  In the interim,Cesia Workman has completed the radiation treatment.  She is also on tamoxifen which she is tolerating fairly well.  She does have some pain in the little bit of swelling around the lumpectomy incision.    She has noted no redness, warmth,drainage at the incision site. Denies fever or chills.      Current Outpatient Prescriptions:   •  acetaminophen (TYLENOL) 500 MG tablet, Take 1,000 mg by mouth Every 6 (Six) Hours As Needed for Mild Pain ., Disp: , Rfl:   •  ALPRAZolam (XANAX) 0.25 MG tablet, Take 0.25 mg by mouth 2 (Two) Times a Day As Needed for Anxiety., Disp: , Rfl:   •  cephalexin (KEFLEX) 250 MG capsule, Take 1 capsule by mouth 4 (Four) Times a Day., Disp: 21 capsule, Rfl: 1  •  Cetirizine HCl (ZYRTEC PO), Take 10 mg by mouth Daily As Needed., Disp: , Rfl:   •  HYDROcodone-acetaminophen (NORCO) 5-325 MG per tablet, 1-2 tabs q 6 hours prn pain.  Maximum tabs 6 daily (Patient taking differently: Take 1-2 tablets by mouth Every 6 (Six) Hours As Needed. 1-2 tabs q 6 hours prn pain.  Maximum tabs 6 daily), Disp: 25 tablet, Rfl: 0  •  ibuprofen (ADVIL,MOTRIN) 600 MG tablet, Take 600 mg by mouth Every 6 (Six) Hours As Needed for Mild Pain . HELD FOR SURGERY, Disp: , Rfl:   •  promethazine (PHENERGAN) 25 MG tablet, Take 1 tablet by mouth Every 6 (Six) Hours As Needed for Nausea or Vomiting for up to 10 doses., Disp: 10 tablet, Rfl: 0  •  tamoxifen (NOLVADEX) 20 MG chemo tablet, Take 1 tablet by mouth Daily for 90 days., Disp: 90 tablet, Rfl: 3  •  vitamin D (ERGOCALCIFEROL) 31087 units capsule capsule, Take 50,000 Units by mouth Every 7 (Seven) Days. MONDAYS, Disp: , Rfl:   Physical examination  Left breast-the patient has a scar in the upper inner quadrant with a little bit of firmness beneath it but no significant  architectural distortion.  The skin itself is slightly hyperpigmented from the radiation.  I do not palpate any masses in the breast that concern me.  Right breast-there is no skin dimpling nipple retraction or worrisome mass palpable in the breast.  Lymphatics-there is no axillary adenopathy.  There is maybe a little puffiness in the left axilla around her incision compared to the other breast in this location.  It does not feel like a seroma.  Assessment:  Left breast cancer status post breast conserving surgery.  She has completed her radiation and is on tamoxifen.  I do think the puffiness around the incision will gradually improve but she knows to call me if it worsens.    Plan:  I will see her back in the office on an as-needed basis.          EMR Dragon/transcription disclaimer:    Much of this encounter note is an electronic transcription/translocation of spoken language to printed text.  The electronic translation of spoken language may permit erroneous, or at times, nonsensical words or phrases to be inadvertently transcribed.  Although I have reviewed the note from such areas, some may still exist.

## 2018-09-17 ENCOUNTER — LAB (OUTPATIENT)
Dept: OTHER | Facility: HOSPITAL | Age: 48
End: 2018-09-17

## 2018-09-17 ENCOUNTER — OFFICE VISIT (OUTPATIENT)
Dept: ONCOLOGY | Facility: CLINIC | Age: 48
End: 2018-09-17

## 2018-09-17 VITALS
OXYGEN SATURATION: 100 % | BODY MASS INDEX: 26.05 KG/M2 | HEART RATE: 52 BPM | TEMPERATURE: 98 F | HEIGHT: 63 IN | DIASTOLIC BLOOD PRESSURE: 60 MMHG | RESPIRATION RATE: 12 BRPM | SYSTOLIC BLOOD PRESSURE: 108 MMHG | WEIGHT: 147 LBS

## 2018-09-17 DIAGNOSIS — C50.212 MALIGNANT NEOPLASM OF UPPER-INNER QUADRANT OF LEFT BREAST IN FEMALE, ESTROGEN RECEPTOR POSITIVE (HCC): ICD-10-CM

## 2018-09-17 DIAGNOSIS — R10.32 ABDOMINAL WALL PAIN IN LEFT LOWER QUADRANT: ICD-10-CM

## 2018-09-17 DIAGNOSIS — Z17.0 MALIGNANT NEOPLASM OF UPPER-INNER QUADRANT OF LEFT BREAST IN FEMALE, ESTROGEN RECEPTOR POSITIVE (HCC): Primary | ICD-10-CM

## 2018-09-17 DIAGNOSIS — C50.212 MALIGNANT NEOPLASM OF UPPER-INNER QUADRANT OF LEFT BREAST IN FEMALE, ESTROGEN RECEPTOR POSITIVE (HCC): Primary | ICD-10-CM

## 2018-09-17 DIAGNOSIS — Z17.0 MALIGNANT NEOPLASM OF UPPER-INNER QUADRANT OF LEFT BREAST IN FEMALE, ESTROGEN RECEPTOR POSITIVE (HCC): ICD-10-CM

## 2018-09-17 LAB
ALBUMIN SERPL-MCNC: 4.1 G/DL (ref 3.5–5.2)
ALBUMIN/GLOB SERPL: 1.4 G/DL
ALP SERPL-CCNC: 38 U/L (ref 39–117)
ALT SERPL W P-5'-P-CCNC: 20 U/L (ref 1–33)
ANION GAP SERPL CALCULATED.3IONS-SCNC: 8 MMOL/L
AST SERPL-CCNC: 21 U/L (ref 1–32)
BASOPHILS # BLD AUTO: 0.06 10*3/MM3 (ref 0–0.2)
BASOPHILS NFR BLD AUTO: 1.1 % (ref 0–1.5)
BILIRUB SERPL-MCNC: 0.2 MG/DL (ref 0.1–1.2)
BUN BLD-MCNC: 11 MG/DL (ref 6–20)
BUN/CREAT SERPL: 13.3 (ref 7–25)
CALCIUM SPEC-SCNC: 9.3 MG/DL (ref 8.6–10.5)
CHLORIDE SERPL-SCNC: 110 MMOL/L (ref 98–107)
CO2 SERPL-SCNC: 26 MMOL/L (ref 22–29)
CREAT BLD-MCNC: 0.83 MG/DL (ref 0.57–1)
DEPRECATED RDW RBC AUTO: 40.8 FL (ref 37–54)
EOSINOPHIL # BLD AUTO: 0.14 10*3/MM3 (ref 0–0.7)
EOSINOPHIL NFR BLD AUTO: 2.5 % (ref 0.3–6.2)
ERYTHROCYTE [DISTWIDTH] IN BLOOD BY AUTOMATED COUNT: 12.4 % (ref 11.7–13)
GFR SERPL CREATININE-BSD FRML MDRD: 74 ML/MIN/1.73
GLOBULIN UR ELPH-MCNC: 2.9 GM/DL
GLUCOSE BLD-MCNC: 122 MG/DL (ref 65–99)
HCT VFR BLD AUTO: 38.8 % (ref 35.6–45.5)
HGB BLD-MCNC: 13 G/DL (ref 11.9–15.5)
IMM GRANULOCYTES # BLD: 0.03 10*3/MM3 (ref 0–0.03)
IMM GRANULOCYTES NFR BLD: 0.5 % (ref 0–0.5)
LYMPHOCYTES # BLD AUTO: 1.62 10*3/MM3 (ref 0.9–4.8)
LYMPHOCYTES NFR BLD AUTO: 29.2 % (ref 19.6–45.3)
MCH RBC QN AUTO: 30.5 PG (ref 26.9–32)
MCHC RBC AUTO-ENTMCNC: 33.5 G/DL (ref 32.4–36.3)
MCV RBC AUTO: 91.1 FL (ref 80.5–98.2)
MONOCYTES # BLD AUTO: 0.55 10*3/MM3 (ref 0.2–1.2)
MONOCYTES NFR BLD AUTO: 9.9 % (ref 5–12)
NEUTROPHILS # BLD AUTO: 3.14 10*3/MM3 (ref 1.9–8.1)
NEUTROPHILS NFR BLD AUTO: 56.8 % (ref 42.7–76)
NRBC BLD MANUAL-RTO: 0 /100 WBC (ref 0–0)
PLATELET # BLD AUTO: 250 10*3/MM3 (ref 140–500)
PMV BLD AUTO: 9.6 FL (ref 6–12)
POTASSIUM BLD-SCNC: 4.1 MMOL/L (ref 3.5–5.2)
PROT SERPL-MCNC: 7 G/DL (ref 6–8.5)
RBC # BLD AUTO: 4.26 10*6/MM3 (ref 3.9–5.2)
SODIUM BLD-SCNC: 144 MMOL/L (ref 136–145)
WBC NRBC COR # BLD: 5.54 10*3/MM3 (ref 4.5–10.7)

## 2018-09-17 PROCEDURE — 80053 COMPREHEN METABOLIC PANEL: CPT | Performed by: INTERNAL MEDICINE

## 2018-09-17 PROCEDURE — 36415 COLL VENOUS BLD VENIPUNCTURE: CPT

## 2018-09-17 PROCEDURE — 99214 OFFICE O/P EST MOD 30 MIN: CPT | Performed by: INTERNAL MEDICINE

## 2018-09-17 PROCEDURE — 85025 COMPLETE CBC W/AUTO DIFF WBC: CPT | Performed by: INTERNAL MEDICINE

## 2018-09-17 NOTE — PROGRESS NOTES
Subjective     REASON FOR CONSULTATION:  1.  T1bN0 M0 strongly ER/OK positive HER-2 negative grade 2 left breast cancer post lumpectomy with close margins for DCIS-Oncotype score of 16 radiation and tamoxifen planned                             REQUESTING PHYSICIAN:  Adrián Pereyra M.D. and Nikita Gallegos M.D.      History of Present Illness is a 47-year-old premenopausal female with a T1b N0 strongly ER/OK positive breast cancer with a low Oncotype score of 16.    She completed radiation which she tolerated well and is on tamoxifen for about 2 months.  She has a little bit of mood swings and irritability but not severe and she's had her period twice on tamoxifen in both time she had severe left lower quadrant pain starting 2 days before..  I recommended an ultrasound of the pelvis and follow-up with her gynecologist as I don't think this is related to her GI tract.    Declined genetic testing at this time and we will reconsider    Not interested in Effexor or gabapentin or anything for her hot flashes and mood change at this point       Past Medical History:   Diagnosis Date   • Anxiety    • Breast cancer (CMS/HCC) 02/14/2018    Left breast, Upper Inner Quadrant, Invasive Mammary Carcinoma, Intermediate grade, measuring at least 0.2 cm in dimension, with associated high grade solid and cribiform ductal carcinoma in situ with comedonecrosis, ER/OK positive, [id number] negative   • Heartburn    • Infectious mononucleosis    • Migraines    • PONV (postoperative nausea and vomiting)    • Pre-diabetes    • PVC's (premature ventricular contractions)    • Wears contact lenses         Past Surgical History:   Procedure Laterality Date   • BREAST BIOPSY Left 02/14/2018    Left breast stereotactic biopsy w/ marker clip placement & positive radiograph-Dr. Lashaun Gamble, [facility]   • BREAST BIOPSY Left 02/12/2018    INCOMPLETE - Biopsy could not be completed as patient had severe vasovagal reaction & subsequent syncope-[facility]   • BREAST  BIOPSY Left 5/23/2018    Procedure: REEXCISION OF POSITIVE LUMPECTOMY MARGIN LEFT BREAST;  Surgeon: Adrián Pereyra MD;  [facility];  Service: General   • BREAST LUMPECTOMY WITH SENTINEL NODE BIOPSY Left 4/11/2018    Procedure: BREAST LUMPECTOMY WITH SENTINEL NODE BIOPSY AND NEEDLE LOCALIZATION;  Surgeon: Adrián Pereyra MD;  [facility];  Service: General      ONCOLOGIC HISTORY:  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  A diagnostic mammogram gram showed a 2.3 cm area of clustered microcalcifications very suspicious for malignancy and the patient underwent a stereotactic biopsy on 2/14/18 with the findings of intermediate grade invasive mammary carcinoma measuring 2 mm with associated high-grade and solid DCIS--ER was 65% [place] was 96% HER-2 0 Ki-67 was 2%.  The patient was referred to Dr. Pereyra and underwent bilateral breast MRIs with the findings of a 2.1 cm abnormality in the left breast with no axillary adenopathy and she was taken for surgery when lumpectomy and sentinel node biopsy was performed on 4/11/18.  This revealed a residual 8 mm area of invasive carcinoma grade 2 with associated high-grade DCIS spanning 16 mm-the lateral and medial margins were 0.5 mm from the DCIS-new anterior, superior and lateral margins were obtained which were negative but the medial margin was still felt to be close.  She went to see Dr. Pereyra to wanted to discuss further the status of her margins but sent her to us also to discuss adjuvant treatment for her invasive cancer.    She is healing well from surgery.  She is obviously anxious about the margins but has no qualms about having further surgery even if the cosmesis is not perfect because she is more worried about recurrence of her  cancer.  She is  3 para 3 -Menarche was age 14 she is premenopausal first childbirth was age 28 she breast-fed for 9 months she's been on no hormonal replacement since her last child 13 years ago  She is having bladder issues and her gynecologist just prescribed vaginal estrogens which she has not yet started.    Family history is positive for her father having thyroid cancer age 64 and he is currently dying from this is a 78 she's one sister is healthy her mother's 72 and healthy she has a maternal aunt with breast cancers at an unknown age paternal aunt with breast cancer in her 60s paternal grandfather with bone cancer and a maternal grandfather with bone cancer is no ovarian cancer in the family.      I explained to Cesia and her  that her tumor is very unlikely to have a high Oncotype score and it is very likely that the mainstay of her treatment would be tamoxifen.  But we will send an Oncotype score because of the small chance that there is a higher recurrence risk than expected based on her pathology in tumor size.  I think it is very reasonable to get genetic testing because of her family history although it is unlikely to change our treatment.  I've asked her to hold off and vaginal estrogens for the time being and to discuss this with her gynecologist.  She will stop her Zoloft and we can prescribe Effexor if needed      we discussed her case at the multiple this may conference because of the close margins for high-grade DCIS and we all felt that radiation would take care of this and no further surgery was needed.    We discussed the low risk Oncotype in the lack of benefit for chemotherapy in this situation which we had already discussed with her on the phone and we talked about tamoxifen.The side effects and toxicities of Tamoxifen were discussed with the patient, including hot flashes, mood swings,depression, DVT and endometrial cancer. A list of drugs that interfere with the  efficacy of tamoxifen and are to be avoided were given to the patient.      Current Outpatient Prescriptions on File Prior to Visit   Medication Sig Dispense Refill   • acetaminophen (TYLENOL) 500 MG tablet Take 1,000 mg by mouth Every 6 (Six) Hours As Needed for Mild Pain .     • ALPRAZolam (XANAX) 0.25 MG tablet Take 0.25 mg by mouth 2 (Two) Times a Day As Needed for Anxiety.     • cephalexin (KEFLEX) 250 MG capsule Take 1 capsule by mouth 4 (Four) Times a Day. 21 capsule 1   • Cetirizine HCl (ZYRTEC PO) Take 10 mg by mouth Daily As Needed.     • HYDROcodone-acetaminophen (NORCO) 5-325 MG per tablet 1-2 tabs q 6 hours prn pain.  Maximum tabs 6 daily (Patient taking differently: Take 1-2 tablets by mouth Every 6 (Six) Hours As Needed. 1-2 tabs q 6 hours prn pain.  Maximum tabs 6 daily) 25 tablet 0   • ibuprofen (ADVIL,MOTRIN) 600 MG tablet Take 600 mg by mouth Every 6 (Six) Hours As Needed for Mild Pain . HELD FOR SURGERY     • promethazine (PHENERGAN) 25 MG tablet Take 1 tablet by mouth Every 6 (Six) Hours As Needed for Nausea or Vomiting for up to 10 doses. 10 tablet 0   • tamoxifen (NOLVADEX) 20 MG chemo tablet Take 1 tablet by mouth Daily for 90 days. 90 tablet 3   • vitamin D (ERGOCALCIFEROL) 93134 units capsule capsule Take 50,000 Units by mouth Every 7 (Seven) Days. MONDAYS       No current facility-administered medications on file prior to visit.         ALLERGIES:  No Known Allergies     Social History     Social History   • Marital status:      Spouse name: Nikita Workman   • Number of children: 3   • Years of education: College     Occupational History   • Occupational Therapist      [employer]     Social History Main Topics   • Smoking status: Former Smoker     Packs/day: 0.50     Years: 2.00     Types: Cigarettes     Start date: 1988     Quit date: 1990   • Smokeless tobacco: Never Used   • Alcohol use Yes      Comment: RARE MONTHLY   • Drug use: No   • Sexual activity: Defer     Other Topics  Concern   • Not on file        Family History   Problem Relation Age of Onset   • Breast cancer Maternal Aunt         In her 60s   • Breast cancer Paternal Aunt    • Hyperlipidemia Mother    • Asthma Father    • Diabetes Father    • Hearing loss Father    • Heart disease Father    • Hypertension Father    • Thyroid cancer Father 64        Follicular Tyroid Cancer   • Alcohol abuse Sister    • Depression Sister    • Diabetes Maternal Grandmother    • Bone cancer Maternal Grandfather    • Breast cancer Cousin    • Malig Hyperthermia Neg Hx         Review of Systems   Gastrointestinal: Positive for abdominal pain (Intermittent felt to be gallbladder-related).   Genitourinary: Positive for difficulty urinating (Early prolapse) and urgency (Bladder pain and possible interstitial cystitis).   Neurological: Positive for dizziness (Occasional vertigo) and headaches (Chronic migraine headaches).        Objective     There were no vitals filed for this visit.  Current Status 6/25/2018   ECOG score 0       Physical Exam    GENERAL:  Well-developed, well-nourished in no acute distress.   SKIN:  Warm, dry without rashes, purpura or petechiae.  EYES:  Pupils equal, round and reactive to light.  EOMs intact.  Conjunctivae normal.  EARS:  Hearing intact.  NOSE:  Septum midline.  No excoriations or nasal discharge.  MOUTH:  Tongue is well-papillated; no stomatitis or ulcers.  Lips normal.  THROAT:  Oropharynx without lesions or exudates.  NECK:  Supple with good range of motion; no thyromegaly or masses, no JVD.  LYMPHATICS:  No cervical, supraclavicular, axillary or inguinal adenopathy.  CHEST:  Lungs clear to auscultation. Good airflow.  BREASTS: Right breast is benign left  shows a lumpectomy scar with Persistent firmness at the lumpectomy site  CARDIAC:  Regular rate and rhythm without murmurs, rubs or gallops. Normal S1,S2.  ABDOMEN:  Soft, minimal left lower quadrant tenderness without mass - with no hepatosplenomegaly  ".  EXTREMITIES:  No clubbing, cyanosis or edema.  NEUROLOGICAL:  Cranial Nerves II-XII grossly intact.  No focal neurological deficits.  PSYCHIATRIC:  Normal affect and mood.        RECENT LABS:  Hematology WBC   Date Value Ref Range Status   06/25/2018 8.20 4.50 - 10.70 10*3/mm3 Final     RBC   Date Value Ref Range Status   06/25/2018 4.56 3.90 - 5.20 10*6/mm3 Final     Hemoglobin   Date Value Ref Range Status   06/25/2018 13.6 11.9 - 15.5 g/dL Final     Hematocrit   Date Value Ref Range Status   06/25/2018 39.9 35.6 - 45.5 % Final     Platelets   Date Value Ref Range Status   06/25/2018 284 140 - 500 10*3/mm3 Final         IMPRESSION:  1. Biopsy-proven malignancy in the left breast at the 11:30 position  measuring on the order of 2.1 cm in greatest dimension. No other  suspicious findings are seen within the left breast and there is no  evidence for left axillary adenopathy.  2. There are no findings suspicious for malignancy in the right breast.     BI-RADS Category 6: Known biopsy-proven malignancy.     4/11/18  1. \"LEFT BREAST MASS,\" WIRE GUIDED LUMPECTOMY:             INVASIVE DUCTAL CARCINOMA, INTERMEDIATE GRADE, 8 MM, MARGINS CLEAR.             CLOSEST MARGIN TO INVASIVE TUMOR: 1.5 MM (INFERIOR).             ASSOCIATED HIGH GRADE DUCTAL CARCINOMA IN SITU SPANNING APPROXIMATELY 16 MM (FOUR                    BLOCKS x 4 MM PER BLOCK).             CLOSEST MARGIN TO DUCTAL CARCINOMA IN SITU: 0.5 MM (LATERAL AND MEDIAL MARGINS).             HORMONE RECEPTORS REPORTED ON PRIOR BIOPSY, [facility] [id number]. REPEAT/                   CONFIRMATORY STUDIES ARE AVAILABLE UPON REQUEST.                         BIOPSY SITE CHANGES.     NOTE: ADDITIONAL MARGINS SUBMITTED AS PARTS 2, 3, AND 4.        2. \"LEFT BREAST NEW ANTERIOR MARGIN\":              BENIGN FIBROADIPOSE TISSUE.              NEW MARGIN - NO ATYPIA.     3. \"LEFT BREAST NEW SUPERIOR MARGIN\":             BENIGN BREAST TISSUE.             NEW MARGIN - NO " "ATYPIA.     4. \"LEFT BREAST NEW LATERAL MARGIN\":             BENIGN BREAST TISSUE.             NEW MARGIN - NO ATYPIA.     5. \"SENTINEL NODE #1\":             LYMPH NODE, MULTIPLE STEP SECTIONS: NO METASTATIC CARCINOMA SEEN.     6. \"SENTINEL NODE #2\":             LYMPH NODE, MULTIPLE STEP SECTIONS: NO METASTATIC CARCINOMA SEEN.     THM/ IHC/a/CMK                                Electronically signed by Darin Claire MD on 4/12/2018 at 1511   Synoptic Checklist   INVASIVE CARCINOMA OF THE BREAST   Breast Invasive - All Specimens   SPECIMEN   Procedure  Excision (less than total mastectomy)   Specimen Laterality  Left   TUMOR   Histologic Type  Invasive carcinoma of no special type (ductal, not otherwise specified)   Histologic Grade ([place] Histologic Score)     Glandular (Acinar) / Tubular Differentiation  Score 3 (< 10% of tumor area forming glandular / tubular structures)   Nuclear Pleomorphism  Score 2 (Cells larger than normal with open vesicular nuclei, visible nucleoli, and moderate variability in both size and shape)   Mitotic Rate  Score 1 (<=3 mitoses per mm2)   Overall Grade  Grade 2 (scores of 6 or 7)   Tumor Size  Greatest dimension of largest invasive focus > 1 mm (indicate exact measurement in Millimeters):: 8 mm   Tumor Focality  Single focus of invasive carcinoma   Ductal Carcinoma In Situ (DCIS)  DCIS is present in specimen   Ductal Carcinoma In Situ (DCIS)  Positive for EIC   Size (Extent) of DCIS  Estimated size (extent) of DCIS (greatest dimension using gross and microscopic evaluation) in Millimeters (mm) is at least: 16  mm   Nuclear Grade  Grade III (high)   Lymphovascular Invasion  Not identified   Treatment Effect  No known presurgical therapy   MARGINS   Invasive Carcinoma Margins  Uninvolved by invasive carcinoma   Distance from Closest Margin in Millimeters (mm)  Specify distance: 1.5 mm   Closest Margin  Inferior   DCIS Margins  Uninvolved by DCIS (DCIS present in specimen) "   Distance of DCIS from Closest Margin in Millimeters (mm)  Specify distance: 0.5 mm   Closest Margin  Medial     Lateral   LYMPH NODES   Regional Lymph Nodes     Number of Lymph Nodes with Macrometastases (> 2 mm)  Specify number: 0   Number of Lymph Nodes with Micrometastases (> 0.2 mm to 2 mm and / or > 200 cells)  Specify number: 0   Number of Lymph Nodes with Isolated Tumor Cells (<= 0.2 mm and <= 200 cells)  Specify number: 0   Number of Lymph Nodes Examined  Specify number: 2   Number of [place] Nodes Examined  Specify number: 2   PATHOLOGIC STAGE CLASSIFICATION (pTNM)   Primary Tumor (Invasive Carcinoma) (pT)  pT1c: Tumor > 10 mm but <= 20 mm in greatest dimension   Modifier  (sn): Only sentinel node(s) evaluated. If 6 or more nodes (sentinel or nonsentinel) are removed, this modifier should not be used.   Category (pN)  pN0: No regional lymph node metastasis identified or ITCs only   .            Assessment/Plan   1.[id number] HER-2 negative grade 2 left breast cancer post lumpectomy and sentinel node biopsy-with close margins from high-grade DCIS medially-Oncotype score 16  2.  Interstitial cystitis undergoing instillation of lidocaine/heparin in  the bladder intermittently  3.  Family history of thyroid and breast cancer?  Chek 2 mutation    Plan  1.continue tamoxifen 20 mg daily   2.  Ultrasound the pelvis attention left ovary-follow-up with GYN  3. .return in 3 months to assess her tolerance of tamoxifen.   .

## 2018-09-21 ENCOUNTER — HOSPITAL ENCOUNTER (OUTPATIENT)
Dept: ULTRASOUND IMAGING | Facility: HOSPITAL | Age: 48
Discharge: HOME | End: 2018-09-21
Attending: INTERNAL MEDICINE | Admitting: INTERNAL MEDICINE

## 2018-09-21 DIAGNOSIS — Z17.0 MALIGNANT NEOPLASM OF UPPER-INNER QUADRANT OF LEFT BREAST IN FEMALE, ESTROGEN RECEPTOR POSITIVE (HCC): ICD-10-CM

## 2018-09-21 DIAGNOSIS — R10.32 ABDOMINAL WALL PAIN IN LEFT LOWER QUADRANT: ICD-10-CM

## 2018-09-21 DIAGNOSIS — C50.212 MALIGNANT NEOPLASM OF UPPER-INNER QUADRANT OF LEFT BREAST IN FEMALE, ESTROGEN RECEPTOR POSITIVE (HCC): ICD-10-CM

## 2018-09-21 PROCEDURE — 76830 TRANSVAGINAL US NON-OB: CPT

## 2018-09-21 PROCEDURE — 93976 VASCULAR STUDY: CPT

## 2018-09-21 PROCEDURE — 76856 US EXAM PELVIC COMPLETE: CPT

## 2018-10-22 ENCOUNTER — TELEPHONE (OUTPATIENT)
Dept: ONCOLOGY | Facility: HOSPITAL | Age: 48
End: 2018-10-22

## 2018-10-22 NOTE — TELEPHONE ENCOUNTER
Patient reports pain in left breast on the underside where the breast meets the rib.  Says it is swollen and very tender to touch and states she doesn't feel well, like it could be an infection.  She is currently in [place].  Reviewed with Norma HUDSON, will have patient go to an immediate care center and if not better once back in town, she will call us for an appt.  Returned call to patient with this info.  Had to leave voicemail.     ----- Message from Rosalia Levi sent at 10/22/2018 11:59 AM EDT -----  [phone]  Re:  Swelling on side where cancer was and not feeling real great

## 2018-11-01 ENCOUNTER — TRANSCRIBE ORDERS (OUTPATIENT)
Dept: ADMINISTRATIVE | Facility: HOSPITAL | Age: 48
End: 2018-11-01

## 2018-11-01 ENCOUNTER — LAB (OUTPATIENT)
Dept: LAB | Facility: HOSPITAL | Age: 48
End: 2018-11-01

## 2018-11-01 DIAGNOSIS — N39.0 LOWER URINARY TRACT INFECTION: ICD-10-CM

## 2018-11-01 DIAGNOSIS — N39.0 LOWER URINARY TRACT INFECTION: Primary | ICD-10-CM

## 2018-11-01 PROCEDURE — 87086 URINE CULTURE/COLONY COUNT: CPT | Performed by: INTERNAL MEDICINE

## 2018-11-03 LAB — BACTERIA SPEC AEROBE CULT: NO GROWTH

## 2018-11-06 ENCOUNTER — TRANSCRIBE ORDERS (OUTPATIENT)
Dept: ADMINISTRATIVE | Facility: HOSPITAL | Age: 48
End: 2018-11-06

## 2018-11-06 ENCOUNTER — LAB (OUTPATIENT)
Dept: LAB | Facility: HOSPITAL | Age: 48
End: 2018-11-06

## 2018-11-06 ENCOUNTER — HOSPITAL ENCOUNTER (OUTPATIENT)
Dept: GENERAL RADIOLOGY | Facility: HOSPITAL | Age: 48
Discharge: HOME | End: 2018-11-06
Admitting: INTERNAL MEDICINE

## 2018-11-06 DIAGNOSIS — R07.9 CHEST PAIN, UNSPECIFIED TYPE: Primary | ICD-10-CM

## 2018-11-06 DIAGNOSIS — R60.9 SWELLING: ICD-10-CM

## 2018-11-06 DIAGNOSIS — R07.9 CHEST PAIN, UNSPECIFIED TYPE: ICD-10-CM

## 2018-11-06 DIAGNOSIS — R52 PAIN: ICD-10-CM

## 2018-11-06 LAB
ALBUMIN SERPL-MCNC: 4.1 G/DL (ref 3.5–5.2)
ALBUMIN/GLOB SERPL: 1.3 G/DL
ALP SERPL-CCNC: 34 U/L (ref 39–117)
ALT SERPL W P-5'-P-CCNC: 23 U/L (ref 1–33)
ANION GAP SERPL CALCULATED.3IONS-SCNC: 10.2 MMOL/L
AST SERPL-CCNC: 18 U/L (ref 1–32)
BASOPHILS # BLD AUTO: 0.05 10*3/MM3 (ref 0–0.2)
BASOPHILS NFR BLD AUTO: 0.5 % (ref 0–1.5)
BILIRUB SERPL-MCNC: 0.2 MG/DL (ref 0.1–1.2)
BUN BLD-MCNC: 16 MG/DL (ref 6–20)
BUN/CREAT SERPL: 16 (ref 7–25)
CALCIUM SPEC-SCNC: 9.7 MG/DL (ref 8.6–10.5)
CHLORIDE SERPL-SCNC: 106 MMOL/L (ref 98–107)
CO2 SERPL-SCNC: 23.8 MMOL/L (ref 22–29)
CREAT BLD-MCNC: 1 MG/DL (ref 0.57–1)
D DIMER PPP FEU-MCNC: 0.38 MCGFEU/ML (ref 0–0.49)
DEPRECATED RDW RBC AUTO: 44.9 FL (ref 37–54)
EOSINOPHIL # BLD AUTO: 0.23 10*3/MM3 (ref 0–0.7)
EOSINOPHIL NFR BLD AUTO: 2.5 % (ref 0.3–6.2)
ERYTHROCYTE [DISTWIDTH] IN BLOOD BY AUTOMATED COUNT: 13.1 % (ref 11.7–13)
ERYTHROCYTE [SEDIMENTATION RATE] IN BLOOD: 7 MM/HR (ref 0–20)
GFR SERPL CREATININE-BSD FRML MDRD: 59 ML/MIN/1.73
GLOBULIN UR ELPH-MCNC: 3.1 GM/DL
GLUCOSE BLD-MCNC: 91 MG/DL (ref 65–99)
HCT VFR BLD AUTO: 39.4 % (ref 35.6–45.5)
HGB BLD-MCNC: 12.7 G/DL (ref 11.9–15.5)
IMM GRANULOCYTES # BLD: 0.04 10*3/MM3 (ref 0–0.03)
IMM GRANULOCYTES NFR BLD: 0.4 % (ref 0–0.5)
LYMPHOCYTES # BLD AUTO: 2.76 10*3/MM3 (ref 0.9–4.8)
LYMPHOCYTES NFR BLD AUTO: 29.6 % (ref 19.6–45.3)
MCH RBC QN AUTO: 30.5 PG (ref 26.9–32)
MCHC RBC AUTO-ENTMCNC: 32.2 G/DL (ref 32.4–36.3)
MCV RBC AUTO: 94.5 FL (ref 80.5–98.2)
MONOCYTES # BLD AUTO: 0.73 10*3/MM3 (ref 0.2–1.2)
MONOCYTES NFR BLD AUTO: 7.8 % (ref 5–12)
NEUTROPHILS # BLD AUTO: 5.51 10*3/MM3 (ref 1.9–8.1)
NEUTROPHILS NFR BLD AUTO: 59.2 % (ref 42.7–76)
PLATELET # BLD AUTO: 302 10*3/MM3 (ref 140–500)
PMV BLD AUTO: 9.9 FL (ref 6–12)
POTASSIUM BLD-SCNC: 4.7 MMOL/L (ref 3.5–5.2)
PROT SERPL-MCNC: 7.2 G/DL (ref 6–8.5)
RBC # BLD AUTO: 4.17 10*6/MM3 (ref 3.9–5.2)
SODIUM BLD-SCNC: 140 MMOL/L (ref 136–145)
WBC NRBC COR # BLD: 9.32 10*3/MM3 (ref 4.5–10.7)

## 2018-11-06 PROCEDURE — 80053 COMPREHEN METABOLIC PANEL: CPT | Performed by: INTERNAL MEDICINE

## 2018-11-06 PROCEDURE — 85025 COMPLETE CBC W/AUTO DIFF WBC: CPT | Performed by: INTERNAL MEDICINE

## 2018-11-06 PROCEDURE — 85652 RBC SED RATE AUTOMATED: CPT | Performed by: INTERNAL MEDICINE

## 2018-11-06 PROCEDURE — 85379 FIBRIN DEGRADATION QUANT: CPT | Performed by: INTERNAL MEDICINE

## 2018-11-06 PROCEDURE — 36415 COLL VENOUS BLD VENIPUNCTURE: CPT

## 2018-11-06 PROCEDURE — 71046 X-RAY EXAM CHEST 2 VIEWS: CPT

## 2018-11-07 ENCOUNTER — HOSPITAL ENCOUNTER (OUTPATIENT)
Dept: CARDIOLOGY | Facility: HOSPITAL | Age: 48
Discharge: HOME | End: 2018-11-07
Admitting: INTERNAL MEDICINE

## 2018-11-07 DIAGNOSIS — R52 PAIN: ICD-10-CM

## 2018-11-07 DIAGNOSIS — R60.9 SWELLING: ICD-10-CM

## 2018-11-07 LAB

## 2018-11-07 PROCEDURE — 93970 EXTREMITY STUDY: CPT

## 2018-12-03 ENCOUNTER — LAB (OUTPATIENT)
Dept: OTHER | Facility: HOSPITAL | Age: 48
End: 2018-12-03

## 2018-12-03 ENCOUNTER — OFFICE VISIT (OUTPATIENT)
Dept: ONCOLOGY | Facility: CLINIC | Age: 48
End: 2018-12-03

## 2018-12-03 VITALS
RESPIRATION RATE: 14 BRPM | HEIGHT: 63 IN | BODY MASS INDEX: 25.36 KG/M2 | TEMPERATURE: 97.9 F | SYSTOLIC BLOOD PRESSURE: 105 MMHG | HEART RATE: 56 BPM | WEIGHT: 143.1 LBS | OXYGEN SATURATION: 100 % | DIASTOLIC BLOOD PRESSURE: 69 MMHG

## 2018-12-03 DIAGNOSIS — C50.212 MALIGNANT NEOPLASM OF UPPER-INNER QUADRANT OF LEFT BREAST IN FEMALE, ESTROGEN RECEPTOR POSITIVE (HCC): Primary | ICD-10-CM

## 2018-12-03 DIAGNOSIS — Z17.0 MALIGNANT NEOPLASM OF UPPER-INNER QUADRANT OF LEFT BREAST IN FEMALE, ESTROGEN RECEPTOR POSITIVE (HCC): Primary | ICD-10-CM

## 2018-12-03 DIAGNOSIS — C50.212 MALIGNANT NEOPLASM OF UPPER-INNER QUADRANT OF LEFT BREAST IN FEMALE, ESTROGEN RECEPTOR POSITIVE (HCC): ICD-10-CM

## 2018-12-03 DIAGNOSIS — Z17.0 MALIGNANT NEOPLASM OF UPPER-INNER QUADRANT OF LEFT BREAST IN FEMALE, ESTROGEN RECEPTOR POSITIVE (HCC): ICD-10-CM

## 2018-12-03 LAB
ALBUMIN SERPL-MCNC: 4.3 G/DL (ref 3.5–5.2)
ALBUMIN/GLOB SERPL: 1.5 G/DL
ALP SERPL-CCNC: 36 U/L (ref 39–117)
ALT SERPL W P-5'-P-CCNC: 41 U/L (ref 1–33)
ANION GAP SERPL CALCULATED.3IONS-SCNC: 11.1 MMOL/L
AST SERPL-CCNC: 36 U/L (ref 1–32)
BASOPHILS # BLD AUTO: 0.05 10*3/MM3 (ref 0–0.2)
BASOPHILS NFR BLD AUTO: 0.6 % (ref 0–1.5)
BILIRUB SERPL-MCNC: 0.4 MG/DL (ref 0.1–1.2)
BUN BLD-MCNC: 15 MG/DL (ref 6–20)
BUN/CREAT SERPL: 20.3 (ref 7–25)
CALCIUM SPEC-SCNC: 9.6 MG/DL (ref 8.6–10.5)
CHLORIDE SERPL-SCNC: 110 MMOL/L (ref 98–107)
CO2 SERPL-SCNC: 23.9 MMOL/L (ref 22–29)
CREAT BLD-MCNC: 0.74 MG/DL (ref 0.57–1)
DEPRECATED RDW RBC AUTO: 43.9 FL (ref 37–54)
EOSINOPHIL # BLD AUTO: 0.08 10*3/MM3 (ref 0–0.7)
EOSINOPHIL NFR BLD AUTO: 0.9 % (ref 0.3–6.2)
ERYTHROCYTE [DISTWIDTH] IN BLOOD BY AUTOMATED COUNT: 12.8 % (ref 11.7–13)
GFR SERPL CREATININE-BSD FRML MDRD: 84 ML/MIN/1.73
GLOBULIN UR ELPH-MCNC: 2.8 GM/DL
GLUCOSE BLD-MCNC: 106 MG/DL (ref 65–99)
HCT VFR BLD AUTO: 38.5 % (ref 35.6–45.5)
HGB BLD-MCNC: 12.8 G/DL (ref 11.9–15.5)
IMM GRANULOCYTES # BLD: 0.04 10*3/MM3 (ref 0–0.03)
IMM GRANULOCYTES NFR BLD: 0.5 % (ref 0–0.5)
LYMPHOCYTES # BLD AUTO: 1.74 10*3/MM3 (ref 0.9–4.8)
LYMPHOCYTES NFR BLD AUTO: 20 % (ref 19.6–45.3)
MCH RBC QN AUTO: 31.1 PG (ref 26.9–32)
MCHC RBC AUTO-ENTMCNC: 33.2 G/DL (ref 32.4–36.3)
MCV RBC AUTO: 93.4 FL (ref 80.5–98.2)
MONOCYTES # BLD AUTO: 0.64 10*3/MM3 (ref 0.2–1.2)
MONOCYTES NFR BLD AUTO: 7.3 % (ref 5–12)
NEUTROPHILS # BLD AUTO: 6.16 10*3/MM3 (ref 1.9–8.1)
NEUTROPHILS NFR BLD AUTO: 70.7 % (ref 42.7–76)
NRBC BLD MANUAL-RTO: 0 /100 WBC (ref 0–0)
PLATELET # BLD AUTO: 258 10*3/MM3 (ref 140–500)
PMV BLD AUTO: 10 FL (ref 6–12)
POTASSIUM BLD-SCNC: 4 MMOL/L (ref 3.5–5.2)
PROT SERPL-MCNC: 7.1 G/DL (ref 6–8.5)
RBC # BLD AUTO: 4.12 10*6/MM3 (ref 3.9–5.2)
SODIUM BLD-SCNC: 145 MMOL/L (ref 136–145)
WBC NRBC COR # BLD: 8.71 10*3/MM3 (ref 4.5–10.7)

## 2018-12-03 PROCEDURE — 36415 COLL VENOUS BLD VENIPUNCTURE: CPT

## 2018-12-03 PROCEDURE — 85025 COMPLETE CBC W/AUTO DIFF WBC: CPT | Performed by: INTERNAL MEDICINE

## 2018-12-03 PROCEDURE — 80053 COMPREHEN METABOLIC PANEL: CPT | Performed by: INTERNAL MEDICINE

## 2018-12-03 PROCEDURE — 99214 OFFICE O/P EST MOD 30 MIN: CPT | Performed by: INTERNAL MEDICINE

## 2018-12-03 RX ORDER — TAMOXIFEN CITRATE 20 MG/1
TABLET ORAL DAILY
COMMUNITY
End: 2019-11-11 | Stop reason: SDUPTHER

## 2018-12-03 NOTE — PROGRESS NOTES
Subjective     REASON FOR CONSULTATION:  1.  T1bN0 M0 strongly ER/IN positive HER-2 negative grade 2 left breast cancer post lumpectomy with close margins for DCIS-Oncotype score of 16 radiation and tamoxifen started 7/18                             REQUESTING PHYSICIAN:  Adrián Pereyra M.D. and Nikita Gallegos M.D.      History of Present Illness is a 47-year-old premenopausal female with a T1b N0 strongly ER/IN positive breast cancer with a low Oncotype score of 16.    She completed radiation which she tolerated well and is on tamoxifen for about 5 months.  At her last visit she was having a lot of pelvic pain with her menstrual cycle and an ultrasound which showed some adenomyosis and her ovarian cyst had disappeared thankfully-she started menstruating regularly  Declined genetic testing at this time and we will reconsider    She's had some unusual discomfort for the last month in the left axilla is now moved down the left upper quadrant of the abdomen.  She saw Dr. Gallegos who Doppler her legs to make sure there is no DVT and also a chest x-ray which was negative but the pain is still persistent I think we need to look into it    Mammography will be due again in April    Past Medical History:   Diagnosis Date   • Anxiety    • Breast cancer (CMS/HCC) 02/14/2018    Left breast, Upper Inner Quadrant, Invasive Mammary Carcinoma, Intermediate grade, measuring at least 0.2 cm in dimension, with associated high grade solid and cribiform ductal carcinoma in situ with comedonecrosis, ER/IN positive, OER1msu negative   • Heartburn    • Infectious mononucleosis    • Migraines    • PONV (postoperative nausea and vomiting)    • Pre-diabetes    • PVC's (premature ventricular contractions)    • Wears contact lenses         Past Surgical History:   Procedure Laterality Date   • BREAST BIOPSY Left 02/14/2018    Left breast stereotactic biopsy w/ marker clip placement & positive radiograph-Dr. 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  A diagnostic mammogram gram showed a 2.3 cm area of clustered microcalcifications very suspicious for malignancy and the patient underwent a stereotactic biopsy on 18 with the findings of intermediate grade invasive mammary carcinoma measuring 2 mm with associated high-grade and solid DCIS--ER was 65% MI was 96% HER-2 0 Ki-67 was 2%.  The patient was referred to Dr. Pereyra and underwent bilateral breast MRIs with the findings of a 2.1 cm abnormality in the left breast with no axillary adenopathy and she was taken for surgery when lumpectomy and sentinel node biopsy was performed on 18.  This revealed a residual 8 mm area of invasive carcinoma grade 2 with associated high-grade DCIS spanning 16 mm-the lateral and medial margins were 0.5 mm from the DCIS-new anterior, superior and lateral margins were obtained which were negative but the medial margin was still felt to be close.  She went to see Dr. Pereyra to wanted to discuss further the status of her margins but sent her to us also to discuss adjuvant treatment for her invasive cancer.    She is healing well from surgery.  She is obviously anxious about the margins but has no qualms about having further surgery even if the cosmesis is not perfect because she is more worried about recurrence of her cancer.  She is  3 para 3 -Menarche was age 14 she is premenopausal first childbirth was age 28 she breast-fed for 9 months she's been on no hormonal replacement since her last child 13 years ago  She is having bladder issues and her gynecologist just  prescribed vaginal estrogens which she has not yet started.    Family history is positive for her father having thyroid cancer age 64 and he is currently dying from this is a 78 she's one sister is healthy her mother's 72 and healthy she has a maternal aunt with breast cancers at an unknown age paternal aunt with breast cancer in her 60s paternal grandfather with bone cancer and a maternal grandfather with bone cancer is no ovarian cancer in the family.      I explained to Cesia and her  that her tumor is very unlikely to have a high Oncotype score and it is very likely that the mainstay of her treatment would be tamoxifen.  But we will send an Oncotype score because of the small chance that there is a higher recurrence risk than expected based on her pathology in tumor size.  I think it is very reasonable to get genetic testing because of her family history although it is unlikely to change our treatment.  I've asked her to hold off and vaginal estrogens for the time being and to discuss this with her gynecologist.  She will stop her Zoloft and we can prescribe Effexor if needed    6/18  we discussed her case at the multiple this may conference because of the close margins for high-grade DCIS and we all felt that radiation would take care of this and no further surgery was needed.    We discussed the low risk Oncotype in the lack of benefit for chemotherapy in this situation which we had already discussed with her on the phone and we talked about tamoxifen.The side effects and toxicities of Tamoxifen were discussed with the patient, including hot flashes, mood swings,depression, DVT and endometrial cancer. A list of drugs that interfere with the efficacy of tamoxifen and are to be avoided were given to the patient.      Current Outpatient Medications on File Prior to Visit   Medication Sig Dispense Refill   • acetaminophen (TYLENOL) 500 MG tablet Take 1,000 mg by mouth Every 6 (Six) Hours As Needed for  Mild Pain .     • ALPRAZolam (XANAX) 0.25 MG tablet Take 0.25 mg by mouth 2 (Two) Times a Day As Needed for Anxiety.     • Cetirizine HCl (ZYRTEC PO) Take 10 mg by mouth Daily As Needed.     • HYDROcodone-acetaminophen (NORCO) 5-325 MG per tablet 1-2 tabs q 6 hours prn pain.  Maximum tabs 6 daily (Patient taking differently: Take 1-2 tablets by mouth Every 6 (Six) Hours As Needed. 1-2 tabs q 6 hours prn pain.  Maximum tabs 6 daily) 25 tablet 0   • ibuprofen (ADVIL,MOTRIN) 600 MG tablet Take 600 mg by mouth Every 6 (Six) Hours As Needed for Mild Pain . HELD FOR SURGERY     • promethazine (PHENERGAN) 25 MG tablet Take 1 tablet by mouth Every 6 (Six) Hours As Needed for Nausea or Vomiting for up to 10 doses. 10 tablet 0   • vitamin D (ERGOCALCIFEROL) 05991 units capsule capsule Take 50,000 Units by mouth Every 7 (Seven) Days.        No current facility-administered medications on file prior to visit.         ALLERGIES:  No Known Allergies     Social History     Socioeconomic History   • Marital status:      Spouse name: Nikita Workman   • Number of children: 3   • Years of education: College   • Highest education level: Not on file   Occupational History   • Occupation: Occupational Therapist     Comment: [employer]   Tobacco Use   • Smoking status: Former Smoker     Packs/day: 0.50     Years: 2.00     Pack years: 1.00     Types: Cigarettes     Start date:      Last attempt to quit:      Years since quittin.9   • Smokeless tobacco: Never Used   Substance and Sexual Activity   • Alcohol use: Yes     Comment: RARE MONTHLY   • Drug use: No   • Sexual activity: Defer     Birth control/protection: None        Family History   Problem Relation Age of Onset   • Breast cancer Maternal Aunt         In her 60s   • Breast cancer Paternal Aunt    • Hyperlipidemia Mother    • Asthma Father    • Diabetes Father    • Hearing loss Father    • Heart disease Father    • Hypertension Father    • Thyroid  "cancer Father 64        Follicular Tyroid Cancer   • Alcohol abuse Sister    • Depression Sister    • Diabetes Maternal Grandmother    • Bone cancer Maternal Grandfather    • Breast cancer Cousin    • Malig Hyperthermia Neg Hx         Review of Systems   Constitutional: Positive for fatigue (little 12/3/18). Negative for chills and fever.   HENT: Negative for congestion, mouth sores and nosebleeds.    Respiratory: Negative for chest tightness and shortness of breath.    Cardiovascular: Positive for chest pain (rib pain 12/3/18).   Gastrointestinal: Positive for abdominal pain (rib pain 12/3/18). Negative for constipation, diarrhea, nausea and vomiting.   Genitourinary: Positive for urgency (12/3/18). Negative for difficulty urinating (Early prolapse).   Musculoskeletal: Negative for arthralgias.   Neurological: Negative for dizziness (Occasional vertigo) and headaches (Chronic migraine headaches).   Psychiatric/Behavioral: The patient is not nervous/anxious.         Objective     Vitals:    12/03/18 1127   Height: 160 cm (62.99\")     Current Status 12/3/2018   ECOG score 0       Physical Exam   Pulmonary/Chest:           GENERAL:  Well-developed, well-nourished in no acute distress.   SKIN:  Warm, dry without rashes, purpura or petechiae.  EYES:  Pupils equal, round and reactive to light.  EOMs intact.  Conjunctivae normal.  EARS:  Hearing intact.  NOSE:  Septum midline.  No excoriations or nasal discharge.  MOUTH:  Tongue is well-papillated; no stomatitis or ulcers.  Lips normal.  THROAT:  Oropharynx without lesions or exudates.  NECK:  Supple with good range of motion; no thyromegaly or masses, no JVD.  LYMPHATICS:  No cervical, supraclavicular, axillary or inguinal adenopathy.  CHEST:  Lungs clear to auscultation. Good airflow.  BREASTS: Right breast is benign left  shows a lumpectomy scar with minimal scarring at   the lumpectomy site-the patient is tender over the left anterior ribs  CARDIAC:  Regular rate and " "rhythm without murmurs, rubs or gallops. Normal S1,S2.  ABDOMEN:  Soft, minimal left lower quadrant tenderness without mass - with no hepatosplenomegaly .  EXTREMITIES:  No clubbing, cyanosis or edema.  NEUROLOGICAL:  Cranial Nerves II-XII grossly intact.  No focal neurological deficits.  PSYCHIATRIC:  Normal affect and mood.        RECENT LABS:  Hematology WBC   Date Value Ref Range Status   12/03/2018 8.71 4.50 - 10.70 10*3/mm3 Final     RBC   Date Value Ref Range Status   12/03/2018 4.12 3.90 - 5.20 10*6/mm3 Final     Hemoglobin   Date Value Ref Range Status   12/03/2018 12.8 11.9 - 15.5 g/dL Final     Hematocrit   Date Value Ref Range Status   12/03/2018 38.5 35.6 - 45.5 % Final     Platelets   Date Value Ref Range Status   12/03/2018 258 140 - 500 10*3/mm3 Final         IMPRESSION:  1. Biopsy-proven malignancy in the left breast at the 11:30 position  measuring on the order of 2.1 cm in greatest dimension. No other  suspicious findings are seen within the left breast and there is no  evidence for left axillary adenopathy.  2. There are no findings suspicious for malignancy in the right breast.     BI-RADS Category 6: Known biopsy-proven malignancy.     4/11/18  1. \"LEFT BREAST MASS,\" WIRE GUIDED LUMPECTOMY:             INVASIVE DUCTAL CARCINOMA, INTERMEDIATE GRADE, 8 MM, MARGINS CLEAR.             CLOSEST MARGIN TO INVASIVE TUMOR: 1.5 MM (INFERIOR).             ASSOCIATED HIGH GRADE DUCTAL CARCINOMA IN SITU SPANNING APPROXIMATELY 16 MM (FOUR                    BLOCKS x 4 MM PER BLOCK).             CLOSEST MARGIN TO DUCTAL CARCINOMA IN SITU: 0.5 MM (LATERAL AND MEDIAL MARGINS).             HORMONE RECEPTORS REPORTED ON PRIOR BIOPSY, [facility] [id number]. REPEAT/                   CONFIRMATORY STUDIES ARE AVAILABLE UPON REQUEST.                         BIOPSY SITE CHANGES.     NOTE: ADDITIONAL MARGINS SUBMITTED AS PARTS 2, 3, AND 4.        2. \"LEFT BREAST NEW ANTERIOR MARGIN\":              BENIGN FIBROADIPOSE " "TISSUE.              NEW MARGIN - NO ATYPIA.     3. \"LEFT BREAST NEW SUPERIOR MARGIN\":             BENIGN BREAST TISSUE.             NEW MARGIN - NO ATYPIA.     4. \"LEFT BREAST NEW LATERAL MARGIN\":             BENIGN BREAST TISSUE.             NEW MARGIN - NO ATYPIA.     5. \"SENTINEL NODE #1\":             LYMPH NODE, MULTIPLE STEP SECTIONS: NO METASTATIC CARCINOMA SEEN.     6. \"SENTINEL NODE #2\":             LYMPH NODE, MULTIPLE STEP SECTIONS: NO METASTATIC CARCINOMA SEEN.     THM/th IHC/a/CMK                                Electronically signed by Darin Claire MD on 4/12/2018 at 1511   Synoptic Checklist   INVASIVE CARCINOMA OF THE BREAST   Breast Invasive - All Specimens   SPECIMEN   Procedure  Excision (less than total mastectomy)   Specimen Laterality  Left   TUMOR   Histologic Type  Invasive carcinoma of no special type (ductal, not otherwise specified)   Histologic Grade (Jessie Histologic Score)     Glandular (Acinar) / Tubular Differentiation  Score 3 (< 10% of tumor area forming glandular / tubular structures)   Nuclear Pleomorphism  Score 2 (Cells larger than normal with open vesicular nuclei, visible nucleoli, and moderate variability in both size and shape)   Mitotic Rate  Score 1 (<=3 mitoses per mm2)   Overall Grade  Grade 2 (scores of 6 or 7)   Tumor Size  Greatest dimension of largest invasive focus > 1 mm (indicate exact measurement in Millimeters):: 8 mm   Tumor Focality  Single focus of invasive carcinoma   Ductal Carcinoma In Situ (DCIS)  DCIS is present in specimen   Ductal Carcinoma In Situ (DCIS)  Positive for EIC   Size (Extent) of DCIS  Estimated size (extent) of DCIS (greatest dimension using gross and microscopic evaluation) in Millimeters (mm) is at least: 16  mm   Nuclear Grade  Grade III (high)   Lymphovascular Invasion  Not identified   Treatment Effect  No known presurgical therapy   MARGINS   Invasive Carcinoma Margins  Uninvolved by invasive carcinoma   Distance from " Closest Margin in Millimeters (mm)  Specify distance: 1.5 mm   Closest Margin  Inferior   DCIS Margins  Uninvolved by DCIS (DCIS present in specimen)   Distance of DCIS from Closest Margin in Millimeters (mm)  Specify distance: 0.5 mm   Closest Margin  Medial     Lateral   LYMPH NODES   Regional Lymph Nodes     Number of Lymph Nodes with Macrometastases (> 2 mm)  Specify number: 0   Number of Lymph Nodes with Micrometastases (> 0.2 mm to 2 mm and / or > 200 cells)  Specify number: 0   Number of Lymph Nodes with Isolated Tumor Cells (<= 0.2 mm and <= 200 cells)  Specify number: 0   Number of Lymph Nodes Examined  Specify number: 2   Number of [place] Nodes Examined  Specify number: 2   PATHOLOGIC STAGE CLASSIFICATION (pTNM)   Primary Tumor (Invasive Carcinoma) (pT)  pT1c: Tumor > 10 mm but <= 20 mm in greatest dimension   Modifier  (sn): Only sentinel node(s) evaluated. If 6 or more nodes (sentinel or nonsentinel) are removed, this modifier should not be used.   Category (pN)  pN0: No regional lymph node metastasis identified or ITCs only   .        COMPLETE PELVIC SONOGRAM   FINDINGS: Transabdominal and transvaginal pelvic sonography was  performed. The uterus is anteflexed and measures 12 x 5 x 7 cm. The  endometrial bilayer measures 0.9 cm in thickness. Scattered  heterogeneity is seen throughout the uterus. Within the posterior mid  uterine body there is a 1.9 x 1.4 x 1.3 cm complex hypoechoic lesion. In  the mid uterine body located in the anterior aspect of the uterus there  is a 1.6 x 1.4 x 1.9 cm hypoechoic lesion.     The right ovary measures 4.2 x 1.9 x 3.2 cm and the left ovary measures  3.4 x 1.9 x 1.4 cm. Normal intraovarian venous vascularity is seen in  both ovaries. Normal follicles are seen within both ovaries. No free  fluid is seen within the pelvic cul-de-sac.     IMPRESSION:  1. Enlarged uterus with 2 focal myometrial lesions measuring on the  order of 1.9 cm in greatest dimension. The  sonographic features suggest  adenomyosis, possibly with focal adenomyomas versus fibroids. It is  difficult to differentiate an adenomyoma versus a fibroid, but I believe  adenomyosis is likely present given the enlarged heterogenous appearance  of the uterus. If imaging differentiation is desired it can be performed  with a pelvic MRI without and with contrast.  2. Normal sonographic appearance of both ovaries.     This report was finalized on 9/21/2018     Interpretation Summary 11/18    · Normal bilateral lower extremity venous duplex scan.         Assessment/Plan   [id number] HER-2 negative grade 2 left breast cancer post lumpectomy and sentinel node biopsy-with close margins from high-grade DCIS medially-Oncotype score 16  2.  Interstitial cystitis undergoing instillation of lidocaine/heparin in  the bladder intermittently  3.  Family history of thyroid and breast cancer-declined genetic testing  4 unusual discomfort left lower ribs outside the radiation port?  Splenic infarct . ?  Costochondritis radiation related discomfort    Plan  1.continue tamoxifen 20 mg daily   2. CT chest and abdomen call for the results   3. .return in 4  months .

## 2018-12-07 ENCOUNTER — HOSPITAL ENCOUNTER (OUTPATIENT)
Dept: CT IMAGING | Facility: HOSPITAL | Age: 48
Discharge: HOME | End: 2018-12-07
Attending: INTERNAL MEDICINE | Admitting: INTERNAL MEDICINE

## 2018-12-07 DIAGNOSIS — Z17.0 MALIGNANT NEOPLASM OF UPPER-INNER QUADRANT OF LEFT BREAST IN FEMALE, ESTROGEN RECEPTOR POSITIVE (HCC): ICD-10-CM

## 2018-12-07 DIAGNOSIS — C50.212 MALIGNANT NEOPLASM OF UPPER-INNER QUADRANT OF LEFT BREAST IN FEMALE, ESTROGEN RECEPTOR POSITIVE (HCC): ICD-10-CM

## 2018-12-07 PROCEDURE — 71260 CT THORAX DX C+: CPT

## 2018-12-07 PROCEDURE — 0 DIATRIZOATE MEGLUMINE & SODIUM PER 1 ML: Performed by: INTERNAL MEDICINE

## 2018-12-07 PROCEDURE — 74177 CT ABD & PELVIS W/CONTRAST: CPT

## 2018-12-07 PROCEDURE — 25010000002 IOPAMIDOL 61 % SOLUTION: Performed by: INTERNAL MEDICINE

## 2018-12-07 RX ADMIN — DIATRIZOATE MEGLUMINE AND DIATRIZOATE SODIUM 30 ML: 660; 100 LIQUID ORAL; RECTAL at 08:30

## 2018-12-07 RX ADMIN — IOPAMIDOL 90 ML: 612 INJECTION, SOLUTION INTRAVENOUS at 09:31

## 2018-12-11 ENCOUNTER — TELEPHONE (OUTPATIENT)
Dept: ONCOLOGY | Facility: HOSPITAL | Age: 48
End: 2018-12-11

## 2018-12-11 NOTE — TELEPHONE ENCOUNTER
----- Message from Tanya Hanley sent at 12/10/2018  5:07 PM EST -----  Contact: [phone]  Ct scan results 12/7.    Reviewed CT scan with Dr. Gutierrez. She states CT scan looks good, treat left side/rib pain with non steriodals for a few weeks and if pain not any better we can do a bone scan. Informed pt. Pt v/u. I also told pt should could try a heating pad to see if that helps with pain. She v/u

## 2018-12-31 ENCOUNTER — TELEPHONE (OUTPATIENT)
Dept: ONCOLOGY | Facility: HOSPITAL | Age: 48
End: 2018-12-31

## 2018-12-31 RX ORDER — ESCITALOPRAM OXALATE 10 MG/1
10 TABLET ORAL DAILY
Qty: 30 TABLET | Refills: 0 | Status: SHIPPED | OUTPATIENT
Start: 2018-12-31 | End: 2019-01-27 | Stop reason: SDUPTHER

## 2018-12-31 NOTE — TELEPHONE ENCOUNTER
----- Message from Leeanna Brand sent at 12/31/2018  2:43 PM EST -----  [phone]    ON TAMOXIFIN IS HAVING A PERIOD FOR 2 1/2 WEEKS SHE DOESN'T HAVE AN APPT TILL MARCH.

## 2018-12-31 NOTE — TELEPHONE ENCOUNTER
"----- Message from Leeanna Brand sent at 12/31/2018  2:43 PM EST -----  [phone]    ON TAMOXIFIN IS HAVING A PERIOD FOR 2 1/2 WEEKS SHE DOESN'T HAVE AN APPT TILL MARCH.        Pt states she has been on her period for 2 1/2 weeks. It started out as \"old blood\" and now it is just like a normal period. She has also noticed that she has been \"snapping\" at her family lately. Pt has many social stressors to deal with lately. D/W Dr. Gutierrez. Per Dr. Gutierrez, this is common with Tamoxifen. Also, pt can start on Lexapro 10mg daily to help with mood changes. Informed pt and she v/u. Advised her to call back if she is still having heavy bleeding at the end of the week. She v/u. Escribed Lexapro.   "

## 2019-01-28 RX ORDER — ESCITALOPRAM OXALATE 10 MG/1
TABLET ORAL
Qty: 30 TABLET | Refills: 2 | Status: SHIPPED | OUTPATIENT
Start: 2019-01-28 | End: 2019-03-29 | Stop reason: SDUPTHER

## 2019-02-08 ENCOUNTER — APPOINTMENT (OUTPATIENT)
Dept: WOMENS IMAGING | Facility: HOSPITAL | Age: 49
End: 2019-02-08

## 2019-02-08 PROCEDURE — 77067 SCR MAMMO BI INCL CAD: CPT | Performed by: RADIOLOGY

## 2019-02-08 PROCEDURE — 77063 BREAST TOMOSYNTHESIS BI: CPT | Performed by: RADIOLOGY

## 2019-02-08 PROCEDURE — MDREVIEWSP: Performed by: RADIOLOGY

## 2019-03-28 ENCOUNTER — APPOINTMENT (OUTPATIENT)
Dept: LAB | Facility: HOSPITAL | Age: 49
End: 2019-03-28

## 2019-03-28 ENCOUNTER — LAB (OUTPATIENT)
Dept: OTHER | Facility: HOSPITAL | Age: 49
End: 2019-03-28

## 2019-03-28 ENCOUNTER — OFFICE VISIT (OUTPATIENT)
Dept: ONCOLOGY | Facility: CLINIC | Age: 49
End: 2019-03-28

## 2019-03-28 VITALS
RESPIRATION RATE: 16 BRPM | TEMPERATURE: 98 F | DIASTOLIC BLOOD PRESSURE: 63 MMHG | BODY MASS INDEX: 23.39 KG/M2 | HEIGHT: 63 IN | WEIGHT: 132 LBS | OXYGEN SATURATION: 99 % | SYSTOLIC BLOOD PRESSURE: 104 MMHG | HEART RATE: 59 BPM

## 2019-03-28 DIAGNOSIS — C50.212 MALIGNANT NEOPLASM OF UPPER-INNER QUADRANT OF LEFT BREAST IN FEMALE, ESTROGEN RECEPTOR POSITIVE (HCC): Primary | ICD-10-CM

## 2019-03-28 DIAGNOSIS — C50.212 MALIGNANT NEOPLASM OF UPPER-INNER QUADRANT OF LEFT BREAST IN FEMALE, ESTROGEN RECEPTOR POSITIVE (HCC): ICD-10-CM

## 2019-03-28 DIAGNOSIS — Z17.0 MALIGNANT NEOPLASM OF UPPER-INNER QUADRANT OF LEFT BREAST IN FEMALE, ESTROGEN RECEPTOR POSITIVE (HCC): ICD-10-CM

## 2019-03-28 DIAGNOSIS — Z17.0 MALIGNANT NEOPLASM OF UPPER-INNER QUADRANT OF LEFT BREAST IN FEMALE, ESTROGEN RECEPTOR POSITIVE (HCC): Primary | ICD-10-CM

## 2019-03-28 LAB
ALBUMIN SERPL-MCNC: 4.2 G/DL (ref 3.5–5.2)
ALBUMIN/GLOB SERPL: 1.4 G/DL
ALP SERPL-CCNC: 35 U/L (ref 39–117)
ALT SERPL W P-5'-P-CCNC: 20 U/L (ref 1–33)
ANION GAP SERPL CALCULATED.3IONS-SCNC: 8.1 MMOL/L
AST SERPL-CCNC: 20 U/L (ref 1–32)
BASOPHILS # BLD AUTO: 0.07 10*3/MM3 (ref 0–0.2)
BASOPHILS NFR BLD AUTO: 0.9 % (ref 0–1.5)
BILIRUB SERPL-MCNC: 0.3 MG/DL (ref 0.1–1.2)
BUN BLD-MCNC: 15 MG/DL (ref 6–20)
BUN/CREAT SERPL: 18.8 (ref 7–25)
CALCIUM SPEC-SCNC: 9.7 MG/DL (ref 8.6–10.5)
CHLORIDE SERPL-SCNC: 106 MMOL/L (ref 98–107)
CO2 SERPL-SCNC: 27.9 MMOL/L (ref 22–29)
CREAT BLD-MCNC: 0.8 MG/DL (ref 0.57–1)
DEPRECATED RDW RBC AUTO: 41.9 FL (ref 37–54)
EOSINOPHIL # BLD AUTO: 0.13 10*3/MM3 (ref 0–0.4)
EOSINOPHIL NFR BLD AUTO: 1.7 % (ref 0.3–6.2)
ERYTHROCYTE [DISTWIDTH] IN BLOOD BY AUTOMATED COUNT: 12.4 % (ref 12.3–15.4)
GFR SERPL CREATININE-BSD FRML MDRD: 77 ML/MIN/1.73
GLOBULIN UR ELPH-MCNC: 3 GM/DL
GLUCOSE BLD-MCNC: 83 MG/DL (ref 65–99)
HCT VFR BLD AUTO: 40.2 % (ref 34–46.6)
HGB BLD-MCNC: 13.3 G/DL (ref 12–15.9)
IMM GRANULOCYTES # BLD AUTO: 0.04 10*3/MM3 (ref 0–0.05)
IMM GRANULOCYTES NFR BLD AUTO: 0.5 % (ref 0–0.5)
LYMPHOCYTES # BLD AUTO: 1.8 10*3/MM3 (ref 0.7–3.1)
LYMPHOCYTES NFR BLD AUTO: 23 % (ref 19.6–45.3)
MCH RBC QN AUTO: 30.3 PG (ref 26.6–33)
MCHC RBC AUTO-ENTMCNC: 33.1 G/DL (ref 31.5–35.7)
MCV RBC AUTO: 91.6 FL (ref 79–97)
MONOCYTES # BLD AUTO: 0.65 10*3/MM3 (ref 0.1–0.9)
MONOCYTES NFR BLD AUTO: 8.3 % (ref 5–12)
NEUTROPHILS # BLD AUTO: 5.12 10*3/MM3 (ref 1.4–7)
NEUTROPHILS NFR BLD AUTO: 65.6 % (ref 42.7–76)
NRBC BLD AUTO-RTO: 0 /100 WBC (ref 0–0)
PLATELET # BLD AUTO: 268 10*3/MM3 (ref 140–450)
PMV BLD AUTO: 9.8 FL (ref 6–12)
POTASSIUM BLD-SCNC: 4.2 MMOL/L (ref 3.5–5.2)
PROT SERPL-MCNC: 7.2 G/DL (ref 6–8.5)
RBC # BLD AUTO: 4.39 10*6/MM3 (ref 3.77–5.28)
SODIUM BLD-SCNC: 142 MMOL/L (ref 136–145)
WBC NRBC COR # BLD: 7.81 10*3/MM3 (ref 3.4–10.8)

## 2019-03-28 PROCEDURE — 80053 COMPREHEN METABOLIC PANEL: CPT | Performed by: INTERNAL MEDICINE

## 2019-03-28 PROCEDURE — 99214 OFFICE O/P EST MOD 30 MIN: CPT | Performed by: INTERNAL MEDICINE

## 2019-03-28 PROCEDURE — 85025 COMPLETE CBC W/AUTO DIFF WBC: CPT | Performed by: INTERNAL MEDICINE

## 2019-03-28 PROCEDURE — 36415 COLL VENOUS BLD VENIPUNCTURE: CPT

## 2019-03-28 NOTE — PROGRESS NOTES
Subjective     REASON FOR CONSULTATION:  1.  T1bN0 M0 strongly ER/MO positive HER-2 negative grade 2 left breast cancer post lumpectomy with close margins for DCIS-Oncotype score of 16 radiation and tamoxifen started                              REQUESTING PHYSICIAN:  Adrián Pereyra M.D. and Nikita Gallegos M.D.      History of Present Illness is a 47-year-old premenopausal female with a T1b N0 strongly ER/MO positive breast cancer with a low Oncotype score of 16.    She completed radiation which she tolerated well and is on tamoxifen for about 9 months.    She called in with some mood swings we added Lexapro 10 mg and this is helped a lot    At her last visit she was having a lot of pelvic pain with her menstrual cycle and an ultrasound which showed some adenomyosis and her ovarian cyst had disappeared thankfully-she started menstruating regularly-we ordered CAT scans because of the pain in her left side and thankfully chest and abdomen are negative except for thickening of her bladder from her interstitial cystitis and she had fibroids-this discomfort has gradually improved with time    Father  with metastatic thyroid cancer I have again stressed the need for genetic testing and she is now agreeable    She is now complaining of trouble with swallowing meat and dry bread which is been progressing over the last 6 months.  She has no obvious dyspeptic symptoms but she may have peptic esophagitis and I think a barium swallow and ENT evaluation is needed because sometimes the obstruction feels higher up in her throat    Mammography in February of this year was thankfully benign    Past Medical History:   Diagnosis Date   • Anxiety    • Breast cancer (CMS/[facility]) 2018    Left breast, Upper Inner Quadrant, Invasive Mammary Carcinoma, Intermediate grade, measuring at least 0.2 cm in dimension, with associated high grade solid and cribiform ductal carcinoma in situ with comedonecrosis, ER/MO positive, PBB2oto  negative   • Heartburn    • Infectious mononucleosis    • Migraines    • PONV (postoperative nausea and vomiting)    • Pre-diabetes    • PVC's (premature ventricular contractions)    • Wears contact lenses         Past Surgical History:   Procedure Laterality Date   • BREAST BIOPSY Left 02/14/2018    Left breast stereotactic biopsy w/ marker clip placement & positive radiograph-Dr. Lashaun Gamble, [facility]   • BREAST BIOPSY Left 02/12/2018    INCOMPLETE - Biopsy could not be completed as patient had severe vasovagal reaction & subsequent syncope-[facility]   • BREAST BIOPSY Left 5/23/2018    Procedure: REEXCISION OF POSITIVE LUMPECTOMY MARGIN LEFT BREAST;  Surgeon: Adrián Pereyra MD;  Location: [facility];  Service: General   • BREAST LUMPECTOMY WITH SENTINEL NODE BIOPSY Left 4/11/2018    Procedure: BREAST LUMPECTOMY WITH SENTINEL NODE BIOPSY AND NEEDLE LOCALIZATION;  Surgeon: Adrián Pereyra MD;  Location: [facility];  Service: General      ONCOLOGIC HISTORY:  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  A diagnostic mammogram gram showed a 2.3 cm area of clustered microcalcifications very suspicious for malignancy and the patient underwent a stereotactic biopsy on 2/14/18 with the findings of intermediate grade invasive mammary carcinoma measuring 2 mm with associated high-grade and solid DCIS--ER was 65% MS was 96% HER-2 0 Ki-67 was 2%.  The patient was referred to Dr. Pereyra and underwent bilateral breast MRIs with the findings of a 2.1 cm abnormality in the left breast with no axillary adenopathy and she was taken for surgery when lumpectomy and sentinel node biopsy was performed on 4/11/18.  This revealed a residual 8 mm area of invasive carcinoma grade 2 with associated high-grade DCIS spanning 16 mm-the  lateral and medial margins were 0.5 mm from the DCIS-new anterior, superior and lateral margins were obtained which were negative but the medial margin was still felt to be close.  She went to see Dr. Pereyra to wanted to discuss further the status of her margins but sent her to us also to discuss adjuvant treatment for her invasive cancer.    She is healing well from surgery.  She is obviously anxious about the margins but has no qualms about having further surgery even if the cosmesis is not perfect because she is more worried about recurrence of her cancer.  She is  3 para 3 -Menarche was age 14 she is premenopausal first childbirth was age 28 she breast-fed for 9 months she's been on no hormonal replacement since her last child 13 years ago  She is having bladder issues and her gynecologist just prescribed vaginal estrogens which she has not yet started.    Family history is positive for her father having thyroid cancer age 64 and he is currently dying from this is a 78 she's one sister is healthy her mother's 72 and healthy she has a maternal aunt with breast cancers at an unknown age paternal aunt with breast cancer in her 60s paternal grandfather with bone cancer and a maternal grandfather with bone cancer is no ovarian cancer in the family.      I explained to Cesia and her  that her tumor is very unlikely to have a high Oncotype score and it is very likely that the mainstay of her treatment would be tamoxifen.  But we will send an Oncotype score because of the small chance that there is a higher recurrence risk than expected based on her pathology in tumor size.  I think it is very reasonable to get genetic testing because of her family history although it is unlikely to change our treatment.  I've asked her to hold off and vaginal estrogens for the time being and to discuss this with her gynecologist.  She will stop her Zoloft and we can prescribe Effexor if needed      we discussed  her case at the [facility] this may conference because of the close margins for high-grade DCIS and we all felt that radiation would take care of this and no further surgery was needed.    We discussed the low risk Oncotype in the lack of benefit for chemotherapy in this situation which we had already discussed with her on the phone and we talked about tamoxifen.The side effects and toxicities of Tamoxifen were discussed with the patient, including hot flashes, mood swings,depression, DVT and endometrial cancer. A list of drugs that interfere with the efficacy of tamoxifen and are to be avoided were given to the patient.      Current Outpatient Medications on File Prior to Visit   Medication Sig Dispense Refill   • ALPRAZolam (XANAX) 0.25 MG tablet Take 0.25 mg by mouth 2 (Two) Times a Day As Needed for Anxiety.     • Cetirizine HCl (ZYRTEC PO) Take 10 mg by mouth Daily As Needed.     • escitalopram (LEXAPRO) 10 MG tablet TAKE 1 TABLET BY MOUTH EVERY DAY 30 tablet 2   • ibuprofen (ADVIL,MOTRIN) 600 MG tablet Take 600 mg by mouth Every 6 (Six) Hours As Needed for Mild Pain . HELD FOR SURGERY     • tamoxifen (NOLVADEX) 20 MG chemo tablet Take  by mouth Daily.     • vitamin D (ERGOCALCIFEROL) 73806 units capsule capsule Take 50,000 Units by mouth Every 7 (Seven) Days. MONDAYS     • [DISCONTINUED] acetaminophen (TYLENOL) 500 MG tablet Take 1,000 mg by mouth Every 6 (Six) Hours As Needed for Mild Pain .     • [DISCONTINUED] HYDROcodone-acetaminophen (NORCO) 5-325 MG per tablet 1-2 tabs q 6 hours prn pain.  Maximum tabs 6 daily (Patient taking differently: Take 1-2 tablets by mouth. 1-2 tabs q 6 hours prn pain.  Maximum tabs 6 daily) 25 tablet 0   • [DISCONTINUED] promethazine (PHENERGAN) 25 MG tablet Take 1 tablet by mouth Every 6 (Six) Hours As Needed for Nausea or Vomiting for up to 10 doses. 10 tablet 0     No current facility-administered medications on file prior to visit.         ALLERGIES:  No Known Allergies      Social History     Socioeconomic History   • Marital status:      Spouse name: Nikita Workman   • Number of children: 3   • Years of education: College   • Highest education level: Not on file   Occupational History   • Occupation: Occupational Therapist     Comment: [employer]   Tobacco Use   • Smoking status: Former Smoker     Packs/day: 0.50     Years: 2.00     Pack years: 1.00     Types: Cigarettes     Start date:      Last attempt to quit:      Years since quittin.2   • Smokeless tobacco: Never Used   Substance and Sexual Activity   • Alcohol use: Yes     Comment: RARE MONTHLY   • Drug use: No   • Sexual activity: Defer     Birth control/protection: None        Family History   Problem Relation Age of Onset   • Breast cancer Maternal Aunt         In her 60s   • Breast cancer Paternal Aunt    • Hyperlipidemia Mother    • Asthma Father    • Diabetes Father    • Hearing loss Father    • Heart disease Father    • Hypertension Father    • Thyroid cancer Father 64        Follicular Tyroid Cancer   • Alcohol abuse Sister    • Depression Sister    • Diabetes Maternal Grandmother    • Bone cancer Maternal Grandfather    • Breast cancer Cousin    • Malig Hyperthermia Neg Hx         Review of Systems   Constitutional: Positive for fatigue (little 12/3/18). Negative for chills and fever.   HENT: Negative for congestion, mouth sores and nosebleeds.    Respiratory: Positive for choking (Difficulty swallowing difficulty swallowing intermittently especially meat). Negative for chest tightness and shortness of breath.    Cardiovascular: Positive for chest pain (rib pain 12/3/18).   Gastrointestinal: Negative for constipation, diarrhea, nausea and vomiting.   Genitourinary: Positive for urgency (12/3/18). Negative for difficulty urinating (Early prolapse).   Musculoskeletal: Negative for arthralgias.   Neurological: Negative for dizziness (Occasional vertigo) and headaches (Chronic migraine headaches).  "  Psychiatric/Behavioral: The patient is not nervous/anxious.         Objective     Vitals:    03/28/19 1030   BP: 104/63   Pulse: 59   Resp: 16   Temp: 98 °F (36.7 °C)   SpO2: 99%   Weight: 59.9 kg (132 lb)   Height: 160 cm (62.99\")   PainSc:   1   PainLoc: Arm  Comment: under left arm just tender     Current Status 3/28/2019   ECOG score 0       Physical Exam    GENERAL:  Well-developed, well-nourished in no acute distress.   SKIN:  Warm, dry without rashes, purpura or petechiae.  EYES:  Pupils equal, round and reactive to light.  EOMs intact.  Conjunctivae normal.  EARS:  Hearing intact.  NOSE:  Septum midline.  No excoriations or nasal discharge.  MOUTH:  Tongue is well-papillated; no stomatitis or ulcers.  Lips normal.  THROAT:  Oropharynx without lesions or exudates.  NECK:  Supple with good range of motion; no thyromegaly or masses, no JVD.  LYMPHATICS:  No cervical, supraclavicular, axillary or inguinal adenopathy.  CHEST:  Lungs clear to auscultation. Good airflow.  BREASTS: Right breast is benign left  shows a lumpectomy scar with minimal scarring at   the lumpectomy site-  CARDIAC:  Regular rate and rhythm without murmurs, rubs or gallops. Normal S1,S2.  ABDOMEN:  Soft, minimal left lower quadrant tenderness without mass - with no hepatosplenomegaly .  EXTREMITIES:  No clubbing, cyanosis or edema.  NEUROLOGICAL:  Cranial Nerves II-XII grossly intact.  No focal neurological deficits.  PSYCHIATRIC:  Normal affect and mood.        RECENT LABS:  Hematology WBC   Date Value Ref Range Status   03/28/2019 7.81 3.40 - 10.80 10*3/mm3 Final     RBC   Date Value Ref Range Status   03/28/2019 4.39 3.77 - 5.28 10*6/mm3 Final     Hemoglobin   Date Value Ref Range Status   03/28/2019 13.3 12.0 - 15.9 g/dL Final     Hematocrit   Date Value Ref Range Status   03/28/2019 40.2 34.0 - 46.6 % Final     Platelets   Date Value Ref Range Status   03/28/2019 268 140 - 450 10*3/mm3 Final         IMPRESSION:  1. Biopsy-proven " "malignancy in the left breast at the 11:30 position  measuring on the order of 2.1 cm in greatest dimension. No other  suspicious findings are seen within the left breast and there is no  evidence for left axillary adenopathy.  2. There are no findings suspicious for malignancy in the right breast.     BI-RADS Category 6: Known biopsy-proven malignancy.     4/11/18  1. \"LEFT BREAST MASS,\" WIRE GUIDED LUMPECTOMY:             INVASIVE DUCTAL CARCINOMA, INTERMEDIATE GRADE, 8 MM, MARGINS CLEAR.             CLOSEST MARGIN TO INVASIVE TUMOR: 1.5 MM (INFERIOR).             ASSOCIATED HIGH GRADE DUCTAL CARCINOMA IN SITU SPANNING APPROXIMATELY 16 MM (FOUR                    BLOCKS x 4 MM PER BLOCK).             CLOSEST MARGIN TO DUCTAL CARCINOMA IN SITU: 0.5 MM (LATERAL AND MEDIAL MARGINS).             HORMONE RECEPTORS REPORTED ON PRIOR BIOPSY, [facility] [id number]. REPEAT/                   CONFIRMATORY STUDIES ARE AVAILABLE UPON REQUEST.                         BIOPSY SITE CHANGES.     NOTE: ADDITIONAL MARGINS SUBMITTED AS PARTS 2, 3, AND 4.        2. \"LEFT BREAST NEW ANTERIOR MARGIN\":              BENIGN FIBROADIPOSE TISSUE.              NEW MARGIN - NO ATYPIA.     3. \"LEFT BREAST NEW SUPERIOR MARGIN\":             BENIGN BREAST TISSUE.             NEW MARGIN - NO ATYPIA.     4. \"LEFT BREAST NEW LATERAL MARGIN\":             BENIGN BREAST TISSUE.             NEW MARGIN - NO ATYPIA.     5. \"SENTINEL NODE #1\":             LYMPH NODE, MULTIPLE STEP SECTIONS: NO METASTATIC CARCINOMA SEEN.     6. \"SENTINEL NODE #2\":             LYMPH NODE, MULTIPLE STEP SECTIONS: NO METASTATIC CARCINOMA SEEN.     THM/th IHC/a/CMK                                Electronically signed by Darin Claire MD on 4/12/2018 at 1511   Synoptic Checklist   INVASIVE CARCINOMA OF THE BREAST   Breast Invasive - All Specimens   SPECIMEN   Procedure  Excision (less than total mastectomy)   Specimen Laterality  Left   TUMOR   Histologic Type  Invasive " carcinoma of no special type (ductal, not otherwise specified)   Histologic Grade (Jessie Histologic Score)     Glandular (Acinar) / Tubular Differentiation  Score 3 (< 10% of tumor area forming glandular / tubular structures)   Nuclear Pleomorphism  Score 2 (Cells larger than normal with open vesicular nuclei, visible nucleoli, and moderate variability in both size and shape)   Mitotic Rate  Score 1 (<=3 mitoses per mm2)   Overall Grade  Grade 2 (scores of 6 or 7)   Tumor Size  Greatest dimension of largest invasive focus > 1 mm (indicate exact measurement in Millimeters):: 8 mm   Tumor Focality  Single focus of invasive carcinoma   Ductal Carcinoma In Situ (DCIS)  DCIS is present in specimen   Ductal Carcinoma In Situ (DCIS)  Positive for EIC   Size (Extent) of DCIS  Estimated size (extent) of DCIS (greatest dimension using gross and microscopic evaluation) in Millimeters (mm) is at least: 16  mm   Nuclear Grade  Grade III (high)   Lymphovascular Invasion  Not identified   Treatment Effect  No known presurgical therapy   MARGINS   Invasive Carcinoma Margins  Uninvolved by invasive carcinoma   Distance from Closest Margin in Millimeters (mm)  Specify distance: 1.5 mm   Closest Margin  Inferior   DCIS Margins  Uninvolved by DCIS (DCIS present in specimen)   Distance of DCIS from Closest Margin in Millimeters (mm)  Specify distance: 0.5 mm   Closest Margin  Medial     Lateral   LYMPH NODES   Regional Lymph Nodes     Number of Lymph Nodes with Macrometastases (> 2 mm)  Specify number: 0   Number of Lymph Nodes with Micrometastases (> 0.2 mm to 2 mm and / or > 200 cells)  Specify number: 0   Number of Lymph Nodes with Isolated Tumor Cells (<= 0.2 mm and <= 200 cells)  Specify number: 0   Number of Lymph Nodes Examined  Specify number: 2   Number of [place] Nodes Examined  Specify number: 2   PATHOLOGIC STAGE CLASSIFICATION (pTNM)   Primary Tumor (Invasive Carcinoma) (pT)  pT1c: Tumor > 10 mm but <= 20 mm in  greatest dimension   Modifier  (sn): Only sentinel node(s) evaluated. If 6 or more nodes (sentinel or nonsentinel) are removed, this modifier should not be used.   Category (pN)  pN0: No regional lymph node metastasis identified or ITCs only   .        COMPLETE PELVIC SONOGRAM   FINDINGS: Transabdominal and transvaginal pelvic sonography was  performed. The uterus is anteflexed and measures 12 x 5 x 7 cm. The  endometrial bilayer measures 0.9 cm in thickness. Scattered  heterogeneity is seen throughout the uterus. Within the posterior mid  uterine body there is a 1.9 x 1.4 x 1.3 cm complex hypoechoic lesion. In  the mid uterine body located in the anterior aspect of the uterus there  is a 1.6 x 1.4 x 1.9 cm hypoechoic lesion.     The right ovary measures 4.2 x 1.9 x 3.2 cm and the left ovary measures  3.4 x 1.9 x 1.4 cm. Normal intraovarian venous vascularity is seen in  both ovaries. Normal follicles are seen within both ovaries. No free  fluid is seen within the pelvic cul-de-sac.     IMPRESSION:  1. Enlarged uterus with 2 focal myometrial lesions measuring on the  order of 1.9 cm in greatest dimension. The sonographic features suggest  adenomyosis, possibly with focal adenomyomas versus fibroids. It is  difficult to differentiate an adenomyoma versus a fibroid, but I believe  adenomyosis is likely present given the enlarged heterogenous appearance  of the uterus. If imaging differentiation is desired it can be performed  with a pelvic MRI without and with contrast.  2. Normal sonographic appearance of both ovaries.     This report was finalized on 9/21/2018     Interpretation Summary 11/18            · Normal bilateral lower extremity venous duplex scan.             Assessment/Plan   [id number] HER-2 negative grade 2 left breast cancer post lumpectomy and sentinel node biopsy-with close margins from high-grade DCIS medially-Oncotype score 16  2.  Interstitial cystitis undergoing instillation of lidocaine/heparin  in  the bladder intermittently  3.  Family history of thyroid and breast cancer-declined genetic testing  4 unusual discomfort left lower ribs outside the radiation port improving CT scans negative  5. Worsening dysphagia to solids    Plan  1.continue tamoxifen 20 mg daily with Lexapro for mood swings  2. genetic testing   3. . Barium swallow for dysphagia and referral to ENT  4. return in 4  months .

## 2019-03-29 RX ORDER — ESCITALOPRAM OXALATE 10 MG/1
10 TABLET ORAL DAILY
Qty: 90 TABLET | Refills: 1 | Status: SHIPPED | OUTPATIENT
Start: 2019-03-29 | End: 2019-10-01 | Stop reason: SDUPTHER

## 2019-04-18 ENCOUNTER — APPOINTMENT (OUTPATIENT)
Dept: GENERAL RADIOLOGY | Facility: HOSPITAL | Age: 49
End: 2019-04-18

## 2019-07-15 RX ORDER — TAMOXIFEN CITRATE 20 MG/1
TABLET ORAL
Qty: 90 TABLET | Refills: 0 | Status: SHIPPED | OUTPATIENT
Start: 2019-07-15 | End: 2019-07-22 | Stop reason: SDUPTHER

## 2019-07-22 ENCOUNTER — LAB (OUTPATIENT)
Dept: OTHER | Facility: HOSPITAL | Age: 49
End: 2019-07-22

## 2019-07-22 ENCOUNTER — OFFICE VISIT (OUTPATIENT)
Dept: ONCOLOGY | Facility: CLINIC | Age: 49
End: 2019-07-22

## 2019-07-22 VITALS
HEIGHT: 63 IN | HEART RATE: 60 BPM | DIASTOLIC BLOOD PRESSURE: 67 MMHG | SYSTOLIC BLOOD PRESSURE: 105 MMHG | TEMPERATURE: 98 F | RESPIRATION RATE: 16 BRPM | OXYGEN SATURATION: 100 % | BODY MASS INDEX: 24.41 KG/M2 | WEIGHT: 137.8 LBS

## 2019-07-22 DIAGNOSIS — Z17.0 MALIGNANT NEOPLASM OF UPPER-INNER QUADRANT OF LEFT BREAST IN FEMALE, ESTROGEN RECEPTOR POSITIVE (HCC): ICD-10-CM

## 2019-07-22 DIAGNOSIS — C50.212 MALIGNANT NEOPLASM OF UPPER-INNER QUADRANT OF LEFT BREAST IN FEMALE, ESTROGEN RECEPTOR POSITIVE (HCC): Primary | ICD-10-CM

## 2019-07-22 DIAGNOSIS — C50.212 MALIGNANT NEOPLASM OF UPPER-INNER QUADRANT OF LEFT BREAST IN FEMALE, ESTROGEN RECEPTOR POSITIVE (HCC): ICD-10-CM

## 2019-07-22 DIAGNOSIS — Z17.0 MALIGNANT NEOPLASM OF UPPER-INNER QUADRANT OF LEFT BREAST IN FEMALE, ESTROGEN RECEPTOR POSITIVE (HCC): Primary | ICD-10-CM

## 2019-07-22 LAB
ALBUMIN SERPL-MCNC: 3.9 G/DL (ref 3.5–5.2)
ALBUMIN/GLOB SERPL: 1.5 G/DL
ALP SERPL-CCNC: 32 U/L (ref 39–117)
ALT SERPL W P-5'-P-CCNC: 16 U/L (ref 1–33)
ANION GAP SERPL CALCULATED.3IONS-SCNC: 7.5 MMOL/L (ref 5–15)
AST SERPL-CCNC: 18 U/L (ref 1–32)
BASOPHILS # BLD AUTO: 0.04 10*3/MM3 (ref 0–0.2)
BASOPHILS NFR BLD AUTO: 0.5 % (ref 0–1.5)
BILIRUB SERPL-MCNC: <0.2 MG/DL (ref 0.1–1.2)
BUN BLD-MCNC: 20 MG/DL (ref 6–20)
BUN/CREAT SERPL: 24.4 (ref 7–25)
CALCIUM SPEC-SCNC: 9.3 MG/DL (ref 8.6–10.5)
CHLORIDE SERPL-SCNC: 108 MMOL/L (ref 98–107)
CO2 SERPL-SCNC: 24.5 MMOL/L (ref 22–29)
CREAT BLD-MCNC: 0.82 MG/DL (ref 0.57–1)
DEPRECATED RDW RBC AUTO: 43.8 FL (ref 37–54)
EOSINOPHIL # BLD AUTO: 0.18 10*3/MM3 (ref 0–0.4)
EOSINOPHIL NFR BLD AUTO: 2.4 % (ref 0.3–6.2)
ERYTHROCYTE [DISTWIDTH] IN BLOOD BY AUTOMATED COUNT: 12.6 % (ref 12.3–15.4)
GFR SERPL CREATININE-BSD FRML MDRD: 75 ML/MIN/1.73
GLOBULIN UR ELPH-MCNC: 2.6 GM/DL
GLUCOSE BLD-MCNC: 153 MG/DL (ref 65–99)
HCT VFR BLD AUTO: 37.1 % (ref 34–46.6)
HGB BLD-MCNC: 12.2 G/DL (ref 12–15.9)
IMM GRANULOCYTES # BLD AUTO: 0.09 10*3/MM3 (ref 0–0.05)
IMM GRANULOCYTES NFR BLD AUTO: 1.2 % (ref 0–0.5)
LYMPHOCYTES # BLD AUTO: 1.6 10*3/MM3 (ref 0.7–3.1)
LYMPHOCYTES NFR BLD AUTO: 21.6 % (ref 19.6–45.3)
MCH RBC QN AUTO: 30.9 PG (ref 26.6–33)
MCHC RBC AUTO-ENTMCNC: 32.9 G/DL (ref 31.5–35.7)
MCV RBC AUTO: 93.9 FL (ref 79–97)
MONOCYTES # BLD AUTO: 0.5 10*3/MM3 (ref 0.1–0.9)
MONOCYTES NFR BLD AUTO: 6.7 % (ref 5–12)
NEUTROPHILS # BLD AUTO: 5.01 10*3/MM3 (ref 1.7–7)
NEUTROPHILS NFR BLD AUTO: 67.6 % (ref 42.7–76)
NRBC BLD AUTO-RTO: 0 /100 WBC (ref 0–0.2)
PLATELET # BLD AUTO: 202 10*3/MM3 (ref 140–450)
PMV BLD AUTO: 10 FL (ref 6–12)
POTASSIUM BLD-SCNC: 4.6 MMOL/L (ref 3.5–5.2)
PROT SERPL-MCNC: 6.5 G/DL (ref 6–8.5)
RBC # BLD AUTO: 3.95 10*6/MM3 (ref 3.77–5.28)
SODIUM BLD-SCNC: 140 MMOL/L (ref 136–145)
WBC NRBC COR # BLD: 7.42 10*3/MM3 (ref 3.4–10.8)

## 2019-07-22 PROCEDURE — 85025 COMPLETE CBC W/AUTO DIFF WBC: CPT | Performed by: INTERNAL MEDICINE

## 2019-07-22 PROCEDURE — 99213 OFFICE O/P EST LOW 20 MIN: CPT | Performed by: INTERNAL MEDICINE

## 2019-07-22 PROCEDURE — 36415 COLL VENOUS BLD VENIPUNCTURE: CPT

## 2019-07-22 PROCEDURE — 80053 COMPREHEN METABOLIC PANEL: CPT | Performed by: INTERNAL MEDICINE

## 2019-07-22 NOTE — PROGRESS NOTES
Subjective     REASON FOR CONSULTATION:  1.  T1bN0 M0 strongly ER/OK positive HER-2 negative grade 2 left breast cancer post lumpectomy with close margins for DCIS-Oncotype score of 16 radiation and tamoxifen started                              REQUESTING PHYSICIAN:  Adrián Pereyra M.D. and Nikita Gallegos M.D.      History of Present Illness is a 47-year-old premenopausal female with a T1b N0 strongly ER/OK positive breast cancer with a low Oncotype score of 16.    She completed radiation which she tolerated well and is on tamoxifen for about12    She called in with some mood swings we added Lexapro 10 mg and this is helped a lot but she still has a low libido which she and her  are dealing with      Father  with metastatic thyroid cancer I have again stressed the need for genetic testing and she did this and it was thankfully benign except for 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  This is actually gotten better and she thinks the Lexapro has helped this also there may have been a component of anxiety.  I told her if the dysphagia recurs she needs an EGD    Mammography in February of this year was thankfully benign and we will add an MRI next year because of breasts are dense    Past Medical History:   Diagnosis Date   • Anxiety    • Breast cancer (CMS/HCC) 2018    Left breast, Upper Inner Quadrant, Invasive Mammary Carcinoma, Intermediate grade, measuring at least 0.2 cm in dimension, with associated high grade solid and cribiform ductal carcinoma in situ with comedonecrosis, ER/OK positive, WHQ9hil negative   • Heartburn    • Infectious mononucleosis    • Migraines    • PONV (postoperative nausea and vomiting)    • Pre-diabetes    • PVC's (premature ventricular contractions)    • Wears contact lenses         Past Surgical History:   Procedure Laterality Date   • BREAST BIOPSY Left 2018    Left breast stereotactic  biopsy w/ marker clip placement & positive radiograph-Dr. Lashaun Gamble, [facility]   • BREAST BIOPSY Left 02/12/2018    INCOMPLETE - Biopsy could not be completed as patient had severe vasovagal reaction & subsequent syncope-[facility]   • BREAST BIOPSY Left 5/23/2018    Procedure: REEXCISION OF POSITIVE LUMPECTOMY MARGIN LEFT BREAST;  Surgeon: Adrián Pereyra MD;  Location: [facility];  Service: General   • BREAST LUMPECTOMY WITH SENTINEL NODE BIOPSY Left 4/11/2018    Procedure: BREAST LUMPECTOMY WITH SENTINEL NODE BIOPSY AND NEEDLE LOCALIZATION;  Surgeon: Adrián Pereyra MD;  Location: [facility];  Service: General      ONCOLOGIC HISTORY:  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  A diagnostic mammogram gram showed a 2.3 cm area of clustered microcalcifications very suspicious for malignancy and the patient underwent a stereotactic biopsy on 2/14/18 with the findings of intermediate grade invasive mammary carcinoma measuring 2 mm with associated high-grade and solid DCIS--ER was 65% OR was 96% HER-2 0 Ki-67 was 2%.  The patient was referred to Dr. Pereyra and underwent bilateral breast MRIs with the findings of a 2.1 cm abnormality in the left breast with no axillary adenopathy and she was taken for surgery when lumpectomy and sentinel node biopsy was performed on 4/11/18.  This revealed a residual 8 mm area of invasive carcinoma grade 2 with associated high-grade DCIS spanning 16 mm-the lateral and medial margins were 0.5 mm from the DCIS-new anterior, superior and lateral margins were obtained which were negative but the medial margin was still felt to be close.  She went to see Dr. Pereyra to wanted to discuss further the status of her margins but sent her to us also to discuss adjuvant treatment for her  invasive cancer.    She is healing well from surgery.  She is obviously anxious about the margins but has no qualms about having further surgery even if the cosmesis is not perfect because she is more worried about recurrence of her cancer.  She is  3 para 3 -Menarche was age 14 she is premenopausal first childbirth was age 28 she breast-fed for 9 months she's been on no hormonal replacement since her last child 13 years ago  She is having bladder issues and her gynecologist just prescribed vaginal estrogens which she has not yet started.    Family history is positive for her father having thyroid cancer age 64 and he is currently dying from this is a 78 she's one sister is healthy her mother's 72 and healthy she has a maternal aunt with breast cancers at an unknown age paternal aunt with breast cancer in her 60s paternal grandfather with bone cancer and a maternal grandfather with bone cancer is no ovarian cancer in the family.      I explained to Cesia and her  that her tumor is very unlikely to have a high Oncotype score and it is very likely that the mainstay of her treatment would be tamoxifen.  But we will send an Oncotype score because of the small chance that there is a higher recurrence risk than expected based on her pathology in tumor size.  I think it is very reasonable to get genetic testing because of her family history although it is unlikely to change our treatment.  I've asked her to hold off and vaginal estrogens for the time being and to discuss this with her gynecologist.  She will stop her Zoloft and we can prescribe Effexor if needed      we discussed her case at the multiple this may conference because of the close margins for high-grade DCIS and we all felt that radiation would take care of this and no further surgery was needed.    We discussed the low risk Oncotype in the lack of benefit for chemotherapy in this situation which we had already discussed with her on the  phone and we talked about tamoxifen.The side effects and toxicities of Tamoxifen were discussed with the patient, including hot flashes, mood swings,depression, DVT and endometrial cancer. A list of drugs that interfere with the efficacy of tamoxifen and are to be avoided were given to the patient.  1/19  At her last visit she was having a lot of pelvic pain with her menstrual cycle and an ultrasound which showed some adenomyosis and her ovarian cyst had disappeared thankfully-she started menstruating regularly-we ordered CAT scans because of the pain in her left side and thankfully chest and abdomen are negative except for thickening of her bladder from her interstitial cystitis and she had fibroids-this discomfort has gradually improved with time        Current Outpatient Medications on File Prior to Visit   Medication Sig Dispense Refill   • ALPRAZolam (XANAX) 0.25 MG tablet Take 0.25 mg by mouth 2 (Two) Times a Day As Needed for Anxiety.     • Cetirizine HCl (ZYRTEC PO) Take 10 mg by mouth Daily As Needed.     • escitalopram (LEXAPRO) 10 MG tablet Take 1 tablet by mouth Daily. 90 tablet 1   • ibuprofen (ADVIL,MOTRIN) 600 MG tablet Take 600 mg by mouth Every 6 (Six) Hours As Needed for Mild Pain . HELD FOR SURGERY     • tamoxifen (NOLVADEX) 20 MG chemo tablet Take  by mouth Daily.     • vitamin D (ERGOCALCIFEROL) 55587 units capsule capsule Take 50,000 Units by mouth Every 7 (Seven) Days. MONDAYS     • [DISCONTINUED] tamoxifen (NOLVADEX) 20 MG chemo tablet TAKE 1 TABLET BY MOUTH EVERY DAY 90 tablet 0     No current facility-administered medications on file prior to visit.         ALLERGIES:  No Known Allergies     Social History     Socioeconomic History   • Marital status:      Spouse name: Nikita Workman   • Number of children: 3   • Years of education: College   • Highest education level: Not on file   Occupational History   • Occupation: Occupational Therapist     Comment: [employer]   Tobacco Use    • Smoking status: Former Smoker     Packs/day: 0.50     Years: 2.00     Pack years: 1.00     Types: Cigarettes     Start date:      Last attempt to quit:      Years since quittin.5   • Smokeless tobacco: Never Used   Substance and Sexual Activity   • Alcohol use: Yes     Comment: RARE MONTHLY   • Drug use: No   • Sexual activity: Defer     Birth control/protection: None        Family History   Problem Relation Age of Onset   • Breast cancer Maternal Aunt         In her 60s   • Breast cancer Paternal Aunt    • Hyperlipidemia Mother    • Asthma Father    • Diabetes Father    • Hearing loss Father    • Heart disease Father    • Hypertension Father    • Thyroid cancer Father 64        Follicular Tyroid Cancer   • Alcohol abuse Sister    • Depression Sister    • Diabetes Maternal Grandmother    • Bone cancer Maternal Grandfather    • Breast cancer Cousin    • Malig Hyperthermia Neg Hx         Review of Systems   Constitutional: Positive for fatigue (better 19). Negative for chills and fever.   HENT: Negative for congestion, mouth sores and nosebleeds.    Respiratory: Positive for choking (improved 19). Negative for chest tightness and shortness of breath.    Cardiovascular: Negative for chest pain (rib pain improved 19).   Gastrointestinal: Positive for abdominal pain (lower abdomen 19). Negative for constipation, diarrhea, nausea and vomiting.   Genitourinary: Positive for urgency (same 19). Negative for difficulty urinating (Early prolapse).   Musculoskeletal: Positive for arthralgias (hands and thumbs 19). Negative for back pain and gait problem.   Neurological: Negative for dizziness (Occasional vertigo), numbness and headaches (Chronic migraine headaches).   Psychiatric/Behavioral: The patient is not nervous/anxious.         Objective     Vitals:    19 0904   BP: 105/67   Pulse: 60   Resp: 16   Temp: 98 °F (36.7 °C)   SpO2: 100%   Weight: 62.5 kg (137 lb 12.8 oz)  "  Height: 160 cm (62.99\")   PainSc: 0-No pain     Current Status 7/22/2019   ECOG score 0       Physical Exam    GENERAL:  Well-developed, well-nourished in no acute distress.   SKIN:  Warm, dry without rashes, purpura or petechiae.  EYES:  Pupils equal, round and reactive to light.  EOMs intact.  Conjunctivae normal.  EARS:  Hearing intact.  NOSE:  Septum midline.  No excoriations or nasal discharge.  MOUTH:  Tongue is well-papillated; no stomatitis or ulcers.  Lips normal.  THROAT:  Oropharynx without lesions or exudates.  NECK:  Supple with good range of motion; no thyromegaly or masses, no JVD.  LYMPHATICS:  No cervical, supraclavicular, axillary or inguinal adenopathy.  CHEST:  Lungs clear to auscultation. Good airflow.  BREASTS: Right breast is benign left  shows a lumpectomy scar with minimal scarring at   the lumpectomy site-  CARDIAC:  Regular rate and rhythm without murmurs, rubs or gallops. Normal S1,S2.  ABDOMEN:  Soft, minimal left lower quadrant tenderness without mass - with no hepatosplenomegaly .  EXTREMITIES:  No clubbing, cyanosis or edema.  NEUROLOGICAL:  Cranial Nerves II-XII grossly intact.  No focal neurological deficits.  PSYCHIATRIC:  Normal affect and mood.        RECENT LABS:  Hematology WBC   Date Value Ref Range Status   07/22/2019 7.42 3.40 - 10.80 10*3/mm3 Final     RBC   Date Value Ref Range Status   07/22/2019 3.95 3.77 - 5.28 10*6/mm3 Final     Hemoglobin   Date Value Ref Range Status   07/22/2019 12.2 12.0 - 15.9 g/dL Final     Hematocrit   Date Value Ref Range Status   07/22/2019 37.1 34.0 - 46.6 % Final     Platelets   Date Value Ref Range Status   07/22/2019 202 140 - 450 10*3/mm3 Final         IMPRESSION:  1. Biopsy-proven malignancy in the left breast at the 11:30 position  measuring on the order of 2.1 cm in greatest dimension. No other  suspicious findings are seen within the left breast and there is no  evidence for left axillary adenopathy.  2. There are no findings " "suspicious for malignancy in the right breast.     BI-RADS Category 6: Known biopsy-proven malignancy.     4/11/18  1. \"LEFT BREAST MASS,\" WIRE GUIDED LUMPECTOMY:             INVASIVE DUCTAL CARCINOMA, INTERMEDIATE GRADE, 8 MM, MARGINS CLEAR.             CLOSEST MARGIN TO INVASIVE TUMOR: 1.5 MM (INFERIOR).             ASSOCIATED HIGH GRADE DUCTAL CARCINOMA IN SITU SPANNING APPROXIMATELY 16 MM (FOUR                    BLOCKS x 4 MM PER BLOCK).             CLOSEST MARGIN TO DUCTAL CARCINOMA IN SITU: 0.5 MM (LATERAL AND MEDIAL MARGINS).             HORMONE RECEPTORS REPORTED ON PRIOR BIOPSY, [facility] [id number]. REPEAT/                   CONFIRMATORY STUDIES ARE AVAILABLE UPON REQUEST.                         BIOPSY SITE CHANGES.     NOTE: ADDITIONAL MARGINS SUBMITTED AS PARTS 2, 3, AND 4.        2. \"LEFT BREAST NEW ANTERIOR MARGIN\":              BENIGN FIBROADIPOSE TISSUE.              NEW MARGIN - NO ATYPIA.     3. \"LEFT BREAST NEW SUPERIOR MARGIN\":             BENIGN BREAST TISSUE.             NEW MARGIN - NO ATYPIA.     4. \"LEFT BREAST NEW LATERAL MARGIN\":             BENIGN BREAST TISSUE.             NEW MARGIN - NO ATYPIA.     5. \"SENTINEL NODE #1\":             LYMPH NODE, MULTIPLE STEP SECTIONS: NO METASTATIC CARCINOMA SEEN.     6. \"SENTINEL NODE #2\":             LYMPH NODE, MULTIPLE STEP SECTIONS: NO METASTATIC CARCINOMA SEEN.     THM/th IHC/a/CMK                                Electronically signed by Darin Claire MD on 4/12/2018 at 1511   Synoptic Checklist   INVASIVE CARCINOMA OF THE BREAST   Breast Invasive - All Specimens   SPECIMEN   Procedure  Excision (less than total mastectomy)   Specimen Laterality  Left   TUMOR   Histologic Type  Invasive carcinoma of no special type (ductal, not otherwise specified)   Histologic Grade (Jessie Histologic Score)     Glandular (Acinar) / Tubular Differentiation  Score 3 (< 10% of tumor area forming glandular / tubular structures)   Nuclear Pleomorphism "  Score 2 (Cells larger than normal with open vesicular nuclei, visible nucleoli, and moderate variability in both size and shape)   Mitotic Rate  Score 1 (<=3 mitoses per mm2)   Overall Grade  Grade 2 (scores of 6 or 7)   Tumor Size  Greatest dimension of largest invasive focus > 1 mm (indicate exact measurement in Millimeters):: 8 mm   Tumor Focality  Single focus of invasive carcinoma   Ductal Carcinoma In Situ (DCIS)  DCIS is present in specimen   Ductal Carcinoma In Situ (DCIS)  Positive for EIC   Size (Extent) of DCIS  Estimated size (extent) of DCIS (greatest dimension using gross and microscopic evaluation) in Millimeters (mm) is at least: 16  mm   Nuclear Grade  Grade III (high)   Lymphovascular Invasion  Not identified   Treatment Effect  No known presurgical therapy   MARGINS   Invasive Carcinoma Margins  Uninvolved by invasive carcinoma   Distance from Closest Margin in Millimeters (mm)  Specify distance: 1.5 mm   Closest Margin  Inferior   DCIS Margins  Uninvolved by DCIS (DCIS present in specimen)   Distance of DCIS from Closest Margin in Millimeters (mm)  Specify distance: 0.5 mm   Closest Margin  Medial     Lateral   LYMPH NODES   Regional Lymph Nodes     Number of Lymph Nodes with Macrometastases (> 2 mm)  Specify number: 0   Number of Lymph Nodes with Micrometastases (> 0.2 mm to 2 mm and / or > 200 cells)  Specify number: 0   Number of Lymph Nodes with Isolated Tumor Cells (<= 0.2 mm and <= 200 cells)  Specify number: 0   Number of Lymph Nodes Examined  Specify number: 2   Number of [place] Nodes Examined  Specify number: 2   PATHOLOGIC STAGE CLASSIFICATION (pTNM)   Primary Tumor (Invasive Carcinoma) (pT)  pT1c: Tumor > 10 mm but <= 20 mm in greatest dimension   Modifier  (sn): Only sentinel node(s) evaluated. If 6 or more nodes (sentinel or nonsentinel) are removed, this modifier should not be used.   Category (pN)  pN0: No regional lymph node metastasis identified or ITCs only   .         COMPLETE PELVIC SONOGRAM   FINDINGS: Transabdominal and transvaginal pelvic sonography was  performed. The uterus is anteflexed and measures 12 x 5 x 7 cm. The  endometrial bilayer measures 0.9 cm in thickness. Scattered  heterogeneity is seen throughout the uterus. Within the posterior mid  uterine body there is a 1.9 x 1.4 x 1.3 cm complex hypoechoic lesion. In  the mid uterine body located in the anterior aspect of the uterus there  is a 1.6 x 1.4 x 1.9 cm hypoechoic lesion.     The right ovary measures 4.2 x 1.9 x 3.2 cm and the left ovary measures  3.4 x 1.9 x 1.4 cm. Normal intraovarian venous vascularity is seen in  both ovaries. Normal follicles are seen within both ovaries. No free  fluid is seen within the pelvic cul-de-sac.     IMPRESSION:  1. Enlarged uterus with 2 focal myometrial lesions measuring on the  order of 1.9 cm in greatest dimension. The sonographic features suggest  adenomyosis, possibly with focal adenomyomas versus fibroids. It is  difficult to differentiate an adenomyoma versus a fibroid, but I believe  adenomyosis is likely present given the enlarged heterogenous appearance  of the uterus. If imaging differentiation is desired it can be performed  with a pelvic MRI without and with contrast.  2. Normal sonographic appearance of both ovaries.     This report was finalized on 9/21/2018     Interpretation Summary 11/18            · Normal bilateral lower extremity venous duplex scan.                     Assessment/Plan   [id number] HER-2 negative grade 2 left breast cancer post lumpectomy and sentinel node biopsy-with close margins from high-grade DCIS medially-Oncotype score 16  2.  Interstitial cystitis undergoing instillation of lidocaine/heparin in  the bladder intermittently  3.  Family history of thyroid and breast cancer- genetic testing negative except for a VUS in the MARLO geneD   4 unusual discomfort left lower ribs outside the radiation port improving CT scans  negative-resolved  5. Worsening dysphagia to solids-ENT exam negative symptoms improved with Lexapro    Plan  1.continue tamoxifen 20 mg daily with Lexapro for mood swings  2. return in 4  months .  3.  MRI planned in February with her next mammogram    If her dysphagia worsens she should either do a barium swallow and EGD          Delivery of cabrera pregnancy

## 2019-10-01 RX ORDER — ESCITALOPRAM OXALATE 10 MG/1
TABLET ORAL
Qty: 90 TABLET | Refills: 1 | Status: SHIPPED | OUTPATIENT
Start: 2019-10-01 | End: 2020-04-23

## 2019-10-17 RX ORDER — TAMOXIFEN CITRATE 20 MG/1
TABLET ORAL
Qty: 90 TABLET | Refills: 0 | Status: SHIPPED | OUTPATIENT
Start: 2019-10-17 | End: 2020-02-17

## 2019-11-11 ENCOUNTER — OFFICE VISIT (OUTPATIENT)
Dept: ONCOLOGY | Facility: CLINIC | Age: 49
End: 2019-11-11

## 2019-11-11 ENCOUNTER — LAB (OUTPATIENT)
Dept: OTHER | Facility: HOSPITAL | Age: 49
End: 2019-11-11

## 2019-11-11 VITALS
BODY MASS INDEX: 25.05 KG/M2 | HEIGHT: 63 IN | TEMPERATURE: 98 F | DIASTOLIC BLOOD PRESSURE: 71 MMHG | OXYGEN SATURATION: 100 % | WEIGHT: 141.4 LBS | RESPIRATION RATE: 16 BRPM | SYSTOLIC BLOOD PRESSURE: 111 MMHG | HEART RATE: 67 BPM

## 2019-11-11 DIAGNOSIS — Z17.0 MALIGNANT NEOPLASM OF UPPER-INNER QUADRANT OF LEFT BREAST IN FEMALE, ESTROGEN RECEPTOR POSITIVE (HCC): Primary | ICD-10-CM

## 2019-11-11 DIAGNOSIS — C50.212 MALIGNANT NEOPLASM OF UPPER-INNER QUADRANT OF LEFT BREAST IN FEMALE, ESTROGEN RECEPTOR POSITIVE (HCC): ICD-10-CM

## 2019-11-11 DIAGNOSIS — Z17.0 MALIGNANT NEOPLASM OF UPPER-INNER QUADRANT OF LEFT BREAST IN FEMALE, ESTROGEN RECEPTOR POSITIVE (HCC): ICD-10-CM

## 2019-11-11 DIAGNOSIS — C50.212 MALIGNANT NEOPLASM OF UPPER-INNER QUADRANT OF LEFT BREAST IN FEMALE, ESTROGEN RECEPTOR POSITIVE (HCC): Primary | ICD-10-CM

## 2019-11-11 LAB
ALBUMIN SERPL-MCNC: 4.4 G/DL (ref 3.5–5.2)
ALBUMIN/GLOB SERPL: 1.7 G/DL
ALP SERPL-CCNC: 36 U/L (ref 39–117)
ALT SERPL W P-5'-P-CCNC: 18 U/L (ref 1–33)
ANION GAP SERPL CALCULATED.3IONS-SCNC: 9.9 MMOL/L (ref 5–15)
AST SERPL-CCNC: 21 U/L (ref 1–32)
BASOPHILS # BLD AUTO: 0.09 10*3/MM3 (ref 0–0.2)
BASOPHILS NFR BLD AUTO: 1.1 % (ref 0–1.5)
BILIRUB SERPL-MCNC: <0.2 MG/DL (ref 0.1–1.2)
BUN BLD-MCNC: 15 MG/DL (ref 6–20)
BUN/CREAT SERPL: 19.5 (ref 7–25)
CALCIUM SPEC-SCNC: 9.4 MG/DL (ref 8.6–10.5)
CHLORIDE SERPL-SCNC: 103 MMOL/L (ref 98–107)
CO2 SERPL-SCNC: 28.1 MMOL/L (ref 22–29)
CREAT BLD-MCNC: 0.77 MG/DL (ref 0.57–1)
DEPRECATED RDW RBC AUTO: 43.5 FL (ref 37–54)
EOSINOPHIL # BLD AUTO: 0.24 10*3/MM3 (ref 0–0.4)
EOSINOPHIL NFR BLD AUTO: 3 % (ref 0.3–6.2)
ERYTHROCYTE [DISTWIDTH] IN BLOOD BY AUTOMATED COUNT: 12.5 % (ref 12.3–15.4)
GFR SERPL CREATININE-BSD FRML MDRD: 80 ML/MIN/1.73
GLOBULIN UR ELPH-MCNC: 2.6 GM/DL
GLUCOSE BLD-MCNC: 140 MG/DL (ref 65–99)
HCT VFR BLD AUTO: 37.8 % (ref 34–46.6)
HGB BLD-MCNC: 12.4 G/DL (ref 12–15.9)
IMM GRANULOCYTES # BLD AUTO: 0.04 10*3/MM3 (ref 0–0.05)
IMM GRANULOCYTES NFR BLD AUTO: 0.5 % (ref 0–0.5)
LYMPHOCYTES # BLD AUTO: 2.26 10*3/MM3 (ref 0.7–3.1)
LYMPHOCYTES NFR BLD AUTO: 27.8 % (ref 19.6–45.3)
MCH RBC QN AUTO: 30.6 PG (ref 26.6–33)
MCHC RBC AUTO-ENTMCNC: 32.8 G/DL (ref 31.5–35.7)
MCV RBC AUTO: 93.3 FL (ref 79–97)
MONOCYTES # BLD AUTO: 0.59 10*3/MM3 (ref 0.1–0.9)
MONOCYTES NFR BLD AUTO: 7.3 % (ref 5–12)
NEUTROPHILS # BLD AUTO: 4.9 10*3/MM3 (ref 1.7–7)
NEUTROPHILS NFR BLD AUTO: 60.3 % (ref 42.7–76)
NRBC BLD AUTO-RTO: 0 /100 WBC (ref 0–0.2)
PLATELET # BLD AUTO: 272 10*3/MM3 (ref 140–450)
PMV BLD AUTO: 9.5 FL (ref 6–12)
POTASSIUM BLD-SCNC: 3.7 MMOL/L (ref 3.5–5.2)
PROT SERPL-MCNC: 7 G/DL (ref 6–8.5)
RBC # BLD AUTO: 4.05 10*6/MM3 (ref 3.77–5.28)
SODIUM BLD-SCNC: 141 MMOL/L (ref 136–145)
WBC NRBC COR # BLD: 8.12 10*3/MM3 (ref 3.4–10.8)

## 2019-11-11 PROCEDURE — 99213 OFFICE O/P EST LOW 20 MIN: CPT | Performed by: INTERNAL MEDICINE

## 2019-11-11 PROCEDURE — 36415 COLL VENOUS BLD VENIPUNCTURE: CPT

## 2019-11-11 PROCEDURE — 85025 COMPLETE CBC W/AUTO DIFF WBC: CPT | Performed by: INTERNAL MEDICINE

## 2019-11-11 PROCEDURE — 80053 COMPREHEN METABOLIC PANEL: CPT | Performed by: INTERNAL MEDICINE

## 2019-11-11 NOTE — PROGRESS NOTES
Subjective     REASON FOR CONSULTATION:  1.  T1bN0 M0 strongly ER/NC positive HER-2 negative grade 2 left breast cancer post lumpectomy with close margins for DCIS-Oncotype score of 16 radiation and tamoxifen started 7/18                             REQUESTING PHYSICIAN:  Adrián Pereyra M.D. and Nikita Gallegos M.D.      History of Present Illness is a 47-year-old premenopausal female with a T1b N0 strongly ER/NC positive breast cancer with a low Oncotype score of 16.    She completed radiation which she tolerated well and is on tamoxifen for about 16 months    She called in with some mood swings we added Lexapro 10 mg and this is helped a lot but she still has a low libido which she and her  are dealing with    She is due again for mammography in February and at this point we will hold off on MRI as insurance is not approving it for this indication    She has some numbness in her hands and this sounds almost like carpal tunnel that she is working with the computer a lot at work    Past Medical History:   Diagnosis Date   • Anxiety    • Breast cancer (CMS/HCC) 02/14/2018    Left breast, Upper Inner Quadrant, Invasive Mammary Carcinoma, Intermediate grade, measuring at least 0.2 cm in dimension, with associated high grade solid and cribiform ductal carcinoma in situ with comedonecrosis, ER/NC positive, [id number] negative   • Heartburn    • Infectious mononucleosis    • Migraines    • PONV (postoperative nausea and vomiting)    • Pre-diabetes    • PVC's (premature ventricular contractions)    • Wears contact lenses         Past Surgical History:   Procedure Laterality Date   • BREAST BIOPSY Left 02/14/2018    Left breast stereotactic biopsy w/ marker clip placement & positive radiograph-Dr. Lashaun Gamble, [facility]   • BREAST BIOPSY Left 02/12/2018    INCOMPLETE - Biopsy could not be completed as patient had severe vasovagal reaction & subsequent syncope-[facility]   • BREAST BIOPSY Left 5/23/2018    Procedure: REEXCISION OF  POSITIVE LUMPECTOMY MARGIN LEFT BREAST;  Surgeon: Adrián Pereyra MD;  Location: [facility];  Service: General   • BREAST LUMPECTOMY WITH SENTINEL NODE BIOPSY Left 2018    Procedure: BREAST LUMPECTOMY WITH SENTINEL NODE BIOPSY AND NEEDLE LOCALIZATION;  Surgeon: Adrián Pereyra MD;  Location: [facility];  Service: General      ONCOLOGIC HISTORY:  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  A diagnostic mammogram gram showed a 2.3 cm area of clustered microcalcifications very suspicious for malignancy and the patient underwent a stereotactic biopsy on 18 with the findings of intermediate grade invasive mammary carcinoma measuring 2 mm with associated high-grade and solid DCIS--ER was 65% FL was 96% HER-2 0 Ki-67 was 2%.  The patient was referred to Dr. Pereyra and underwent bilateral breast MRIs with the findings of a 2.1 cm abnormality in the left breast with no axillary adenopathy and she was taken for surgery when lumpectomy and sentinel node biopsy was performed on 18.  This revealed a residual 8 mm area of invasive carcinoma grade 2 with associated high-grade DCIS spanning 16 mm-the lateral and medial margins were 0.5 mm from the DCIS-new anterior, superior and lateral margins were obtained which were negative but the medial margin was still felt to be close.  She went to see Dr. Pereyra to wanted to discuss further the status of her margins but sent her to us also to discuss adjuvant treatment for her invasive cancer.    She is healing well from surgery.  She is obviously anxious about the margins but has no qualms about having further surgery even if the cosmesis is not perfect because she is more worried about recurrence of her cancer.  She is  3 para 3 -Menarche was age  14 she is premenopausal first childbirth was age 28 she breast-fed for 9 months she's been on no hormonal replacement since her last child 13 years ago  She is having bladder issues and her gynecologist just prescribed vaginal estrogens which she has not yet started.    Family history is positive for her father having thyroid cancer age 64 and he is currently dying from this is a 78 she's one sister is healthy her mother's 72 and healthy she has a maternal aunt with breast cancers at an unknown age paternal aunt with breast cancer in her 60s paternal grandfather with bone cancer and a maternal grandfather with bone cancer is no ovarian cancer in the family.      I explained to Cesia and her  that her tumor is very unlikely to have a high Oncotype score and it is very likely that the mainstay of her treatment would be tamoxifen.  But we will send an Oncotype score because of the small chance that there is a higher recurrence risk than expected based on her pathology in tumor size.  I think it is very reasonable to get genetic testing because of her family history although it is unlikely to change our treatment.  I've asked her to hold off and vaginal estrogens for the time being and to discuss this with her gynecologist.  She will stop her Zoloft and we can prescribe Effexor if needed    6/18  we discussed her case at the multiple this may conference because of the close margins for high-grade DCIS and we all felt that radiation would take care of this and no further surgery was needed.    We discussed the low risk Oncotype in the lack of benefit for chemotherapy in this situation which we had already discussed with her on the phone and we talked about tamoxifen.The side effects and toxicities of Tamoxifen were discussed with the patient, including hot flashes, mood swings,depression, DVT and endometrial cancer. A list of drugs that interfere with the efficacy of tamoxifen and are to be avoided were given to  the patient.    At her last visit she was having a lot of pelvic pain with her menstrual cycle and an ultrasound which showed some adenomyosis and her ovarian cyst had disappeared thankfully-she started menstruating regularly-we ordered CAT scans because of the pain in her left side and thankfully chest and abdomen are negative except for thickening of her bladder from her interstitial cystitis and she had fibroids-this discomfort has gradually improved with time        Father  with metastatic thyroid cancer I have again stressed the need for genetic testing and she did this and it was thankfully benign except for 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  This is actually gotten better and she thinks the Lexapro has helped this also there may have been a component of anxiety.  I told her if the dysphagia recurs she needs an EGD          Current Outpatient Medications on File Prior to Visit   Medication Sig Dispense Refill   • ALPRAZolam (XANAX) 0.25 MG tablet Take 0.25 mg by mouth 2 (Two) Times a Day As Needed for Anxiety.     • escitalopram (LEXAPRO) 10 MG tablet TAKE 1 TABLET BY MOUTH EVERY DAY 90 tablet 1   • ibuprofen (ADVIL,MOTRIN) 600 MG tablet Take 600 mg by mouth Every 6 (Six) Hours As Needed for Mild Pain . HELD FOR SURGERY     • tamoxifen (NOLVADEX) 20 MG chemo tablet TAKE 1 TABLET BY MOUTH EVERY DAY 90 tablet 0   • vitamin D (ERGOCALCIFEROL) 79561 units capsule capsule Take 50,000 Units by mouth Every 7 (Seven) Days.      • [DISCONTINUED] amoxicillin-clavulanate (AUGMENTIN) 875-125 MG per tablet Take 1 tablet by mouth 2 (Two) Times a Day. 20 tablet 0   • [DISCONTINUED] tamoxifen (NOLVADEX) 20 MG chemo tablet Take  by mouth Daily.       No current facility-administered medications on file prior to visit.         ALLERGIES:  No Known Allergies     Social History     Socioeconomic History   • Marital status:       Spouse name: Nikita Workman   • Number of children: 3   • Years of education: College   • Highest education level: Not on file   Occupational History   • Occupation: Occupational Therapist     Comment: [employer]   Tobacco Use   • Smoking status: Former Smoker     Packs/day: 0.50     Years: 2.00     Pack years: 1.00     Types: Cigarettes     Start date:      Last attempt to quit:      Years since quittin.8   • Smokeless tobacco: Never Used   Substance and Sexual Activity   • Alcohol use: Yes     Comment: RARE MONTHLY   • Drug use: No   • Sexual activity: Defer     Birth control/protection: None        Family History   Problem Relation Age of Onset   • Breast cancer Maternal Aunt         In her 60s   • Breast cancer Paternal Aunt    • Hyperlipidemia Mother    • Asthma Father    • Diabetes Father    • Hearing loss Father    • Heart disease Father    • Hypertension Father    • Thyroid cancer Father 64        Follicular Tyroid Cancer   • Alcohol abuse Sister    • Depression Sister    • Diabetes Maternal Grandmother    • Bone cancer Maternal Grandfather    • Breast cancer Cousin    • Malig Hyperthermia Neg Hx         Review of Systems   Constitutional: Positive for fatigue (better 19). Negative for chills and fever.   HENT: Positive for trouble swallowing (occassionally 19). Negative for congestion, mouth sores and nosebleeds.    Respiratory: Negative for choking (better 19), chest tightness and shortness of breath.    Cardiovascular: Negative for chest pain (rib pain improved 19).   Gastrointestinal: Positive for abdominal pain (lower abdomen 19 same ). Negative for constipation, diarrhea, nausea and vomiting.   Genitourinary: Positive for urgency (same 19). Negative for difficulty urinating (Early prolapse).   Musculoskeletal: Positive for arthralgias (hands and thumbs 19 little worse ). Negative for back pain and gait problem.   Neurological: Positive for  "numbness (hands 11/11/19). Negative for dizziness (Occasional vertigo) and headaches (Chronic migraine headaches).   Psychiatric/Behavioral: The patient is not nervous/anxious.         Objective     Vitals:    11/11/19 1622   BP: 111/71   Pulse: 67   Resp: 16   Temp: 98 °F (36.7 °C)   SpO2: 100%   Weight: 64.1 kg (141 lb 6.4 oz)   Height: 160 cm (62.99\")   PainSc:   2   PainLoc: Chest     Current Status 11/11/2019   ECOG score 0       Physical Exam    GENERAL:  Well-developed, well-nourished in no acute distress.   SKIN:  Warm, dry without rashes, purpura or petechiae.  EYES:  Pupils equal, round and reactive to light.  EOMs intact.  Conjunctivae normal.  EARS:  Hearing intact.  NOSE:  Septum midline.  No excoriations or nasal discharge.  MOUTH:  Tongue is well-papillated; no stomatitis or ulcers.  Lips normal.  THROAT:  Oropharynx without lesions or exudates.  NECK:  Supple with good range of motion; no thyromegaly or masses, no JVD.  LYMPHATICS:  No cervical, supraclavicular, axillary or inguinal adenopathy.  CHEST:  Lungs clear to auscultation. Good airflow.  BREASTS: Right breast is benign left  shows a lumpectomy scar with minimal scarring at   the lumpectomy site-  CARDIAC:  Regular rate and rhythm without murmurs, rubs or gallops. Normal S1,S2.  ABDOMEN:  Soft, minimal left lower quadrant tenderness without mass - with no hepatosplenomegaly .  EXTREMITIES:  No clubbing, cyanosis or edema.  NEUROLOGICAL:  Cranial Nerves II-XII grossly intact.  No focal neurological deficits.  PSYCHIATRIC:  Normal affect and mood.        RECENT LABS:  Hematology WBC   Date Value Ref Range Status   11/11/2019 8.12 3.40 - 10.80 10*3/mm3 Final     RBC   Date Value Ref Range Status   11/11/2019 4.05 3.77 - 5.28 10*6/mm3 Final     Hemoglobin   Date Value Ref Range Status   11/11/2019 12.4 12.0 - 15.9 g/dL Final     Hematocrit   Date Value Ref Range Status   11/11/2019 37.8 34.0 - 46.6 % Final     Platelets   Date Value Ref Range " "Status   11/11/2019 272 140 - 450 10*3/mm3 Final         IMPRESSION:  1. Biopsy-proven malignancy in the left breast at the 11:30 position  measuring on the order of 2.1 cm in greatest dimension. No other  suspicious findings are seen within the left breast and there is no  evidence for left axillary adenopathy.  2. There are no findings suspicious for malignancy in the right breast.     BI-RADS Category 6: Known biopsy-proven malignancy.     4/11/18  1. \"LEFT BREAST MASS,\" WIRE GUIDED LUMPECTOMY:             INVASIVE DUCTAL CARCINOMA, INTERMEDIATE GRADE, 8 MM, MARGINS CLEAR.             CLOSEST MARGIN TO INVASIVE TUMOR: 1.5 MM (INFERIOR).             ASSOCIATED HIGH GRADE DUCTAL CARCINOMA IN SITU SPANNING APPROXIMATELY 16 MM (FOUR                    BLOCKS x 4 MM PER BLOCK).             CLOSEST MARGIN TO DUCTAL CARCINOMA IN SITU: 0.5 MM (LATERAL AND MEDIAL MARGINS).             HORMONE RECEPTORS REPORTED ON PRIOR BIOPSY, [facility] [id number]. REPEAT/                   CONFIRMATORY STUDIES ARE AVAILABLE UPON REQUEST.                         BIOPSY SITE CHANGES.     NOTE: ADDITIONAL MARGINS SUBMITTED AS PARTS 2, 3, AND 4.        2. \"LEFT BREAST NEW ANTERIOR MARGIN\":              BENIGN FIBROADIPOSE TISSUE.              NEW MARGIN - NO ATYPIA.     3. \"LEFT BREAST NEW SUPERIOR MARGIN\":             BENIGN BREAST TISSUE.             NEW MARGIN - NO ATYPIA.     4. \"LEFT BREAST NEW LATERAL MARGIN\":             BENIGN BREAST TISSUE.             NEW MARGIN - NO ATYPIA.     5. \"SENTINEL NODE #1\":             LYMPH NODE, MULTIPLE STEP SECTIONS: NO METASTATIC CARCINOMA SEEN.     6. \"SENTINEL NODE #2\":             LYMPH NODE, MULTIPLE STEP SECTIONS: NO METASTATIC CARCINOMA SEEN.     THM/th IHC/a/CMK                                Electronically signed by Darin Claire MD on 4/12/2018 at 1511   Synoptic Checklist   INVASIVE CARCINOMA OF THE BREAST   Breast Invasive - All Specimens   SPECIMEN   Procedure  Excision (less " than total mastectomy)   Specimen Laterality  Left   TUMOR   Histologic Type  Invasive carcinoma of no special type (ductal, not otherwise specified)   Histologic Grade ([place] Histologic Score)     Glandular (Acinar) / Tubular Differentiation  Score 3 (< 10% of tumor area forming glandular / tubular structures)   Nuclear Pleomorphism  Score 2 (Cells larger than normal with open vesicular nuclei, visible nucleoli, and moderate variability in both size and shape)   Mitotic Rate  Score 1 (<=3 mitoses per mm2)   Overall Grade  Grade 2 (scores of 6 or 7)   Tumor Size  Greatest dimension of largest invasive focus > 1 mm (indicate exact measurement in Millimeters):: 8 mm   Tumor Focality  Single focus of invasive carcinoma   Ductal Carcinoma In Situ (DCIS)  DCIS is present in specimen   Ductal Carcinoma In Situ (DCIS)  Positive for EIC   Size (Extent) of DCIS  Estimated size (extent) of DCIS (greatest dimension using gross and microscopic evaluation) in Millimeters (mm) is at least: 16  mm   Nuclear Grade  Grade III (high)   Lymphovascular Invasion  Not identified   Treatment Effect  No known presurgical therapy   MARGINS   Invasive Carcinoma Margins  Uninvolved by invasive carcinoma   Distance from Closest Margin in Millimeters (mm)  Specify distance: 1.5 mm   Closest Margin  Inferior   DCIS Margins  Uninvolved by DCIS (DCIS present in specimen)   Distance of DCIS from Closest Margin in Millimeters (mm)  Specify distance: 0.5 mm   Closest Margin  Medial     Lateral   LYMPH NODES   Regional Lymph Nodes     Number of Lymph Nodes with Macrometastases (> 2 mm)  Specify number: 0   Number of Lymph Nodes with Micrometastases (> 0.2 mm to 2 mm and / or > 200 cells)  Specify number: 0   Number of Lymph Nodes with Isolated Tumor Cells (<= 0.2 mm and <= 200 cells)  Specify number: 0   Number of Lymph Nodes Examined  Specify number: 2   Number of [place] Nodes Examined  Specify number: 2   PATHOLOGIC STAGE CLASSIFICATION  (pTNM)   Primary Tumor (Invasive Carcinoma) (pT)  pT1c: Tumor > 10 mm but <= 20 mm in greatest dimension   Modifier  (sn): Only sentinel node(s) evaluated. If 6 or more nodes (sentinel or nonsentinel) are removed, this modifier should not be used.   Category (pN)  pN0: No regional lymph node metastasis identified or ITCs only   .        COMPLETE PELVIC SONOGRAM   FINDINGS: Transabdominal and transvaginal pelvic sonography was  performed. The uterus is anteflexed and measures 12 x 5 x 7 cm. The  endometrial bilayer measures 0.9 cm in thickness. Scattered  heterogeneity is seen throughout the uterus. Within the posterior mid  uterine body there is a 1.9 x 1.4 x 1.3 cm complex hypoechoic lesion. In  the mid uterine body located in the anterior aspect of the uterus there  is a 1.6 x 1.4 x 1.9 cm hypoechoic lesion.     The right ovary measures 4.2 x 1.9 x 3.2 cm and the left ovary measures  3.4 x 1.9 x 1.4 cm. Normal intraovarian venous vascularity is seen in  both ovaries. Normal follicles are seen within both ovaries. No free  fluid is seen within the pelvic cul-de-sac.     IMPRESSION:  1. Enlarged uterus with 2 focal myometrial lesions measuring on the  order of 1.9 cm in greatest dimension. The sonographic features suggest  adenomyosis, possibly with focal adenomyomas versus fibroids. It is  difficult to differentiate an adenomyoma versus a fibroid, but I believe  adenomyosis is likely present given the enlarged heterogenous appearance  of the uterus. If imaging differentiation is desired it can be performed  with a pelvic MRI without and with contrast.  2. Normal sonographic appearance of both ovaries.     This report was finalized on 9/21/2018     Interpretation Summary 11/18            · Normal bilateral lower extremity venous duplex scan.                     Assessment/Plan   1.[id number] HER-2 negative grade 2 left breast cancer post lumpectomy and sentinel node biopsy-with close margins from high-grade DCIS  medially-Oncotype score 16  2.  Interstitial cystitis undergoing instillation of lidocaine/heparin in  the bladder intermittently  3.  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 Worsening dysphagia to solids-ENT exam negative symptoms improved with Lexapro    Plan  1.continue tamoxifen 20 mg daily with Lexapro for mood swings  2. return in 6  months .  3. February is her next mammogram

## 2020-02-14 ENCOUNTER — TRANSCRIBE ORDERS (OUTPATIENT)
Dept: ADMINISTRATIVE | Facility: HOSPITAL | Age: 50
End: 2020-02-14

## 2020-02-14 DIAGNOSIS — Z12.39 SCREENING BREAST EXAMINATION: Primary | ICD-10-CM

## 2020-02-17 RX ORDER — TAMOXIFEN CITRATE 20 MG/1
TABLET ORAL
Qty: 90 TABLET | Refills: 1 | Status: SHIPPED | OUTPATIENT
Start: 2020-02-17 | End: 2020-09-03

## 2020-02-28 ENCOUNTER — HOSPITAL ENCOUNTER (OUTPATIENT)
Dept: MAMMOGRAPHY | Facility: HOSPITAL | Age: 50
Discharge: HOME | End: 2020-02-28
Admitting: SPECIALIST

## 2020-02-28 DIAGNOSIS — Z12.39 SCREENING BREAST EXAMINATION: ICD-10-CM

## 2020-02-28 PROCEDURE — 77067 SCR MAMMO BI INCL CAD: CPT

## 2020-02-28 PROCEDURE — 77063 BREAST TOMOSYNTHESIS BI: CPT

## 2020-04-23 RX ORDER — ESCITALOPRAM OXALATE 10 MG/1
TABLET ORAL
Qty: 90 TABLET | Refills: 1 | Status: SHIPPED | OUTPATIENT
Start: 2020-04-23 | End: 2020-11-08

## 2020-05-18 ENCOUNTER — APPOINTMENT (OUTPATIENT)
Dept: OTHER | Facility: HOSPITAL | Age: 50
End: 2020-05-18

## 2020-05-18 ENCOUNTER — TELEMEDICINE (OUTPATIENT)
Dept: ONCOLOGY | Facility: CLINIC | Age: 50
End: 2020-05-18

## 2020-05-18 DIAGNOSIS — C50.212 MALIGNANT NEOPLASM OF UPPER-INNER QUADRANT OF LEFT BREAST IN FEMALE, ESTROGEN RECEPTOR POSITIVE (HCC): Primary | ICD-10-CM

## 2020-05-18 DIAGNOSIS — Z17.0 MALIGNANT NEOPLASM OF UPPER-INNER QUADRANT OF LEFT BREAST IN FEMALE, ESTROGEN RECEPTOR POSITIVE (HCC): Primary | ICD-10-CM

## 2020-05-18 PROCEDURE — 99213 OFFICE O/P EST LOW 20 MIN: CPT | Performed by: INTERNAL MEDICINE

## 2020-05-18 NOTE — PROGRESS NOTES
Subjective     REASON FOR CONSULTATION:  1.  T1bN0 M0 strongly ER/NC positive HER-2 negative grade 2 left breast cancer post lumpectomy with close margins for DCIS-Oncotype score of 16 radiation and tamoxifen started 7/18                             REQUESTING PHYSICIAN:  Adrián Pereyra M.D. and Nikita Gallegos M.D.      History of Present Illness is a 47-year-old premenopausal female with a T1b N0 strongly ER/NC positive breast cancer with a low Oncotype score of 16.    She completed radiation which she tolerated well and is on tamoxifen for about 24  Months.  She is doing a video visit today because of the coronavirus pandemic    She called in with some mood swings we added Lexapro 10 mg and this is helped a lot but she still has a low libido which she and her  are dealing with   Her periods are becoming less heavy and further apart but she is still not postmenopausal    She is up-to-date with Pap smears    She had a mammogram in February and thankfully this was benign    She is still having some issues with swallowing especially dry foods such as bread and she has to drink liquids make it go down.  I think she needs to see a GI doctor for evaluation we will schedule a follow-up with GI    Past Medical History:   Diagnosis Date   • Anxiety    • Breast cancer (CMS/HCC) 02/14/2018    Left breast, Upper Inner Quadrant, Invasive Mammary Carcinoma, Intermediate grade, measuring at least 0.2 cm in dimension, with associated high grade solid and cribiform ductal carcinoma in situ with comedonecrosis, ER/NC positive, [id number] negative   • Heartburn    • Infectious mononucleosis    • Migraines    • PONV (postoperative nausea and vomiting)    • Pre-diabetes    • PVC's (premature ventricular contractions)    • Wears contact lenses         Past Surgical History:   Procedure Laterality Date   • BREAST BIOPSY Left 02/14/2018    Left breast stereotactic biopsy w/ marker clip placement & positive radiograph-Dr. Lashaun Gamble,  [facility]   • BREAST BIOPSY Left 02/12/2018    INCOMPLETE - Biopsy could not be completed as patient had severe vasovagal reaction & subsequent syncope-[facility]   • BREAST BIOPSY Left 5/23/2018    Procedure: REEXCISION OF POSITIVE LUMPECTOMY MARGIN LEFT BREAST;  Surgeon: Adrián Pereyra MD;  Location: [facility];  Service: General   • BREAST LUMPECTOMY WITH SENTINEL NODE BIOPSY Left 4/11/2018    Procedure: BREAST LUMPECTOMY WITH SENTINEL NODE BIOPSY AND NEEDLE LOCALIZATION;  Surgeon: Adrián Pereyra MD;  Location: [facility];  Service: General      ONCOLOGIC HISTORY:  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  A diagnostic mammogram gram showed a 2.3 cm area of clustered microcalcifications very suspicious for malignancy and the patient underwent a stereotactic biopsy on 2/14/18 with the findings of intermediate grade invasive mammary carcinoma measuring 2 mm with associated high-grade and solid DCIS--ER was 65% FL was 96% HER-2 0 Ki-67 was 2%.  The patient was referred to Dr. Pereyra and underwent bilateral breast MRIs with the findings of a 2.1 cm abnormality in the left breast with no axillary adenopathy and she was taken for surgery when lumpectomy and sentinel node biopsy was performed on 4/11/18.  This revealed a residual 8 mm area of invasive carcinoma grade 2 with associated high-grade DCIS spanning 16 mm-the lateral and medial margins were 0.5 mm from the DCIS-new anterior, superior and lateral margins were obtained which were negative but the medial margin was still felt to be close.  She went to see Dr. Pereyra to wanted to discuss further the status of her margins but sent her to us also to discuss adjuvant treatment for her invasive cancer.    She is healing well from surgery.  She is obviously  anxious about the margins but has no qualms about having further surgery even if the cosmesis is not perfect because she is more worried about recurrence of her cancer.  She is  3 para 3 -Menarche was age 14 she is premenopausal first childbirth was age 28 she breast-fed for 9 months she's been on no hormonal replacement since her last child 13 years ago  She is having bladder issues and her gynecologist just prescribed vaginal estrogens which she has not yet started.    Family history is positive for her father having thyroid cancer age 64 and he is currently dying from this is a 78 she's one sister is healthy her mother's 72 and healthy she has a maternal aunt with breast cancers at an unknown age paternal aunt with breast cancer in her 60s paternal grandfather with bone cancer and a maternal grandfather with bone cancer is no ovarian cancer in the family.      I explained to Cesia and her  that her tumor is very unlikely to have a high Oncotype score and it is very likely that the mainstay of her treatment would be tamoxifen.  But we will send an Oncotype score because of the small chance that there is a higher recurrence risk than expected based on her pathology in tumor size.  I think it is very reasonable to get genetic testing because of her family history although it is unlikely to change our treatment.  I've asked her to hold off and vaginal estrogens for the time being and to discuss this with her gynecologist.  She will stop her Zoloft and we can prescribe Effexor if needed      we discussed her case at the multiple this may conference because of the close margins for high-grade DCIS and we all felt that radiation would take care of this and no further surgery was needed.    We discussed the low risk Oncotype in the lack of benefit for chemotherapy in this situation which we had already discussed with her on the phone and we talked about tamoxifen.The side effects and toxicities of  Tamoxifen were discussed with the patient, including hot flashes, mood swings,depression, DVT and endometrial cancer. A list of drugs that interfere with the efficacy of tamoxifen and are to be avoided were given to the patient.    At her last visit she was having a lot of pelvic pain with her menstrual cycle and an ultrasound which showed some adenomyosis and her ovarian cyst had disappeared thankfully-she started menstruating regularly-we ordered CAT scans because of the pain in her left side and thankfully chest and abdomen are negative except for thickening of her bladder from her interstitial cystitis and she had fibroids-this discomfort has gradually improved with time        Father  with metastatic thyroid cancer I have again stressed the need for genetic testing and she did this and it was thankfully benign except for 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  This is actually gotten better and she thinks the Lexapro has helped this also there may have been a component of anxiety.  I told her if the dysphagia recurs she needs an EGD          Current Outpatient Medications on File Prior to Visit   Medication Sig Dispense Refill   • ALPRAZolam (XANAX) 0.25 MG tablet Take 0.25 mg by mouth 2 (Two) Times a Day As Needed for Anxiety.     • escitalopram (LEXAPRO) 10 MG tablet TAKE 1 TABLET BY MOUTH EVERY DAY 90 tablet 1   • ibuprofen (ADVIL,MOTRIN) 600 MG tablet Take 600 mg by mouth Every 6 (Six) Hours As Needed for Mild Pain . HELD FOR SURGERY     • tamoxifen (NOLVADEX) 20 MG chemo tablet TAKE 1 TABLET BY MOUTH EVERY DAY 90 tablet 1   • vitamin D (ERGOCALCIFEROL) 13867 units capsule capsule Take 50,000 Units by mouth Every 7 (Seven) Days.        No current facility-administered medications on file prior to visit.         ALLERGIES:  No Known Allergies     Social History     Socioeconomic History   • Marital status:       Spouse name: Nikita Workman   • Number of children: 3   • Years of education: College   • Highest education level: Not on file   Occupational History   • Occupation: Occupational Therapist     Comment: [employer]   Tobacco Use   • Smoking status: Former Smoker     Packs/day: 0.50     Years: 2.00     Pack years: 1.00     Types: Cigarettes     Start date:      Last attempt to quit:      Years since quittin.3   • Smokeless tobacco: Never Used   Substance and Sexual Activity   • Alcohol use: Yes     Comment: RARE MONTHLY   • Drug use: No   • Sexual activity: Defer     Birth control/protection: None        Family History   Problem Relation Age of Onset   • Breast cancer Maternal Aunt         In her 60s   • Breast cancer Paternal Aunt    • Hyperlipidemia Mother    • Asthma Father    • Diabetes Father    • Hearing loss Father    • Heart disease Father    • Hypertension Father    • Thyroid cancer Father 64        Follicular Tyroid Cancer   • Alcohol abuse Sister    • Depression Sister    • Diabetes Maternal Grandmother    • Bone cancer Maternal Grandfather    • Breast cancer Cousin    • Malig Hyperthermia Neg Hx         Review of Systems   Constitutional: Positive for fatigue (better 19). Negative for chills and fever.   HENT: Positive for trouble swallowing (occassionally 19). Negative for congestion, mouth sores and nosebleeds.    Respiratory: Negative for choking (better 19), chest tightness and shortness of breath.    Cardiovascular: Negative for chest pain (rib pain improved 19).   Gastrointestinal: Positive for abdominal pain (lower abdomen 19 same ). Negative for constipation, diarrhea, nausea and vomiting.   Genitourinary: Positive for urgency (same 19). Negative for difficulty urinating (Early prolapse).   Musculoskeletal: Positive for arthralgias (hands and thumbs 19 little worse ). Negative for back pain and gait problem.   Neurological: Positive for  numbness (hands 11/11/19). Negative for dizziness (Occasional vertigo) and headaches (Chronic migraine headaches).   Psychiatric/Behavioral: The patient is not nervous/anxious.         Objective     There were no vitals filed for this visit.  Video visit no vitals  Current Status 11/11/2019   ECOG score 0       Physical Exam    GENERAL:  Well-developed, well-nourished in no acute distress.   SKIN:  Warm, dry without rashes, purpura or petechiae.  EYES:  Pupils equal, round and reactive to light.  EOMs intact.  Conjunctivae normal.  EARS:  Hearing intact.  NOSE:  Septum midline.  No excoriations or nasal discharge.  MOUTH:  Tongue is well-papillated; no stomatitis or ulcers.  Lips normal.  THROAT:  Oropharynx without lesions or exudates.  NECK:  Supple with good range of motion; no thyromegaly or masses, no JVD.  LYMPHATICS:  No cervical, supraclavicular, axillary or inguinal adenopathy.  CHEST:  Lungs clear to auscultation. Good airflow.  BREASTS: Right breast is benign left  shows a lumpectomy scar with minimal scarring at   the lumpectomy site-  CARDIAC:  Regular rate and rhythm without murmurs, rubs or gallops. Normal S1,S2.  ABDOMEN:  Soft, minimal left lower quadrant tenderness without mass - with no hepatosplenomegaly .  EXTREMITIES:  No clubbing, cyanosis or edema.  NEUROLOGICAL:  Cranial Nerves II-XII grossly intact.  No focal neurological deficits.  PSYCHIATRIC:  Normal affect and mood.      Video visit no physical    RECENT LABS:  Hematology WBC   Date Value Ref Range Status   11/11/2019 8.12 3.40 - 10.80 10*3/mm3 Final     RBC   Date Value Ref Range Status   11/11/2019 4.05 3.77 - 5.28 10*6/mm3 Final     Hemoglobin   Date Value Ref Range Status   11/11/2019 12.4 12.0 - 15.9 g/dL Final     Hematocrit   Date Value Ref Range Status   11/11/2019 37.8 34.0 - 46.6 % Final     Platelets   Date Value Ref Range Status   11/11/2019 272 140 - 450 10*3/mm3 Final         IMPRESSION:  1. Biopsy-proven malignancy in the  "left breast at the 11:30 position  measuring on the order of 2.1 cm in greatest dimension. No other  suspicious findings are seen within the left breast and there is no  evidence for left axillary adenopathy.  2. There are no findings suspicious for malignancy in the right breast.     BI-RADS Category 6: Known biopsy-proven malignancy.     4/11/18  1. \"LEFT BREAST MASS,\" WIRE GUIDED LUMPECTOMY:             INVASIVE DUCTAL CARCINOMA, INTERMEDIATE GRADE, 8 MM, MARGINS CLEAR.             CLOSEST MARGIN TO INVASIVE TUMOR: 1.5 MM (INFERIOR).             ASSOCIATED HIGH GRADE DUCTAL CARCINOMA IN SITU SPANNING APPROXIMATELY 16 MM (FOUR                    BLOCKS x 4 MM PER BLOCK).             CLOSEST MARGIN TO DUCTAL CARCINOMA IN SITU: 0.5 MM (LATERAL AND MEDIAL MARGINS).             HORMONE RECEPTORS REPORTED ON PRIOR BIOPSY, [facility] [id number]. REPEAT/                   CONFIRMATORY STUDIES ARE AVAILABLE UPON REQUEST.                         BIOPSY SITE CHANGES.     NOTE: ADDITIONAL MARGINS SUBMITTED AS PARTS 2, 3, AND 4.        2. \"LEFT BREAST NEW ANTERIOR MARGIN\":              BENIGN FIBROADIPOSE TISSUE.              NEW MARGIN - NO ATYPIA.     3. \"LEFT BREAST NEW SUPERIOR MARGIN\":             BENIGN BREAST TISSUE.             NEW MARGIN - NO ATYPIA.     4. \"LEFT BREAST NEW LATERAL MARGIN\":             BENIGN BREAST TISSUE.             NEW MARGIN - NO ATYPIA.     5. \"SENTINEL NODE #1\":             LYMPH NODE, MULTIPLE STEP SECTIONS: NO METASTATIC CARCINOMA SEEN.     6. \"SENTINEL NODE #2\":             LYMPH NODE, MULTIPLE STEP SECTIONS: NO METASTATIC CARCINOMA SEEN.     THM/th IHC/a/CMK                                Electronically signed by Darin Claire MD on 4/12/2018 at 1511   Synoptic Checklist   INVASIVE CARCINOMA OF THE BREAST   Breast Invasive - All Specimens   SPECIMEN   Procedure  Excision (less than total mastectomy)   Specimen Laterality  Left   TUMOR   Histologic Type  Invasive carcinoma of no " special type (ductal, not otherwise specified)   Histologic Grade (Jessie Histologic Score)     Glandular (Acinar) / Tubular Differentiation  Score 3 (< 10% of tumor area forming glandular / tubular structures)   Nuclear Pleomorphism  Score 2 (Cells larger than normal with open vesicular nuclei, visible nucleoli, and moderate variability in both size and shape)   Mitotic Rate  Score 1 (<=3 mitoses per mm2)   Overall Grade  Grade 2 (scores of 6 or 7)   Tumor Size  Greatest dimension of largest invasive focus > 1 mm (indicate exact measurement in Millimeters):: 8 mm   Tumor Focality  Single focus of invasive carcinoma   Ductal Carcinoma In Situ (DCIS)  DCIS is present in specimen   Ductal Carcinoma In Situ (DCIS)  Positive for EIC   Size (Extent) of DCIS  Estimated size (extent) of DCIS (greatest dimension using gross and microscopic evaluation) in Millimeters (mm) is at least: 16  mm   Nuclear Grade  Grade III (high)   Lymphovascular Invasion  Not identified   Treatment Effect  No known presurgical therapy   MARGINS   Invasive Carcinoma Margins  Uninvolved by invasive carcinoma   Distance from Closest Margin in Millimeters (mm)  Specify distance: 1.5 mm   Closest Margin  Inferior   DCIS Margins  Uninvolved by DCIS (DCIS present in specimen)   Distance of DCIS from Closest Margin in Millimeters (mm)  Specify distance: 0.5 mm   Closest Margin  Medial     Lateral   LYMPH NODES   Regional Lymph Nodes     Number of Lymph Nodes with Macrometastases (> 2 mm)  Specify number: 0   Number of Lymph Nodes with Micrometastases (> 0.2 mm to 2 mm and / or > 200 cells)  Specify number: 0   Number of Lymph Nodes with Isolated Tumor Cells (<= 0.2 mm and <= 200 cells)  Specify number: 0   Number of Lymph Nodes Examined  Specify number: 2   Number of [place] Nodes Examined  Specify number: 2   PATHOLOGIC STAGE CLASSIFICATION (pTNM)   Primary Tumor (Invasive Carcinoma) (pT)  pT1c: Tumor > 10 mm but <= 20 mm in greatest dimension    Modifier  (sn): Only sentinel node(s) evaluated. If 6 or more nodes (sentinel or nonsentinel) are removed, this modifier should not be used.   Category (pN)  pN0: No regional lymph node metastasis identified or ITCs only   .        COMPLETE PELVIC SONOGRAM   FINDINGS: Transabdominal and transvaginal pelvic sonography was  performed. The uterus is anteflexed and measures 12 x 5 x 7 cm. The  endometrial bilayer measures 0.9 cm in thickness. Scattered  heterogeneity is seen throughout the uterus. Within the posterior mid  uterine body there is a 1.9 x 1.4 x 1.3 cm complex hypoechoic lesion. In  the mid uterine body located in the anterior aspect of the uterus there  is a 1.6 x 1.4 x 1.9 cm hypoechoic lesion.     The right ovary measures 4.2 x 1.9 x 3.2 cm and the left ovary measures  3.4 x 1.9 x 1.4 cm. Normal intraovarian venous vascularity is seen in  both ovaries. Normal follicles are seen within both ovaries. No free  fluid is seen within the pelvic cul-de-sac.     IMPRESSION:  1. Enlarged uterus with 2 focal myometrial lesions measuring on the  order of 1.9 cm in greatest dimension. The sonographic features suggest  adenomyosis, possibly with focal adenomyomas versus fibroids. It is  difficult to differentiate an adenomyoma versus a fibroid, but I believe  adenomyosis is likely present given the enlarged heterogenous appearance  of the uterus. If imaging differentiation is desired it can be performed  with a pelvic MRI without and with contrast.  2. Normal sonographic appearance of both ovaries.     This report was finalized on 9/21/2018     Interpretation Summary 11/18            · Normal bilateral lower extremity venous duplex scan.                     Assessment/Plan   1.[id number] HER-2 negative grade 2 left breast cancer post lumpectomy and sentinel node biopsy-with close margins from high-grade DCIS medially-Oncotype score 16  2.  Interstitial cystitis undergoing instillation of lidocaine/heparin in  the  bladder intermittently  3.  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 Worsening dysphagia to solids-ENT exam negative symptoms improved with Lexapro-but persistent as of 5/20's will refer to GI    Plan  1.continue tamoxifen 20 mg daily with Lexapro for mood swings  2. return in 5  months .  3.  Refer to GI for upper endoscopy    Video visit 20 minutes  Standard precautions discussed regarding the Novel Coronavirus (COVID-19)  including patient to limit contact with others outside of home, frequent handwashing and using alcohol gel when unable to use soap and water, as well to monitoring for symptoms and notifying our office via telephone for any symptoms or exposures.

## 2020-08-26 ENCOUNTER — LAB (OUTPATIENT)
Dept: LAB | Facility: HOSPITAL | Age: 50
End: 2020-08-26

## 2020-08-26 ENCOUNTER — TRANSCRIBE ORDERS (OUTPATIENT)
Dept: ADMINISTRATIVE | Facility: HOSPITAL | Age: 50
End: 2020-08-26

## 2020-08-26 DIAGNOSIS — N39.0 URINARY TRACT INFECTION WITHOUT HEMATURIA, SITE UNSPECIFIED: Primary | ICD-10-CM

## 2020-08-26 DIAGNOSIS — N39.0 URINARY TRACT INFECTION WITHOUT HEMATURIA, SITE UNSPECIFIED: ICD-10-CM

## 2020-08-26 PROCEDURE — 87077 CULTURE AEROBIC IDENTIFY: CPT | Performed by: INTERNAL MEDICINE

## 2020-08-26 PROCEDURE — 87186 SC STD MICRODIL/AGAR DIL: CPT | Performed by: INTERNAL MEDICINE

## 2020-08-26 PROCEDURE — 87086 URINE CULTURE/COLONY COUNT: CPT | Performed by: INTERNAL MEDICINE

## 2020-08-28 LAB — BACTERIA SPEC AEROBE CULT: ABNORMAL

## 2020-09-03 RX ORDER — TAMOXIFEN CITRATE 20 MG/1
TABLET ORAL
Qty: 90 TABLET | Refills: 1 | Status: SHIPPED | OUTPATIENT
Start: 2020-09-03 | End: 2021-02-16

## 2020-11-02 ENCOUNTER — APPOINTMENT (OUTPATIENT)
Dept: OTHER | Facility: HOSPITAL | Age: 50
End: 2020-11-02

## 2020-11-08 RX ORDER — ESCITALOPRAM OXALATE 10 MG/1
TABLET ORAL
Qty: 90 TABLET | Refills: 1 | Status: SHIPPED | OUTPATIENT
Start: 2020-11-08 | End: 2021-05-17

## 2020-11-23 ENCOUNTER — OFFICE VISIT (OUTPATIENT)
Dept: ONCOLOGY | Facility: CLINIC | Age: 50
End: 2020-11-23

## 2020-11-23 ENCOUNTER — LAB (OUTPATIENT)
Dept: OTHER | Facility: HOSPITAL | Age: 50
End: 2020-11-23

## 2020-11-23 VITALS
OXYGEN SATURATION: 100 % | WEIGHT: 153.1 LBS | HEIGHT: 64 IN | BODY MASS INDEX: 26.14 KG/M2 | RESPIRATION RATE: 16 BRPM | TEMPERATURE: 98.4 F | DIASTOLIC BLOOD PRESSURE: 76 MMHG | HEART RATE: 69 BPM | SYSTOLIC BLOOD PRESSURE: 114 MMHG

## 2020-11-23 DIAGNOSIS — Z17.0 MALIGNANT NEOPLASM OF UPPER-INNER QUADRANT OF LEFT BREAST IN FEMALE, ESTROGEN RECEPTOR POSITIVE (HCC): Primary | ICD-10-CM

## 2020-11-23 DIAGNOSIS — C50.212 MALIGNANT NEOPLASM OF UPPER-INNER QUADRANT OF LEFT BREAST IN FEMALE, ESTROGEN RECEPTOR POSITIVE (HCC): ICD-10-CM

## 2020-11-23 DIAGNOSIS — Z12.31 SCREENING MAMMOGRAM FOR HIGH-RISK PATIENT: ICD-10-CM

## 2020-11-23 DIAGNOSIS — C50.212 MALIGNANT NEOPLASM OF UPPER-INNER QUADRANT OF LEFT BREAST IN FEMALE, ESTROGEN RECEPTOR POSITIVE (HCC): Primary | ICD-10-CM

## 2020-11-23 DIAGNOSIS — Z17.0 MALIGNANT NEOPLASM OF UPPER-INNER QUADRANT OF LEFT BREAST IN FEMALE, ESTROGEN RECEPTOR POSITIVE (HCC): ICD-10-CM

## 2020-11-23 LAB
ALBUMIN SERPL-MCNC: 3.9 G/DL (ref 3.5–5.2)
ALBUMIN/GLOB SERPL: 1.6 G/DL
ALP SERPL-CCNC: 35 U/L (ref 39–117)
ALT SERPL W P-5'-P-CCNC: 11 U/L (ref 1–33)
ANION GAP SERPL CALCULATED.3IONS-SCNC: 5.9 MMOL/L (ref 5–15)
AST SERPL-CCNC: 18 U/L (ref 1–32)
BASOPHILS # BLD AUTO: 0.07 10*3/MM3 (ref 0–0.2)
BASOPHILS NFR BLD AUTO: 0.8 % (ref 0–1.5)
BILIRUB SERPL-MCNC: 0.2 MG/DL (ref 0–1.2)
BUN SERPL-MCNC: 18 MG/DL (ref 6–20)
BUN/CREAT SERPL: 21.2 (ref 7–25)
CALCIUM SPEC-SCNC: 9 MG/DL (ref 8.6–10.5)
CHLORIDE SERPL-SCNC: 105 MMOL/L (ref 98–107)
CO2 SERPL-SCNC: 25.1 MMOL/L (ref 22–29)
CREAT SERPL-MCNC: 0.85 MG/DL (ref 0.57–1)
DEPRECATED RDW RBC AUTO: 43 FL (ref 37–54)
EOSINOPHIL # BLD AUTO: 0.15 10*3/MM3 (ref 0–0.4)
EOSINOPHIL NFR BLD AUTO: 1.8 % (ref 0.3–6.2)
ERYTHROCYTE [DISTWIDTH] IN BLOOD BY AUTOMATED COUNT: 12.8 % (ref 12.3–15.4)
GFR SERPL CREATININE-BSD FRML MDRD: 71 ML/MIN/1.73
GLOBULIN UR ELPH-MCNC: 2.5 GM/DL
GLUCOSE SERPL-MCNC: 159 MG/DL (ref 65–99)
HCT VFR BLD AUTO: 38.6 % (ref 34–46.6)
HGB BLD-MCNC: 12.5 G/DL (ref 12–15.9)
IMM GRANULOCYTES # BLD AUTO: 0.05 10*3/MM3 (ref 0–0.05)
IMM GRANULOCYTES NFR BLD AUTO: 0.6 % (ref 0–0.5)
LYMPHOCYTES # BLD AUTO: 1.96 10*3/MM3 (ref 0.7–3.1)
LYMPHOCYTES NFR BLD AUTO: 23 % (ref 19.6–45.3)
MCH RBC QN AUTO: 29.8 PG (ref 26.6–33)
MCHC RBC AUTO-ENTMCNC: 32.4 G/DL (ref 31.5–35.7)
MCV RBC AUTO: 91.9 FL (ref 79–97)
MONOCYTES # BLD AUTO: 0.43 10*3/MM3 (ref 0.1–0.9)
MONOCYTES NFR BLD AUTO: 5 % (ref 5–12)
NEUTROPHILS NFR BLD AUTO: 5.88 10*3/MM3 (ref 1.7–7)
NEUTROPHILS NFR BLD AUTO: 68.8 % (ref 42.7–76)
NRBC BLD AUTO-RTO: 0 /100 WBC (ref 0–0.2)
PLATELET # BLD AUTO: 227 10*3/MM3 (ref 140–450)
PMV BLD AUTO: 10 FL (ref 6–12)
POTASSIUM SERPL-SCNC: 4 MMOL/L (ref 3.5–5.2)
PROT SERPL-MCNC: 6.4 G/DL (ref 6–8.5)
RBC # BLD AUTO: 4.2 10*6/MM3 (ref 3.77–5.28)
SODIUM SERPL-SCNC: 136 MMOL/L (ref 136–145)
WBC # BLD AUTO: 8.54 10*3/MM3 (ref 3.4–10.8)

## 2020-11-23 PROCEDURE — 36415 COLL VENOUS BLD VENIPUNCTURE: CPT

## 2020-11-23 PROCEDURE — 80053 COMPREHEN METABOLIC PANEL: CPT | Performed by: INTERNAL MEDICINE

## 2020-11-23 PROCEDURE — 99214 OFFICE O/P EST MOD 30 MIN: CPT | Performed by: NURSE PRACTITIONER

## 2020-11-23 PROCEDURE — 85025 COMPLETE CBC W/AUTO DIFF WBC: CPT | Performed by: INTERNAL MEDICINE

## 2020-11-23 RX ORDER — CHOLECALCIFEROL (VITAMIN D3) 1250 MCG
CAPSULE ORAL
COMMUNITY
Start: 2020-10-26 | End: 2022-03-07

## 2020-11-23 RX ORDER — ZOLPIDEM TARTRATE 5 MG/1
TABLET ORAL
COMMUNITY
Start: 2020-10-01 | End: 2022-05-07

## 2020-11-23 NOTE — PROGRESS NOTES
Subjective     REASON FOR CONSULTATION:  1.  T1bN0 M0 strongly ER/MI positive HER-2 negative grade 2 left breast cancer post lumpectomy with close margins for DCIS-Oncotype score of 16 radiation and tamoxifen started 7/18                             REQUESTING PHYSICIAN:  Adrián Pereyra M.D. and Nikita Gallegos M.D.      History of Present Illness is a 47-year-old premenopausal female with a T1b N0 strongly ER/MI positive breast cancer with a low Oncotype score of 16.  She completed radiation and began tamoxifen 7/20/2018.    Patient's last mammogram was in February 2020 and was benign.    Patient denies any concerns on self breast exams.  Occasionally she has tenderness at the area of the lumpectomy and lymph node removal but this is been stable since surgery.    She denies any unilateral swelling, pain with deep breath, or increased shortness of breath.  She has had some lower pelvic pain approximately a week to 10 days prior to her menses.  Gynecology did do an ultrasound of her ovaries which did show a cyst in her thinking of possible this is causing her discomfort.  She thought it could possibly be interstitial cystitis.    She continues to have low libido.  She is considering coming off the Lexapro she feels like her moods are much better.  She has gained approximately 12 pounds in the past year.  She reports she has not been consistently working out and knows that she needs a larger diet more closely.    Past Medical History:   Diagnosis Date   • Anxiety    • Breast cancer (CMS/[facility]) 02/14/2018    Left breast, Upper Inner Quadrant, Invasive Mammary Carcinoma, Intermediate grade, measuring at least 0.2 cm in dimension, with associated high grade solid and cribiform ductal carcinoma in situ with comedonecrosis, ER/MI positive, PAV5nyn negative   • Heartburn    • Infectious mononucleosis    • Migraines    • PONV (postoperative nausea and vomiting)    • Pre-diabetes    • PVC's (premature ventricular contractions)     • Wears contact lenses         Past Surgical History:   Procedure Laterality Date   • BREAST BIOPSY Left 02/14/2018    Left breast stereotactic biopsy w/ marker clip placement & positive radiograph-Dr. Lashaun Gamble, [facility]   • BREAST BIOPSY Left 02/12/2018    INCOMPLETE - Biopsy could not be completed as patient had severe vasovagal reaction & subsequent syncope-[facility]   • BREAST BIOPSY Left 5/23/2018    Procedure: REEXCISION OF POSITIVE LUMPECTOMY MARGIN LEFT BREAST;  Surgeon: Adrián Pereyra MD;  Location: [facility];  Service: General   • BREAST LUMPECTOMY WITH SENTINEL NODE BIOPSY Left 4/11/2018    Procedure: BREAST LUMPECTOMY WITH SENTINEL NODE BIOPSY AND NEEDLE LOCALIZATION;  Surgeon: Adrián Pereyra MD;  Location: [facility];  Service: General      ONCOLOGIC HISTORY:  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  A diagnostic mammogram gram showed a 2.3 cm area of clustered microcalcifications very suspicious for malignancy and the patient underwent a stereotactic biopsy on 2/14/18 with the findings of intermediate grade invasive mammary carcinoma measuring 2 mm with associated high-grade and solid DCIS--ER was 65% DE was 96% HER-2 0 Ki-67 was 2%.  The patient was referred to Dr. Pereyra and underwent bilateral breast MRIs with the findings of a 2.1 cm abnormality in the left breast with no axillary adenopathy and she was taken for surgery when lumpectomy and sentinel node biopsy was performed on 4/11/18.  This revealed a residual 8 mm area of invasive carcinoma grade 2 with associated high-grade DCIS spanning 16 mm-the lateral and medial margins were 0.5 mm from the DCIS-new anterior, superior and lateral margins were obtained which were negative but the medial margin was still felt to be close.   She went to see Dr. Pereyra to wanted to discuss further the status of her margins but sent her to us also to discuss adjuvant treatment for her invasive cancer.    She is healing well from surgery.  She is obviously anxious about the margins but has no qualms about having further surgery even if the cosmesis is not perfect because she is more worried about recurrence of her cancer.  She is  3 para 3 -Menarche was age 14 she is premenopausal first childbirth was age 28 she breast-fed for 9 months she's been on no hormonal replacement since her last child 13 years ago  She is having bladder issues and her gynecologist just prescribed vaginal estrogens which she has not yet started.    Family history is positive for her father having thyroid cancer age 64 and he is currently dying from this is a 78 she's one sister is healthy her mother's 72 and healthy she has a maternal aunt with breast cancers at an unknown age paternal aunt with breast cancer in her 60s paternal grandfather with bone cancer and a maternal grandfather with bone cancer is no ovarian cancer in the family.      I explained to Cesia and her  that her tumor is very unlikely to have a high Oncotype score and it is very likely that the mainstay of her treatment would be tamoxifen.  But we will send an Oncotype score because of the small chance that there is a higher recurrence risk than expected based on her pathology in tumor size.  I think it is very reasonable to get genetic testing because of her family history although it is unlikely to change our treatment.  I've asked her to hold off and vaginal estrogens for the time being and to discuss this with her gynecologist.  She will stop her Zoloft and we can prescribe Effexor if needed      we discussed her case at the multiple this may conference because of the close margins for high-grade DCIS and we all felt that radiation would take care of this and no further surgery was  needed.    We discussed the low risk Oncotype in the lack of benefit for chemotherapy in this situation which we had already discussed with her on the phone and we talked about tamoxifen.The side effects and toxicities of Tamoxifen were discussed with the patient, including hot flashes, mood swings,depression, DVT and endometrial cancer. A list of drugs that interfere with the efficacy of tamoxifen and are to be avoided were given to the patient.    At her last visit she was having a lot of pelvic pain with her menstrual cycle and an ultrasound which showed some adenomyosis and her ovarian cyst had disappeared thankfully-she started menstruating regularly-we ordered CAT scans because of the pain in her left side and thankfully chest and abdomen are negative except for thickening of her bladder from her interstitial cystitis and she had fibroids-this discomfort has gradually improved with time        Father  with metastatic thyroid cancer I have again stressed the need for genetic testing and she did this and it was thankfully benign except for 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  This is actually gotten better and she thinks the Lexapro has helped this also there may have been a component of anxiety.  I told her if the dysphagia recurs she needs an EGD          Current Outpatient Medications on File Prior to Visit   Medication Sig Dispense Refill   • zolpidem (Ambien) 5 MG tablet      • ALPRAZolam (XANAX) 0.25 MG tablet Take 0.25 mg by mouth 2 (Two) Times a Day As Needed for Anxiety.     • Cholecalciferol (Vitamin D3) 1.25 MG (91042 UT) capsule TAKE ONE CAPSULE BY MOUTH WEEKLY AS DIRECTED BY PROVIDER.     • escitalopram (LEXAPRO) 10 MG tablet TAKE 1 TABLET BY MOUTH EVERY DAY 90 tablet 1   • ibuprofen (ADVIL,MOTRIN) 600 MG tablet Take 600 mg by mouth Every 6 (Six) Hours As Needed for Mild Pain . HELD FOR SURGERY     • tamoxifen  (NOLVADEX) 20 MG chemo tablet TAKE 1 TABLET BY MOUTH EVERY DAY 90 tablet 1   • vitamin D (ERGOCALCIFEROL) 75835 units capsule capsule Take 50,000 Units by mouth Every 7 (Seven) Days.        No current facility-administered medications on file prior to visit.         ALLERGIES:  No Known Allergies     Social History     Socioeconomic History   • Marital status:      Spouse name: Nikita Workman   • Number of children: 3   • Years of education: College   • Highest education level: Not on file   Occupational History   • Occupation: Occupational Therapist     Comment: [employer]   Tobacco Use   • Smoking status: Former Smoker     Packs/day: 0.50     Years: 2.00     Pack years: 1.00     Types: Cigarettes     Start date:      Quit date:      Years since quittin.9   • Smokeless tobacco: Never Used   Substance and Sexual Activity   • Alcohol use: Yes     Comment: RARE MONTHLY   • Drug use: No   • Sexual activity: Defer     Birth control/protection: None        Family History   Problem Relation Age of Onset   • Breast cancer Maternal Aunt         In her 60s   • Breast cancer Paternal Aunt    • Hyperlipidemia Mother    • Asthma Father    • Diabetes Father    • Hearing loss Father    • Heart disease Father    • Hypertension Father    • Thyroid cancer Father 64        Follicular Tyroid Cancer   • Alcohol abuse Sister    • Depression Sister    • Diabetes Maternal Grandmother    • Bone cancer Maternal Grandfather    • Breast cancer Cousin    • Malig Hyperthermia Neg Hx         Review of Systems   Constitutional: Positive for unexpected weight change (gain). Negative for chills, fatigue and fever.   HENT: Negative for congestion, mouth sores, nosebleeds and trouble swallowing.    Respiratory: Negative for choking (better 19), chest tightness and shortness of breath.    Cardiovascular: Negative for chest pain (rib pain improved 19).   Gastrointestinal: Positive for abdominal pain (lower  "abdomen 11/11/19 same ). Negative for constipation, diarrhea, nausea and vomiting.   Genitourinary: Positive for pelvic pain (intermittent). Negative for difficulty urinating (Early prolapse).        Continued decreased libido   Musculoskeletal: Positive for arthralgias (hands and thumbs intermittently). Negative for back pain and gait problem.   Neurological: Positive for numbness (hands). Negative for dizziness (Occasional vertigo) and headaches (Chronic migraine headaches).   Psychiatric/Behavioral: The patient is not nervous/anxious.         Objective     Vitals:    11/23/20 0809   BP: 114/76   Pulse: 69   Resp: 16   Temp: 98.4 °F (36.9 °C)   TempSrc: Skin   SpO2: 100%   Weight: 69.4 kg (153 lb 1.6 oz)   Height: 162.2 cm (63.86\")  Comment: new HT W/ flat Shoe on       Current Status 11/23/2020   ECOG score 0       Physical Exam    GENERAL:  Well-developed, well-nourished in no acute distress.   SKIN:  Warm, dry without rashes, purpura or petechiae.  EYES:  Pupils equal, round and reactive to light.  EOMs intact.  Conjunctivae normal.  EARS:  Hearing intact.  NOSE:  Septum midline.  No excoriations or nasal discharge.  MOUTH:  Tongue is well-papillated; no stomatitis or ulcers.  Lips normal.  THROAT:  Oropharynx without lesions or exudates.  NECK:  Supple with good range of motion; no thyromegaly or masses, no JVD.  LYMPHATICS:  No cervical, supraclavicular, axillary adenopathy.  CHEST:  Lungs clear to auscultation. Good airflow.  BREASTS: Right breast is benign left  shows a lumpectomy scar with minimal scarring at   the lumpectomy site, otherwise soft, no nodules, no skin changes, no nipple discharge.  Left breast and axilla soft, no nodules, no skin changes, no nipple discharge.  CARDIAC:  Regular rate and rhythm without murmurs, rubs or gallops. Normal S1,S2.  ABDOMEN:  Soft, minimal left lower quadrant tenderness without mass - with no hepatosplenomegaly .  EXTREMITIES:  No clubbing, cyanosis or " "edema.  NEUROLOGICAL:  Cranial Nerves II-XII grossly intact.  No focal neurological deficits.  PSYCHIATRIC:  Normal affect and mood.          RECENT LABS:  Hematology WBC   Date Value Ref Range Status   11/23/2020 8.54 3.40 - 10.80 10*3/mm3 Final     RBC   Date Value Ref Range Status   11/23/2020 4.20 3.77 - 5.28 10*6/mm3 Final     Hemoglobin   Date Value Ref Range Status   11/23/2020 12.5 12.0 - 15.9 g/dL Final     Hematocrit   Date Value Ref Range Status   11/23/2020 38.6 34.0 - 46.6 % Final     Platelets   Date Value Ref Range Status   11/23/2020 227 140 - 450 10*3/mm3 Final         IMPRESSION:  1. Biopsy-proven malignancy in the left breast at the 11:30 position  measuring on the order of 2.1 cm in greatest dimension. No other  suspicious findings are seen within the left breast and there is no  evidence for left axillary adenopathy.  2. There are no findings suspicious for malignancy in the right breast.     BI-RADS Category 6: Known biopsy-proven malignancy.     4/11/18  1. \"LEFT BREAST MASS,\" WIRE GUIDED LUMPECTOMY:             INVASIVE DUCTAL CARCINOMA, INTERMEDIATE GRADE, 8 MM, MARGINS CLEAR.             CLOSEST MARGIN TO INVASIVE TUMOR: 1.5 MM (INFERIOR).             ASSOCIATED HIGH GRADE DUCTAL CARCINOMA IN SITU SPANNING APPROXIMATELY 16 MM (FOUR                    BLOCKS x 4 MM PER BLOCK).             CLOSEST MARGIN TO DUCTAL CARCINOMA IN SITU: 0.5 MM (LATERAL AND MEDIAL MARGINS).             HORMONE RECEPTORS REPORTED ON PRIOR BIOPSY, [facility] [id number]. REPEAT/                   CONFIRMATORY STUDIES ARE AVAILABLE UPON REQUEST.                         BIOPSY SITE CHANGES.     NOTE: ADDITIONAL MARGINS SUBMITTED AS PARTS 2, 3, AND 4.        2. \"LEFT BREAST NEW ANTERIOR MARGIN\":              BENIGN FIBROADIPOSE TISSUE.              NEW MARGIN - NO ATYPIA.     3. \"LEFT BREAST NEW SUPERIOR MARGIN\":             BENIGN BREAST TISSUE.             NEW MARGIN - NO ATYPIA.     4. \"LEFT BREAST NEW LATERAL " "MARGIN\":             BENIGN BREAST TISSUE.             NEW MARGIN - NO ATYPIA.     5. \"SENTINEL NODE #1\":             LYMPH NODE, MULTIPLE STEP SECTIONS: NO METASTATIC CARCINOMA SEEN.     6. \"SENTINEL NODE #2\":             LYMPH NODE, MULTIPLE STEP SECTIONS: NO METASTATIC CARCINOMA SEEN.     THM/th IHC/a/CMK                                Electronically signed by Darin Claire MD on 4/12/2018 at 1511   Synoptic Checklist   INVASIVE CARCINOMA OF THE BREAST   Breast Invasive - All Specimens   SPECIMEN   Procedure  Excision (less than total mastectomy)   Specimen Laterality  Left   TUMOR   Histologic Type  Invasive carcinoma of no special type (ductal, not otherwise specified)   Histologic Grade (Jessie Histologic Score)     Glandular (Acinar) / Tubular Differentiation  Score 3 (< 10% of tumor area forming glandular / tubular structures)   Nuclear Pleomorphism  Score 2 (Cells larger than normal with open vesicular nuclei, visible nucleoli, and moderate variability in both size and shape)   Mitotic Rate  Score 1 (<=3 mitoses per mm2)   Overall Grade  Grade 2 (scores of 6 or 7)   Tumor Size  Greatest dimension of largest invasive focus > 1 mm (indicate exact measurement in Millimeters):: 8 mm   Tumor Focality  Single focus of invasive carcinoma   Ductal Carcinoma In Situ (DCIS)  DCIS is present in specimen   Ductal Carcinoma In Situ (DCIS)  Positive for EIC   Size (Extent) of DCIS  Estimated size (extent) of DCIS (greatest dimension using gross and microscopic evaluation) in Millimeters (mm) is at least: 16  mm   Nuclear Grade  Grade III (high)   Lymphovascular Invasion  Not identified   Treatment Effect  No known presurgical therapy   MARGINS   Invasive Carcinoma Margins  Uninvolved by invasive carcinoma   Distance from Closest Margin in Millimeters (mm)  Specify distance: 1.5 mm   Closest Margin  Inferior   DCIS Margins  Uninvolved by DCIS (DCIS present in specimen)   Distance of DCIS from Closest Margin in " Millimeters (mm)  Specify distance: 0.5 mm   Closest Margin  Medial     Lateral   LYMPH NODES   Regional Lymph Nodes     Number of Lymph Nodes with Macrometastases (> 2 mm)  Specify number: 0   Number of Lymph Nodes with Micrometastases (> 0.2 mm to 2 mm and / or > 200 cells)  Specify number: 0   Number of Lymph Nodes with Isolated Tumor Cells (<= 0.2 mm and <= 200 cells)  Specify number: 0   Number of Lymph Nodes Examined  Specify number: 2   Number of Winston Nodes Examined  Specify number: 2   PATHOLOGIC STAGE CLASSIFICATION (pTNM)   Primary Tumor (Invasive Carcinoma) (pT)  pT1c: Tumor > 10 mm but <= 20 mm in greatest dimension   Modifier  (sn): Only sentinel node(s) evaluated. If 6 or more nodes (sentinel or nonsentinel) are removed, this modifier should not be used.   Category (pN)  pN0: No regional lymph node metastasis identified or ITCs only   .        COMPLETE PELVIC SONOGRAM   FINDINGS: Transabdominal and transvaginal pelvic sonography was  performed. The uterus is anteflexed and measures 12 x 5 x 7 cm. The  endometrial bilayer measures 0.9 cm in thickness. Scattered  heterogeneity is seen throughout the uterus. Within the posterior mid  uterine body there is a 1.9 x 1.4 x 1.3 cm complex hypoechoic lesion. In  the mid uterine body located in the anterior aspect of the uterus there  is a 1.6 x 1.4 x 1.9 cm hypoechoic lesion.     The right ovary measures 4.2 x 1.9 x 3.2 cm and the left ovary measures  3.4 x 1.9 x 1.4 cm. Normal intraovarian venous vascularity is seen in  both ovaries. Normal follicles are seen within both ovaries. No free  fluid is seen within the pelvic cul-de-sac.     IMPRESSION:  1. Enlarged uterus with 2 focal myometrial lesions measuring on the  order of 1.9 cm in greatest dimension. The sonographic features suggest  adenomyosis, possibly with focal adenomyomas versus fibroids. It is  difficult to differentiate an adenomyoma versus a fibroid, but I believe  adenomyosis is likely  present given the enlarged heterogenous appearance  of the uterus. If imaging differentiation is desired it can be performed  with a pelvic MRI without and with contrast.  2. Normal sonographic appearance of both ovaries.     This report was finalized on 9/21/2018     Interpretation Summary 11/18            · Normal bilateral lower extremity venous duplex scan.                     Assessment/Plan   1.[id number] HER-2 negative grade 2 left breast cancer post lumpectomy and sentinel node biopsy-with close margins from high-grade DCIS medially-Oncotype score 16.  Tamoxifen initiated 7/2018  2.  Interstitial cystitis undergoing instillation of lidocaine/heparin in  the bladder intermittently.  Pelvic discomfort approximately 10 days prior to menses currently.  Ovarian cyst found by gynecology as possible culprit versus interstitial cystitis potential flare prior to menses.  3.  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 Worsening dysphagia to solids-ENT exam negative symptoms improved with Lexapro-but persistent as of 5/20's referred to GI  6.  Depression.  Patient on Lexapro, initiated during the time of family deaths and recovery from her breast surgery.  Patient feels she has improved would like to wean off the medication.    Plan  1.tamoxifen 20 mg daily  2.  Wean the Lexapro to 5 mg daily  3.  Follow-up with Dr. Gutierrez in 4 months, mammogram ordered to be completed around March 1, 2021

## 2021-02-16 RX ORDER — TAMOXIFEN CITRATE 20 MG/1
TABLET ORAL
Qty: 90 TABLET | Refills: 1 | Status: SHIPPED | OUTPATIENT
Start: 2021-02-16 | End: 2021-09-08

## 2021-03-05 ENCOUNTER — APPOINTMENT (OUTPATIENT)
Dept: MAMMOGRAPHY | Facility: HOSPITAL | Age: 51
End: 2021-03-05

## 2021-03-22 ENCOUNTER — OFFICE VISIT (OUTPATIENT)
Dept: ONCOLOGY | Facility: CLINIC | Age: 51
End: 2021-03-22

## 2021-03-22 ENCOUNTER — LAB (OUTPATIENT)
Dept: OTHER | Facility: HOSPITAL | Age: 51
End: 2021-03-22

## 2021-03-22 VITALS
HEIGHT: 64 IN | BODY MASS INDEX: 26.51 KG/M2 | DIASTOLIC BLOOD PRESSURE: 81 MMHG | TEMPERATURE: 97.8 F | SYSTOLIC BLOOD PRESSURE: 117 MMHG | HEART RATE: 70 BPM | OXYGEN SATURATION: 99 % | RESPIRATION RATE: 16 BRPM | WEIGHT: 155.3 LBS

## 2021-03-22 DIAGNOSIS — Z17.0 MALIGNANT NEOPLASM OF UPPER-INNER QUADRANT OF LEFT BREAST IN FEMALE, ESTROGEN RECEPTOR POSITIVE (HCC): Primary | ICD-10-CM

## 2021-03-22 DIAGNOSIS — C50.212 MALIGNANT NEOPLASM OF UPPER-INNER QUADRANT OF LEFT BREAST IN FEMALE, ESTROGEN RECEPTOR POSITIVE (HCC): Primary | ICD-10-CM

## 2021-03-22 DIAGNOSIS — Z79.810 LONG-TERM CURRENT USE OF TAMOXIFEN: ICD-10-CM

## 2021-03-22 LAB
ALBUMIN SERPL-MCNC: 4.1 G/DL (ref 3.5–5.2)
ALBUMIN/GLOB SERPL: 1.4 G/DL
ALP SERPL-CCNC: 38 U/L (ref 39–117)
ALT SERPL W P-5'-P-CCNC: 13 U/L (ref 1–33)
ANION GAP SERPL CALCULATED.3IONS-SCNC: 7.6 MMOL/L (ref 5–15)
AST SERPL-CCNC: 16 U/L (ref 1–32)
BASOPHILS # BLD AUTO: 0.08 10*3/MM3 (ref 0–0.2)
BASOPHILS NFR BLD AUTO: 0.8 % (ref 0–1.5)
BILIRUB SERPL-MCNC: <0.2 MG/DL (ref 0–1.2)
BUN SERPL-MCNC: 17 MG/DL (ref 6–20)
BUN/CREAT SERPL: 20 (ref 7–25)
CALCIUM SPEC-SCNC: 9.5 MG/DL (ref 8.6–10.5)
CHLORIDE SERPL-SCNC: 108 MMOL/L (ref 98–107)
CO2 SERPL-SCNC: 24.4 MMOL/L (ref 22–29)
CREAT SERPL-MCNC: 0.85 MG/DL (ref 0.57–1)
DEPRECATED RDW RBC AUTO: 41.1 FL (ref 37–54)
EOSINOPHIL # BLD AUTO: 0.23 10*3/MM3 (ref 0–0.4)
EOSINOPHIL NFR BLD AUTO: 2.3 % (ref 0.3–6.2)
ERYTHROCYTE [DISTWIDTH] IN BLOOD BY AUTOMATED COUNT: 12.5 % (ref 12.3–15.4)
GFR SERPL CREATININE-BSD FRML MDRD: 71 ML/MIN/1.73
GLOBULIN UR ELPH-MCNC: 3 GM/DL
GLUCOSE SERPL-MCNC: 99 MG/DL (ref 65–99)
HCT VFR BLD AUTO: 37.7 % (ref 34–46.6)
HGB BLD-MCNC: 12.6 G/DL (ref 12–15.9)
IMM GRANULOCYTES # BLD AUTO: 0.05 10*3/MM3 (ref 0–0.05)
IMM GRANULOCYTES NFR BLD AUTO: 0.5 % (ref 0–0.5)
LYMPHOCYTES # BLD AUTO: 3.27 10*3/MM3 (ref 0.7–3.1)
LYMPHOCYTES NFR BLD AUTO: 32.7 % (ref 19.6–45.3)
MCH RBC QN AUTO: 30.4 PG (ref 26.6–33)
MCHC RBC AUTO-ENTMCNC: 33.4 G/DL (ref 31.5–35.7)
MCV RBC AUTO: 90.8 FL (ref 79–97)
MONOCYTES # BLD AUTO: 0.85 10*3/MM3 (ref 0.1–0.9)
MONOCYTES NFR BLD AUTO: 8.5 % (ref 5–12)
NEUTROPHILS NFR BLD AUTO: 5.52 10*3/MM3 (ref 1.7–7)
NEUTROPHILS NFR BLD AUTO: 55.2 % (ref 42.7–76)
NRBC BLD AUTO-RTO: 0 /100 WBC (ref 0–0.2)
PLATELET # BLD AUTO: 259 10*3/MM3 (ref 140–450)
PMV BLD AUTO: 9.6 FL (ref 6–12)
POTASSIUM SERPL-SCNC: 4.2 MMOL/L (ref 3.5–5.2)
PROT SERPL-MCNC: 7.1 G/DL (ref 6–8.5)
RBC # BLD AUTO: 4.15 10*6/MM3 (ref 3.77–5.28)
SODIUM SERPL-SCNC: 140 MMOL/L (ref 136–145)
WBC # BLD AUTO: 10 10*3/MM3 (ref 3.4–10.8)

## 2021-03-22 PROCEDURE — 85025 COMPLETE CBC W/AUTO DIFF WBC: CPT | Performed by: INTERNAL MEDICINE

## 2021-03-22 PROCEDURE — 80053 COMPREHEN METABOLIC PANEL: CPT | Performed by: INTERNAL MEDICINE

## 2021-03-22 PROCEDURE — 36415 COLL VENOUS BLD VENIPUNCTURE: CPT

## 2021-03-22 PROCEDURE — 99214 OFFICE O/P EST MOD 30 MIN: CPT | Performed by: INTERNAL MEDICINE

## 2021-03-22 NOTE — PROGRESS NOTES
Subjective     REASON FOR CONSULTATION:  1.  T1bN0 M0 strongly ER/NE positive HER-2 negative grade 2 left breast cancer post lumpectomy with close margins for DCIS-Oncotype score of 16 radiation and tamoxifen started 7/18                             REQUESTING PHYSICIAN:  Adrián Pereyra M.D. and Nikita Gallegos M.D.      History of Present Illness is a 50-year-old premenopausal female with a T1b N0 strongly ER/NE positive breast cancer with a low Oncotype score of 16.  She completed radiation and began tamoxifen 7/20/2018.    Patient's last mammogram was in February 2020 and was benign.  Next mammogram was due this month but because she had the coronavirus vaccination she has to wait 5 weeks and she is going to have it next week.    Patient denies any concerns on self breast exams.  Occasionally she has tenderness at the area of the lumpectomy and lymph node removal but this is been stable since surgery.    Her interstitial cystitis is stable-she has menstrual cycles.    She continues to have low libido.  She is considering coming off the Lexapro but when she tried backing down in November when she was here last she felt terrible and she realizes she still needs it and this is fine      Past Medical History:   Diagnosis Date   • Anxiety    • Breast cancer (CMS/HCC) 02/14/2018    Left breast, Upper Inner Quadrant, Invasive Mammary Carcinoma, Intermediate grade, measuring at least 0.2 cm in dimension, with associated high grade solid and cribiform ductal carcinoma in situ with comedonecrosis, ER/NE positive, [id number] negative   • Heartburn    • Infectious mononucleosis    • Migraines    • PONV (postoperative nausea and vomiting)    • Pre-diabetes    • PVC's (premature ventricular contractions)    • Wears contact lenses         Past Surgical History:   Procedure Laterality Date   • BREAST BIOPSY Left 02/14/2018    Left breast stereotactic biopsy w/ marker clip placement & positive radiograph-Dr. Lashaun Gamble, [facility]   •  BREAST BIOPSY Left 02/12/2018    INCOMPLETE - Biopsy could not be completed as patient had severe vasovagal reaction & subsequent syncope-[facility]   • BREAST BIOPSY Left 5/23/2018    Procedure: REEXCISION OF POSITIVE LUMPECTOMY MARGIN LEFT BREAST;  Surgeon: Adrián Pereyra MD;  Location: [facility];  Service: General   • BREAST LUMPECTOMY WITH SENTINEL NODE BIOPSY Left 4/11/2018    Procedure: BREAST LUMPECTOMY WITH SENTINEL NODE BIOPSY AND NEEDLE LOCALIZATION;  Surgeon: Adrián Pereyra MD;  Location: [facility];  Service: General      ONCOLOGIC HISTORY:  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  A diagnostic mammogram gram showed a 2.3 cm area of clustered microcalcifications very suspicious for malignancy and the patient underwent a stereotactic biopsy on 2/14/18 with the findings of intermediate grade invasive mammary carcinoma measuring 2 mm with associated high-grade and solid DCIS--ER was 65% MD was 96% HER-2 0 Ki-67 was 2%.  The patient was referred to Dr. Pereyra and underwent bilateral breast MRIs with the findings of a 2.1 cm abnormality in the left breast with no axillary adenopathy and she was taken for surgery when lumpectomy and sentinel node biopsy was performed on 4/11/18.  This revealed a residual 8 mm area of invasive carcinoma grade 2 with associated high-grade DCIS spanning 16 mm-the lateral and medial margins were 0.5 mm from the DCIS-new anterior, superior and lateral margins were obtained which were negative but the medial margin was still felt to be close.  She went to see Dr. Pereyra to wanted to discuss further the status of her margins but sent her to us also to discuss adjuvant treatment for her invasive cancer.    She is healing well from surgery.  She is obviously anxious  about the margins but has no qualms about having further surgery even if the cosmesis is not perfect because she is more worried about recurrence of her cancer.  She is  3 para 3 -Menarche was age 14 she is premenopausal first childbirth was age 28 she breast-fed for 9 months she's been on no hormonal replacement since her last child 13 years ago  She is having bladder issues and her gynecologist just prescribed vaginal estrogens which she has not yet started.    Family history is positive for her father having thyroid cancer age 64 and he is currently dying from this is a 78 she's one sister is healthy her mother's 72 and healthy she has a maternal aunt with breast cancers at an unknown age paternal aunt with breast cancer in her 60s paternal grandfather with bone cancer and a maternal grandfather with bone cancer is no ovarian cancer in the family.      I explained to Cesia and her  that her tumor is very unlikely to have a high Oncotype score and it is very likely that the mainstay of her treatment would be tamoxifen.  But we will send an Oncotype score because of the small chance that there is a higher recurrence risk than expected based on her pathology in tumor size.  I think it is very reasonable to get genetic testing because of her family history although it is unlikely to change our treatment.  I've asked her to hold off and vaginal estrogens for the time being and to discuss this with her gynecologist.  She will stop her Zoloft and we can prescribe Effexor if needed      we discussed her case at the multiple this may conference because of the close margins for high-grade DCIS and we all felt that radiation would take care of this and no further surgery was needed.    We discussed the low risk Oncotype in the lack of benefit for chemotherapy in this situation which we had already discussed with her on the phone and we talked about tamoxifen.The side effects and toxicities of Tamoxifen  were discussed with the patient, including hot flashes, mood swings,depression, DVT and endometrial cancer. A list of drugs that interfere with the efficacy of tamoxifen and are to be avoided were given to the patient.    At her last visit she was having a lot of pelvic pain with her menstrual cycle and an ultrasound which showed some adenomyosis and her ovarian cyst had disappeared thankfully-she started menstruating regularly-we ordered CAT scans because of the pain in her left side and thankfully chest and abdomen are negative except for thickening of her bladder from her interstitial cystitis and she had fibroids-this discomfort has gradually improved with time        Father  with metastatic thyroid cancer I have again stressed the need for genetic testing and she did this and it was thankfully benign except for 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  This is actually gotten better and she thinks the Lexapro has helped this also there may have been a component of anxiety.  I told her if the dysphagia recurs she needs an EGD          Current Outpatient Medications on File Prior to Visit   Medication Sig Dispense Refill   • ALPRAZolam (XANAX) 0.25 MG tablet Take 0.25 mg by mouth 2 (Two) Times a Day As Needed for Anxiety.     • Cholecalciferol (Vitamin D3) 1.25 MG (18954 UT) capsule TAKE ONE CAPSULE BY MOUTH WEEKLY AS DIRECTED BY PROVIDER.     • escitalopram (LEXAPRO) 10 MG tablet TAKE 1 TABLET BY MOUTH EVERY DAY (Patient taking differently: Pt takes 0.5 mg) 90 tablet 1   • ibuprofen (ADVIL,MOTRIN) 600 MG tablet Take 600 mg by mouth Every 6 (Six) Hours As Needed for Mild Pain . HELD FOR SURGERY     • tamoxifen (NOLVADEX) 20 MG chemo tablet TAKE 1 TABLET BY MOUTH EVERY DAY 90 tablet 1   • zolpidem (Ambien) 5 MG tablet      • [DISCONTINUED] vitamin D (ERGOCALCIFEROL) 89602 units capsule capsule Take 50,000 Units by mouth Every 7 (Seven)  Days.        No current facility-administered medications on file prior to visit.        ALLERGIES:  No Known Allergies     Social History     Socioeconomic History   • Marital status:      Spouse name: Nikita Workman   • Number of children: 3   • Years of education: College   • Highest education level: Not on file   Tobacco Use   • Smoking status: Former Smoker     Packs/day: 0.50     Years: 2.00     Pack years: 1.00     Types: Cigarettes     Start date:      Quit date:      Years since quittin.2   • Smokeless tobacco: Never Used   Substance and Sexual Activity   • Alcohol use: Yes     Comment: RARE MONTHLY   • Drug use: No   • Sexual activity: Defer     Birth control/protection: None        Family History   Problem Relation Age of Onset   • Breast cancer Maternal Aunt         In her 60s   • Breast cancer Paternal Aunt    • Hyperlipidemia Mother    • Asthma Father    • Diabetes Father    • Hearing loss Father    • Heart disease Father    • Hypertension Father    • Thyroid cancer Father 64        Follicular Tyroid Cancer   • Alcohol abuse Sister    • Depression Sister    • Diabetes Maternal Grandmother    • Bone cancer Maternal Grandfather    • Breast cancer Cousin    • Malig Hyperthermia Neg Hx         Review of Systems   Constitutional: Positive for unexpected weight change (gain). Negative for chills, fatigue and fever.   HENT: Negative for congestion, mouth sores, nosebleeds and trouble swallowing.    Respiratory: Negative for choking, chest tightness and shortness of breath.    Cardiovascular: Negative for chest pain.   Gastrointestinal: Negative for constipation, diarrhea, nausea and vomiting.   Genitourinary: Positive for pelvic pain (intermittent-interstitial cystitis). Negative for difficulty urinating (Early prolapse).        Continued decreased libido   Musculoskeletal: Positive for arthralgias (hands and thumbs intermittently). Negative for back pain and gait problem.  "  Neurological: Negative for dizziness, numbness and headaches (Chronic migraine headaches).   Psychiatric/Behavioral: The patient is not nervous/anxious.         Objective     Vitals:    03/22/21 1546   BP: 117/81   Pulse: 70   Resp: 16   Temp: 97.8 °F (36.6 °C)   TempSrc: Skin   SpO2: 99%   Weight: 70.4 kg (155 lb 4.8 oz)   Height: 162.2 cm (63.86\")   PainSc: 0-No pain       Current Status 3/22/2021   ECOG score 0       Physical Exam    GENERAL:  Well-developed, well-nourished in no acute distress.   SKIN:  Warm, dry without rashes, purpura or petechiae.  EYES:  Pupils equal, round and reactive to light.  EOMs intact.  Conjunctivae normal.  EARS:  Hearing intact.  NOSE:  Septum midline.  No excoriations or nasal discharge.  MOUTH:  Tongue is well-papillated; no stomatitis or ulcers.  Lips normal.  THROAT:  Oropharynx without lesions or exudates.  NECK:  Supple with good range of motion; no thyromegaly or masses, no JVD.  LYMPHATICS:  No cervical, supraclavicular, axillary adenopathy.  CHEST:  Lungs clear to auscultation. Good airflow.  BREASTS: Right breast is benign left  shows a lumpectomy scar with minimal scarring at   the lumpectomy site, otherwise soft, no nodules, no skin changes, no nipple discharge.  Left breast and axilla soft, no nodules, no skin changes, no nipple discharge.  Right axilla is tender to palpation possibly related to her recent Covid vaccination no definite adenopathy  CARDIAC:  Regular rate and rhythm without murmurs, rubs or gallops. Normal S1,S2.  ABDOMEN:  Soft, minimal left lower quadrant tenderness without mass - with no hepatosplenomegaly .  EXTREMITIES:  No clubbing, cyanosis or edema.  NEUROLOGICAL:  Cranial Nerves II-XII grossly intact.  No focal neurological deficits.  PSYCHIATRIC:  Normal affect and mood.          RECENT LABS:  Hematology WBC   Date Value Ref Range Status   03/22/2021 10.00 3.40 - 10.80 10*3/mm3 Final     RBC   Date Value Ref Range Status   03/22/2021 4.15 " "3.77 - 5.28 10*6/mm3 Final     Hemoglobin   Date Value Ref Range Status   03/22/2021 12.6 12.0 - 15.9 g/dL Final     Hematocrit   Date Value Ref Range Status   03/22/2021 37.7 34.0 - 46.6 % Final     Platelets   Date Value Ref Range Status   03/22/2021 259 140 - 450 10*3/mm3 Final         IMPRESSION:  1. Biopsy-proven malignancy in the left breast at the 11:30 position  measuring on the order of 2.1 cm in greatest dimension. No other  suspicious findings are seen within the left breast and there is no  evidence for left axillary adenopathy.  2. There are no findings suspicious for malignancy in the right breast.     BI-RADS Category 6: Known biopsy-proven malignancy.     4/11/18  1. \"LEFT BREAST MASS,\" WIRE GUIDED LUMPECTOMY:             INVASIVE DUCTAL CARCINOMA, INTERMEDIATE GRADE, 8 MM, MARGINS CLEAR.             CLOSEST MARGIN TO INVASIVE TUMOR: 1.5 MM (INFERIOR).             ASSOCIATED HIGH GRADE DUCTAL CARCINOMA IN SITU SPANNING APPROXIMATELY 16 MM (FOUR                    BLOCKS x 4 MM PER BLOCK).             CLOSEST MARGIN TO DUCTAL CARCINOMA IN SITU: 0.5 MM (LATERAL AND MEDIAL MARGINS).             HORMONE RECEPTORS REPORTED ON PRIOR BIOPSY, [facility] [id number]. REPEAT/                   CONFIRMATORY STUDIES ARE AVAILABLE UPON REQUEST.                         BIOPSY SITE CHANGES.     NOTE: ADDITIONAL MARGINS SUBMITTED AS PARTS 2, 3, AND 4.        2. \"LEFT BREAST NEW ANTERIOR MARGIN\":              BENIGN FIBROADIPOSE TISSUE.              NEW MARGIN - NO ATYPIA.     3. \"LEFT BREAST NEW SUPERIOR MARGIN\":             BENIGN BREAST TISSUE.             NEW MARGIN - NO ATYPIA.     4. \"LEFT BREAST NEW LATERAL MARGIN\":             BENIGN BREAST TISSUE.             NEW MARGIN - NO ATYPIA.     5. \"SENTINEL NODE #1\":             LYMPH NODE, MULTIPLE STEP SECTIONS: NO METASTATIC CARCINOMA SEEN.     6. \"SENTINEL NODE #2\":             LYMPH NODE, MULTIPLE STEP SECTIONS: NO METASTATIC CARCINOMA SEEN.     THM/th " IHC/a/CMK                                Electronically signed by Darin Claire MD on 4/12/2018 at 1511   Synoptic Checklist   INVASIVE CARCINOMA OF THE BREAST   Breast Invasive - All Specimens   SPECIMEN   Procedure  Excision (less than total mastectomy)   Specimen Laterality  Left   TUMOR   Histologic Type  Invasive carcinoma of no special type (ductal, not otherwise specified)   Histologic Grade (Jessie Histologic Score)     Glandular (Acinar) / Tubular Differentiation  Score 3 (< 10% of tumor area forming glandular / tubular structures)   Nuclear Pleomorphism  Score 2 (Cells larger than normal with open vesicular nuclei, visible nucleoli, and moderate variability in both size and shape)   Mitotic Rate  Score 1 (<=3 mitoses per mm2)   Overall Grade  Grade 2 (scores of 6 or 7)   Tumor Size  Greatest dimension of largest invasive focus > 1 mm (indicate exact measurement in Millimeters):: 8 mm   Tumor Focality  Single focus of invasive carcinoma   Ductal Carcinoma In Situ (DCIS)  DCIS is present in specimen   Ductal Carcinoma In Situ (DCIS)  Positive for EIC   Size (Extent) of DCIS  Estimated size (extent) of DCIS (greatest dimension using gross and microscopic evaluation) in Millimeters (mm) is at least: 16  mm   Nuclear Grade  Grade III (high)   Lymphovascular Invasion  Not identified   Treatment Effect  No known presurgical therapy   MARGINS   Invasive Carcinoma Margins  Uninvolved by invasive carcinoma   Distance from Closest Margin in Millimeters (mm)  Specify distance: 1.5 mm   Closest Margin  Inferior   DCIS Margins  Uninvolved by DCIS (DCIS present in specimen)   Distance of DCIS from Closest Margin in Millimeters (mm)  Specify distance: 0.5 mm   Closest Margin  Medial     Lateral   LYMPH NODES   Regional Lymph Nodes     Number of Lymph Nodes with Macrometastases (> 2 mm)  Specify number: 0   Number of Lymph Nodes with Micrometastases (> 0.2 mm to 2 mm and / or > 200 cells)  Specify number: 0    Number of Lymph Nodes with Isolated Tumor Cells (<= 0.2 mm and <= 200 cells)  Specify number: 0   Number of Lymph Nodes Examined  Specify number: 2   Number of [place] Nodes Examined  Specify number: 2   PATHOLOGIC STAGE CLASSIFICATION (pTNM)   Primary Tumor (Invasive Carcinoma) (pT)  pT1c: Tumor > 10 mm but <= 20 mm in greatest dimension   Modifier  (sn): Only sentinel node(s) evaluated. If 6 or more nodes (sentinel or nonsentinel) are removed, this modifier should not be used.   Category (pN)  pN0: No regional lymph node metastasis identified or ITCs only   .        COMPLETE PELVIC SONOGRAM   FINDINGS: Transabdominal and transvaginal pelvic sonography was  performed. The uterus is anteflexed and measures 12 x 5 x 7 cm. The  endometrial bilayer measures 0.9 cm in thickness. Scattered  heterogeneity is seen throughout the uterus. Within the posterior mid  uterine body there is a 1.9 x 1.4 x 1.3 cm complex hypoechoic lesion. In  the mid uterine body located in the anterior aspect of the uterus there  is a 1.6 x 1.4 x 1.9 cm hypoechoic lesion.     The right ovary measures 4.2 x 1.9 x 3.2 cm and the left ovary measures  3.4 x 1.9 x 1.4 cm. Normal intraovarian venous vascularity is seen in  both ovaries. Normal follicles are seen within both ovaries. No free  fluid is seen within the pelvic cul-de-sac.     IMPRESSION:  1. Enlarged uterus with 2 focal myometrial lesions measuring on the  order of 1.9 cm in greatest dimension. The sonographic features suggest  adenomyosis, possibly with focal adenomyomas versus fibroids. It is  difficult to differentiate an adenomyoma versus a fibroid, but I believe  adenomyosis is likely present given the enlarged heterogenous appearance  of the uterus. If imaging differentiation is desired it can be performed  with a pelvic MRI without and with contrast.  2. Normal sonographic appearance of both ovaries.     This report was finalized on 9/21/2018     Interpretation Summary  11/18            · Normal bilateral lower extremity venous duplex scan.                     Assessment/Plan   1.[id number] HER-2 negative grade 2 left breast cancer post lumpectomy and sentinel node biopsy-with close margins from high-grade DCIS medially-Oncotype score 16.  Tamoxifen initiated 7/2018  2.  Interstitial cystitis undergoing instillation of lidocaine/heparin in  the bladder intermittently.  Pelvic discomfort approximately 10 days prior to menses currently.  Ovarian cyst found by gynecology as possible culprit versus interstitial cystitis potential flare prior to menses.  3.  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 Worsening dysphagia to solids-ENT exam negative symptoms improved with Lexapro-but persistent as of 5/20's referred to GI  6.  Depression.  Patient on Lexapro, initiated during the time of family deaths and recovery from her breast surgery.  Patient feels she has improved would like to wean off the medication.    Plan  1.continue tamoxifen 20 mg daily  2.  Continue Lexapro   3.  Follow-up with Dr. Gutierrez in 6 months, mammogram ordered to be completed around April 1, 2021

## 2021-03-26 ENCOUNTER — APPOINTMENT (OUTPATIENT)
Dept: MAMMOGRAPHY | Facility: HOSPITAL | Age: 51
End: 2021-03-26

## 2021-04-02 ENCOUNTER — HOSPITAL ENCOUNTER (OUTPATIENT)
Dept: MAMMOGRAPHY | Facility: HOSPITAL | Age: 51
Discharge: HOME | End: 2021-04-02
Admitting: NURSE PRACTITIONER

## 2021-04-02 DIAGNOSIS — Z17.0 MALIGNANT NEOPLASM OF UPPER-INNER QUADRANT OF LEFT BREAST IN FEMALE, ESTROGEN RECEPTOR POSITIVE (HCC): ICD-10-CM

## 2021-04-02 DIAGNOSIS — Z12.31 SCREENING MAMMOGRAM FOR HIGH-RISK PATIENT: ICD-10-CM

## 2021-04-02 DIAGNOSIS — C50.212 MALIGNANT NEOPLASM OF UPPER-INNER QUADRANT OF LEFT BREAST IN FEMALE, ESTROGEN RECEPTOR POSITIVE (HCC): ICD-10-CM

## 2021-04-02 PROCEDURE — 77063 BREAST TOMOSYNTHESIS BI: CPT

## 2021-04-02 PROCEDURE — 77067 SCR MAMMO BI INCL CAD: CPT

## 2021-05-17 RX ORDER — ESCITALOPRAM OXALATE 10 MG/1
TABLET ORAL
Qty: 90 TABLET | Refills: 1 | Status: SHIPPED | OUTPATIENT
Start: 2021-05-17 | End: 2022-03-07

## 2021-07-28 ENCOUNTER — OFFICE VISIT (OUTPATIENT)
Dept: ORTHOPEDIC SURGERY | Facility: CLINIC | Age: 51
End: 2021-07-28

## 2021-07-28 VITALS — BODY MASS INDEX: 26.46 KG/M2 | HEIGHT: 64 IN | TEMPERATURE: 98.2 F | WEIGHT: 155 LBS

## 2021-07-28 DIAGNOSIS — M77.42 METATARSALGIA OF BOTH FEET: ICD-10-CM

## 2021-07-28 DIAGNOSIS — M77.41 METATARSALGIA OF BOTH FEET: ICD-10-CM

## 2021-07-28 DIAGNOSIS — M76.70 PERONEAL TENDINITIS, UNSPECIFIED LATERALITY: ICD-10-CM

## 2021-07-28 DIAGNOSIS — R52 PAIN: ICD-10-CM

## 2021-07-28 DIAGNOSIS — M18.11 ARTHRITIS OF CARPOMETACARPAL (CMC) JOINT OF RIGHT THUMB: Primary | ICD-10-CM

## 2021-07-28 PROCEDURE — 73130 X-RAY EXAM OF HAND: CPT | Performed by: ORTHOPAEDIC SURGERY

## 2021-07-28 PROCEDURE — 73630 X-RAY EXAM OF FOOT: CPT | Performed by: ORTHOPAEDIC SURGERY

## 2021-07-28 PROCEDURE — 99204 OFFICE O/P NEW MOD 45 MIN: CPT | Performed by: ORTHOPAEDIC SURGERY

## 2021-07-28 RX ORDER — DICLOFENAC SODIUM 20 MG/G
2 SOLUTION TOPICAL 2 TIMES DAILY
Qty: 112 G | Refills: 12 | Status: SHIPPED | OUTPATIENT
Start: 2021-07-28 | End: 2022-05-07

## 2021-07-28 NOTE — PROGRESS NOTES
New Patient Complaint      Patient: Cesia Workman  YOB: 1970 50 y.o. female  Medical Record Number: [id number]    Chief Complaints: Feet hurt    History of Present Illness: Patient reports a 10-month history of moderate intermittent stabbing aching dull pain that mainly is in the base of the fifth metatarsal with some extension of the inferolateral hindfoot that began in her left foot about 10 months ago.    She reports that her ankle feels little stiff from time to time and in the last several months has started having similar symptoms on the right but not to the degree that she has in the left.    She does not recall any specific injury or change in activity and it is somewhat bothersome with exercise but is able to tolerate the elliptical.    She has a history of plantar fasciitis in the left and had injections done by podiatrist several years ago with only some slight residual pain over the medial hindfoot but this is not the main area of her concern at this point.    She also reports history of chronic several years aching popping pain the base of the right thumb at the carpometacarpal joint but no specific injury.         HPI    Allergies: No Known Allergies    Medications:   Current Outpatient Medications on File Prior to Visit   Medication Sig   • ALPRAZolam (XANAX) 0.25 MG tablet Take 0.25 mg by mouth 2 (Two) Times a Day As Needed for Anxiety.   • escitalopram (LEXAPRO) 10 MG tablet TAKE 1 TABLET BY MOUTH EVERY DAY   • ibuprofen (ADVIL,MOTRIN) 600 MG tablet Take 600 mg by mouth Every 6 (Six) Hours As Needed for Mild Pain . HELD FOR SURGERY   • tamoxifen (NOLVADEX) 20 MG chemo tablet TAKE 1 TABLET BY MOUTH EVERY DAY   • zolpidem (Ambien) 5 MG tablet    • Cholecalciferol (Vitamin D3) 1.25 MG (41644 UT) capsule TAKE ONE CAPSULE BY MOUTH WEEKLY AS DIRECTED BY PROVIDER.     No current facility-administered medications on file prior to visit.       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 Lashaun Gamble, [facility]   • BREAST BIOPSY Left 2018    INCOMPLETE - Biopsy could not be completed as patient had severe vasovagal reaction & subsequent syncope-[facility]   • BREAST BIOPSY Left 2018    Procedure: REEXCISION OF POSITIVE LUMPECTOMY MARGIN LEFT BREAST;  Surgeon: Adrián Pereyra MD;  Location: [facility];  Service: General   • BREAST LUMPECTOMY WITH SENTINEL NODE BIOPSY Left 2018    Procedure: BREAST LUMPECTOMY WITH SENTINEL NODE BIOPSY AND NEEDLE LOCALIZATION;  Surgeon: Adrián Pereyra MD;  Location: [facility];  Service: General     Social History     Occupational History   • Occupation: Occupational Therapist     Comment: [employer]   Tobacco Use   • Smoking status: Former Smoker     Packs/day: 0.50     Years: 2.00     Pack years: 1.00     Types: Cigarettes     Start date:      Quit date:      Years since quittin.5   • Smokeless tobacco: Never Used   Substance and Sexual Activity   • Alcohol use: Yes     Comment: RARE MONTHLY   • Drug use: No   • Sexual activity: Defer     Birth control/protection: None      Social History     Social History Narrative   • Not on file     Family History   Problem Relation Age of Onset   • Breast cancer Maternal Aunt         In her 60s   • Breast cancer Paternal Aunt    •  "Hyperlipidemia Mother    • Asthma Father    • Diabetes Father    • Hearing loss Father    • Heart disease Father    • Hypertension Father    • Thyroid cancer Father 64        Follicular Tyroid Cancer   • Alcohol abuse Sister    • Depression Sister    • Diabetes Maternal Grandmother    • Bone cancer Maternal Grandfather    • Breast cancer Cousin    • Malig Hyperthermia Neg Hx        Review of Systems: 14 point review of systems performed, positive pertinent findings identified in HPI. All remaining systems negative     Review of Systems      Physical Exam:   Vitals:    07/28/21 0954   Temp: 98.2 °F (36.8 °C)   Weight: 70.3 kg (155 lb)   Height: 162.6 cm (64\")   PainSc:   7     Physical Exam   Constitutional: pleasant, well developed   Eyes: sclera non icteric  Hearing : adequate for exam  Cardiovascular: palpable pulses in bilaeral feet, bilateral calves/ thighs NT without sign of DVT  Respiratoy: breathing unlabored   Neurological: grossly sensate to LT throughout bilateral LEs  Psychiatric: oriented with normal mood and affect.   Lymphatic: No palpable popliteal lymphadenopathy bilateral LEs  Skin: intact throughout bilateral legs/feet  Musculoskeletal: She has neutral standing alignment and minimal if any swelling of the feet.  There is mild discomfort along the base of the fifth metatarsal left more so than right but no ulceration or callus with some mild discomfort inferolaterally along the peroneal tendons worse with resisted eversion.  No gross subluxation and grossly stable ligamentous exam.    Right thumb show discomfort over the CMC joint with some discomfort with grind testing but no instability.  Physical Exam  Ortho Exam    Radiology: 3 views of both feet and 3 views of the right hand ordered evaluate pain reviewed and no prior x-rays available for comparison there is some mild plantar calcaneal spurring left greater than right bilaterally with some prominent superior calcaneal tuberosity with no " intratendinous Achilles calcifications.  There is mild hallux valgus deformity bilaterally.  There are some questionable ossification over the proximal extent of the fifth metatarsals bilaterally but no clear evidence of fracture.    Right hand does not show any clear evidence of fracture but does appear to be some mild arthritic change at the carpometacarpal joint of the thumb.    Assessment/Plan: 1.  Bilateral foot pain with fifth metatarsal base pain left greater than right with possible insertional peroneal tendinitis.    2.  Right thumb CMC arthritis.  3.  Previous left plantar fasciitis with some residual heel pain    A long discussion regarding treatment options related and seem to have any gross malalignment to the foot that would lend itself to lainey peroneal tendon tear without traumatic injury and symptoms seem to mainly isolate and localized to the inferolateral hindfoot along the base of the fifth metatarsals left more so than right.    We discussed treatment options and I do not see anything to do from a surgical standpoint at this time.    Had her fitted with bilateral heel wedges to help offload the lateral hindfoot and demonstrated heel cord stretching exercises to do these 4 times a day.  Instruction sheet was provided and demonstrated for her.    Also have her apply Pennsaid the areas twice daily and see about getting her into some therapy with [facility] at [facility] possibly even orthotics.    Regarding her thumb, she may apply Pennsaid to that area as well and will refer her to see  for further evaluation and treatment including possible injections.    We will plan on seeing her back in 6 weeks but if she is not at all improved in 3 weeks she will let us know and we can get MRIs of her hindfeet prior to follow-up as this will give us evaluation of peroneal tendon and base of the fifth metatarsal.

## 2021-08-10 ENCOUNTER — TELEPHONE (OUTPATIENT)
Dept: ORTHOPEDIC SURGERY | Facility: CLINIC | Age: 51
End: 2021-08-10

## 2021-08-10 NOTE — TELEPHONE ENCOUNTER
Per notes from [facility], patient feeling better and did not want to schedule physical therapy at this time.       Closing referral.

## 2021-08-18 ENCOUNTER — TELEPHONE (OUTPATIENT)
Dept: ORTHOPEDIC SURGERY | Facility: CLINIC | Age: 51
End: 2021-08-18

## 2021-09-08 RX ORDER — TAMOXIFEN CITRATE 20 MG/1
TABLET ORAL
Qty: 90 TABLET | Refills: 3 | Status: SHIPPED | OUTPATIENT
Start: 2021-09-08 | End: 2022-06-28

## 2021-09-08 NOTE — TELEPHONE ENCOUNTER
Tamoxifen refill request rec electronically from [facility]. Per last office note from Dr Gutierrez-pt is to continue.    1.continue tamoxifen 20 mg daily    I have routed the rx to Dr Gutierrez for signature. Once signed it will be escribed to [facility].

## 2021-09-20 ENCOUNTER — OFFICE VISIT (OUTPATIENT)
Dept: ONCOLOGY | Facility: CLINIC | Age: 51
End: 2021-09-20

## 2021-09-20 ENCOUNTER — LAB (OUTPATIENT)
Dept: OTHER | Facility: HOSPITAL | Age: 51
End: 2021-09-20

## 2021-09-20 VITALS
SYSTOLIC BLOOD PRESSURE: 116 MMHG | OXYGEN SATURATION: 98 % | WEIGHT: 158.9 LBS | HEART RATE: 63 BPM | HEIGHT: 64 IN | TEMPERATURE: 97.1 F | DIASTOLIC BLOOD PRESSURE: 79 MMHG | BODY MASS INDEX: 27.13 KG/M2 | RESPIRATION RATE: 16 BRPM

## 2021-09-20 DIAGNOSIS — C50.212 MALIGNANT NEOPLASM OF UPPER-INNER QUADRANT OF LEFT BREAST IN FEMALE, ESTROGEN RECEPTOR POSITIVE (HCC): Primary | ICD-10-CM

## 2021-09-20 DIAGNOSIS — Z17.0 MALIGNANT NEOPLASM OF UPPER-INNER QUADRANT OF LEFT BREAST IN FEMALE, ESTROGEN RECEPTOR POSITIVE (HCC): ICD-10-CM

## 2021-09-20 DIAGNOSIS — C50.212 MALIGNANT NEOPLASM OF UPPER-INNER QUADRANT OF LEFT BREAST IN FEMALE, ESTROGEN RECEPTOR POSITIVE (HCC): ICD-10-CM

## 2021-09-20 DIAGNOSIS — Z17.0 MALIGNANT NEOPLASM OF UPPER-INNER QUADRANT OF LEFT BREAST IN FEMALE, ESTROGEN RECEPTOR POSITIVE (HCC): Primary | ICD-10-CM

## 2021-09-20 DIAGNOSIS — Z79.810 LONG-TERM CURRENT USE OF TAMOXIFEN: ICD-10-CM

## 2021-09-20 LAB
ALBUMIN SERPL-MCNC: 4.2 G/DL (ref 3.5–5.2)
ALBUMIN/GLOB SERPL: 1.8 G/DL
ALP SERPL-CCNC: 45 U/L (ref 39–117)
ALT SERPL W P-5'-P-CCNC: 14 U/L (ref 1–33)
ANION GAP SERPL CALCULATED.3IONS-SCNC: 9 MMOL/L (ref 5–15)
AST SERPL-CCNC: 18 U/L (ref 1–32)
BASOPHILS # BLD AUTO: 0.06 10*3/MM3 (ref 0–0.2)
BASOPHILS NFR BLD AUTO: 0.7 % (ref 0–1.5)
BILIRUB SERPL-MCNC: <0.2 MG/DL (ref 0–1.2)
BUN SERPL-MCNC: 14 MG/DL (ref 6–20)
BUN/CREAT SERPL: 17.7 (ref 7–25)
CALCIUM SPEC-SCNC: 9.3 MG/DL (ref 8.6–10.5)
CHLORIDE SERPL-SCNC: 108 MMOL/L (ref 98–107)
CO2 SERPL-SCNC: 24 MMOL/L (ref 22–29)
CREAT SERPL-MCNC: 0.79 MG/DL (ref 0.57–1)
DEPRECATED RDW RBC AUTO: 43.7 FL (ref 37–54)
EOSINOPHIL # BLD AUTO: 0.22 10*3/MM3 (ref 0–0.4)
EOSINOPHIL NFR BLD AUTO: 2.4 % (ref 0.3–6.2)
ERYTHROCYTE [DISTWIDTH] IN BLOOD BY AUTOMATED COUNT: 12.7 % (ref 12.3–15.4)
GFR SERPL CREATININE-BSD FRML MDRD: 77 ML/MIN/1.73
GLOBULIN UR ELPH-MCNC: 2.4 GM/DL
GLUCOSE SERPL-MCNC: 88 MG/DL (ref 65–99)
HCT VFR BLD AUTO: 38 % (ref 34–46.6)
HGB BLD-MCNC: 12.2 G/DL (ref 12–15.9)
IMM GRANULOCYTES # BLD AUTO: 0.05 10*3/MM3 (ref 0–0.05)
IMM GRANULOCYTES NFR BLD AUTO: 0.5 % (ref 0–0.5)
LYMPHOCYTES # BLD AUTO: 2.58 10*3/MM3 (ref 0.7–3.1)
LYMPHOCYTES NFR BLD AUTO: 28.1 % (ref 19.6–45.3)
MCH RBC QN AUTO: 29.9 PG (ref 26.6–33)
MCHC RBC AUTO-ENTMCNC: 32.1 G/DL (ref 31.5–35.7)
MCV RBC AUTO: 93.1 FL (ref 79–97)
MONOCYTES # BLD AUTO: 0.76 10*3/MM3 (ref 0.1–0.9)
MONOCYTES NFR BLD AUTO: 8.3 % (ref 5–12)
NEUTROPHILS NFR BLD AUTO: 5.51 10*3/MM3 (ref 1.7–7)
NEUTROPHILS NFR BLD AUTO: 60 % (ref 42.7–76)
NRBC BLD AUTO-RTO: 0 /100 WBC (ref 0–0.2)
PLATELET # BLD AUTO: 248 10*3/MM3 (ref 140–450)
PMV BLD AUTO: 10.6 FL (ref 6–12)
POTASSIUM SERPL-SCNC: 4.3 MMOL/L (ref 3.5–5.2)
PROT SERPL-MCNC: 6.6 G/DL (ref 6–8.5)
RBC # BLD AUTO: 4.08 10*6/MM3 (ref 3.77–5.28)
SODIUM SERPL-SCNC: 141 MMOL/L (ref 136–145)
WBC # BLD AUTO: 9.18 10*3/MM3 (ref 3.4–10.8)

## 2021-09-20 PROCEDURE — 85025 COMPLETE CBC W/AUTO DIFF WBC: CPT | Performed by: INTERNAL MEDICINE

## 2021-09-20 PROCEDURE — 99214 OFFICE O/P EST MOD 30 MIN: CPT | Performed by: INTERNAL MEDICINE

## 2021-09-20 PROCEDURE — 80053 COMPREHEN METABOLIC PANEL: CPT | Performed by: INTERNAL MEDICINE

## 2021-09-20 PROCEDURE — 36415 COLL VENOUS BLD VENIPUNCTURE: CPT

## 2021-09-20 RX ORDER — ZOLPIDEM TARTRATE 10 MG/1
TABLET ORAL
COMMUNITY
Start: 2021-08-12 | End: 2022-05-21 | Stop reason: HOSPADM

## 2021-09-20 NOTE — PROGRESS NOTES
Subjective     REASON FOR CONSULTATION:  1.  T1bN0 M0 strongly ER/AK positive HER-2 negative grade 2 left breast cancer post lumpectomy with close margins for DCIS-Oncotype score of 16 radiation and tamoxifen started 7/18                             REQUESTING PHYSICIAN:  Adrián Pereyra M.D. and Nikita Gallegos M.D.      History of Present Illness is a 50-year-old premenopausal female with a T1b N0 strongly ER/AK positive breast cancer with a low Oncotype score of 16.  She completed radiation and began tamoxifen 7/20/2018.    Patient's last mammogram was in April 2021 and was benign.     Patient denies any concerns on self breast exams.  Occasionally she has tenderness at the area of the lumpectomy and lymph node removal but this is been stable since surgery.    She is having issues with word recall and this is getting worse and I suggested a 1 month break from tamoxifen to see if he gets any better    Her interstitial cystitis is stable-she continues to have menstrual cycles.    She continues to have low libido.  She has weaned off the Lexapro 2 weeks ago but is still very stallworth and is hoping that she can get better without it because she thinks is causing weight gain    She is vaccinated against COVID-19    Past Medical History:   Diagnosis Date   • Anxiety    • Breast cancer (CMS/[facility]) 02/14/2018    Left breast, Upper Inner Quadrant, Invasive Mammary Carcinoma, Intermediate grade, measuring at least 0.2 cm in dimension, with associated high grade solid and cribiform ductal carcinoma in situ with comedonecrosis, ER/AK positive, [id number] negative   • Heartburn    • Infectious mononucleosis    • Migraines    • PONV (postoperative nausea and vomiting)    • Pre-diabetes    • PVC's (premature ventricular contractions)    • Wears contact lenses         Past Surgical History:   Procedure Laterality Date   • BREAST BIOPSY Left 02/14/2018    Left breast stereotactic biopsy w/ marker clip placement & positive radiograph-  Lashaun Gamble, [facility]   • BREAST BIOPSY Left 02/12/2018    INCOMPLETE - Biopsy could not be completed as patient had severe vasovagal reaction & subsequent syncope-[facility]   • BREAST BIOPSY Left 5/23/2018    Procedure: REEXCISION OF POSITIVE LUMPECTOMY MARGIN LEFT BREAST;  Surgeon: Adrián Pereyra MD;  Location: [facility];  Service: General   • BREAST LUMPECTOMY WITH SENTINEL NODE BIOPSY Left 4/11/2018    Procedure: BREAST LUMPECTOMY WITH SENTINEL NODE BIOPSY AND NEEDLE LOCALIZATION;  Surgeon: Adrián Pereyra MD;  Location: [facility];  Service: General      ONCOLOGIC HISTORY:  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  A diagnostic mammogram gram showed a 2.3 cm area of clustered microcalcifications very suspicious for malignancy and the patient underwent a stereotactic biopsy on 2/14/18 with the findings of intermediate grade invasive mammary carcinoma measuring 2 mm with associated high-grade and solid DCIS--ER was 65% SC was 96% HER-2 0 Ki-67 was 2%.  The patient was referred to Dr. Pereyra and underwent bilateral breast MRIs with the findings of a 2.1 cm abnormality in the left breast with no axillary adenopathy and she was taken for surgery when lumpectomy and sentinel node biopsy was performed on 4/11/18.  This revealed a residual 8 mm area of invasive carcinoma grade 2 with associated high-grade DCIS spanning 16 mm-the lateral and medial margins were 0.5 mm from the DCIS-new anterior, superior and lateral margins were obtained which were negative but the medial margin was still felt to be close.  She went to see Dr. Pereyra to wanted to discuss further the status of her margins but sent her to us also to discuss adjuvant treatment for her invasive cancer.    She is healing well from surgery.  She  is obviously anxious about the margins but has no qualms about having further surgery even if the cosmesis is not perfect because she is more worried about recurrence of her cancer.  She is  3 para 3 -Menarche was age 14 she is premenopausal first childbirth was age 28 she breast-fed for 9 months she's been on no hormonal replacement since her last child 13 years ago  She is having bladder issues and her gynecologist just prescribed vaginal estrogens which she has not yet started.    Family history is positive for her father having thyroid cancer age 64 and he is currently dying from this is a 78 she's one sister is healthy her mother's 72 and healthy she has a maternal aunt with breast cancers at an unknown age paternal aunt with breast cancer in her 60s paternal grandfather with bone cancer and a maternal grandfather with bone cancer is no ovarian cancer in the family.      I explained to Cesia and her  that her tumor is very unlikely to have a high Oncotype score and it is very likely that the mainstay of her treatment would be tamoxifen.  But we will send an Oncotype score because of the small chance that there is a higher recurrence risk than expected based on her pathology in tumor size.  I think it is very reasonable to get genetic testing because of her family history although it is unlikely to change our treatment.  I've asked her to hold off and vaginal estrogens for the time being and to discuss this with her gynecologist.  She will stop her Zoloft and we can prescribe Effexor if needed      we discussed her case at the multiple this may conference because of the close margins for high-grade DCIS and we all felt that radiation would take care of this and no further surgery was needed.    We discussed the low risk Oncotype in the lack of benefit for chemotherapy in this situation which we had already discussed with her on the phone and we talked about tamoxifen.The side effects and  toxicities of Tamoxifen were discussed with the patient, including hot flashes, mood swings,depression, DVT and endometrial cancer. A list of drugs that interfere with the efficacy of tamoxifen and are to be avoided were given to the patient.    At her last visit she was having a lot of pelvic pain with her menstrual cycle and an ultrasound which showed some adenomyosis and her ovarian cyst had disappeared thankfully-she started menstruating regularly-we ordered CAT scans because of the pain in her left side and thankfully chest and abdomen are negative except for thickening of her bladder from her interstitial cystitis and she had fibroids-this discomfort has gradually improved with time        Father  with metastatic thyroid cancer I have again stressed the need for genetic testing and she did this and it was thankfully benign except for 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  This is actually gotten better and she thinks the Lexapro has helped this also there may have been a component of anxiety.  I told her if the dysphagia recurs she needs an EGD          Current Outpatient Medications on File Prior to Visit   Medication Sig Dispense Refill   • ALPRAZolam (XANAX) 0.25 MG tablet Take 0.25 mg by mouth 2 (Two) Times a Day As Needed for Anxiety.     • Cholecalciferol (Vitamin D3) 1.25 MG (20583 UT) capsule TAKE ONE CAPSULE BY MOUTH WEEKLY AS DIRECTED BY PROVIDER.     • Diclofenac Sodium 2 % solution Apply 2 Pump topically 2 (Two) Times a Day. 112 g 12   • ibuprofen (ADVIL,MOTRIN) 600 MG tablet Take 600 mg by mouth Every 6 (Six) Hours As Needed for Mild Pain . HELD FOR SURGERY     • tamoxifen (NOLVADEX) 20 MG chemo tablet TAKE 1 TABLET BY MOUTH EVERY DAY 90 tablet 3   • zolpidem (AMBIEN) 10 MG tablet TAKE 1 TABLET (10 MG) BY ORAL ROUTE ONCE DAILY AT BEDTIME AS NEEDED     • zolpidem (Ambien) 5 MG tablet      • escitalopram (LEXAPRO) 10  MG tablet TAKE 1 TABLET BY MOUTH EVERY DAY 90 tablet 1     No current facility-administered medications on file prior to visit.        ALLERGIES:  No Known Allergies     Social History     Socioeconomic History   • Marital status:      Spouse name: Nikita Workman   • Number of children: 3   • Years of education: College   • Highest education level: Not on file   Tobacco Use   • Smoking status: Former Smoker     Packs/day: 0.50     Years: 2.00     Pack years: 1.00     Types: Cigarettes     Start date:      Quit date:      Years since quittin.7   • Smokeless tobacco: Never Used   Substance and Sexual Activity   • Alcohol use: Yes     Comment: RARE MONTHLY   • Drug use: No   • Sexual activity: Defer     Birth control/protection: None        Family History   Problem Relation Age of Onset   • Breast cancer Maternal Aunt         In her 60s   • Breast cancer Paternal Aunt    • Hyperlipidemia Mother    • Asthma Father    • Diabetes Father    • Hearing loss Father    • Heart disease Father    • Hypertension Father    • Thyroid cancer Father 64        Follicular Tyroid Cancer   • Alcohol abuse Sister    • Depression Sister    • Diabetes Maternal Grandmother    • Bone cancer Maternal Grandfather    • Breast cancer Cousin    • Malig Hyperthermia Neg Hx         Review of Systems   Constitutional: Negative for chills, fatigue, fever and unexpected weight change.   HENT: Negative for congestion, mouth sores, nosebleeds and trouble swallowing.    Respiratory: Negative for choking, chest tightness and shortness of breath.    Cardiovascular: Negative for chest pain.   Gastrointestinal: Negative for constipation, diarrhea, nausea and vomiting.   Genitourinary: Negative for difficulty urinating and pelvic pain.        Continued decreased libido   Musculoskeletal: Positive for arthralgias (hands and thumbs intermittently). Negative for back pain and gait problem.   Neurological: Negative for dizziness, numbness and  "headaches (Chronic migraine headaches).   Psychiatric/Behavioral: Positive for decreased concentration (Word finding issues). The patient is not nervous/anxious.         Objective     Vitals:    09/20/21 1550   BP: 116/79   Pulse: 63   Resp: 16   Temp: 97.1 °F (36.2 °C)   TempSrc: Skin   SpO2: 98%   Weight: 72.1 kg (158 lb 14.4 oz)   Height: 162.6 cm (64.02\")   PainSc: 0-No pain       Current Status 9/20/2021   ECOG score 0       Physical Exam    GENERAL:  Well-developed, well-nourished in no acute distress.   SKIN:  Warm, dry without rashes, purpura or petechiae.  EYES:  Pupils equal, round and reactive to light.  EOMs intact.  Conjunctivae normal.  EARS:  Hearing intact.  NOSE:  Septum midline.  No excoriations or nasal discharge.  MOUTH:  Tongue is well-papillated; no stomatitis or ulcers.  Lips normal.  THROAT:  Oropharynx without lesions or exudates.  NECK:  Supple with good range of motion; no thyromegaly or masses, no JVD.  LYMPHATICS:  No cervical, supraclavicular, axillary adenopathy.  CHEST:  Lungs clear to auscultation. Good airflow.  BREASTS: Right breast is benign left  shows a lumpectomy scar with minimal scarring at   the lumpectomy site, otherwise soft, no nodules, no skin changes, no nipple discharge.  Left breast and axilla soft, no nodules, no skin changes, no nipple discharge.  Right axilla is tender to palpation no definite adenopathy  CARDIAC:  Regular rate and rhythm without murmurs, rubs or gallops. Normal S1,S2.  ABDOMEN:  Soft, minimal left lower quadrant tenderness without mass - with no hepatosplenomegaly .  EXTREMITIES:  No clubbing, cyanosis or edema.  NEUROLOGICAL:  Cranial Nerves II-XII grossly intact.  No focal neurological deficits.  PSYCHIATRIC:  Normal affect and mood.    I have reexamined the patient and the results are consistent with the previously documented exam. Dick Gutierrez MD         RECENT LABS:  Hematology WBC   Date Value Ref Range Status   03/22/2021 10.00 3.40 " "- 10.80 10*3/mm3 Final     RBC   Date Value Ref Range Status   03/22/2021 4.15 3.77 - 5.28 10*6/mm3 Final     Hemoglobin   Date Value Ref Range Status   03/22/2021 12.6 12.0 - 15.9 g/dL Final     Hematocrit   Date Value Ref Range Status   03/22/2021 37.7 34.0 - 46.6 % Final     Platelets   Date Value Ref Range Status   03/22/2021 259 140 - 450 10*3/mm3 Final         IMPRESSION:  1. Biopsy-proven malignancy in the left breast at the 11:30 position  measuring on the order of 2.1 cm in greatest dimension. No other  suspicious findings are seen within the left breast and there is no  evidence for left axillary adenopathy.  2. There are no findings suspicious for malignancy in the right breast.     BI-RADS Category 6: Known biopsy-proven malignancy.     4/11/18  1. \"LEFT BREAST MASS,\" WIRE GUIDED LUMPECTOMY:             INVASIVE DUCTAL CARCINOMA, INTERMEDIATE GRADE, 8 MM, MARGINS CLEAR.             CLOSEST MARGIN TO INVASIVE TUMOR: 1.5 MM (INFERIOR).             ASSOCIATED HIGH GRADE DUCTAL CARCINOMA IN SITU SPANNING APPROXIMATELY 16 MM (FOUR                    BLOCKS x 4 MM PER BLOCK).             CLOSEST MARGIN TO DUCTAL CARCINOMA IN SITU: 0.5 MM (LATERAL AND MEDIAL MARGINS).             HORMONE RECEPTORS REPORTED ON PRIOR BIOPSY, [facility] [id number]. REPEAT/                   CONFIRMATORY STUDIES ARE AVAILABLE UPON REQUEST.                         BIOPSY SITE CHANGES.     NOTE: ADDITIONAL MARGINS SUBMITTED AS PARTS 2, 3, AND 4.        2. \"LEFT BREAST NEW ANTERIOR MARGIN\":              BENIGN FIBROADIPOSE TISSUE.              NEW MARGIN - NO ATYPIA.     3. \"LEFT BREAST NEW SUPERIOR MARGIN\":             BENIGN BREAST TISSUE.             NEW MARGIN - NO ATYPIA.     4. \"LEFT BREAST NEW LATERAL MARGIN\":             BENIGN BREAST TISSUE.             NEW MARGIN - NO ATYPIA.     5. \"SENTINEL NODE #1\":             LYMPH NODE, MULTIPLE STEP SECTIONS: NO METASTATIC CARCINOMA SEEN.     6. \"SENTINEL NODE #2\":             " LYMPH NODE, MULTIPLE STEP SECTIONS: NO METASTATIC CARCINOMA SEEN.     THM/th IHC/a/CMK                                Electronically signed by Darin Claire MD on 4/12/2018 at 1511   Synoptic Checklist   INVASIVE CARCINOMA OF THE BREAST   Breast Invasive - All Specimens   SPECIMEN   Procedure  Excision (less than total mastectomy)   Specimen Laterality  Left   TUMOR   Histologic Type  Invasive carcinoma of no special type (ductal, not otherwise specified)   Histologic Grade ([place] Histologic Score)     Glandular (Acinar) / Tubular Differentiation  Score 3 (< 10% of tumor area forming glandular / tubular structures)   Nuclear Pleomorphism  Score 2 (Cells larger than normal with open vesicular nuclei, visible nucleoli, and moderate variability in both size and shape)   Mitotic Rate  Score 1 (<=3 mitoses per mm2)   Overall Grade  Grade 2 (scores of 6 or 7)   Tumor Size  Greatest dimension of largest invasive focus > 1 mm (indicate exact measurement in Millimeters):: 8 mm   Tumor Focality  Single focus of invasive carcinoma   Ductal Carcinoma In Situ (DCIS)  DCIS is present in specimen   Ductal Carcinoma In Situ (DCIS)  Positive for EIC   Size (Extent) of DCIS  Estimated size (extent) of DCIS (greatest dimension using gross and microscopic evaluation) in Millimeters (mm) is at least: 16  mm   Nuclear Grade  Grade III (high)   Lymphovascular Invasion  Not identified   Treatment Effect  No known presurgical therapy   MARGINS   Invasive Carcinoma Margins  Uninvolved by invasive carcinoma   Distance from Closest Margin in Millimeters (mm)  Specify distance: 1.5 mm   Closest Margin  Inferior   DCIS Margins  Uninvolved by DCIS (DCIS present in specimen)   Distance of DCIS from Closest Margin in Millimeters (mm)  Specify distance: 0.5 mm   Closest Margin  Medial     Lateral   LYMPH NODES   Regional Lymph Nodes     Number of Lymph Nodes with Macrometastases (> 2 mm)  Specify number: 0   Number of Lymph Nodes with  Micrometastases (> 0.2 mm to 2 mm and / or > 200 cells)  Specify number: 0   Number of Lymph Nodes with Isolated Tumor Cells (<= 0.2 mm and <= 200 cells)  Specify number: 0   Number of Lymph Nodes Examined  Specify number: 2   Number of [place] Nodes Examined  Specify number: 2   PATHOLOGIC STAGE CLASSIFICATION (pTNM)   Primary Tumor (Invasive Carcinoma) (pT)  pT1c: Tumor > 10 mm but <= 20 mm in greatest dimension   Modifier  (sn): Only sentinel node(s) evaluated. If 6 or more nodes (sentinel or nonsentinel) are removed, this modifier should not be used.   Category (pN)  pN0: No regional lymph node metastasis identified or ITCs only   .        COMPLETE PELVIC SONOGRAM   FINDINGS: Transabdominal and transvaginal pelvic sonography was  performed. The uterus is anteflexed and measures 12 x 5 x 7 cm. The  endometrial bilayer measures 0.9 cm in thickness. Scattered  heterogeneity is seen throughout the uterus. Within the posterior mid  uterine body there is a 1.9 x 1.4 x 1.3 cm complex hypoechoic lesion. In  the mid uterine body located in the anterior aspect of the uterus there  is a 1.6 x 1.4 x 1.9 cm hypoechoic lesion.     The right ovary measures 4.2 x 1.9 x 3.2 cm and the left ovary measures  3.4 x 1.9 x 1.4 cm. Normal intraovarian venous vascularity is seen in  both ovaries. Normal follicles are seen within both ovaries. No free  fluid is seen within the pelvic cul-de-sac.     IMPRESSION:  1. Enlarged uterus with 2 focal myometrial lesions measuring on the  order of 1.9 cm in greatest dimension. The sonographic features suggest  adenomyosis, possibly with focal adenomyomas versus fibroids. It is  difficult to differentiate an adenomyoma versus a fibroid, but I believe  adenomyosis is likely present given the enlarged heterogenous appearance  of the uterus. If imaging differentiation is desired it can be performed  with a pelvic MRI without and with contrast.  2. Normal sonographic appearance of both ovaries.      This report was finalized on 9/21/2018     Interpretation Summary 11/18            · Normal bilateral lower extremity venous duplex scan.                     Assessment/Plan   1.[id number] HER-2 negative grade 2 left breast cancer post lumpectomy and sentinel node biopsy-with close margins from high-grade DCIS medially-Oncotype score 16.  Tamoxifen initiated 7/2018  · 1 month break from tamoxifen to see if her memory issues improve and then resume    2.  Interstitial cystitis undergoing instillation of lidocaine/heparin in  the bladder intermittently.  Pelvic discomfort approximately 10 days prior to menses currently.  Ovarian cyst found by gynecology as possible culprit versus interstitial cystitis potential flare prior to menses.    3.  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 Worsening dysphagia to solids-ENT exam negative symptoms improved with Lexapro-but persistent as of 5/20's referred to GI    6.  Depression.  Patient on Lexapro, initiated during the time of family deaths and recovery from her breast surgery.  Patient feels she has improved would like to wean off the medication  · Patient weaning off Lexapro and 9/21.    Plan  1.continue tamoxifen 20 mg daily after 1 month treatment hiatus  2.  Stay off Lexapro if she can  3.  Follow-up with Dr. Gutierrez in 6 months,   4.  Mammogram and DEXA to be completed around April 1, 2022

## 2021-09-22 ENCOUNTER — LAB (OUTPATIENT)
Dept: LAB | Facility: HOSPITAL | Age: 51
End: 2021-09-22

## 2021-09-22 ENCOUNTER — TRANSCRIBE ORDERS (OUTPATIENT)
Dept: ADMINISTRATIVE | Facility: HOSPITAL | Age: 51
End: 2021-09-22

## 2021-09-22 DIAGNOSIS — E03.9 PRIMARY HYPOTHYROIDISM: ICD-10-CM

## 2021-09-22 DIAGNOSIS — E03.9 PRIMARY HYPOTHYROIDISM: Primary | ICD-10-CM

## 2021-09-22 LAB
25(OH)D3 SERPL-MCNC: 49.4 NG/ML (ref 30–100)
T3 SERPL-MCNC: 144 NG/DL (ref 80–200)
T4 SERPL-MCNC: 7.07 MCG/DL (ref 4.5–11.7)
TSH SERPL DL<=0.05 MIU/L-ACNC: 0.76 UIU/ML (ref 0.27–4.2)

## 2021-09-22 PROCEDURE — 84480 ASSAY TRIIODOTHYRONINE (T3): CPT

## 2021-09-22 PROCEDURE — 36415 COLL VENOUS BLD VENIPUNCTURE: CPT

## 2021-09-22 PROCEDURE — 82306 VITAMIN D 25 HYDROXY: CPT

## 2021-09-22 PROCEDURE — 84436 ASSAY OF TOTAL THYROXINE: CPT

## 2021-09-22 PROCEDURE — 84443 ASSAY THYROID STIM HORMONE: CPT

## 2021-09-23 ENCOUNTER — TRANSCRIBE ORDERS (OUTPATIENT)
Dept: ADMINISTRATIVE | Facility: HOSPITAL | Age: 51
End: 2021-09-23

## 2021-09-23 DIAGNOSIS — R00.2 PALPITATIONS: Primary | ICD-10-CM

## 2021-09-28 ENCOUNTER — HOSPITAL ENCOUNTER (OUTPATIENT)
Dept: CARDIOLOGY | Facility: HOSPITAL | Age: 51
Discharge: HOME | End: 2021-09-28
Admitting: INTERNAL MEDICINE

## 2021-09-28 DIAGNOSIS — R00.2 PALPITATIONS: ICD-10-CM

## 2021-09-28 PROCEDURE — 93225 XTRNL ECG REC<48 HRS REC: CPT

## 2021-09-28 PROCEDURE — 93226 XTRNL ECG REC<48 HR SCAN A/R: CPT

## 2021-09-30 LAB
MAXIMAL PREDICTED HEART RATE: 170 BPM
STRESS TARGET HR: 145 BPM

## 2021-09-30 PROCEDURE — 93227 XTRNL ECG REC<48 HR R&I: CPT | Performed by: INTERNAL MEDICINE

## 2021-11-11 ENCOUNTER — TRANSCRIBE ORDERS (OUTPATIENT)
Dept: ADMINISTRATIVE | Facility: HOSPITAL | Age: 51
End: 2021-11-11

## 2021-11-11 ENCOUNTER — LAB (OUTPATIENT)
Dept: LAB | Facility: HOSPITAL | Age: 51
End: 2021-11-11

## 2021-11-11 DIAGNOSIS — Z00.00 ROUTINE GENERAL MEDICAL EXAMINATION AT A HEALTH CARE FACILITY: Primary | ICD-10-CM

## 2021-11-11 DIAGNOSIS — E03.9 MYXEDEMA HEART DISEASE: ICD-10-CM

## 2021-11-11 DIAGNOSIS — I51.9 MYXEDEMA HEART DISEASE: ICD-10-CM

## 2021-11-11 DIAGNOSIS — Z00.00 ROUTINE GENERAL MEDICAL EXAMINATION AT A HEALTH CARE FACILITY: ICD-10-CM

## 2021-11-11 LAB
25(OH)D3 SERPL-MCNC: 50.1 NG/ML (ref 30–100)
ALBUMIN SERPL-MCNC: 4.1 G/DL (ref 3.5–5.2)
ALBUMIN/GLOB SERPL: 1.6 G/DL
ALP SERPL-CCNC: 40 U/L (ref 39–117)
ALT SERPL W P-5'-P-CCNC: 13 U/L (ref 1–33)
ANION GAP SERPL CALCULATED.3IONS-SCNC: 10 MMOL/L (ref 5–15)
AST SERPL-CCNC: 14 U/L (ref 1–32)
BACTERIA UR QL AUTO: ABNORMAL /HPF
BASOPHILS # BLD AUTO: 0.07 10*3/MM3 (ref 0–0.2)
BASOPHILS NFR BLD AUTO: 0.8 % (ref 0–1.5)
BILIRUB SERPL-MCNC: 0.3 MG/DL (ref 0–1.2)
BILIRUB UR QL STRIP: NEGATIVE
BUN SERPL-MCNC: 15 MG/DL (ref 6–20)
BUN/CREAT SERPL: 16.9 (ref 7–25)
CALCIUM SPEC-SCNC: 9.3 MG/DL (ref 8.6–10.5)
CHLORIDE SERPL-SCNC: 107 MMOL/L (ref 98–107)
CHOLEST SERPL-MCNC: 144 MG/DL (ref 0–200)
CLARITY UR: CLEAR
CO2 SERPL-SCNC: 23 MMOL/L (ref 22–29)
COLOR UR: YELLOW
CREAT SERPL-MCNC: 0.89 MG/DL (ref 0.57–1)
DEPRECATED RDW RBC AUTO: 41.2 FL (ref 37–54)
EOSINOPHIL # BLD AUTO: 0.16 10*3/MM3 (ref 0–0.4)
EOSINOPHIL NFR BLD AUTO: 1.8 % (ref 0.3–6.2)
ERYTHROCYTE [DISTWIDTH] IN BLOOD BY AUTOMATED COUNT: 12.5 % (ref 12.3–15.4)
GFR SERPL CREATININE-BSD FRML MDRD: 67 ML/MIN/1.73
GLOBULIN UR ELPH-MCNC: 2.6 GM/DL
GLUCOSE SERPL-MCNC: 111 MG/DL (ref 65–99)
GLUCOSE UR STRIP-MCNC: NEGATIVE MG/DL
HCT VFR BLD AUTO: 37.8 % (ref 34–46.6)
HDLC SERPL-MCNC: 47 MG/DL (ref 40–60)
HGB BLD-MCNC: 12.8 G/DL (ref 12–15.9)
HGB UR QL STRIP.AUTO: ABNORMAL
HYALINE CASTS UR QL AUTO: ABNORMAL /LPF
IMM GRANULOCYTES # BLD AUTO: 0.03 10*3/MM3 (ref 0–0.05)
IMM GRANULOCYTES NFR BLD AUTO: 0.3 % (ref 0–0.5)
KETONES UR QL STRIP: NEGATIVE
LDLC SERPL CALC-MCNC: 83 MG/DL (ref 0–100)
LDLC/HDLC SERPL: 1.76 {RATIO}
LEUKOCYTE ESTERASE UR QL STRIP.AUTO: NEGATIVE
LYMPHOCYTES # BLD AUTO: 2.59 10*3/MM3 (ref 0.7–3.1)
LYMPHOCYTES NFR BLD AUTO: 29 % (ref 19.6–45.3)
MCH RBC QN AUTO: 30.3 PG (ref 26.6–33)
MCHC RBC AUTO-ENTMCNC: 33.9 G/DL (ref 31.5–35.7)
MCV RBC AUTO: 89.4 FL (ref 79–97)
MONOCYTES # BLD AUTO: 0.62 10*3/MM3 (ref 0.1–0.9)
MONOCYTES NFR BLD AUTO: 7 % (ref 5–12)
NEUTROPHILS NFR BLD AUTO: 5.45 10*3/MM3 (ref 1.7–7)
NEUTROPHILS NFR BLD AUTO: 61.1 % (ref 42.7–76)
NITRITE UR QL STRIP: NEGATIVE
NRBC BLD AUTO-RTO: 0 /100 WBC (ref 0–0.2)
PH UR STRIP.AUTO: 6 [PH] (ref 5–8)
PLATELET # BLD AUTO: 300 10*3/MM3 (ref 140–450)
PMV BLD AUTO: 9.9 FL (ref 6–12)
POTASSIUM SERPL-SCNC: 4.2 MMOL/L (ref 3.5–5.2)
PROT SERPL-MCNC: 6.7 G/DL (ref 6–8.5)
PROT UR QL STRIP: NEGATIVE
RBC # BLD AUTO: 4.23 10*6/MM3 (ref 3.77–5.28)
RBC # UR: ABNORMAL /HPF
REF LAB TEST METHOD: ABNORMAL
SODIUM SERPL-SCNC: 140 MMOL/L (ref 136–145)
SP GR UR STRIP: 1.02 (ref 1–1.03)
SQUAMOUS #/AREA URNS HPF: ABNORMAL /HPF
T3 SERPL-MCNC: 143 NG/DL (ref 80–200)
T4 SERPL-MCNC: 7.98 MCG/DL (ref 4.5–11.7)
TRIGL SERPL-MCNC: 72 MG/DL (ref 0–150)
TSH SERPL DL<=0.05 MIU/L-ACNC: 0.59 UIU/ML (ref 0.27–4.2)
UROBILINOGEN UR QL STRIP: ABNORMAL
VLDLC SERPL-MCNC: 14 MG/DL (ref 5–40)
WBC # BLD AUTO: 8.92 10*3/MM3 (ref 3.4–10.8)
WBC UR QL AUTO: ABNORMAL /HPF

## 2021-11-11 PROCEDURE — 84436 ASSAY OF TOTAL THYROXINE: CPT

## 2021-11-11 PROCEDURE — 84443 ASSAY THYROID STIM HORMONE: CPT

## 2021-11-11 PROCEDURE — 80053 COMPREHEN METABOLIC PANEL: CPT

## 2021-11-11 PROCEDURE — 84480 ASSAY TRIIODOTHYRONINE (T3): CPT

## 2021-11-11 PROCEDURE — 81001 URINALYSIS AUTO W/SCOPE: CPT

## 2021-11-11 PROCEDURE — 36415 COLL VENOUS BLD VENIPUNCTURE: CPT

## 2021-11-11 PROCEDURE — 82306 VITAMIN D 25 HYDROXY: CPT

## 2021-11-11 PROCEDURE — 85025 COMPLETE CBC W/AUTO DIFF WBC: CPT

## 2021-11-11 PROCEDURE — 80061 LIPID PANEL: CPT

## 2021-12-22 ENCOUNTER — TRANSCRIBE ORDERS (OUTPATIENT)
Dept: ADMINISTRATIVE | Facility: HOSPITAL | Age: 51
End: 2021-12-22

## 2021-12-22 ENCOUNTER — LAB (OUTPATIENT)
Dept: LAB | Facility: HOSPITAL | Age: 51
End: 2021-12-22

## 2021-12-22 DIAGNOSIS — R31.29 MICROSCOPIC HEMATURIA: Primary | ICD-10-CM

## 2021-12-22 DIAGNOSIS — R31.29 MICROSCOPIC HEMATURIA: ICD-10-CM

## 2021-12-22 LAB
BACTERIA UR QL AUTO: NORMAL /HPF
BILIRUB UR QL STRIP: NEGATIVE
CLARITY UR: CLEAR
COLOR UR: YELLOW
GLUCOSE UR STRIP-MCNC: NEGATIVE MG/DL
HGB UR QL STRIP.AUTO: NEGATIVE
HYALINE CASTS UR QL AUTO: NORMAL /LPF
KETONES UR QL STRIP: NEGATIVE
LEUKOCYTE ESTERASE UR QL STRIP.AUTO: NEGATIVE
NITRITE UR QL STRIP: NEGATIVE
PH UR STRIP.AUTO: 6 [PH] (ref 5–8)
PROT UR QL STRIP: NEGATIVE
RBC # UR STRIP: NORMAL /HPF
REF LAB TEST METHOD: NORMAL
SP GR UR STRIP: 1.01 (ref 1–1.03)
SQUAMOUS #/AREA URNS HPF: NORMAL /HPF
UROBILINOGEN UR QL STRIP: NORMAL
WBC # UR STRIP: NORMAL /HPF

## 2021-12-22 PROCEDURE — 81001 URINALYSIS AUTO W/SCOPE: CPT

## 2022-01-28 ENCOUNTER — TRANSCRIBE ORDERS (OUTPATIENT)
Dept: ADMINISTRATIVE | Facility: HOSPITAL | Age: 52
End: 2022-01-28

## 2022-01-28 DIAGNOSIS — Z12.31 SCREENING MAMMOGRAM, ENCOUNTER FOR: Primary | ICD-10-CM

## 2022-03-07 ENCOUNTER — LAB (OUTPATIENT)
Dept: OTHER | Facility: HOSPITAL | Age: 52
End: 2022-03-07

## 2022-03-07 ENCOUNTER — OFFICE VISIT (OUTPATIENT)
Dept: ONCOLOGY | Facility: CLINIC | Age: 52
End: 2022-03-07

## 2022-03-07 VITALS
SYSTOLIC BLOOD PRESSURE: 112 MMHG | TEMPERATURE: 97.3 F | RESPIRATION RATE: 18 BRPM | DIASTOLIC BLOOD PRESSURE: 76 MMHG | WEIGHT: 147.8 LBS | HEART RATE: 56 BPM | OXYGEN SATURATION: 98 % | BODY MASS INDEX: 25.23 KG/M2 | HEIGHT: 64 IN

## 2022-03-07 DIAGNOSIS — Z79.810 LONG-TERM CURRENT USE OF TAMOXIFEN: ICD-10-CM

## 2022-03-07 DIAGNOSIS — C50.212 MALIGNANT NEOPLASM OF UPPER-INNER QUADRANT OF LEFT BREAST IN FEMALE, ESTROGEN RECEPTOR POSITIVE: Primary | ICD-10-CM

## 2022-03-07 DIAGNOSIS — C50.212 MALIGNANT NEOPLASM OF UPPER-INNER QUADRANT OF LEFT BREAST IN FEMALE, ESTROGEN RECEPTOR POSITIVE: ICD-10-CM

## 2022-03-07 DIAGNOSIS — Z17.0 MALIGNANT NEOPLASM OF UPPER-INNER QUADRANT OF LEFT BREAST IN FEMALE, ESTROGEN RECEPTOR POSITIVE: Primary | ICD-10-CM

## 2022-03-07 DIAGNOSIS — Z17.0 MALIGNANT NEOPLASM OF UPPER-INNER QUADRANT OF LEFT BREAST IN FEMALE, ESTROGEN RECEPTOR POSITIVE: ICD-10-CM

## 2022-03-07 LAB
ALBUMIN SERPL-MCNC: 4.2 G/DL (ref 3.5–5.2)
ALBUMIN/GLOB SERPL: 1.6 G/DL
ALP SERPL-CCNC: 48 U/L (ref 39–117)
ALT SERPL W P-5'-P-CCNC: 13 U/L (ref 1–33)
ANION GAP SERPL CALCULATED.3IONS-SCNC: 10.7 MMOL/L (ref 5–15)
AST SERPL-CCNC: 16 U/L (ref 1–32)
BASOPHILS # BLD AUTO: 0.06 10*3/MM3 (ref 0–0.2)
BASOPHILS NFR BLD AUTO: 0.7 % (ref 0–1.5)
BILIRUB SERPL-MCNC: 0.2 MG/DL (ref 0–1.2)
BUN SERPL-MCNC: 11 MG/DL (ref 6–20)
BUN/CREAT SERPL: 14.9 (ref 7–25)
CALCIUM SPEC-SCNC: 9.3 MG/DL (ref 8.6–10.5)
CHLORIDE SERPL-SCNC: 108 MMOL/L (ref 98–107)
CO2 SERPL-SCNC: 22.3 MMOL/L (ref 22–29)
CREAT SERPL-MCNC: 0.74 MG/DL (ref 0.57–1)
DEPRECATED RDW RBC AUTO: 42.2 FL (ref 37–54)
EGFRCR SERPLBLD CKD-EPI 2021: 98.7 ML/MIN/1.73
EOSINOPHIL # BLD AUTO: 0.14 10*3/MM3 (ref 0–0.4)
EOSINOPHIL NFR BLD AUTO: 1.7 % (ref 0.3–6.2)
ERYTHROCYTE [DISTWIDTH] IN BLOOD BY AUTOMATED COUNT: 12.8 % (ref 12.3–15.4)
GLOBULIN UR ELPH-MCNC: 2.7 GM/DL
GLUCOSE SERPL-MCNC: 130 MG/DL (ref 65–99)
HCT VFR BLD AUTO: 38.2 % (ref 34–46.6)
HGB BLD-MCNC: 12.7 G/DL (ref 12–15.9)
IMM GRANULOCYTES # BLD AUTO: 0.02 10*3/MM3 (ref 0–0.05)
IMM GRANULOCYTES NFR BLD AUTO: 0.2 % (ref 0–0.5)
LYMPHOCYTES # BLD AUTO: 2.47 10*3/MM3 (ref 0.7–3.1)
LYMPHOCYTES NFR BLD AUTO: 29.9 % (ref 19.6–45.3)
MCH RBC QN AUTO: 29.9 PG (ref 26.6–33)
MCHC RBC AUTO-ENTMCNC: 33.2 G/DL (ref 31.5–35.7)
MCV RBC AUTO: 89.9 FL (ref 79–97)
MONOCYTES # BLD AUTO: 0.65 10*3/MM3 (ref 0.1–0.9)
MONOCYTES NFR BLD AUTO: 7.9 % (ref 5–12)
NEUTROPHILS NFR BLD AUTO: 4.92 10*3/MM3 (ref 1.7–7)
NEUTROPHILS NFR BLD AUTO: 59.6 % (ref 42.7–76)
NRBC BLD AUTO-RTO: 0 /100 WBC (ref 0–0.2)
PLATELET # BLD AUTO: 260 10*3/MM3 (ref 140–450)
PMV BLD AUTO: 10.5 FL (ref 6–12)
POTASSIUM SERPL-SCNC: 3.9 MMOL/L (ref 3.5–5.2)
PROT SERPL-MCNC: 6.9 G/DL (ref 6–8.5)
RBC # BLD AUTO: 4.25 10*6/MM3 (ref 3.77–5.28)
SODIUM SERPL-SCNC: 141 MMOL/L (ref 136–145)
WBC NRBC COR # BLD: 8.26 10*3/MM3 (ref 3.4–10.8)

## 2022-03-07 PROCEDURE — 36415 COLL VENOUS BLD VENIPUNCTURE: CPT

## 2022-03-07 PROCEDURE — 85025 COMPLETE CBC W/AUTO DIFF WBC: CPT | Performed by: INTERNAL MEDICINE

## 2022-03-07 PROCEDURE — 80053 COMPREHEN METABOLIC PANEL: CPT | Performed by: INTERNAL MEDICINE

## 2022-03-07 PROCEDURE — 99214 OFFICE O/P EST MOD 30 MIN: CPT | Performed by: INTERNAL MEDICINE

## 2022-03-07 NOTE — PROGRESS NOTES
Subjective     REASON FOR CONSULTATION:  1.  T1bN0 M0 strongly ER/WV positive HER-2 negative grade 2 left breast cancer post lumpectomy with close margins for DCIS-Oncotype score of 16 radiation and tamoxifen started 7/18                             REQUESTING PHYSICIAN:  Adrián Pereyra M.D. and Nikita Gallegos M.D.      History of Present Illness is a 51-year-old perimenopausal female with a T1b N0 strongly ER/WV positive breast cancer with a low Oncotype score of 16.  She completed radiation and began tamoxifen 7/20/2018.    Patient's last mammogram was in April 2021 and was benign.  Her mammogram is set up for April 22 along with a DEXA scan    Patient denies any concerns on self breast exams.  Occasionally she has tenderness at the area of the lumpectomy and lymph node removal but this is been stable since surgery.    She is having issues with word recall and this is getting worse and I suggested a 1 month break from tamoxifen to see if he gets any better and it did get somewhat better    Her interstitial cystitis is stable-she has skipped 2 months of menstrual bleeding and perimenopausal    She continues to have low libido.  She has weaned off the Lexapro 2 weeks ago but is still very stallworth but lost 10 pounds when she stopped the Lexapro  She is vaccinated against COVID-19    Past Medical History:   Diagnosis Date   • Anxiety    • Breast cancer (HCC) 02/14/2018    Left breast, Upper Inner Quadrant, Invasive Mammary Carcinoma, Intermediate grade, measuring at least 0.2 cm in dimension, with associated high grade solid and cribiform ductal carcinoma in situ with comedonecrosis, ER/WV positive, EFP2boc negative   • Heartburn    • Infectious mononucleosis    • Migraines    • PONV (postoperative nausea and vomiting)    • Pre-diabetes    • PVC's (premature ventricular contractions)    • Wears contact lenses         Past Surgical History:   Procedure Laterality Date   • BREAST BIOPSY Left 02/14/2018    Left breast  stereotactic biopsy w/ marker clip placement & positive radiograph-Dr. Lashaun Gamble, [facility]   • BREAST BIOPSY Left 02/12/2018    INCOMPLETE - Biopsy could not be completed as patient had severe vasovagal reaction & subsequent syncope-[facility]   • BREAST BIOPSY Left 5/23/2018    Procedure: REEXCISION OF POSITIVE LUMPECTOMY MARGIN LEFT BREAST;  Surgeon: Adrián Pereyra MD;  Location: [facility];  Service: General   • BREAST LUMPECTOMY WITH SENTINEL NODE BIOPSY Left 4/11/2018    Procedure: BREAST LUMPECTOMY WITH SENTINEL NODE BIOPSY AND NEEDLE LOCALIZATION;  Surgeon: Adrián Pereyra MD;  Location: [facility];  Service: General      ONCOLOGIC HISTORY:  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  A diagnostic mammogram gram showed a 2.3 cm area of clustered microcalcifications very suspicious for malignancy and the patient underwent a stereotactic biopsy on 2/14/18 with the findings of intermediate grade invasive mammary carcinoma measuring 2 mm with associated high-grade and solid DCIS--ER was 65% ND was 96% HER-2 0 Ki-67 was 2%.  The patient was referred to Dr. Pereyra and underwent bilateral breast MRIs with the findings of a 2.1 cm abnormality in the left breast with no axillary adenopathy and she was taken for surgery when lumpectomy and sentinel node biopsy was performed on 4/11/18.  This revealed a residual 8 mm area of invasive carcinoma grade 2 with associated high-grade DCIS spanning 16 mm-the lateral and medial margins were 0.5 mm from the DCIS-new anterior, superior and lateral margins were obtained which were negative but the medial margin was still felt to be close.  She went to see Dr. Pereyra to wanted to discuss further the status of her margins but sent her to us also to discuss adjuvant  treatment for her invasive cancer.    She is healing well from surgery.  She is obviously anxious about the margins but has no qualms about having further surgery even if the cosmesis is not perfect because she is more worried about recurrence of her cancer.  She is  3 para 3 -Menarche was age 14 she is premenopausal first childbirth was age 28 she breast-fed for 9 months she's been on no hormonal replacement since her last child 13 years ago  She is having bladder issues and her gynecologist just prescribed vaginal estrogens which she has not yet started.    Family history is positive for her father having thyroid cancer age 64 and he is currently dying from this is a 78 she's one sister is healthy her mother's 72 and healthy she has a maternal aunt with breast cancers at an unknown age paternal aunt with breast cancer in her 60s paternal grandfather with bone cancer and a maternal grandfather with bone cancer is no ovarian cancer in the family.      I explained to Cesia and her  that her tumor is very unlikely to have a high Oncotype score and it is very likely that the mainstay of her treatment would be tamoxifen.  But we will send an Oncotype score because of the small chance that there is a higher recurrence risk than expected based on her pathology in tumor size.  I think it is very reasonable to get genetic testing because of her family history although it is unlikely to change our treatment.  I've asked her to hold off and vaginal estrogens for the time being and to discuss this with her gynecologist.  She will stop her Zoloft and we can prescribe Effexor if needed      we discussed her case at the multiple this may conference because of the close margins for high-grade DCIS and we all felt that radiation would take care of this and no further surgery was needed.    We discussed the low risk Oncotype in the lack of benefit for chemotherapy in this situation which we had already discussed  with her on the phone and we talked about tamoxifen.The side effects and toxicities of Tamoxifen were discussed with the patient, including hot flashes, mood swings,depression, DVT and endometrial cancer. A list of drugs that interfere with the efficacy of tamoxifen and are to be avoided were given to the patient.    At her last visit she was having a lot of pelvic pain with her menstrual cycle and an ultrasound which showed some adenomyosis and her ovarian cyst had disappeared thankfully-she started menstruating regularly-we ordered CAT scans because of the pain in her left side and thankfully chest and abdomen are negative except for thickening of her bladder from her interstitial cystitis and she had fibroids-this discomfort has gradually improved with time        Father  with metastatic thyroid cancer I have again stressed the need for genetic testing and she did this and it was thankfully benign except for 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  This is actually gotten better and she thinks the Lexapro has helped this also there may have been a component of anxiety.  I told her if the dysphagia recurs she needs an EGD          Current Outpatient Medications on File Prior to Visit   Medication Sig Dispense Refill   • ALPRAZolam (XANAX) 0.25 MG tablet Take 0.25 mg by mouth 2 (Two) Times a Day As Needed for Anxiety.     • Diclofenac Sodium 2 % solution Apply 2 Pump topically 2 (Two) Times a Day. 112 g 12   • tamoxifen (NOLVADEX) 20 MG chemo tablet TAKE 1 TABLET BY MOUTH EVERY DAY 90 tablet 3   • zolpidem (AMBIEN) 10 MG tablet TAKE 1 TABLET (10 MG) BY ORAL ROUTE ONCE DAILY AT BEDTIME AS NEEDED     • zolpidem (AMBIEN) 5 MG tablet      • [DISCONTINUED] Cholecalciferol (Vitamin D3) 1.25 MG (12859 UT) capsule TAKE ONE CAPSULE BY MOUTH WEEKLY AS DIRECTED BY PROVIDER.     • [DISCONTINUED] escitalopram (LEXAPRO) 10 MG tablet TAKE 1 TABLET BY  MOUTH EVERY DAY 90 tablet 1   • [DISCONTINUED] ibuprofen (ADVIL,MOTRIN) 600 MG tablet Take 600 mg by mouth Every 6 (Six) Hours As Needed for Mild Pain . HELD FOR SURGERY       No current facility-administered medications on file prior to visit.        ALLERGIES:  No Known Allergies     Social History     Socioeconomic History   • Marital status:      Spouse name: Nikita Workman   • Number of children: 3   • Years of education: College   Tobacco Use   • Smoking status: Former Smoker     Packs/day: 0.50     Years: 2.00     Pack years: 1.00     Types: Cigarettes     Start date:      Quit date:      Years since quittin.2   • Smokeless tobacco: Never Used   Substance and Sexual Activity   • Alcohol use: Yes     Comment: RARE MONTHLY   • Drug use: No   • Sexual activity: Defer     Birth control/protection: None        Family History   Problem Relation Age of Onset   • Breast cancer Maternal Aunt         In her 60s   • Breast cancer Paternal Aunt    • Hyperlipidemia Mother    • Asthma Father    • Diabetes Father    • Hearing loss Father    • Heart disease Father    • Hypertension Father    • Thyroid cancer Father 64        Follicular Tyroid Cancer   • Alcohol abuse Sister    • Depression Sister    • Diabetes Maternal Grandmother    • Bone cancer Maternal Grandfather    • Breast cancer Cousin    • Malig Hyperthermia Neg Hx         Review of Systems   Constitutional: Negative for chills, fatigue, fever and unexpected weight change.   HENT: Negative for congestion, mouth sores, nosebleeds and trouble swallowing.    Respiratory: Negative for choking, chest tightness and shortness of breath.    Cardiovascular: Negative for chest pain.   Gastrointestinal: Negative for constipation, diarrhea, nausea and vomiting.   Genitourinary: Negative for difficulty urinating and pelvic pain.        Continued decreased libido   Musculoskeletal: Positive for arthralgias (hands and thumbs intermittently). Negative for back  "pain and gait problem.   Neurological: Negative for dizziness, numbness and headaches (Chronic migraine headaches).   Psychiatric/Behavioral: Positive for decreased concentration (Word finding issues). The patient is not nervous/anxious.         Objective     Vitals:    03/07/22 1538   BP: 112/76   Pulse: 56   Resp: 18   Temp: 97.3 °F (36.3 °C)   TempSrc: Temporal   SpO2: 98%   Weight: 67 kg (147 lb 12.8 oz)   Height: 162.6 cm (64.02\")   PainSc: 0-No pain       Current Status 3/7/2022   ECOG score 0       Physical Exam    GENERAL:  Well-developed, well-nourished in no acute distress.   SKIN:  Warm, dry without rashes, purpura or petechiae.  EYES:  Pupils equal, round and reactive to light.  EOMs intact.  Conjunctivae normal.  EARS:  Hearing intact.  NOSE:  Septum midline.  No excoriations or nasal discharge.  MOUTH:  Tongue is well-papillated; no stomatitis or ulcers.  Lips normal.  THROAT:  Oropharynx without lesions or exudates.  NECK:  Supple with good range of motion; no thyromegaly or masses, no JVD.  LYMPHATICS:  No cervical, supraclavicular, axillary adenopathy.  CHEST:  Lungs clear to auscultation. Good airflow.  BREASTS: Right breast is benign left  shows a lumpectomy scar with minimal scarring at   the lumpectomy site, otherwise soft, no nodules, no skin changes, no nipple discharge.  Left breast and axilla soft, no nodules, no skin changes, no nipple discharge.  Right axilla is tender to palpation no definite adenopathy  CARDIAC:  Regular rate and rhythm without murmurs, rubs or gallops. Normal S1,S2.  ABDOMEN:  Soft, minimal left lower quadrant tenderness without mass - with no hepatosplenomegaly .  EXTREMITIES:  No clubbing, cyanosis or edema.  NEUROLOGICAL:  Cranial Nerves II-XII grossly intact.  No focal neurological deficits.  PSYCHIATRIC:  Normal affect and mood.    I have reexamined the patient and the results are consistent with the previously documented exam. Dick Gutierrez MD " "        RECENT LABS:  Hematology WBC   Date Value Ref Range Status   03/07/2022 8.26 3.40 - 10.80 10*3/mm3 Final     RBC   Date Value Ref Range Status   03/07/2022 4.25 3.77 - 5.28 10*6/mm3 Final     Hemoglobin   Date Value Ref Range Status   03/07/2022 12.7 12.0 - 15.9 g/dL Final     Hematocrit   Date Value Ref Range Status   03/07/2022 38.2 34.0 - 46.6 % Final     Platelets   Date Value Ref Range Status   03/07/2022 260 140 - 450 10*3/mm3 Final         IMPRESSION:  1. Biopsy-proven malignancy in the left breast at the 11:30 position  measuring on the order of 2.1 cm in greatest dimension. No other  suspicious findings are seen within the left breast and there is no  evidence for left axillary adenopathy.  2. There are no findings suspicious for malignancy in the right breast.     BI-RADS Category 6: Known biopsy-proven malignancy.     4/11/18  1. \"LEFT BREAST MASS,\" WIRE GUIDED LUMPECTOMY:             INVASIVE DUCTAL CARCINOMA, INTERMEDIATE GRADE, 8 MM, MARGINS CLEAR.             CLOSEST MARGIN TO INVASIVE TUMOR: 1.5 MM (INFERIOR).             ASSOCIATED HIGH GRADE DUCTAL CARCINOMA IN SITU SPANNING APPROXIMATELY 16 MM (FOUR                    BLOCKS x 4 MM PER BLOCK).             CLOSEST MARGIN TO DUCTAL CARCINOMA IN SITU: 0.5 MM (LATERAL AND MEDIAL MARGINS).             HORMONE RECEPTORS REPORTED ON PRIOR BIOPSY, [facility] [id number]. REPEAT/                   CONFIRMATORY STUDIES ARE AVAILABLE UPON REQUEST.                         BIOPSY SITE CHANGES.     NOTE: ADDITIONAL MARGINS SUBMITTED AS PARTS 2, 3, AND 4.        2. \"LEFT BREAST NEW ANTERIOR MARGIN\":              BENIGN FIBROADIPOSE TISSUE.              NEW MARGIN - NO ATYPIA.     3. \"LEFT BREAST NEW SUPERIOR MARGIN\":             BENIGN BREAST TISSUE.             NEW MARGIN - NO ATYPIA.     4. \"LEFT BREAST NEW LATERAL MARGIN\":             BENIGN BREAST TISSUE.             NEW MARGIN - NO ATYPIA.     5. \"SENTINEL NODE #1\":             LYMPH NODE, " "MULTIPLE STEP SECTIONS: NO METASTATIC CARCINOMA SEEN.     6. \"SENTINEL NODE #2\":             LYMPH NODE, MULTIPLE STEP SECTIONS: NO METASTATIC CARCINOMA SEEN.     THM/th IHC/a/CMK                                Electronically signed by Darin Claire MD on 4/12/2018 at 1511   Synoptic Checklist   INVASIVE CARCINOMA OF THE BREAST   Breast Invasive - All Specimens   SPECIMEN   Procedure  Excision (less than total mastectomy)   Specimen Laterality  Left   TUMOR   Histologic Type  Invasive carcinoma of no special type (ductal, not otherwise specified)   Histologic Grade (Jessie Histologic Score)     Glandular (Acinar) / Tubular Differentiation  Score 3 (< 10% of tumor area forming glandular / tubular structures)   Nuclear Pleomorphism  Score 2 (Cells larger than normal with open vesicular nuclei, visible nucleoli, and moderate variability in both size and shape)   Mitotic Rate  Score 1 (<=3 mitoses per mm2)   Overall Grade  Grade 2 (scores of 6 or 7)   Tumor Size  Greatest dimension of largest invasive focus > 1 mm (indicate exact measurement in Millimeters):: 8 mm   Tumor Focality  Single focus of invasive carcinoma   Ductal Carcinoma In Situ (DCIS)  DCIS is present in specimen   Ductal Carcinoma In Situ (DCIS)  Positive for EIC   Size (Extent) of DCIS  Estimated size (extent) of DCIS (greatest dimension using gross and microscopic evaluation) in Millimeters (mm) is at least: 16  mm   Nuclear Grade  Grade III (high)   Lymphovascular Invasion  Not identified   Treatment Effect  No known presurgical therapy   MARGINS   Invasive Carcinoma Margins  Uninvolved by invasive carcinoma   Distance from Closest Margin in Millimeters (mm)  Specify distance: 1.5 mm   Closest Margin  Inferior   DCIS Margins  Uninvolved by DCIS (DCIS present in specimen)   Distance of DCIS from Closest Margin in Millimeters (mm)  Specify distance: 0.5 mm   Closest Margin  Medial     Lateral   LYMPH NODES   Regional Lymph Nodes     Number of " Lymph Nodes with Macrometastases (> 2 mm)  Specify number: 0   Number of Lymph Nodes with Micrometastases (> 0.2 mm to 2 mm and / or > 200 cells)  Specify number: 0   Number of Lymph Nodes with Isolated Tumor Cells (<= 0.2 mm and <= 200 cells)  Specify number: 0   Number of Lymph Nodes Examined  Specify number: 2   Number of [place] Nodes Examined  Specify number: 2   PATHOLOGIC STAGE CLASSIFICATION (pTNM)   Primary Tumor (Invasive Carcinoma) (pT)  pT1c: Tumor > 10 mm but <= 20 mm in greatest dimension   Modifier  (sn): Only sentinel node(s) evaluated. If 6 or more nodes (sentinel or nonsentinel) are removed, this modifier should not be used.   Category (pN)  pN0: No regional lymph node metastasis identified or ITCs only   .        COMPLETE PELVIC SONOGRAM   FINDINGS: Transabdominal and transvaginal pelvic sonography was  performed. The uterus is anteflexed and measures 12 x 5 x 7 cm. The  endometrial bilayer measures 0.9 cm in thickness. Scattered  heterogeneity is seen throughout the uterus. Within the posterior mid  uterine body there is a 1.9 x 1.4 x 1.3 cm complex hypoechoic lesion. In  the mid uterine body located in the anterior aspect of the uterus there  is a 1.6 x 1.4 x 1.9 cm hypoechoic lesion.     The right ovary measures 4.2 x 1.9 x 3.2 cm and the left ovary measures  3.4 x 1.9 x 1.4 cm. Normal intraovarian venous vascularity is seen in  both ovaries. Normal follicles are seen within both ovaries. No free  fluid is seen within the pelvic cul-de-sac.     IMPRESSION:  1. Enlarged uterus with 2 focal myometrial lesions measuring on the  order of 1.9 cm in greatest dimension. The sonographic features suggest  adenomyosis, possibly with focal adenomyomas versus fibroids. It is  difficult to differentiate an adenomyoma versus a fibroid, but I believe  adenomyosis is likely present given the enlarged heterogenous appearance  of the uterus. If imaging differentiation is desired it can be performed  with a  pelvic MRI without and with contrast.  2. Normal sonographic appearance of both ovaries.     This report was finalized on 9/21/2018     Interpretation Summary 11/18            · Normal bilateral lower extremity venous duplex scan.                     Assessment/Plan   1.L1tT2I2 HER-2 negative grade 2 left breast cancer post lumpectomy and sentinel node biopsy-with close margins from high-grade DCIS medially-Oncotype score 16.  Tamoxifen initiated 7/2018  · 1 month break from tamoxifen to see if her memory issues improve and then resume  · Doing well on tamoxifen in 3/22-becoming perimenopausal    2.  Interstitial cystitis undergoing instillation of lidocaine/heparin in  the bladder intermittently.  Pelvic discomfort approximately 10 days prior to menses currently.  Ovarian cyst found by gynecology as possible culprit versus interstitial cystitis potential flare prior to menses.    3.  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 Worsening dysphagia to solids-ENT exam negative symptoms improved with Lexapro-but persistent as of 5/20's referred to GI  · Lester to be more anxiety than mechanical issues    6.  Depression.  Patient on Lexapro, initiated during the time of family deaths and recovery from her breast surgery.  Patient feels she has improved would like to wean off the medication  · Patient weaning off Lexapro and 9/21.    Plan  1.continue tamoxifen 20 mg daily   2.  Follow-up with Dr. Gutierrez in 6 months,   3.  Mammogram and DEXA to be completed around April 1, 2022    Discussed the implications and data regarding breast cancer index which may end up doing when she is 5 and half years to decide if there is any reason to continue past 6 years because she is worried about her mental acuity while on tamoxifen

## 2022-04-15 ENCOUNTER — HOSPITAL ENCOUNTER (OUTPATIENT)
Dept: MAMMOGRAPHY | Facility: HOSPITAL | Age: 52
Discharge: HOME | End: 2022-04-15
Admitting: SPECIALIST

## 2022-04-15 DIAGNOSIS — Z12.31 SCREENING MAMMOGRAM, ENCOUNTER FOR: ICD-10-CM

## 2022-04-15 PROCEDURE — 77063 BREAST TOMOSYNTHESIS BI: CPT

## 2022-04-15 PROCEDURE — 77067 SCR MAMMO BI INCL CAD: CPT

## 2022-05-07 ENCOUNTER — HOSPITAL ENCOUNTER (EMERGENCY)
Facility: HOSPITAL | Age: 52
Discharge: ANOTHER HOSPITAL | End: 2022-05-07
Attending: EMERGENCY MEDICINE | Admitting: EMERGENCY MEDICINE

## 2022-05-07 ENCOUNTER — HOSPITAL ENCOUNTER (INPATIENT)
Facility: HOSPITAL | Age: 52
LOS: 14 days | Discharge: NURSING, REHAB OR LONG-TERM CARE | End: 2022-05-21
Attending: INTERNAL MEDICINE | Admitting: INTERNAL MEDICINE

## 2022-05-07 ENCOUNTER — APPOINTMENT (OUTPATIENT)
Dept: CT IMAGING | Facility: HOSPITAL | Age: 52
End: 2022-05-07

## 2022-05-07 VITALS
TEMPERATURE: 98.2 F | RESPIRATION RATE: 18 BRPM | HEART RATE: 68 BPM | WEIGHT: 140 LBS | SYSTOLIC BLOOD PRESSURE: 117 MMHG | BODY MASS INDEX: 25.76 KG/M2 | DIASTOLIC BLOOD PRESSURE: 73 MMHG | HEIGHT: 62 IN | OXYGEN SATURATION: 100 %

## 2022-05-07 DIAGNOSIS — R51.9 NONINTRACTABLE HEADACHE, UNSPECIFIED CHRONICITY PATTERN, UNSPECIFIED HEADACHE TYPE: ICD-10-CM

## 2022-05-07 DIAGNOSIS — R20.8 DECREASED SENSATION OF LEG: ICD-10-CM

## 2022-05-07 DIAGNOSIS — M54.9 ACUTE BILATERAL BACK PAIN, UNSPECIFIED BACK LOCATION: ICD-10-CM

## 2022-05-07 DIAGNOSIS — R29.898 LEG WEAKNESS, BILATERAL: Primary | ICD-10-CM

## 2022-05-07 DIAGNOSIS — M51.24 THORACIC DISC HERNIATION: ICD-10-CM

## 2022-05-07 DIAGNOSIS — F41.9 ANXIETY: ICD-10-CM

## 2022-05-07 DIAGNOSIS — R29.898 BILATERAL LEG WEAKNESS: Primary | ICD-10-CM

## 2022-05-07 LAB
ALBUMIN SERPL-MCNC: 4.3 G/DL (ref 3.5–5.2)
ALBUMIN/GLOB SERPL: 1.5 G/DL
ALP SERPL-CCNC: 46 U/L (ref 39–117)
ALT SERPL W P-5'-P-CCNC: 17 U/L (ref 1–33)
AMPHET+METHAMPHET UR QL: NEGATIVE
AMPHETAMINES UR QL: NEGATIVE
ANION GAP SERPL CALCULATED.3IONS-SCNC: 19.3 MMOL/L (ref 5–15)
AST SERPL-CCNC: 20 U/L (ref 1–32)
B-HCG UR QL: NEGATIVE
BACTERIA UR QL AUTO: ABNORMAL /HPF
BARBITURATES UR QL SCN: NEGATIVE
BASOPHILS # BLD AUTO: 0.08 10*3/MM3 (ref 0–0.2)
BASOPHILS NFR BLD AUTO: 0.6 % (ref 0–1.5)
BENZODIAZ UR QL SCN: NEGATIVE
BILIRUB SERPL-MCNC: 0.2 MG/DL (ref 0–1.2)
BILIRUB UR QL STRIP: NEGATIVE
BUN SERPL-MCNC: 17 MG/DL (ref 6–20)
BUN/CREAT SERPL: 19.8 (ref 7–25)
BUPRENORPHINE SERPL-MCNC: NEGATIVE NG/ML
CALCIUM SPEC-SCNC: 9.7 MG/DL (ref 8.6–10.5)
CANNABINOIDS SERPL QL: NEGATIVE
CHLORIDE SERPL-SCNC: 104 MMOL/L (ref 98–107)
CLARITY UR: CLEAR
CO2 SERPL-SCNC: 16.7 MMOL/L (ref 22–29)
COCAINE UR QL: NEGATIVE
COLOR UR: YELLOW
CREAT SERPL-MCNC: 0.86 MG/DL (ref 0.57–1)
DEPRECATED RDW RBC AUTO: 43.6 FL (ref 37–54)
EGFRCR SERPLBLD CKD-EPI 2021: 82.4 ML/MIN/1.73
EOSINOPHIL # BLD AUTO: 0.02 10*3/MM3 (ref 0–0.4)
EOSINOPHIL NFR BLD AUTO: 0.1 % (ref 0.3–6.2)
ERYTHROCYTE [DISTWIDTH] IN BLOOD BY AUTOMATED COUNT: 12.9 % (ref 12.3–15.4)
ETHANOL BLD-MCNC: <10 MG/DL (ref 0–10)
ETHANOL UR QL: <0.01 %
GLOBULIN UR ELPH-MCNC: 2.8 GM/DL
GLUCOSE SERPL-MCNC: 141 MG/DL (ref 65–99)
GLUCOSE UR STRIP-MCNC: ABNORMAL MG/DL
HCT VFR BLD AUTO: 45.2 % (ref 34–46.6)
HGB BLD-MCNC: 14.7 G/DL (ref 12–15.9)
HGB UR QL STRIP.AUTO: ABNORMAL
HYALINE CASTS UR QL AUTO: ABNORMAL /LPF
IMM GRANULOCYTES # BLD AUTO: 0.08 10*3/MM3 (ref 0–0.05)
IMM GRANULOCYTES NFR BLD AUTO: 0.6 % (ref 0–0.5)
KETONES UR QL STRIP: ABNORMAL
LEUKOCYTE ESTERASE UR QL STRIP.AUTO: NEGATIVE
LYMPHOCYTES # BLD AUTO: 2.69 10*3/MM3 (ref 0.7–3.1)
LYMPHOCYTES NFR BLD AUTO: 18.7 % (ref 19.6–45.3)
MCH RBC QN AUTO: 30.1 PG (ref 26.6–33)
MCHC RBC AUTO-ENTMCNC: 32.5 G/DL (ref 31.5–35.7)
MCV RBC AUTO: 92.6 FL (ref 79–97)
METHADONE UR QL SCN: NEGATIVE
MONOCYTES # BLD AUTO: 0.66 10*3/MM3 (ref 0.1–0.9)
MONOCYTES NFR BLD AUTO: 4.6 % (ref 5–12)
NEUTROPHILS NFR BLD AUTO: 10.89 10*3/MM3 (ref 1.7–7)
NEUTROPHILS NFR BLD AUTO: 75.4 % (ref 42.7–76)
NITRITE UR QL STRIP: NEGATIVE
NRBC BLD AUTO-RTO: 0 /100 WBC (ref 0–0.2)
OPIATES UR QL: POSITIVE
OXYCODONE UR QL SCN: NEGATIVE
PCP UR QL SCN: NEGATIVE
PH UR STRIP.AUTO: 7 [PH] (ref 4.5–8)
PLATELET # BLD AUTO: 311 10*3/MM3 (ref 140–450)
PMV BLD AUTO: 10.7 FL (ref 6–12)
POTASSIUM SERPL-SCNC: 3.5 MMOL/L (ref 3.5–5.2)
PROCALCITONIN SERPL-MCNC: 0.03 NG/ML (ref 0–0.25)
PROPOXYPH UR QL: NEGATIVE
PROT SERPL-MCNC: 7.1 G/DL (ref 6–8.5)
PROT UR QL STRIP: ABNORMAL
RBC # BLD AUTO: 4.88 10*6/MM3 (ref 3.77–5.28)
RBC # UR STRIP: ABNORMAL /HPF
REF LAB TEST METHOD: ABNORMAL
SARS-COV-2 RNA PNL SPEC NAA+PROBE: NOT DETECTED
SODIUM SERPL-SCNC: 140 MMOL/L (ref 136–145)
SP GR UR STRIP: 1.02 (ref 1–1.03)
SQUAMOUS #/AREA URNS HPF: ABNORMAL /HPF
TRICYCLICS UR QL SCN: NEGATIVE
UROBILINOGEN UR QL STRIP: ABNORMAL
WBC # UR STRIP: ABNORMAL /HPF
WBC NRBC COR # BLD: 14.42 10*3/MM3 (ref 3.4–10.8)

## 2022-05-07 PROCEDURE — 70450 CT HEAD/BRAIN W/O DYE: CPT

## 2022-05-07 PROCEDURE — 82077 ASSAY SPEC XCP UR&BREATH IA: CPT | Performed by: EMERGENCY MEDICINE

## 2022-05-07 PROCEDURE — 81025 URINE PREGNANCY TEST: CPT | Performed by: EMERGENCY MEDICINE

## 2022-05-07 PROCEDURE — 99284 EMERGENCY DEPT VISIT MOD MDM: CPT | Performed by: EMERGENCY MEDICINE

## 2022-05-07 PROCEDURE — 96375 TX/PRO/DX INJ NEW DRUG ADDON: CPT

## 2022-05-07 PROCEDURE — 96374 THER/PROPH/DIAG INJ IV PUSH: CPT

## 2022-05-07 PROCEDURE — 25010000002 KETOROLAC TROMETHAMINE PER 15 MG: Performed by: EMERGENCY MEDICINE

## 2022-05-07 PROCEDURE — 74176 CT ABD & PELVIS W/O CONTRAST: CPT

## 2022-05-07 PROCEDURE — 25010000002 MORPHINE PER 10 MG: Performed by: EMERGENCY MEDICINE

## 2022-05-07 PROCEDURE — 25010000002 DIAZEPAM PER 5 MG: Performed by: EMERGENCY MEDICINE

## 2022-05-07 PROCEDURE — 25010000002 ONDANSETRON PER 1 MG: Performed by: EMERGENCY MEDICINE

## 2022-05-07 PROCEDURE — 81001 URINALYSIS AUTO W/SCOPE: CPT | Performed by: EMERGENCY MEDICINE

## 2022-05-07 PROCEDURE — 80306 DRUG TEST PRSMV INSTRMNT: CPT | Performed by: EMERGENCY MEDICINE

## 2022-05-07 PROCEDURE — 25010000002 LORAZEPAM PER 2 MG: Performed by: EMERGENCY MEDICINE

## 2022-05-07 PROCEDURE — 80053 COMPREHEN METABOLIC PANEL: CPT | Performed by: EMERGENCY MEDICINE

## 2022-05-07 PROCEDURE — 84145 PROCALCITONIN (PCT): CPT | Performed by: EMERGENCY MEDICINE

## 2022-05-07 PROCEDURE — 87635 SARS-COV-2 COVID-19 AMP PRB: CPT | Performed by: EMERGENCY MEDICINE

## 2022-05-07 PROCEDURE — 85025 COMPLETE CBC W/AUTO DIFF WBC: CPT | Performed by: EMERGENCY MEDICINE

## 2022-05-07 PROCEDURE — 99284 EMERGENCY DEPT VISIT MOD MDM: CPT

## 2022-05-07 RX ORDER — DIAZEPAM 5 MG/ML
5 INJECTION, SOLUTION INTRAMUSCULAR; INTRAVENOUS ONCE
Status: COMPLETED | OUTPATIENT
Start: 2022-05-07 | End: 2022-05-07

## 2022-05-07 RX ORDER — LORAZEPAM 2 MG/ML
0.5 INJECTION INTRAMUSCULAR ONCE
Status: COMPLETED | OUTPATIENT
Start: 2022-05-07 | End: 2022-05-07

## 2022-05-07 RX ORDER — UREA 10 %
3 LOTION (ML) TOPICAL NIGHTLY PRN
Status: DISCONTINUED | OUTPATIENT
Start: 2022-05-07 | End: 2022-05-21 | Stop reason: HOSPADM

## 2022-05-07 RX ORDER — ONDANSETRON 2 MG/ML
4 INJECTION INTRAMUSCULAR; INTRAVENOUS ONCE
Status: COMPLETED | OUTPATIENT
Start: 2022-05-07 | End: 2022-05-07

## 2022-05-07 RX ORDER — KETOROLAC TROMETHAMINE 30 MG/ML
30 INJECTION, SOLUTION INTRAMUSCULAR; INTRAVENOUS ONCE
Status: COMPLETED | OUTPATIENT
Start: 2022-05-07 | End: 2022-05-07

## 2022-05-07 RX ORDER — ONDANSETRON 4 MG/1
4 TABLET, FILM COATED ORAL EVERY 6 HOURS PRN
Status: DISCONTINUED | OUTPATIENT
Start: 2022-05-07 | End: 2022-05-21 | Stop reason: HOSPADM

## 2022-05-07 RX ORDER — SODIUM CHLORIDE 0.9 % (FLUSH) 0.9 %
10 SYRINGE (ML) INJECTION AS NEEDED
Status: DISCONTINUED | OUTPATIENT
Start: 2022-05-07 | End: 2022-05-07 | Stop reason: HOSPADM

## 2022-05-07 RX ORDER — ONDANSETRON 2 MG/ML
4 INJECTION INTRAMUSCULAR; INTRAVENOUS EVERY 6 HOURS PRN
Status: DISCONTINUED | OUTPATIENT
Start: 2022-05-07 | End: 2022-05-21 | Stop reason: HOSPADM

## 2022-05-07 RX ORDER — ACETAMINOPHEN 325 MG/1
650 TABLET ORAL EVERY 4 HOURS PRN
Status: DISCONTINUED | OUTPATIENT
Start: 2022-05-07 | End: 2022-05-21 | Stop reason: HOSPADM

## 2022-05-07 RX ADMIN — MORPHINE SULFATE 4 MG: 4 INJECTION INTRAVENOUS at 19:20

## 2022-05-07 RX ADMIN — DIAZEPAM 5 MG: 5 INJECTION, SOLUTION INTRAMUSCULAR; INTRAVENOUS at 20:13

## 2022-05-07 RX ADMIN — LORAZEPAM 0.5 MG: 2 INJECTION INTRAMUSCULAR; INTRAVENOUS at 22:08

## 2022-05-07 RX ADMIN — SODIUM CHLORIDE, POTASSIUM CHLORIDE, SODIUM LACTATE AND CALCIUM CHLORIDE 1000 ML: 600; 310; 30; 20 INJECTION, SOLUTION INTRAVENOUS at 19:25

## 2022-05-07 RX ADMIN — KETOROLAC TROMETHAMINE 30 MG: 30 INJECTION, SOLUTION INTRAMUSCULAR; INTRAVENOUS at 19:20

## 2022-05-07 RX ADMIN — ONDANSETRON 4 MG: 2 INJECTION INTRAMUSCULAR; INTRAVENOUS at 19:27

## 2022-05-08 ENCOUNTER — APPOINTMENT (OUTPATIENT)
Dept: GENERAL RADIOLOGY | Facility: HOSPITAL | Age: 52
End: 2022-05-08

## 2022-05-08 ENCOUNTER — ANESTHESIA EVENT (OUTPATIENT)
Dept: PERIOP | Facility: HOSPITAL | Age: 52
End: 2022-05-08

## 2022-05-08 ENCOUNTER — APPOINTMENT (OUTPATIENT)
Dept: MRI IMAGING | Facility: HOSPITAL | Age: 52
End: 2022-05-08

## 2022-05-08 ENCOUNTER — ANESTHESIA (OUTPATIENT)
Dept: PERIOP | Facility: HOSPITAL | Age: 52
End: 2022-05-08

## 2022-05-08 PROBLEM — R29.898 BILATERAL LEG WEAKNESS: Status: ACTIVE | Noted: 2022-05-08

## 2022-05-08 PROBLEM — R33.9 URINARY RETENTION: Status: ACTIVE | Noted: 2022-05-08

## 2022-05-08 PROBLEM — R20.0 BILATERAL LEG NUMBNESS: Status: ACTIVE | Noted: 2022-05-08

## 2022-05-08 PROBLEM — R29.898 WEAKNESS OF BOTH LOWER EXTREMITIES: Status: ACTIVE | Noted: 2022-05-08

## 2022-05-08 LAB
ANION GAP SERPL CALCULATED.3IONS-SCNC: 11.6 MMOL/L (ref 5–15)
BUN SERPL-MCNC: 14 MG/DL (ref 6–20)
BUN/CREAT SERPL: 18.7 (ref 7–25)
CALCIUM SPEC-SCNC: 8.8 MG/DL (ref 8.6–10.5)
CHLORIDE SERPL-SCNC: 108 MMOL/L (ref 98–107)
CO2 SERPL-SCNC: 22.4 MMOL/L (ref 22–29)
CREAT SERPL-MCNC: 0.75 MG/DL (ref 0.57–1)
DEPRECATED RDW RBC AUTO: 42.3 FL (ref 37–54)
EGFRCR SERPLBLD CKD-EPI 2021: 97.1 ML/MIN/1.73
ERYTHROCYTE [DISTWIDTH] IN BLOOD BY AUTOMATED COUNT: 12.9 % (ref 12.3–15.4)
GLUCOSE BLDC GLUCOMTR-MCNC: 120 MG/DL (ref 70–130)
GLUCOSE SERPL-MCNC: 117 MG/DL (ref 65–99)
HCT VFR BLD AUTO: 38.1 % (ref 34–46.6)
HGB BLD-MCNC: 13.2 G/DL (ref 12–15.9)
MCH RBC QN AUTO: 30.8 PG (ref 26.6–33)
MCHC RBC AUTO-ENTMCNC: 34.6 G/DL (ref 31.5–35.7)
MCV RBC AUTO: 88.8 FL (ref 79–97)
PLATELET # BLD AUTO: 280 10*3/MM3 (ref 140–450)
PMV BLD AUTO: 9.8 FL (ref 6–12)
POTASSIUM SERPL-SCNC: 3.8 MMOL/L (ref 3.5–5.2)
RBC # BLD AUTO: 4.29 10*6/MM3 (ref 3.77–5.28)
SODIUM SERPL-SCNC: 142 MMOL/L (ref 136–145)
WBC NRBC COR # BLD: 15.67 10*3/MM3 (ref 3.4–10.8)

## 2022-05-08 PROCEDURE — 22610 ARTHRD PST TQ 1NTRSPC THRC: CPT | Performed by: SPECIALIST/TECHNOLOGIST, OTHER

## 2022-05-08 PROCEDURE — C1713 ANCHOR/SCREW BN/BN,TIS/BN: HCPCS | Performed by: NEUROLOGICAL SURGERY

## 2022-05-08 PROCEDURE — 25010000002 FENTANYL CITRATE (PF) 50 MCG/ML SOLUTION: Performed by: NURSE ANESTHETIST, CERTIFIED REGISTERED

## 2022-05-08 PROCEDURE — 63055 DECOMPRESS SPINAL CORD THRC: CPT | Performed by: SPECIALIST/TECHNOLOGIST, OTHER

## 2022-05-08 PROCEDURE — 72072 X-RAY EXAM THORAC SPINE 3VWS: CPT

## 2022-05-08 PROCEDURE — 25010000002 SUCCINYLCHOLINE PER 20 MG: Performed by: NURSE ANESTHETIST, CERTIFIED REGISTERED

## 2022-05-08 PROCEDURE — 25010000002 ONDANSETRON PER 1 MG: Performed by: NURSE ANESTHETIST, CERTIFIED REGISTERED

## 2022-05-08 PROCEDURE — 82962 GLUCOSE BLOOD TEST: CPT

## 2022-05-08 PROCEDURE — 80048 BASIC METABOLIC PNL TOTAL CA: CPT

## 2022-05-08 PROCEDURE — 25010000002 PROCHLORPERAZINE 10 MG/2ML SOLUTION: Performed by: NURSE PRACTITIONER

## 2022-05-08 PROCEDURE — 63055 DECOMPRESS SPINAL CORD THRC: CPT | Performed by: NEUROLOGICAL SURGERY

## 2022-05-08 PROCEDURE — 85027 COMPLETE CBC AUTOMATED: CPT

## 2022-05-08 PROCEDURE — 88311 DECALCIFY TISSUE: CPT | Performed by: NEUROLOGICAL SURGERY

## 2022-05-08 PROCEDURE — 22842 INSERT SPINE FIXATION DEVICE: CPT | Performed by: SPECIALIST/TECHNOLOGIST, OTHER

## 2022-05-08 PROCEDURE — 22610 ARTHRD PST TQ 1NTRSPC THRC: CPT | Performed by: NEUROLOGICAL SURGERY

## 2022-05-08 PROCEDURE — 0 GADOBENATE DIMEGLUMINE 529 MG/ML SOLUTION: Performed by: INTERNAL MEDICINE

## 2022-05-08 PROCEDURE — 72158 MRI LUMBAR SPINE W/O & W/DYE: CPT

## 2022-05-08 PROCEDURE — 25010000002 DEXAMETHASONE PER 1 MG: Performed by: NURSE ANESTHETIST, CERTIFIED REGISTERED

## 2022-05-08 PROCEDURE — 25010000002 CEFAZOLIN IN DEXTROSE 2-4 GM/100ML-% SOLUTION: Performed by: NEUROLOGICAL SURGERY

## 2022-05-08 PROCEDURE — 25010000002 MIDAZOLAM PER 1 MG: Performed by: ANESTHESIOLOGY

## 2022-05-08 PROCEDURE — 61783 SCAN PROC SPINAL: CPT | Performed by: NEUROLOGICAL SURGERY

## 2022-05-08 PROCEDURE — 72157 MRI CHEST SPINE W/O & W/DYE: CPT

## 2022-05-08 PROCEDURE — 25010000002 PHENYLEPHRINE 10 MG/ML SOLUTION: Performed by: NURSE ANESTHETIST, CERTIFIED REGISTERED

## 2022-05-08 PROCEDURE — 88342 IMHCHEM/IMCYTCHM 1ST ANTB: CPT | Performed by: NEUROLOGICAL SURGERY

## 2022-05-08 PROCEDURE — 22842 INSERT SPINE FIXATION DEVICE: CPT | Performed by: NEUROLOGICAL SURGERY

## 2022-05-08 PROCEDURE — 25010000002 CEFAZOLIN IN DEXTROSE 2-4 GM/100ML-% SOLUTION: Performed by: ANESTHESIOLOGY

## 2022-05-08 PROCEDURE — 25010000002 VANCOMYCIN 1 G RECONSTITUTED SOLUTION 1 EACH VIAL: Performed by: NEUROLOGICAL SURGERY

## 2022-05-08 PROCEDURE — 25010000002 PROPOFOL 10 MG/ML EMULSION: Performed by: NURSE ANESTHETIST, CERTIFIED REGISTERED

## 2022-05-08 PROCEDURE — 0RB90ZZ EXCISION OF THORACIC VERTEBRAL DISC, OPEN APPROACH: ICD-10-PCS | Performed by: NEUROLOGICAL SURGERY

## 2022-05-08 PROCEDURE — 88307 TISSUE EXAM BY PATHOLOGIST: CPT | Performed by: NEUROLOGICAL SURGERY

## 2022-05-08 PROCEDURE — 0RG7071 FUSION OF 2 TO 7 THORACIC VERTEBRAL JOINTS WITH AUTOLOGOUS TISSUE SUBSTITUTE, POSTERIOR APPROACH, POSTERIOR COLUMN, OPEN APPROACH: ICD-10-PCS | Performed by: NEUROLOGICAL SURGERY

## 2022-05-08 PROCEDURE — 25010000002 HYDROMORPHONE PER 4 MG: Performed by: ANESTHESIOLOGY

## 2022-05-08 PROCEDURE — A9577 INJ MULTIHANCE: HCPCS | Performed by: INTERNAL MEDICINE

## 2022-05-08 PROCEDURE — 76000 FLUOROSCOPY <1 HR PHYS/QHP: CPT

## 2022-05-08 DEVICE — FLOSEAL HEMOSTATIC MATRIX, 5ML
Type: IMPLANTABLE DEVICE | Site: SPINE THORACIC | Status: FUNCTIONAL
Brand: FLOSEAL HEMOSTATIC MATRIX

## 2022-05-08 DEVICE — BONE CCCS 20/80 15CC 1TO3MM: Type: IMPLANTABLE DEVICE | Site: SPINE THORACIC | Status: FUNCTIONAL

## 2022-05-08 DEVICE — IMPLANTABLE DEVICE: Type: IMPLANTABLE DEVICE | Site: SPINE THORACIC | Status: FUNCTIONAL

## 2022-05-08 DEVICE — ROD PRELRD SPINE EXPEDIUM W/LINE 65MM: Type: IMPLANTABLE DEVICE | Site: SPINE THORACIC | Status: FUNCTIONAL

## 2022-05-08 DEVICE — ALLOGRFT BONE VIVIGEN CELLUAR MATRX FORMABLE 1CC: Type: IMPLANTABLE DEVICE | Site: SPINE THORACIC | Status: FUNCTIONAL

## 2022-05-08 DEVICE — SCRW VIPER INNR ST: Type: IMPLANTABLE DEVICE | Site: SPINE THORACIC | Status: FUNCTIONAL

## 2022-05-08 RX ORDER — CEFAZOLIN SODIUM 2 G/100ML
2 INJECTION, SOLUTION INTRAVENOUS ONCE
Status: COMPLETED | OUTPATIENT
Start: 2022-05-08 | End: 2022-05-08

## 2022-05-08 RX ORDER — ONDANSETRON 2 MG/ML
4 INJECTION INTRAMUSCULAR; INTRAVENOUS ONCE AS NEEDED
Status: DISCONTINUED | OUTPATIENT
Start: 2022-05-08 | End: 2022-05-08 | Stop reason: HOSPADM

## 2022-05-08 RX ORDER — SODIUM CHLORIDE 9 MG/ML
100 INJECTION, SOLUTION INTRAVENOUS CONTINUOUS
Status: DISCONTINUED | OUTPATIENT
Start: 2022-05-08 | End: 2022-05-17

## 2022-05-08 RX ORDER — CEFAZOLIN SODIUM 2 G/100ML
INJECTION, SOLUTION INTRAVENOUS AS NEEDED
Status: DISCONTINUED | OUTPATIENT
Start: 2022-05-08 | End: 2022-05-08 | Stop reason: SURG

## 2022-05-08 RX ORDER — FENTANYL CITRATE 50 UG/ML
50 INJECTION, SOLUTION INTRAMUSCULAR; INTRAVENOUS
Status: DISCONTINUED | OUTPATIENT
Start: 2022-05-08 | End: 2022-05-08 | Stop reason: HOSPADM

## 2022-05-08 RX ORDER — CYCLOBENZAPRINE HCL 10 MG
10 TABLET ORAL ONCE
Status: COMPLETED | OUTPATIENT
Start: 2022-05-08 | End: 2022-05-08

## 2022-05-08 RX ORDER — PHENYLEPHRINE HYDROCHLORIDE 10 MG/ML
INJECTION INTRAVENOUS AS NEEDED
Status: DISCONTINUED | OUTPATIENT
Start: 2022-05-08 | End: 2022-05-08 | Stop reason: SURG

## 2022-05-08 RX ORDER — SODIUM CHLORIDE, SODIUM LACTATE, POTASSIUM CHLORIDE, CALCIUM CHLORIDE 600; 310; 30; 20 MG/100ML; MG/100ML; MG/100ML; MG/100ML
9 INJECTION, SOLUTION INTRAVENOUS CONTINUOUS
Status: DISCONTINUED | OUTPATIENT
Start: 2022-05-08 | End: 2022-05-08

## 2022-05-08 RX ORDER — LIDOCAINE HYDROCHLORIDE 20 MG/ML
INJECTION, SOLUTION INFILTRATION; PERINEURAL AS NEEDED
Status: DISCONTINUED | OUTPATIENT
Start: 2022-05-08 | End: 2022-05-08 | Stop reason: SURG

## 2022-05-08 RX ORDER — MIDAZOLAM HYDROCHLORIDE 1 MG/ML
1 INJECTION INTRAMUSCULAR; INTRAVENOUS
Status: DISCONTINUED | OUTPATIENT
Start: 2022-05-08 | End: 2022-05-08 | Stop reason: HOSPADM

## 2022-05-08 RX ORDER — HYDROMORPHONE HYDROCHLORIDE 1 MG/ML
0.5 INJECTION, SOLUTION INTRAMUSCULAR; INTRAVENOUS; SUBCUTANEOUS
Status: DISCONTINUED | OUTPATIENT
Start: 2022-05-08 | End: 2022-05-08 | Stop reason: HOSPADM

## 2022-05-08 RX ORDER — ONDANSETRON 2 MG/ML
INJECTION INTRAMUSCULAR; INTRAVENOUS AS NEEDED
Status: DISCONTINUED | OUTPATIENT
Start: 2022-05-08 | End: 2022-05-08 | Stop reason: SURG

## 2022-05-08 RX ORDER — PROCHLORPERAZINE EDISYLATE 5 MG/ML
5 INJECTION INTRAMUSCULAR; INTRAVENOUS EVERY 4 HOURS PRN
Status: DISCONTINUED | OUTPATIENT
Start: 2022-05-08 | End: 2022-05-21 | Stop reason: HOSPADM

## 2022-05-08 RX ORDER — NALOXONE HCL 0.4 MG/ML
0.2 VIAL (ML) INJECTION AS NEEDED
Status: DISCONTINUED | OUTPATIENT
Start: 2022-05-08 | End: 2022-05-08 | Stop reason: HOSPADM

## 2022-05-08 RX ORDER — TAMOXIFEN CITRATE 10 MG/1
20 TABLET ORAL DAILY
Status: DISCONTINUED | OUTPATIENT
Start: 2022-05-08 | End: 2022-05-08

## 2022-05-08 RX ORDER — SUCCINYLCHOLINE CHLORIDE 20 MG/ML
INJECTION INTRAMUSCULAR; INTRAVENOUS AS NEEDED
Status: DISCONTINUED | OUTPATIENT
Start: 2022-05-08 | End: 2022-05-08 | Stop reason: SURG

## 2022-05-08 RX ORDER — ROCURONIUM BROMIDE 10 MG/ML
INJECTION, SOLUTION INTRAVENOUS AS NEEDED
Status: DISCONTINUED | OUTPATIENT
Start: 2022-05-08 | End: 2022-05-08 | Stop reason: SURG

## 2022-05-08 RX ORDER — ALPRAZOLAM 0.25 MG/1
0.25 TABLET ORAL 2 TIMES DAILY PRN
Status: DISCONTINUED | OUTPATIENT
Start: 2022-05-08 | End: 2022-05-08

## 2022-05-08 RX ORDER — HYDROMORPHONE HCL 110MG/55ML
PATIENT CONTROLLED ANALGESIA SYRINGE INTRAVENOUS AS NEEDED
Status: DISCONTINUED | OUTPATIENT
Start: 2022-05-08 | End: 2022-05-08 | Stop reason: SURG

## 2022-05-08 RX ORDER — HYDROXYZINE HYDROCHLORIDE 25 MG/1
25 TABLET, FILM COATED ORAL 3 TIMES DAILY PRN
Status: DISCONTINUED | OUTPATIENT
Start: 2022-05-08 | End: 2022-05-08

## 2022-05-08 RX ORDER — FAMOTIDINE 10 MG/ML
20 INJECTION, SOLUTION INTRAVENOUS ONCE
Status: COMPLETED | OUTPATIENT
Start: 2022-05-09 | End: 2022-05-09

## 2022-05-08 RX ORDER — LABETALOL HYDROCHLORIDE 5 MG/ML
5 INJECTION, SOLUTION INTRAVENOUS
Status: DISCONTINUED | OUTPATIENT
Start: 2022-05-08 | End: 2022-05-08 | Stop reason: HOSPADM

## 2022-05-08 RX ORDER — PROMETHAZINE HYDROCHLORIDE 25 MG/1
25 SUPPOSITORY RECTAL ONCE AS NEEDED
Status: DISCONTINUED | OUTPATIENT
Start: 2022-05-08 | End: 2022-05-08 | Stop reason: HOSPADM

## 2022-05-08 RX ORDER — CYCLOBENZAPRINE HCL 10 MG
10 TABLET ORAL ONCE
Status: COMPLETED | OUTPATIENT
Start: 2022-05-09 | End: 2022-05-09

## 2022-05-08 RX ORDER — PREDNISONE 1 MG/1
5 TABLET ORAL DAILY
Status: ON HOLD | COMMUNITY
End: 2022-05-09

## 2022-05-08 RX ORDER — FLUMAZENIL 0.1 MG/ML
0.2 INJECTION INTRAVENOUS AS NEEDED
Status: DISCONTINUED | OUTPATIENT
Start: 2022-05-08 | End: 2022-05-08 | Stop reason: HOSPADM

## 2022-05-08 RX ORDER — HYDROCODONE BITARTRATE AND ACETAMINOPHEN 7.5; 325 MG/1; MG/1
1 TABLET ORAL ONCE AS NEEDED
Status: DISCONTINUED | OUTPATIENT
Start: 2022-05-08 | End: 2022-05-08 | Stop reason: HOSPADM

## 2022-05-08 RX ORDER — OXYCODONE AND ACETAMINOPHEN 7.5; 325 MG/1; MG/1
1 TABLET ORAL EVERY 4 HOURS PRN
Status: DISCONTINUED | OUTPATIENT
Start: 2022-05-08 | End: 2022-05-08 | Stop reason: HOSPADM

## 2022-05-08 RX ORDER — HYDRALAZINE HYDROCHLORIDE 20 MG/ML
5 INJECTION INTRAMUSCULAR; INTRAVENOUS
Status: DISCONTINUED | OUTPATIENT
Start: 2022-05-08 | End: 2022-05-08 | Stop reason: HOSPADM

## 2022-05-08 RX ORDER — EPHEDRINE SULFATE 50 MG/ML
5 INJECTION, SOLUTION INTRAVENOUS ONCE AS NEEDED
Status: DISCONTINUED | OUTPATIENT
Start: 2022-05-08 | End: 2022-05-08 | Stop reason: HOSPADM

## 2022-05-08 RX ORDER — SODIUM CHLORIDE 0.9 % (FLUSH) 0.9 %
3-10 SYRINGE (ML) INJECTION AS NEEDED
Status: DISCONTINUED | OUTPATIENT
Start: 2022-05-08 | End: 2022-05-08 | Stop reason: HOSPADM

## 2022-05-08 RX ORDER — HYDROCODONE BITARTRATE AND ACETAMINOPHEN 5; 325 MG/1; MG/1
1 TABLET ORAL EVERY 6 HOURS PRN
Status: DISCONTINUED | OUTPATIENT
Start: 2022-05-08 | End: 2022-05-08

## 2022-05-08 RX ORDER — FENTANYL CITRATE 50 UG/ML
INJECTION, SOLUTION INTRAMUSCULAR; INTRAVENOUS AS NEEDED
Status: DISCONTINUED | OUTPATIENT
Start: 2022-05-08 | End: 2022-05-08 | Stop reason: SURG

## 2022-05-08 RX ORDER — ALPRAZOLAM 0.25 MG/1
0.25 TABLET ORAL 3 TIMES DAILY PRN
Status: DISCONTINUED | OUTPATIENT
Start: 2022-05-08 | End: 2022-05-08

## 2022-05-08 RX ORDER — CYCLOBENZAPRINE HCL 10 MG
10 TABLET ORAL 3 TIMES DAILY PRN
Status: DISCONTINUED | OUTPATIENT
Start: 2022-05-08 | End: 2022-05-12

## 2022-05-08 RX ORDER — LIDOCAINE HYDROCHLORIDE 10 MG/ML
0.5 INJECTION, SOLUTION EPIDURAL; INFILTRATION; INTRACAUDAL; PERINEURAL ONCE AS NEEDED
Status: DISCONTINUED | OUTPATIENT
Start: 2022-05-08 | End: 2022-05-08 | Stop reason: HOSPADM

## 2022-05-08 RX ORDER — DEXAMETHASONE SODIUM PHOSPHATE 10 MG/ML
INJECTION INTRAMUSCULAR; INTRAVENOUS AS NEEDED
Status: DISCONTINUED | OUTPATIENT
Start: 2022-05-08 | End: 2022-05-08 | Stop reason: SURG

## 2022-05-08 RX ORDER — DIPHENHYDRAMINE HCL 25 MG
25 CAPSULE ORAL
Status: DISCONTINUED | OUTPATIENT
Start: 2022-05-08 | End: 2022-05-08 | Stop reason: HOSPADM

## 2022-05-08 RX ORDER — PROPOFOL 10 MG/ML
VIAL (ML) INTRAVENOUS AS NEEDED
Status: DISCONTINUED | OUTPATIENT
Start: 2022-05-08 | End: 2022-05-08 | Stop reason: SURG

## 2022-05-08 RX ORDER — PROMETHAZINE HYDROCHLORIDE 25 MG/1
25 TABLET ORAL ONCE AS NEEDED
Status: DISCONTINUED | OUTPATIENT
Start: 2022-05-08 | End: 2022-05-08 | Stop reason: HOSPADM

## 2022-05-08 RX ORDER — DIPHENHYDRAMINE HYDROCHLORIDE 50 MG/ML
12.5 INJECTION INTRAMUSCULAR; INTRAVENOUS
Status: DISCONTINUED | OUTPATIENT
Start: 2022-05-08 | End: 2022-05-08 | Stop reason: HOSPADM

## 2022-05-08 RX ORDER — HYDROXYZINE HYDROCHLORIDE 25 MG/1
25 TABLET, FILM COATED ORAL 3 TIMES DAILY PRN
Status: DISCONTINUED | OUTPATIENT
Start: 2022-05-08 | End: 2022-05-21 | Stop reason: HOSPADM

## 2022-05-08 RX ORDER — SCOLOPAMINE TRANSDERMAL SYSTEM 1 MG/1
1 PATCH, EXTENDED RELEASE TRANSDERMAL ONCE
Status: DISCONTINUED | OUTPATIENT
Start: 2022-05-08 | End: 2022-05-08

## 2022-05-08 RX ORDER — SODIUM CHLORIDE 0.9 % (FLUSH) 0.9 %
3 SYRINGE (ML) INJECTION EVERY 12 HOURS SCHEDULED
Status: DISCONTINUED | OUTPATIENT
Start: 2022-05-08 | End: 2022-05-08 | Stop reason: HOSPADM

## 2022-05-08 RX ORDER — SODIUM CHLORIDE, SODIUM LACTATE, POTASSIUM CHLORIDE, CALCIUM CHLORIDE 600; 310; 30; 20 MG/100ML; MG/100ML; MG/100ML; MG/100ML
INJECTION, SOLUTION INTRAVENOUS CONTINUOUS PRN
Status: DISCONTINUED | OUTPATIENT
Start: 2022-05-08 | End: 2022-05-08 | Stop reason: SURG

## 2022-05-08 RX ORDER — FAMOTIDINE 10 MG/ML
20 INJECTION, SOLUTION INTRAVENOUS ONCE
Status: COMPLETED | OUTPATIENT
Start: 2022-05-08 | End: 2022-05-08

## 2022-05-08 RX ADMIN — PHENYLEPHRINE HYDROCHLORIDE 100 MCG: 10 INJECTION, SOLUTION INTRAVENOUS at 17:00

## 2022-05-08 RX ADMIN — ALPRAZOLAM 0.25 MG: 0.25 TABLET ORAL at 02:14

## 2022-05-08 RX ADMIN — LIDOCAINE HYDROCHLORIDE 60 MG: 20 INJECTION, SOLUTION INFILTRATION; PERINEURAL at 16:53

## 2022-05-08 RX ADMIN — CEFAZOLIN SODIUM 2 G: 2 INJECTION, SOLUTION INTRAVENOUS at 20:34

## 2022-05-08 RX ADMIN — ONDANSETRON 4 MG: 2 INJECTION INTRAMUSCULAR; INTRAVENOUS at 21:35

## 2022-05-08 RX ADMIN — PROPOFOL 250 MCG/KG/MIN: 10 INJECTION, EMULSION INTRAVENOUS at 16:57

## 2022-05-08 RX ADMIN — ALPRAZOLAM 0.25 MG: 0.25 TABLET ORAL at 09:40

## 2022-05-08 RX ADMIN — PHENYLEPHRINE HYDROCHLORIDE 100 MCG: 10 INJECTION, SOLUTION INTRAVENOUS at 21:33

## 2022-05-08 RX ADMIN — CEFAZOLIN SODIUM 2 G: 2 INJECTION, SOLUTION INTRAVENOUS at 16:34

## 2022-05-08 RX ADMIN — SODIUM CHLORIDE, POTASSIUM CHLORIDE, SODIUM LACTATE AND CALCIUM CHLORIDE: 600; 310; 30; 20 INJECTION, SOLUTION INTRAVENOUS at 20:49

## 2022-05-08 RX ADMIN — TAMOXIFEN CITRATE 20 MG: 10 TABLET ORAL at 12:58

## 2022-05-08 RX ADMIN — FAMOTIDINE 20 MG: 10 INJECTION INTRAVENOUS at 15:59

## 2022-05-08 RX ADMIN — SCOPALAMINE 1 PATCH: 1 PATCH, EXTENDED RELEASE TRANSDERMAL at 16:04

## 2022-05-08 RX ADMIN — ONDANSETRON 4 MG: 2 INJECTION INTRAMUSCULAR; INTRAVENOUS at 17:30

## 2022-05-08 RX ADMIN — ACETAMINOPHEN 650 MG: 325 TABLET, FILM COATED ORAL at 12:42

## 2022-05-08 RX ADMIN — CYCLOBENZAPRINE 10 MG: 10 TABLET, FILM COATED ORAL at 10:07

## 2022-05-08 RX ADMIN — FENTANYL CITRATE 100 MCG: 0.05 INJECTION, SOLUTION INTRAMUSCULAR; INTRAVENOUS at 16:53

## 2022-05-08 RX ADMIN — HYDROMORPHONE HYDROCHLORIDE 0.5 MG: 2 INJECTION, SOLUTION INTRAMUSCULAR; INTRAVENOUS; SUBCUTANEOUS at 19:44

## 2022-05-08 RX ADMIN — SUCCINYLCHOLINE CHLORIDE 140 MG: 20 INJECTION, SOLUTION INTRAMUSCULAR; INTRAVENOUS; PARENTERAL at 16:54

## 2022-05-08 RX ADMIN — MIDAZOLAM 2 MG: 1 INJECTION INTRAMUSCULAR; INTRAVENOUS at 19:40

## 2022-05-08 RX ADMIN — ROCURONIUM BROMIDE 10 MG: 50 INJECTION INTRAVENOUS at 16:53

## 2022-05-08 RX ADMIN — PROPOFOL 50 MG: 10 INJECTION, EMULSION INTRAVENOUS at 19:37

## 2022-05-08 RX ADMIN — DEXAMETHASONE SODIUM PHOSPHATE 8 MG: 10 INJECTION INTRAMUSCULAR; INTRAVENOUS at 17:14

## 2022-05-08 RX ADMIN — PROCHLORPERAZINE EDISYLATE 5 MG: 5 INJECTION INTRAMUSCULAR; INTRAVENOUS at 02:14

## 2022-05-08 RX ADMIN — SODIUM CHLORIDE, POTASSIUM CHLORIDE, SODIUM LACTATE AND CALCIUM CHLORIDE: 600; 310; 30; 20 INJECTION, SOLUTION INTRAVENOUS at 16:46

## 2022-05-08 RX ADMIN — PROPOFOL 200 MG: 10 INJECTION, EMULSION INTRAVENOUS at 16:54

## 2022-05-08 RX ADMIN — CYCLOBENZAPRINE 10 MG: 10 TABLET, FILM COATED ORAL at 02:07

## 2022-05-08 RX ADMIN — HYDROMORPHONE HYDROCHLORIDE 0.5 MG: 2 INJECTION, SOLUTION INTRAMUSCULAR; INTRAVENOUS; SUBCUTANEOUS at 21:01

## 2022-05-08 RX ADMIN — SODIUM CHLORIDE, POTASSIUM CHLORIDE, SODIUM LACTATE AND CALCIUM CHLORIDE 9 ML/HR: 600; 310; 30; 20 INJECTION, SOLUTION INTRAVENOUS at 16:03

## 2022-05-08 RX ADMIN — HYDROMORPHONE HYDROCHLORIDE 0.5 MG: 2 INJECTION, SOLUTION INTRAMUSCULAR; INTRAVENOUS; SUBCUTANEOUS at 17:50

## 2022-05-08 RX ADMIN — GADOBENATE DIMEGLUMINE 12 ML: 529 INJECTION, SOLUTION INTRAVENOUS at 11:44

## 2022-05-08 NOTE — ANESTHESIA PROCEDURE NOTES
Airway  Urgency: elective    Date/Time: 5/8/2022 4:57 PM  Airway not difficult    General Information and Staff    Patient location during procedure: OR    Indications and Patient Condition  Indications for airway management: airway protection    Preoxygenated: yes  MILS maintained throughout  Mask difficulty assessment: 1 - vent by mask    Final Airway Details  Final airway type: endotracheal airway      Successful airway: ETT  Cuffed: yes   Successful intubation technique: direct laryngoscopy  Facilitating devices/methods: intubating stylet  Endotracheal tube insertion site: oral  Blade: Gwyn  Blade size: 3  ETT size (mm): 7.0  Cormack-Lehane Classification: grade I - full view of glottis  Placement verified by: chest auscultation and capnometry   Measured from: lips  ETT/EBT  to lips (cm): 20  Number of attempts at approach: 1  Assessment: lips, teeth, and gum same as pre-op and atraumatic intubation

## 2022-05-08 NOTE — ANESTHESIA PROCEDURE NOTES
Arterial Line    Pre-sedation assessment completed: 5/8/2022 5:25 PM    Patient reassessed immediately prior to procedure    Patient location during procedure: OR  Start time: 5/8/2022 5:25 PM  Stop Time:5/8/2022 5:27 PM       Line placed for hemodynamic monitoring.  Performed By   Anesthesiologist: Murphy Freedman MD  Preanesthetic Checklist  Completed: patient identified, IV checked, site marked, risks and benefits discussed, surgical consent, monitors and equipment checked, pre-op evaluation and timeout performed  Arterial Line Prep   Sterile Tech: cap, gloves and mask  Prep: ChloraPrep  Patient monitoring: blood pressure monitoring, continuous pulse oximetry and EKG  Arterial Line Procedure   Laterality:right  Location:  radial artery  Catheter size: 20 G   Guidance: ultrasound guided  PROCEDURE NOTE/ULTRASOUND INTERPRETATION.  Using ultrasound guidance the potential vascular sites for insertion of the catheter were visualized to determine the patency of the vessel to be used for vascular access.  After selecting the appropriate site for insertion, the needle was visualized under ultrasound being inserted into the radial artery, followed by ultrasound confirmation of wire and catheter placement. There were no abnormalities seen on ultrasound; an image was taken; and the patient tolerated the procedure with no complications.   Number of attempts: 1  Successful placement: yes  Post Assessment   Dressing Type: occlusive dressing applied, secured with tape and wrist guard applied.   Complications no  Circ/Move/Sens Assessment: normal and unchanged.   Patient Tolerance: patient tolerated the procedure well with no apparent complications

## 2022-05-08 NOTE — ED PROVIDER NOTES
Subjective   History of Present Illness  History of Present Illness    Chief complaint: Back pain, leg weakness and numbness    Location: Mid back, bilateral    Quality/Severity: Severe pain    Timing/Duration: Worsening for the past couple days    Modifying Factors: Worse with any kind of position changes or attempts at moving    Narrative: This patient arrives via EMS from her home for evaluation of back pain with numbness and weakness in the bilateral legs.  She denies any recent injuries to explain this new problem.  She has had some pains and numbness in the back and legs in recent days but today the weakness has become remarkably worse and she can no longer walk or stand on her own as she would ordinarily be able to do.  She thought that the pain in her back was due to muscle spasms.  She had been taking some medications at home to try and relieve the pain but it was not helping so far.  additionally, tonight she began developing some nausea and vomiting.  She is having some chills but no known fevers.  There have been no known sick contacts.    Associated Symptoms: Headache, anxiety    Review of Systems   Constitutional: Positive for activity change and chills. Negative for fever.   Eyes: Negative for pain and visual disturbance.   Respiratory: Negative for cough and shortness of breath.    Cardiovascular: Negative for chest pain.   Gastrointestinal: Positive for nausea and vomiting. Negative for abdominal pain and diarrhea.   Genitourinary: Negative for dysuria and hematuria.   Musculoskeletal: Positive for back pain, gait problem and myalgias.   Skin: Negative for color change, rash and wound.   Neurological: Positive for weakness, numbness and headaches. Negative for syncope and speech difficulty.   Psychiatric/Behavioral: The patient is nervous/anxious.    All other systems reviewed and are negative.      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 Lashaun Gamble, [facility]   • BREAST BIOPSY Left 02/12/2018    INCOMPLETE - Biopsy could not be completed as patient had severe vasovagal reaction & subsequent syncope-[facility]   • BREAST BIOPSY Left 5/23/2018    Procedure: REEXCISION OF POSITIVE LUMPECTOMY MARGIN LEFT BREAST;  Surgeon: Adrián Pereyra MD;  Location: [facility];  Service: General   • BREAST LUMPECTOMY WITH SENTINEL NODE BIOPSY Left 4/11/2018    Procedure: BREAST LUMPECTOMY WITH SENTINEL NODE BIOPSY AND NEEDLE LOCALIZATION;  Surgeon: Adrián Pereyra MD;  Location: [facility];  Service: General       Family History   Problem Relation Age of Onset   • Breast cancer Maternal Aunt         In her 60s   • Breast cancer Paternal Aunt    • Hyperlipidemia Mother    • Asthma Father    • Diabetes Father    • Hearing loss Father    • Heart disease Father    • Hypertension Father    • Thyroid cancer Father 64        Follicular Tyroid Cancer   • Alcohol abuse Sister    • Depression Sister    • Diabetes Maternal Grandmother    • Bone cancer Maternal Grandfather    • Breast cancer Cousin    • Malig Hyperthermia Neg Hx        Social History     Socioeconomic History   • Marital status:      Spouse name: Nikita Workman   • Number of children: 3   • Years of education: College   Tobacco Use   • Smoking status: Former Smoker      Packs/day: 0.50     Years: 2.00     Pack years: 1.00     Types: Cigarettes     Start date:      Quit date:      Years since quittin.3   • Smokeless tobacco: Never Used   Substance and Sexual Activity   • Alcohol use: Yes     Comment: RARE MONTHLY   • Drug use: No   • Sexual activity: Defer     Birth control/protection: None       ED Triage Vitals [22 1901]   Temp Heart Rate Resp BP SpO2   98.2 °F (36.8 °C) 70 20 134/87 100 %      Temp src Heart Rate Source Patient Position BP Location FiO2 (%)   Oral Monitor Lying Right arm --         Objective   Physical Exam  Vitals and nursing note reviewed.   Constitutional:       General: She is in acute distress.      Appearance: She is well-developed. She is not toxic-appearing or diaphoretic.      Comments: Anxious and uncomfortable appearing.  Eyes closed, somewhat tearful   HENT:      Head: Normocephalic and atraumatic.      Nose: Nose normal.   Eyes:      General:         Right eye: No discharge.         Left eye: No discharge.      Pupils: Pupils are equal, round, and reactive to light.   Cardiovascular:      Rate and Rhythm: Normal rate and regular rhythm.      Pulses: Normal pulses.      Heart sounds: No murmur heard.  Pulmonary:      Effort: Pulmonary effort is normal. No respiratory distress.      Breath sounds: Normal breath sounds. No stridor. No rhonchi.   Abdominal:      Palpations: Abdomen is soft.      Tenderness: There is no abdominal tenderness. There is no guarding or rebound.      Hernia: No hernia is present.   Musculoskeletal:         General: No deformity. Normal range of motion.      Cervical back: Normal range of motion and neck supple.   Skin:     General: Skin is warm and dry.      Coloration: Skin is not jaundiced.      Findings: No bruising, erythema or rash.   Neurological:      Mental Status: She is alert and oriented to person, place, and time.      Cranial Nerves: No cranial nerve deficit.      Sensory: Sensory deficit  present.      Motor: Weakness present.      Comments: Patient reports decrease sensation to bilateral lower extremities at the legs and feet and ankles.  She has 2 out of 5 strength noted to the proximal and distal muscles of the right lower extremity.  When I examined her she has essentially 1 out of 5 strength to the left lower extremity proximal and distal leg muscles.  There is no significant swelling or erythema or skin changes to the lower extremities.  The legs are warm and well-perfused.    Patient has some mild, vague diffuse tenderness to palpation across the thoracic and lumbosacral back soft tissues.  There is no point level of maximal tenderness to palpation elicited, however.   Psychiatric:         Behavior: Behavior normal.         Thought Content: Thought content normal.         Judgment: Judgment normal.       Results for orders placed or performed during the hospital encounter of 05/07/22   Comprehensive Metabolic Panel    Specimen: Blood   Result Value Ref Range    Glucose 141 (H) 65 - 99 mg/dL    BUN 17 6 - 20 mg/dL    Creatinine 0.86 0.57 - 1.00 mg/dL    Sodium 140 136 - 145 mmol/L    Potassium 3.5 3.5 - 5.2 mmol/L    Chloride 104 98 - 107 mmol/L    CO2 16.7 (L) 22.0 - 29.0 mmol/L    Calcium 9.7 8.6 - 10.5 mg/dL    Total Protein 7.1 6.0 - 8.5 g/dL    Albumin 4.30 3.50 - 5.20 g/dL    ALT (SGPT) 17 1 - 33 U/L    AST (SGOT) 20 1 - 32 U/L    Alkaline Phosphatase 46 39 - 117 U/L    Total Bilirubin 0.2 0.0 - 1.2 mg/dL    Globulin 2.8 gm/dL    A/G Ratio 1.5 g/dL    BUN/Creatinine Ratio 19.8 7.0 - 25.0    Anion Gap 19.3 (H) 5.0 - 15.0 mmol/L    eGFR 82.4 >60.0 mL/min/1.73   Pregnancy, Urine - Urine, Clean Catch    Specimen: Urine, Clean Catch   Result Value Ref Range    HCG, Urine QL Negative Negative   Urinalysis With Culture If Indicated - Urine, Clean Catch    Specimen: Urine, Clean Catch   Result Value Ref Range    Color, UA Yellow Yellow, Straw    Appearance, UA Clear Clear    pH, UA 7.0 4.5 - 8.0     Specific Gravity, UA 1.020 1.003 - 1.030    Glucose,  mg/dL (Trace) (A) Negative    Ketones, UA 40 mg/dL (2+) (A) Negative    Bilirubin, UA Negative Negative    Blood, UA Trace (A) Negative    Protein,  mg/dL (2+) (A) Negative    Leuk Esterase, UA Negative Negative    Nitrite, UA Negative Negative    Urobilinogen, UA 0.2 E.U./dL 0.2 - 1.0 E.U./dL   Procalcitonin    Specimen: Blood   Result Value Ref Range    Procalcitonin 0.03 0.00 - 0.25 ng/mL   CBC Auto Differential    Specimen: Blood   Result Value Ref Range    WBC 14.42 (H) 3.40 - 10.80 10*3/mm3    RBC 4.88 3.77 - 5.28 10*6/mm3    Hemoglobin 14.7 12.0 - 15.9 g/dL    Hematocrit 45.2 34.0 - 46.6 %    MCV 92.6 79.0 - 97.0 fL    MCH 30.1 26.6 - 33.0 pg    MCHC 32.5 31.5 - 35.7 g/dL    RDW 12.9 12.3 - 15.4 %    RDW-SD 43.6 37.0 - 54.0 fl    MPV 10.7 6.0 - 12.0 fL    Platelets 311 140 - 450 10*3/mm3    Neutrophil % 75.4 42.7 - 76.0 %    Lymphocyte % 18.7 (L) 19.6 - 45.3 %    Monocyte % 4.6 (L) 5.0 - 12.0 %    Eosinophil % 0.1 (L) 0.3 - 6.2 %    Basophil % 0.6 0.0 - 1.5 %    Immature Grans % 0.6 (H) 0.0 - 0.5 %    Neutrophils, Absolute 10.89 (H) 1.70 - 7.00 10*3/mm3    Lymphocytes, Absolute 2.69 0.70 - 3.10 10*3/mm3    Monocytes, Absolute 0.66 0.10 - 0.90 10*3/mm3    Eosinophils, Absolute 0.02 0.00 - 0.40 10*3/mm3    Basophils, Absolute 0.08 0.00 - 0.20 10*3/mm3    Immature Grans, Absolute 0.08 (H) 0.00 - 0.05 10*3/mm3    nRBC 0.0 0.0 - 0.2 /100 WBC   Ethanol    Specimen: Blood   Result Value Ref Range    Ethanol <10 0 - 10 mg/dL    Ethanol % <0.010 %   Urine Drug Screen - Urine, Clean Catch    Specimen: Urine, Clean Catch   Result Value Ref Range    THC, Screen, Urine Negative Negative    Phencyclidine (PCP), Urine Negative Negative    Cocaine Screen, Urine Negative Negative    Methamphetamine, Ur Negative Negative    Opiate Screen Positive (A) Negative    Amphetamine Screen, Urine Negative Negative    Benzodiazepine Screen, Urine Negative Negative     Tricyclic Antidepressants Screen Negative Negative    Methadone Screen, Urine Negative Negative    Barbiturates Screen, Urine Negative Negative    Oxycodone Screen, Urine Negative Negative    Propoxyphene Screen Negative Negative    Buprenorphine, Screen, Urine Negative Negative   Urinalysis, Microscopic Only - Urine, Clean Catch    Specimen: Urine, Clean Catch   Result Value Ref Range    RBC, UA 0-2 (A) None Seen /HPF    WBC, UA None Seen None Seen /HPF    Bacteria, UA None Seen None Seen /HPF    Squamous Epithelial Cells, UA 0-2 None Seen, 0-2 /HPF    Hyaline Casts, UA None Seen None Seen /LPF    Methodology Manual Light Microscopy        RADIOLOGY        Study: CT head without contrast    Findings: No acute intracranial process    Interpreted contemporaneously with treatment by Dr. Jackson, independently viewed by me    RADIOLOGY        Study: CT abdomen and pelvis without contrast    Findings:     Enlarged uterus and somewhat irregular suggesting leiomyomatous changes. 2 cm cyst right posterior pelvis likely ovarian in origin.  //////////// ADDENDUM #1 ////////////  At the request of the ER physician the thoracic spine was reviewed as the patient's clinical symptoms suggest possible spinal etiology. At T10-T11 there is calcification of the disc space with a extruded calcified fragment extending into the left lateral  spinal canal. This measures 11 x 7 mm and produces moderate canal stenosis and may impinge the cord at this level. No aggressive features and no findings to suggest an active inflammatory process. Further evaluation with MRI with and without contrast may  be of benefit for further evaluation and characterization    Interpreted contemporaneously with treatment by Dr. Guillen, independently viewed by me    Procedures           ED Course  ED Course as of 05/07/22 2224   Sat May 07, 2022   2151 I reviewed the labs and CT reports from today's visit.  Patient has a curious clinical presentation here.  I do  suspect she has some level of pathology in the spinal column, spinal cord region at some level.  Perhaps at T10, T11 as suggested by CT imaging today.  Unfortunately we do not have means to MRI her spine for her this weekend.  Therefore I am arranging to transfer her to [facility] for further diagnostic imaging and consultation with neurosurgery specialist.  Patient is agreeable to this plan. [CLIVE]      ED Course User Index  [CLIVE] Andrew Walters MD                                                 MDM  Number of Diagnoses or Management Options     Amount and/or Complexity of Data Reviewed  Clinical lab tests: reviewed and ordered  Tests in the radiology section of CPT®: reviewed and ordered  Discuss the patient with other providers: yes (Dr. Mejia, neurosurgery  Dr. Menchaca, hospitalist)  Independent visualization of images, tracings, or specimens: yes    Risk of Complications, Morbidity, and/or Mortality  Presenting problems: high  Diagnostic procedures: moderate  Management options: moderate        Final diagnoses:   Leg weakness, bilateral   Decreased sensation of leg   Acute bilateral back pain, unspecified back location   Nonintractable headache, unspecified chronicity pattern, unspecified headache type   Anxiety       ED Disposition  ED Disposition     ED Disposition   Transfer to Another Facility     Condition   --    Comment   --             No follow-up provider specified.       Medication List      ASK your doctor about these medications    zolpidem 10 MG tablet  Commonly known as: AMBIEN  Ask about: Which instructions should I use?             Andrew Walters MD  05/07/22 5961

## 2022-05-08 NOTE — ANESTHESIA PREPROCEDURE EVALUATION
Anesthesia Evaluation     Patient summary reviewed and Nursing notes reviewed   no history of anesthetic complications:  NPO Solid Status: > 8 hours  NPO Liquid Status: > 2 hours           Airway   Mallampati: II  TM distance: >3 FB  Neck ROM: full  no difficulty expected  Dental - normal exam     Pulmonary - normal exam   (+) a smoker Former,   (-) COPD, asthma, lung cancer  Cardiovascular - negative cardio ROS and normal exam  Exercise tolerance: good (4-7 METS)    ECG reviewed  Rhythm: regular  Rate: normal    (-) hypertension, valvular problems/murmurs, past MI, CAD, dysrhythmias, angina, CHF, orthopnea, PND, cardiac stents, CABG, pericardial effusion      Neuro/Psych  (+) headaches, numbness, psychiatric history,    (-) seizures, TIA, CVA    ROS Comment:  c/o bilateral leg weakness for a couple of weeks, but has been unable to walk x1 day.  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  GI/Hepatic/Renal/Endo - negative ROS     Musculoskeletal     Abdominal  - normal exam   Substance History - negative use     OB/GYN          Other   arthritis,    history of cancer      Other Comment: Hx/o BRCA                  Anesthesia Plan    ASA 2 - emergent     general     intravenous induction     Anesthetic plan, all risks, benefits, and alternatives have been provided, discussed and informed consent has been obtained with: patient.    Plan discussed with CRNA.        CODE STATUS:    Level Of Support Discussed With: Patient  Code Status (Patient has no pulse and is not breathing): CPR (Attempt to Resuscitate)  Medical Interventions (Patient has pulse or is breathing): Full

## 2022-05-08 NOTE — ED NOTES
Patient states pain, headache, nausea, and muscle spasms are all better. She has had no further episodes of vomiting and is relaxing comfortably in no distress.

## 2022-05-09 LAB
ANION GAP SERPL CALCULATED.3IONS-SCNC: 7.6 MMOL/L (ref 5–15)
BUN SERPL-MCNC: 13 MG/DL (ref 6–20)
BUN/CREAT SERPL: 21.7 (ref 7–25)
CALCIUM SPEC-SCNC: 8.5 MG/DL (ref 8.6–10.5)
CHLORIDE SERPL-SCNC: 110 MMOL/L (ref 98–107)
CO2 SERPL-SCNC: 22.4 MMOL/L (ref 22–29)
CREAT SERPL-MCNC: 0.6 MG/DL (ref 0.57–1)
DEPRECATED RDW RBC AUTO: 45.3 FL (ref 37–54)
EGFRCR SERPLBLD CKD-EPI 2021: 109.5 ML/MIN/1.73
ERYTHROCYTE [DISTWIDTH] IN BLOOD BY AUTOMATED COUNT: 13.2 % (ref 12.3–15.4)
GLUCOSE BLDC GLUCOMTR-MCNC: 100 MG/DL (ref 70–130)
GLUCOSE BLDC GLUCOMTR-MCNC: 101 MG/DL (ref 70–130)
GLUCOSE BLDC GLUCOMTR-MCNC: 81 MG/DL (ref 70–130)
GLUCOSE BLDC GLUCOMTR-MCNC: 97 MG/DL (ref 70–130)
GLUCOSE SERPL-MCNC: 134 MG/DL (ref 65–99)
HCT VFR BLD AUTO: 36 % (ref 34–46.6)
HGB BLD-MCNC: 11.6 G/DL (ref 12–15.9)
MCH RBC QN AUTO: 29.7 PG (ref 26.6–33)
MCHC RBC AUTO-ENTMCNC: 32.2 G/DL (ref 31.5–35.7)
MCV RBC AUTO: 92.3 FL (ref 79–97)
PLATELET # BLD AUTO: 301 10*3/MM3 (ref 140–450)
PMV BLD AUTO: 10.1 FL (ref 6–12)
POTASSIUM SERPL-SCNC: 4.1 MMOL/L (ref 3.5–5.2)
RBC # BLD AUTO: 3.9 10*6/MM3 (ref 3.77–5.28)
SODIUM SERPL-SCNC: 140 MMOL/L (ref 136–145)
WBC NRBC COR # BLD: 17.79 10*3/MM3 (ref 3.4–10.8)

## 2022-05-09 PROCEDURE — 25010000002 PHENYLEPHRINE 10 MG/ML SOLUTION: Performed by: INTERNAL MEDICINE

## 2022-05-09 PROCEDURE — 25010000002 PROCHLORPERAZINE 10 MG/2ML SOLUTION: Performed by: NEUROLOGICAL SURGERY

## 2022-05-09 PROCEDURE — 85027 COMPLETE CBC AUTOMATED: CPT | Performed by: NEUROLOGICAL SURGERY

## 2022-05-09 PROCEDURE — 97530 THERAPEUTIC ACTIVITIES: CPT

## 2022-05-09 PROCEDURE — 99024 POSTOP FOLLOW-UP VISIT: CPT | Performed by: PHYSICIAN ASSISTANT

## 2022-05-09 PROCEDURE — 82962 GLUCOSE BLOOD TEST: CPT

## 2022-05-09 PROCEDURE — 97162 PT EVAL MOD COMPLEX 30 MIN: CPT

## 2022-05-09 PROCEDURE — 25010000002 ONDANSETRON PER 1 MG: Performed by: NEUROLOGICAL SURGERY

## 2022-05-09 PROCEDURE — 80048 BASIC METABOLIC PNL TOTAL CA: CPT | Performed by: NEUROLOGICAL SURGERY

## 2022-05-09 PROCEDURE — 97166 OT EVAL MOD COMPLEX 45 MIN: CPT

## 2022-05-09 RX ORDER — HYDROCODONE BITARTRATE AND ACETAMINOPHEN 5; 325 MG/1; MG/1
1 TABLET ORAL EVERY 4 HOURS PRN
Status: DISCONTINUED | OUTPATIENT
Start: 2022-05-09 | End: 2022-05-21 | Stop reason: HOSPADM

## 2022-05-09 RX ORDER — SODIUM CHLORIDE 0.9 % (FLUSH) 0.9 %
10 SYRINGE (ML) INJECTION AS NEEDED
Status: DISCONTINUED | OUTPATIENT
Start: 2022-05-09 | End: 2022-05-21 | Stop reason: HOSPADM

## 2022-05-09 RX ORDER — PHENYLEPHRINE HCL IN 0.9% NACL 0.5 MG/5ML
.5-3 SYRINGE (ML) INTRAVENOUS
Status: DISCONTINUED | OUTPATIENT
Start: 2022-05-09 | End: 2022-05-16

## 2022-05-09 RX ORDER — TAMOXIFEN CITRATE 10 MG/1
20 TABLET ORAL DAILY
Status: DISCONTINUED | OUTPATIENT
Start: 2022-05-09 | End: 2022-05-21 | Stop reason: HOSPADM

## 2022-05-09 RX ORDER — PHENYLEPHRINE HCL IN 0.9% NACL 0.5 MG/5ML
.5-3 SYRINGE (ML) INTRAVENOUS
Status: DISCONTINUED | OUTPATIENT
Start: 2022-05-09 | End: 2022-05-09

## 2022-05-09 RX ORDER — SODIUM CHLORIDE 0.9 % (FLUSH) 0.9 %
10 SYRINGE (ML) INJECTION EVERY 12 HOURS SCHEDULED
Status: DISCONTINUED | OUTPATIENT
Start: 2022-05-09 | End: 2022-05-18

## 2022-05-09 RX ORDER — NICOTINE POLACRILEX 4 MG
15 LOZENGE BUCCAL
Status: DISCONTINUED | OUTPATIENT
Start: 2022-05-09 | End: 2022-05-21 | Stop reason: HOSPADM

## 2022-05-09 RX ORDER — DIAZEPAM 2 MG/1
4 TABLET ORAL EVERY 12 HOURS PRN
Status: COMPLETED | OUTPATIENT
Start: 2022-05-09 | End: 2022-05-12

## 2022-05-09 RX ORDER — DEXTROSE MONOHYDRATE 25 G/50ML
25 INJECTION, SOLUTION INTRAVENOUS
Status: DISCONTINUED | OUTPATIENT
Start: 2022-05-09 | End: 2022-05-21 | Stop reason: HOSPADM

## 2022-05-09 RX ADMIN — PROCHLORPERAZINE EDISYLATE 5 MG: 5 INJECTION INTRAMUSCULAR; INTRAVENOUS at 00:49

## 2022-05-09 RX ADMIN — ACETAMINOPHEN 650 MG: 325 TABLET, FILM COATED ORAL at 08:14

## 2022-05-09 RX ADMIN — HYDROCODONE BITARTRATE AND ACETAMINOPHEN 1 TABLET: 5; 325 TABLET ORAL at 18:28

## 2022-05-09 RX ADMIN — FAMOTIDINE 20 MG: 10 INJECTION INTRAVENOUS at 00:44

## 2022-05-09 RX ADMIN — HYDROCODONE BITARTRATE AND ACETAMINOPHEN 1 TABLET: 5; 325 TABLET ORAL at 13:35

## 2022-05-09 RX ADMIN — HYDROCODONE BITARTRATE AND ACETAMINOPHEN 1 TABLET: 5; 325 TABLET ORAL at 23:44

## 2022-05-09 RX ADMIN — HYDROXYZINE HYDROCHLORIDE 25 MG: 25 TABLET ORAL at 18:28

## 2022-05-09 RX ADMIN — ONDANSETRON 4 MG: 2 INJECTION INTRAMUSCULAR; INTRAVENOUS at 13:35

## 2022-05-09 RX ADMIN — DIAZEPAM 4 MG: 2 TABLET ORAL at 21:14

## 2022-05-09 RX ADMIN — TAMOXIFEN CITRATE 20 MG: 10 TABLET ORAL at 15:46

## 2022-05-09 RX ADMIN — ACETAMINOPHEN 650 MG: 325 TABLET, FILM COATED ORAL at 03:45

## 2022-05-09 RX ADMIN — PHENYLEPHRINE HYDROCHLORIDE 0.5 MCG/KG/MIN: 10 INJECTION INTRAVENOUS at 01:45

## 2022-05-09 RX ADMIN — DIAZEPAM 4 MG: 2 TABLET ORAL at 09:47

## 2022-05-09 RX ADMIN — Medication 10 ML: at 08:07

## 2022-05-09 RX ADMIN — CYCLOBENZAPRINE 10 MG: 10 TABLET, FILM COATED ORAL at 07:34

## 2022-05-09 RX ADMIN — SODIUM CHLORIDE 100 ML/HR: 9 INJECTION, SOLUTION INTRAVENOUS at 11:58

## 2022-05-09 RX ADMIN — SODIUM CHLORIDE 100 ML/HR: 9 INJECTION, SOLUTION INTRAVENOUS at 21:14

## 2022-05-09 RX ADMIN — SODIUM CHLORIDE 100 ML/HR: 9 INJECTION, SOLUTION INTRAVENOUS at 01:53

## 2022-05-09 RX ADMIN — CYCLOBENZAPRINE 10 MG: 10 TABLET, FILM COATED ORAL at 00:44

## 2022-05-09 RX ADMIN — PHENYLEPHRINE HYDROCHLORIDE 0.5 MCG/KG/MIN: 10 INJECTION INTRAVENOUS at 20:05

## 2022-05-09 RX ADMIN — CYCLOBENZAPRINE 10 MG: 10 TABLET, FILM COATED ORAL at 15:46

## 2022-05-09 RX ADMIN — ONDANSETRON 4 MG: 2 INJECTION INTRAMUSCULAR; INTRAVENOUS at 03:51

## 2022-05-09 NOTE — ANESTHESIA POSTPROCEDURE EVALUATION
"Patient: Cesia Workman    Procedure Summary     Date: 05/08/22 Room / Location: [facility] OR [address] [place] [address] MAIN OR    Anesthesia Start: 1646 Anesthesia Stop: 2236    Procedure: T10-T12 BILATERAL POSTERIOR LATERAL FUSION WITH T11 LEFT SIDED TRANSPEDICULAR DISCECTOMY FOR SPINAL CANAL DECOMPRESSION (Left Spine Thoracic) Diagnosis:     Surgeons: Brady Vega MD Provider: Brady Pena MD    Anesthesia Type: general ASA Status: 2 - Emergent          Anesthesia Type: general    Vitals  Vitals Value Taken Time   /75 05/08/22 2331   Temp 36.4 °C (97.6 °F) 05/08/22 2315   Pulse 76 05/08/22 2334   Resp 14 05/08/22 2330   SpO2 100 % 05/08/22 2334   Vitals shown include unvalidated device data.        Post Anesthesia Care and Evaluation    Patient location during evaluation: bedside  Patient participation: complete - patient participated  Level of consciousness: awake and alert  Pain management: adequate  Airway patency: patent  Anesthetic complications: No anesthetic complications    Cardiovascular status: acceptable  Respiratory status: acceptable  Hydration status: acceptable    Comments: /65 (BP Location: Right arm, Patient Position: Lying)   Pulse (!) 45   Temp 36.4 °C (97.5 °F) (Oral)   Resp 14   Ht 157.5 cm (62\")   Wt 65 kg (143 lb 4.8 oz)   LMP 04/24/2022   SpO2 100%   BMI 26.21 kg/m²       "

## 2022-05-10 LAB
GLUCOSE BLDC GLUCOMTR-MCNC: 101 MG/DL (ref 70–130)
GLUCOSE BLDC GLUCOMTR-MCNC: 104 MG/DL (ref 70–130)
GLUCOSE BLDC GLUCOMTR-MCNC: 75 MG/DL (ref 70–130)
LAB AP CASE REPORT: NORMAL
LAB AP SPECIAL STAINS: NORMAL
PATH REPORT.FINAL DX SPEC: NORMAL
PATH REPORT.GROSS SPEC: NORMAL

## 2022-05-10 PROCEDURE — 99024 POSTOP FOLLOW-UP VISIT: CPT | Performed by: NURSE PRACTITIONER

## 2022-05-10 PROCEDURE — 97110 THERAPEUTIC EXERCISES: CPT

## 2022-05-10 PROCEDURE — 25010000002 HEPARIN (PORCINE) PER 1000 UNITS: Performed by: NURSE PRACTITIONER

## 2022-05-10 PROCEDURE — 25010000002 PROCHLORPERAZINE 10 MG/2ML SOLUTION: Performed by: NEUROLOGICAL SURGERY

## 2022-05-10 PROCEDURE — 82962 GLUCOSE BLOOD TEST: CPT

## 2022-05-10 PROCEDURE — 97530 THERAPEUTIC ACTIVITIES: CPT

## 2022-05-10 PROCEDURE — 25010000002 PHENYLEPHRINE 10 MG/ML SOLUTION: Performed by: NURSE PRACTITIONER

## 2022-05-10 RX ORDER — DOCUSATE SODIUM 100 MG/1
100 CAPSULE, LIQUID FILLED ORAL DAILY
Status: DISCONTINUED | OUTPATIENT
Start: 2022-05-10 | End: 2022-05-13

## 2022-05-10 RX ORDER — HEPARIN SODIUM 5000 [USP'U]/ML
5000 INJECTION, SOLUTION INTRAVENOUS; SUBCUTANEOUS EVERY 8 HOURS SCHEDULED
Status: DISCONTINUED | OUTPATIENT
Start: 2022-05-10 | End: 2022-05-21 | Stop reason: HOSPADM

## 2022-05-10 RX ORDER — MIDODRINE HYDROCHLORIDE 5 MG/1
5 TABLET ORAL EVERY 8 HOURS
Status: DISCONTINUED | OUTPATIENT
Start: 2022-05-10 | End: 2022-05-11

## 2022-05-10 RX ORDER — AMOXICILLIN 250 MG
2 CAPSULE ORAL NIGHTLY
Status: DISCONTINUED | OUTPATIENT
Start: 2022-05-10 | End: 2022-05-13

## 2022-05-10 RX ORDER — BISACODYL 10 MG
10 SUPPOSITORY, RECTAL RECTAL DAILY
Status: DISCONTINUED | OUTPATIENT
Start: 2022-05-10 | End: 2022-05-16

## 2022-05-10 RX ADMIN — CYCLOBENZAPRINE 10 MG: 10 TABLET, FILM COATED ORAL at 00:59

## 2022-05-10 RX ADMIN — HEPARIN SODIUM 5000 UNITS: 5000 INJECTION INTRAVENOUS; SUBCUTANEOUS at 22:29

## 2022-05-10 RX ADMIN — DIAZEPAM 4 MG: 2 TABLET ORAL at 21:44

## 2022-05-10 RX ADMIN — SODIUM CHLORIDE 100 ML/HR: 9 INJECTION, SOLUTION INTRAVENOUS at 08:16

## 2022-05-10 RX ADMIN — HYDROCODONE BITARTRATE AND ACETAMINOPHEN 1 TABLET: 5; 325 TABLET ORAL at 22:34

## 2022-05-10 RX ADMIN — PHENYLEPHRINE HYDROCHLORIDE 1.5 MCG/KG/MIN: 10 INJECTION INTRAVENOUS at 11:38

## 2022-05-10 RX ADMIN — MIDODRINE HYDROCHLORIDE 5 MG: 5 TABLET ORAL at 20:36

## 2022-05-10 RX ADMIN — PHENYLEPHRINE HYDROCHLORIDE 1.6 MCG/KG/MIN: 10 INJECTION INTRAVENOUS at 22:29

## 2022-05-10 RX ADMIN — HYDROCODONE BITARTRATE AND ACETAMINOPHEN 1 TABLET: 5; 325 TABLET ORAL at 13:14

## 2022-05-10 RX ADMIN — DOCUSATE SODIUM 100 MG: 100 CAPSULE, LIQUID FILLED ORAL at 11:44

## 2022-05-10 RX ADMIN — HYDROCODONE BITARTRATE AND ACETAMINOPHEN 1 TABLET: 5; 325 TABLET ORAL at 08:17

## 2022-05-10 RX ADMIN — BISACODYL 10 MG: 10 SUPPOSITORY RECTAL at 11:44

## 2022-05-10 RX ADMIN — DOCUSATE SODIUM 50MG AND SENNOSIDES 8.6MG 2 TABLET: 8.6; 5 TABLET, FILM COATED ORAL at 20:36

## 2022-05-10 RX ADMIN — CYCLOBENZAPRINE 10 MG: 10 TABLET, FILM COATED ORAL at 10:43

## 2022-05-10 RX ADMIN — HEPARIN SODIUM 5000 UNITS: 5000 INJECTION INTRAVENOUS; SUBCUTANEOUS at 13:14

## 2022-05-10 RX ADMIN — PROCHLORPERAZINE EDISYLATE 5 MG: 5 INJECTION INTRAMUSCULAR; INTRAVENOUS at 08:17

## 2022-05-10 RX ADMIN — MIDODRINE HYDROCHLORIDE 5 MG: 5 TABLET ORAL at 13:13

## 2022-05-10 RX ADMIN — Medication 10 ML: at 08:19

## 2022-05-10 RX ADMIN — HYDROCODONE BITARTRATE AND ACETAMINOPHEN 1 TABLET: 5; 325 TABLET ORAL at 18:17

## 2022-05-10 RX ADMIN — SODIUM CHLORIDE 100 ML/HR: 9 INJECTION, SOLUTION INTRAVENOUS at 18:39

## 2022-05-10 RX ADMIN — TAMOXIFEN CITRATE 20 MG: 10 TABLET ORAL at 08:24

## 2022-05-10 RX ADMIN — Medication 10 ML: at 20:36

## 2022-05-11 LAB
ALBUMIN SERPL-MCNC: 3 G/DL (ref 3.5–5.2)
ALBUMIN/GLOB SERPL: 1.3 G/DL
ALP SERPL-CCNC: 38 U/L (ref 39–117)
ALT SERPL W P-5'-P-CCNC: 33 U/L (ref 1–33)
ANION GAP SERPL CALCULATED.3IONS-SCNC: 11 MMOL/L (ref 5–15)
AST SERPL-CCNC: 35 U/L (ref 1–32)
BACTERIA UR QL AUTO: NORMAL /HPF
BILIRUB SERPL-MCNC: 0.3 MG/DL (ref 0–1.2)
BILIRUB UR QL STRIP: NEGATIVE
BUN SERPL-MCNC: 10 MG/DL (ref 6–20)
BUN/CREAT SERPL: 18.2 (ref 7–25)
CALCIUM SPEC-SCNC: 8.1 MG/DL (ref 8.6–10.5)
CHLORIDE SERPL-SCNC: 108 MMOL/L (ref 98–107)
CLARITY UR: CLEAR
CO2 SERPL-SCNC: 20 MMOL/L (ref 22–29)
COLOR UR: YELLOW
CREAT SERPL-MCNC: 0.55 MG/DL (ref 0.57–1)
DEPRECATED RDW RBC AUTO: 42.8 FL (ref 37–54)
EGFRCR SERPLBLD CKD-EPI 2021: 111.8 ML/MIN/1.73
ERYTHROCYTE [DISTWIDTH] IN BLOOD BY AUTOMATED COUNT: 12.5 % (ref 12.3–15.4)
GLOBULIN UR ELPH-MCNC: 2.3 GM/DL
GLUCOSE BLDC GLUCOMTR-MCNC: 109 MG/DL (ref 70–130)
GLUCOSE BLDC GLUCOMTR-MCNC: 109 MG/DL (ref 70–130)
GLUCOSE BLDC GLUCOMTR-MCNC: 92 MG/DL (ref 70–130)
GLUCOSE BLDC GLUCOMTR-MCNC: 94 MG/DL (ref 70–130)
GLUCOSE SERPL-MCNC: 111 MG/DL (ref 65–99)
GLUCOSE UR STRIP-MCNC: ABNORMAL MG/DL
HCT VFR BLD AUTO: 31.2 % (ref 34–46.6)
HGB BLD-MCNC: 10.2 G/DL (ref 12–15.9)
HGB UR QL STRIP.AUTO: ABNORMAL
HYALINE CASTS UR QL AUTO: NORMAL /LPF
KETONES UR QL STRIP: ABNORMAL
LEUKOCYTE ESTERASE UR QL STRIP.AUTO: NEGATIVE
MCH RBC QN AUTO: 30.4 PG (ref 26.6–33)
MCHC RBC AUTO-ENTMCNC: 32.7 G/DL (ref 31.5–35.7)
MCV RBC AUTO: 92.9 FL (ref 79–97)
NITRITE UR QL STRIP: NEGATIVE
PH UR STRIP.AUTO: 6 [PH] (ref 5–8)
PLATELET # BLD AUTO: 211 10*3/MM3 (ref 140–450)
PMV BLD AUTO: 10.6 FL (ref 6–12)
POTASSIUM SERPL-SCNC: 3.5 MMOL/L (ref 3.5–5.2)
PROT SERPL-MCNC: 5.3 G/DL (ref 6–8.5)
PROT UR QL STRIP: NEGATIVE
RBC # BLD AUTO: 3.36 10*6/MM3 (ref 3.77–5.28)
RBC # UR STRIP: NORMAL /HPF
REF LAB TEST METHOD: NORMAL
SODIUM SERPL-SCNC: 139 MMOL/L (ref 136–145)
SP GR UR STRIP: 1.01 (ref 1–1.03)
SQUAMOUS #/AREA URNS HPF: NORMAL /HPF
UROBILINOGEN UR QL STRIP: ABNORMAL
WBC # UR STRIP: NORMAL /HPF
WBC NRBC COR # BLD: 19.35 10*3/MM3 (ref 3.4–10.8)

## 2022-05-11 PROCEDURE — 85027 COMPLETE CBC AUTOMATED: CPT | Performed by: INTERNAL MEDICINE

## 2022-05-11 PROCEDURE — 25010000002 HYDROMORPHONE PER 4 MG: Performed by: NURSE PRACTITIONER

## 2022-05-11 PROCEDURE — 97530 THERAPEUTIC ACTIVITIES: CPT

## 2022-05-11 PROCEDURE — 25010000002 PROCHLORPERAZINE 10 MG/2ML SOLUTION: Performed by: NEUROLOGICAL SURGERY

## 2022-05-11 PROCEDURE — 81001 URINALYSIS AUTO W/SCOPE: CPT | Performed by: NURSE PRACTITIONER

## 2022-05-11 PROCEDURE — 25010000002 PHENYLEPHRINE 10 MG/ML SOLUTION: Performed by: NURSE PRACTITIONER

## 2022-05-11 PROCEDURE — 80053 COMPREHEN METABOLIC PANEL: CPT | Performed by: INTERNAL MEDICINE

## 2022-05-11 PROCEDURE — 97535 SELF CARE MNGMENT TRAINING: CPT

## 2022-05-11 PROCEDURE — 25010000002 HEPARIN (PORCINE) PER 1000 UNITS: Performed by: NURSE PRACTITIONER

## 2022-05-11 PROCEDURE — 82962 GLUCOSE BLOOD TEST: CPT

## 2022-05-11 PROCEDURE — 25010000002 ONDANSETRON PER 1 MG: Performed by: NEUROLOGICAL SURGERY

## 2022-05-11 PROCEDURE — 99024 POSTOP FOLLOW-UP VISIT: CPT | Performed by: NURSE PRACTITIONER

## 2022-05-11 PROCEDURE — 25010000002 HYDROMORPHONE 1 MG/ML SOLUTION: Performed by: NEUROLOGICAL SURGERY

## 2022-05-11 RX ORDER — POTASSIUM CHLORIDE 1.5 G/1.77G
40 POWDER, FOR SOLUTION ORAL AS NEEDED
Status: DISCONTINUED | OUTPATIENT
Start: 2022-05-11 | End: 2022-05-21 | Stop reason: HOSPADM

## 2022-05-11 RX ORDER — MIDODRINE HYDROCHLORIDE 5 MG/1
10 TABLET ORAL EVERY 8 HOURS
Status: DISCONTINUED | OUTPATIENT
Start: 2022-05-11 | End: 2022-05-12

## 2022-05-11 RX ORDER — HYDROMORPHONE HYDROCHLORIDE 1 MG/ML
0.5 INJECTION, SOLUTION INTRAMUSCULAR; INTRAVENOUS; SUBCUTANEOUS
Status: DISCONTINUED | OUTPATIENT
Start: 2022-05-11 | End: 2022-05-21 | Stop reason: HOSPADM

## 2022-05-11 RX ORDER — POTASSIUM CHLORIDE 750 MG/1
40 TABLET, FILM COATED, EXTENDED RELEASE ORAL AS NEEDED
Status: DISCONTINUED | OUTPATIENT
Start: 2022-05-11 | End: 2022-05-21 | Stop reason: HOSPADM

## 2022-05-11 RX ORDER — POTASSIUM CHLORIDE 7.45 MG/ML
10 INJECTION INTRAVENOUS
Status: DISCONTINUED | OUTPATIENT
Start: 2022-05-11 | End: 2022-05-21 | Stop reason: HOSPADM

## 2022-05-11 RX ADMIN — DOCUSATE SODIUM 100 MG: 100 CAPSULE, LIQUID FILLED ORAL at 08:24

## 2022-05-11 RX ADMIN — SODIUM CHLORIDE 100 ML/HR: 9 INJECTION, SOLUTION INTRAVENOUS at 23:24

## 2022-05-11 RX ADMIN — HEPARIN SODIUM 5000 UNITS: 5000 INJECTION INTRAVENOUS; SUBCUTANEOUS at 22:02

## 2022-05-11 RX ADMIN — HYDROCODONE BITARTRATE AND ACETAMINOPHEN 1 TABLET: 5; 325 TABLET ORAL at 15:07

## 2022-05-11 RX ADMIN — Medication 10 ML: at 20:45

## 2022-05-11 RX ADMIN — CYCLOBENZAPRINE 10 MG: 10 TABLET, FILM COATED ORAL at 08:24

## 2022-05-11 RX ADMIN — BISACODYL 10 MG: 10 SUPPOSITORY RECTAL at 08:25

## 2022-05-11 RX ADMIN — ONDANSETRON 4 MG: 2 INJECTION INTRAMUSCULAR; INTRAVENOUS at 19:57

## 2022-05-11 RX ADMIN — HYDROCODONE BITARTRATE AND ACETAMINOPHEN 1 TABLET: 5; 325 TABLET ORAL at 02:40

## 2022-05-11 RX ADMIN — HEPARIN SODIUM 5000 UNITS: 5000 INJECTION INTRAVENOUS; SUBCUTANEOUS at 13:28

## 2022-05-11 RX ADMIN — PHENYLEPHRINE HYDROCHLORIDE 1.3 MCG/KG/MIN: 10 INJECTION INTRAVENOUS at 18:47

## 2022-05-11 RX ADMIN — PHENYLEPHRINE HYDROCHLORIDE 1.6 MCG/KG/MIN: 10 INJECTION INTRAVENOUS at 08:23

## 2022-05-11 RX ADMIN — HEPARIN SODIUM 5000 UNITS: 5000 INJECTION INTRAVENOUS; SUBCUTANEOUS at 06:33

## 2022-05-11 RX ADMIN — TAMOXIFEN CITRATE 20 MG: 10 TABLET ORAL at 08:24

## 2022-05-11 RX ADMIN — POTASSIUM CHLORIDE 40 MEQ: 750 TABLET, EXTENDED RELEASE ORAL at 11:16

## 2022-05-11 RX ADMIN — MIDODRINE HYDROCHLORIDE 10 MG: 5 TABLET ORAL at 19:48

## 2022-05-11 RX ADMIN — ONDANSETRON 4 MG: 2 INJECTION INTRAMUSCULAR; INTRAVENOUS at 03:59

## 2022-05-11 RX ADMIN — HYDROMORPHONE HYDROCHLORIDE 0.5 MG: 1 INJECTION, SOLUTION INTRAMUSCULAR; INTRAVENOUS; SUBCUTANEOUS at 22:07

## 2022-05-11 RX ADMIN — HYDROMORPHONE HYDROCHLORIDE 1 MG: 1 INJECTION, SOLUTION INTRAMUSCULAR; INTRAVENOUS; SUBCUTANEOUS at 03:59

## 2022-05-11 RX ADMIN — CYCLOBENZAPRINE 10 MG: 10 TABLET, FILM COATED ORAL at 17:11

## 2022-05-11 RX ADMIN — PROCHLORPERAZINE EDISYLATE 5 MG: 5 INJECTION INTRAMUSCULAR; INTRAVENOUS at 09:15

## 2022-05-11 RX ADMIN — MIDODRINE HYDROCHLORIDE 5 MG: 5 TABLET ORAL at 03:38

## 2022-05-11 RX ADMIN — HYDROCODONE BITARTRATE AND ACETAMINOPHEN 1 TABLET: 5; 325 TABLET ORAL at 19:48

## 2022-05-11 RX ADMIN — HYDROCODONE BITARTRATE AND ACETAMINOPHEN 1 TABLET: 5; 325 TABLET ORAL at 06:38

## 2022-05-11 RX ADMIN — Medication 10 ML: at 08:25

## 2022-05-11 RX ADMIN — HYDROMORPHONE HYDROCHLORIDE 1 MG: 1 INJECTION, SOLUTION INTRAMUSCULAR; INTRAVENOUS; SUBCUTANEOUS at 09:15

## 2022-05-11 RX ADMIN — SODIUM CHLORIDE 100 ML/HR: 9 INJECTION, SOLUTION INTRAVENOUS at 02:48

## 2022-05-11 RX ADMIN — HYDROCODONE BITARTRATE AND ACETAMINOPHEN 1 TABLET: 5; 325 TABLET ORAL at 11:16

## 2022-05-11 RX ADMIN — MIDODRINE HYDROCHLORIDE 10 MG: 5 TABLET ORAL at 11:16

## 2022-05-11 RX ADMIN — POTASSIUM CHLORIDE 40 MEQ: 750 TABLET, EXTENDED RELEASE ORAL at 06:33

## 2022-05-12 ENCOUNTER — APPOINTMENT (OUTPATIENT)
Dept: CARDIOLOGY | Facility: HOSPITAL | Age: 52
End: 2022-05-12

## 2022-05-12 LAB
ALBUMIN SERPL-MCNC: 2.2 G/DL (ref 3.5–5.2)
ALBUMIN/GLOB SERPL: 0.9 G/DL
ALP SERPL-CCNC: 38 U/L (ref 39–117)
ALT SERPL W P-5'-P-CCNC: 23 U/L (ref 1–33)
ANION GAP SERPL CALCULATED.3IONS-SCNC: 11 MMOL/L (ref 5–15)
AST SERPL-CCNC: 17 U/L (ref 1–32)
BH CV LOWER VASCULAR LEFT COMMON FEMORAL AUGMENT: NORMAL
BH CV LOWER VASCULAR LEFT COMMON FEMORAL COMPETENT: NORMAL
BH CV LOWER VASCULAR LEFT COMMON FEMORAL COMPRESS: NORMAL
BH CV LOWER VASCULAR LEFT COMMON FEMORAL PHASIC: NORMAL
BH CV LOWER VASCULAR LEFT COMMON FEMORAL SPONT: NORMAL
BH CV LOWER VASCULAR LEFT DISTAL FEMORAL COMPRESS: NORMAL
BH CV LOWER VASCULAR LEFT GASTRONEMIUS COMPRESS: NORMAL
BH CV LOWER VASCULAR LEFT GREATER SAPH AK COMPRESS: NORMAL
BH CV LOWER VASCULAR LEFT GREATER SAPH BK COMPRESS: NORMAL
BH CV LOWER VASCULAR LEFT LESSER SAPH COMPRESS: NORMAL
BH CV LOWER VASCULAR LEFT MID FEMORAL AUGMENT: NORMAL
BH CV LOWER VASCULAR LEFT MID FEMORAL COMPETENT: NORMAL
BH CV LOWER VASCULAR LEFT MID FEMORAL COMPRESS: NORMAL
BH CV LOWER VASCULAR LEFT MID FEMORAL PHASIC: NORMAL
BH CV LOWER VASCULAR LEFT MID FEMORAL SPONT: NORMAL
BH CV LOWER VASCULAR LEFT PERONEAL COMPRESS: NORMAL
BH CV LOWER VASCULAR LEFT POPLITEAL AUGMENT: NORMAL
BH CV LOWER VASCULAR LEFT POPLITEAL COMPETENT: NORMAL
BH CV LOWER VASCULAR LEFT POPLITEAL COMPRESS: NORMAL
BH CV LOWER VASCULAR LEFT POPLITEAL PHASIC: NORMAL
BH CV LOWER VASCULAR LEFT POPLITEAL SPONT: NORMAL
BH CV LOWER VASCULAR LEFT POSTERIOR TIBIAL COMPRESS: NORMAL
BH CV LOWER VASCULAR LEFT PROFUNDA FEMORAL COMPRESS: NORMAL
BH CV LOWER VASCULAR LEFT PROXIMAL FEMORAL COMPRESS: NORMAL
BH CV LOWER VASCULAR LEFT SAPHENOFEMORAL JUNCTION COMPRESS: NORMAL
BH CV LOWER VASCULAR RIGHT COMMON FEMORAL AUGMENT: NORMAL
BH CV LOWER VASCULAR RIGHT COMMON FEMORAL COMPETENT: NORMAL
BH CV LOWER VASCULAR RIGHT COMMON FEMORAL COMPRESS: NORMAL
BH CV LOWER VASCULAR RIGHT COMMON FEMORAL PHASIC: NORMAL
BH CV LOWER VASCULAR RIGHT COMMON FEMORAL SPONT: NORMAL
BH CV LOWER VASCULAR RIGHT DISTAL FEMORAL COMPRESS: NORMAL
BH CV LOWER VASCULAR RIGHT GASTRONEMIUS COMPRESS: NORMAL
BH CV LOWER VASCULAR RIGHT GREATER SAPH AK COMPRESS: NORMAL
BH CV LOWER VASCULAR RIGHT GREATER SAPH BK COMPRESS: NORMAL
BH CV LOWER VASCULAR RIGHT LESSER SAPH COMPRESS: NORMAL
BH CV LOWER VASCULAR RIGHT MID FEMORAL AUGMENT: NORMAL
BH CV LOWER VASCULAR RIGHT MID FEMORAL COMPETENT: NORMAL
BH CV LOWER VASCULAR RIGHT MID FEMORAL COMPRESS: NORMAL
BH CV LOWER VASCULAR RIGHT MID FEMORAL PHASIC: NORMAL
BH CV LOWER VASCULAR RIGHT MID FEMORAL SPONT: NORMAL
BH CV LOWER VASCULAR RIGHT PERONEAL COMPRESS: NORMAL
BH CV LOWER VASCULAR RIGHT POPLITEAL AUGMENT: NORMAL
BH CV LOWER VASCULAR RIGHT POPLITEAL COMPETENT: NORMAL
BH CV LOWER VASCULAR RIGHT POPLITEAL COMPRESS: NORMAL
BH CV LOWER VASCULAR RIGHT POPLITEAL PHASIC: NORMAL
BH CV LOWER VASCULAR RIGHT POPLITEAL SPONT: NORMAL
BH CV LOWER VASCULAR RIGHT POSTERIOR TIBIAL COMPRESS: NORMAL
BH CV LOWER VASCULAR RIGHT PROFUNDA FEMORAL COMPRESS: NORMAL
BH CV LOWER VASCULAR RIGHT PROXIMAL FEMORAL COMPRESS: NORMAL
BH CV LOWER VASCULAR RIGHT SAPHENOFEMORAL JUNCTION COMPRESS: NORMAL
BILIRUB SERPL-MCNC: 0.3 MG/DL (ref 0–1.2)
BUN SERPL-MCNC: 8 MG/DL (ref 6–20)
BUN/CREAT SERPL: 26.7 (ref 7–25)
CALCIUM SPEC-SCNC: 6.5 MG/DL (ref 8.6–10.5)
CHLORIDE SERPL-SCNC: 116 MMOL/L (ref 98–107)
CO2 SERPL-SCNC: 15 MMOL/L (ref 22–29)
CREAT SERPL-MCNC: 0.3 MG/DL (ref 0.57–1)
DEPRECATED RDW RBC AUTO: 41.3 FL (ref 37–54)
EGFRCR SERPLBLD CKD-EPI 2021: 129.4 ML/MIN/1.73
ERYTHROCYTE [DISTWIDTH] IN BLOOD BY AUTOMATED COUNT: 12.3 % (ref 12.3–15.4)
GLOBULIN UR ELPH-MCNC: 2.5 GM/DL
GLUCOSE BLDC GLUCOMTR-MCNC: 112 MG/DL (ref 70–130)
GLUCOSE BLDC GLUCOMTR-MCNC: 88 MG/DL (ref 70–130)
GLUCOSE BLDC GLUCOMTR-MCNC: 94 MG/DL (ref 70–130)
GLUCOSE BLDC GLUCOMTR-MCNC: 95 MG/DL (ref 70–130)
GLUCOSE SERPL-MCNC: 85 MG/DL (ref 65–99)
HCT VFR BLD AUTO: 31 % (ref 34–46.6)
HGB BLD-MCNC: 10.2 G/DL (ref 12–15.9)
MAXIMAL PREDICTED HEART RATE: 170 BPM
MCH RBC QN AUTO: 30.2 PG (ref 26.6–33)
MCHC RBC AUTO-ENTMCNC: 32.9 G/DL (ref 31.5–35.7)
MCV RBC AUTO: 91.7 FL (ref 79–97)
PLATELET # BLD AUTO: 180 10*3/MM3 (ref 140–450)
PMV BLD AUTO: 10.8 FL (ref 6–12)
POTASSIUM SERPL-SCNC: 3.6 MMOL/L (ref 3.5–5.2)
POTASSIUM SERPL-SCNC: 4.5 MMOL/L (ref 3.5–5.2)
PROT SERPL-MCNC: 4.7 G/DL (ref 6–8.5)
RBC # BLD AUTO: 3.38 10*6/MM3 (ref 3.77–5.28)
SODIUM SERPL-SCNC: 142 MMOL/L (ref 136–145)
STRESS TARGET HR: 145 BPM
WBC NRBC COR # BLD: 21.25 10*3/MM3 (ref 3.4–10.8)

## 2022-05-12 PROCEDURE — 84132 ASSAY OF SERUM POTASSIUM: CPT | Performed by: INTERNAL MEDICINE

## 2022-05-12 PROCEDURE — 80053 COMPREHEN METABOLIC PANEL: CPT | Performed by: INTERNAL MEDICINE

## 2022-05-12 PROCEDURE — 25010000002 HEPARIN (PORCINE) PER 1000 UNITS: Performed by: NURSE PRACTITIONER

## 2022-05-12 PROCEDURE — 25010000002 PHENYLEPHRINE 10 MG/ML SOLUTION: Performed by: NURSE PRACTITIONER

## 2022-05-12 PROCEDURE — 97530 THERAPEUTIC ACTIVITIES: CPT

## 2022-05-12 PROCEDURE — 63710000001 ONDANSETRON PER 8 MG: Performed by: NEUROLOGICAL SURGERY

## 2022-05-12 PROCEDURE — 85027 COMPLETE CBC AUTOMATED: CPT | Performed by: INTERNAL MEDICINE

## 2022-05-12 PROCEDURE — 97535 SELF CARE MNGMENT TRAINING: CPT

## 2022-05-12 PROCEDURE — 93970 EXTREMITY STUDY: CPT

## 2022-05-12 PROCEDURE — 82962 GLUCOSE BLOOD TEST: CPT

## 2022-05-12 PROCEDURE — C1751 CATH, INF, PER/CENT/MIDLINE: HCPCS

## 2022-05-12 PROCEDURE — 99024 POSTOP FOLLOW-UP VISIT: CPT | Performed by: NURSE PRACTITIONER

## 2022-05-12 PROCEDURE — 99223 1ST HOSP IP/OBS HIGH 75: CPT | Performed by: INTERNAL MEDICINE

## 2022-05-12 RX ORDER — SODIUM CHLORIDE 0.9 % (FLUSH) 0.9 %
10 SYRINGE (ML) INJECTION EVERY 12 HOURS SCHEDULED
Status: DISCONTINUED | OUTPATIENT
Start: 2022-05-12 | End: 2022-05-21 | Stop reason: HOSPADM

## 2022-05-12 RX ORDER — METHOCARBAMOL 500 MG/1
500 TABLET, FILM COATED ORAL EVERY 6 HOURS SCHEDULED
Status: DISCONTINUED | OUTPATIENT
Start: 2022-05-12 | End: 2022-05-21 | Stop reason: HOSPADM

## 2022-05-12 RX ORDER — SIMETHICONE 80 MG
80 TABLET,CHEWABLE ORAL 4 TIMES DAILY PRN
Status: DISCONTINUED | OUTPATIENT
Start: 2022-05-12 | End: 2022-05-21 | Stop reason: HOSPADM

## 2022-05-12 RX ORDER — SODIUM CHLORIDE 0.9 % (FLUSH) 0.9 %
20 SYRINGE (ML) INJECTION AS NEEDED
Status: DISCONTINUED | OUTPATIENT
Start: 2022-05-12 | End: 2022-05-21 | Stop reason: HOSPADM

## 2022-05-12 RX ORDER — SODIUM CHLORIDE 0.9 % (FLUSH) 0.9 %
10 SYRINGE (ML) INJECTION AS NEEDED
Status: DISCONTINUED | OUTPATIENT
Start: 2022-05-12 | End: 2022-05-21 | Stop reason: HOSPADM

## 2022-05-12 RX ADMIN — HYDROCODONE BITARTRATE AND ACETAMINOPHEN 1 TABLET: 5; 325 TABLET ORAL at 20:48

## 2022-05-12 RX ADMIN — Medication 10 ML: at 20:54

## 2022-05-12 RX ADMIN — ONDANSETRON HYDROCHLORIDE 4 MG: 4 TABLET, FILM COATED ORAL at 08:58

## 2022-05-12 RX ADMIN — Medication 10 ML: at 10:58

## 2022-05-12 RX ADMIN — HYDROCODONE BITARTRATE AND ACETAMINOPHEN 1 TABLET: 5; 325 TABLET ORAL at 08:51

## 2022-05-12 RX ADMIN — MIDODRINE HYDROCHLORIDE 10 MG: 5 TABLET ORAL at 03:43

## 2022-05-12 RX ADMIN — HEPARIN SODIUM 5000 UNITS: 5000 INJECTION INTRAVENOUS; SUBCUTANEOUS at 22:30

## 2022-05-12 RX ADMIN — HYDROCODONE BITARTRATE AND ACETAMINOPHEN 1 TABLET: 5; 325 TABLET ORAL at 00:20

## 2022-05-12 RX ADMIN — TAMOXIFEN CITRATE 20 MG: 10 TABLET ORAL at 08:51

## 2022-05-12 RX ADMIN — HYDROCODONE BITARTRATE AND ACETAMINOPHEN 1 TABLET: 5; 325 TABLET ORAL at 12:50

## 2022-05-12 RX ADMIN — Medication 10 ML: at 12:08

## 2022-05-12 RX ADMIN — DIAZEPAM 4 MG: 2 TABLET ORAL at 02:39

## 2022-05-12 RX ADMIN — POTASSIUM CHLORIDE 40 MEQ: 750 TABLET, EXTENDED RELEASE ORAL at 06:35

## 2022-05-12 RX ADMIN — Medication 10 ML: at 20:53

## 2022-05-12 RX ADMIN — PHENYLEPHRINE HYDROCHLORIDE 1.9 MCG/KG/MIN: 10 INJECTION INTRAVENOUS at 02:40

## 2022-05-12 RX ADMIN — HYDROCODONE BITARTRATE AND ACETAMINOPHEN 1 TABLET: 5; 325 TABLET ORAL at 16:54

## 2022-05-12 RX ADMIN — POTASSIUM CHLORIDE 40 MEQ: 750 TABLET, EXTENDED RELEASE ORAL at 12:06

## 2022-05-12 RX ADMIN — HEPARIN SODIUM 5000 UNITS: 5000 INJECTION INTRAVENOUS; SUBCUTANEOUS at 14:24

## 2022-05-12 RX ADMIN — Medication 10 ML: at 12:07

## 2022-05-12 RX ADMIN — SODIUM CHLORIDE 100 ML/HR: 9 INJECTION, SOLUTION INTRAVENOUS at 10:18

## 2022-05-12 RX ADMIN — HEPARIN SODIUM 5000 UNITS: 5000 INJECTION INTRAVENOUS; SUBCUTANEOUS at 05:35

## 2022-05-12 RX ADMIN — ONDANSETRON HYDROCHLORIDE 4 MG: 4 TABLET, FILM COATED ORAL at 02:39

## 2022-05-12 RX ADMIN — PHENYLEPHRINE HYDROCHLORIDE 1.6 MCG/KG/MIN: 10 INJECTION INTRAVENOUS at 10:29

## 2022-05-12 RX ADMIN — METHOCARBAMOL TABLETS 500 MG: 500 TABLET, COATED ORAL at 17:50

## 2022-05-12 RX ADMIN — METHOCARBAMOL TABLETS 500 MG: 500 TABLET, COATED ORAL at 12:06

## 2022-05-13 ENCOUNTER — APPOINTMENT (OUTPATIENT)
Dept: GENERAL RADIOLOGY | Facility: HOSPITAL | Age: 52
End: 2022-05-13

## 2022-05-13 LAB
ALBUMIN SERPL-MCNC: 2.7 G/DL (ref 3.5–5.2)
ALBUMIN/GLOB SERPL: 1.2 G/DL
ALP SERPL-CCNC: 49 U/L (ref 39–117)
ALT SERPL W P-5'-P-CCNC: 22 U/L (ref 1–33)
ANION GAP SERPL CALCULATED.3IONS-SCNC: 10 MMOL/L (ref 5–15)
AST SERPL-CCNC: 18 U/L (ref 1–32)
BILIRUB SERPL-MCNC: 0.2 MG/DL (ref 0–1.2)
BUN SERPL-MCNC: 10 MG/DL (ref 6–20)
BUN/CREAT SERPL: 23.3 (ref 7–25)
CALCIUM SPEC-SCNC: 8 MG/DL (ref 8.6–10.5)
CHLORIDE SERPL-SCNC: 110 MMOL/L (ref 98–107)
CO2 SERPL-SCNC: 18 MMOL/L (ref 22–29)
CREAT SERPL-MCNC: 0.43 MG/DL (ref 0.57–1)
DEPRECATED RDW RBC AUTO: 43.3 FL (ref 37–54)
EGFRCR SERPLBLD CKD-EPI 2021: 118.7 ML/MIN/1.73
ERYTHROCYTE [DISTWIDTH] IN BLOOD BY AUTOMATED COUNT: 12.7 % (ref 12.3–15.4)
GLOBULIN UR ELPH-MCNC: 2.3 GM/DL
GLUCOSE SERPL-MCNC: 94 MG/DL (ref 65–99)
HCT VFR BLD AUTO: 28.4 % (ref 34–46.6)
HGB BLD-MCNC: 9.1 G/DL (ref 12–15.9)
MCH RBC QN AUTO: 29.6 PG (ref 26.6–33)
MCHC RBC AUTO-ENTMCNC: 32 G/DL (ref 31.5–35.7)
MCV RBC AUTO: 92.5 FL (ref 79–97)
PLATELET # BLD AUTO: 209 10*3/MM3 (ref 140–450)
PMV BLD AUTO: 11 FL (ref 6–12)
POTASSIUM SERPL-SCNC: 4 MMOL/L (ref 3.5–5.2)
PROCALCITONIN SERPL-MCNC: 0.13 NG/ML (ref 0–0.25)
PROT SERPL-MCNC: 5 G/DL (ref 6–8.5)
RBC # BLD AUTO: 3.07 10*6/MM3 (ref 3.77–5.28)
SODIUM SERPL-SCNC: 138 MMOL/L (ref 136–145)
WBC NRBC COR # BLD: 18.79 10*3/MM3 (ref 3.4–10.8)

## 2022-05-13 PROCEDURE — 97535 SELF CARE MNGMENT TRAINING: CPT

## 2022-05-13 PROCEDURE — 25010000002 ONDANSETRON PER 1 MG: Performed by: NEUROLOGICAL SURGERY

## 2022-05-13 PROCEDURE — 99233 SBSQ HOSP IP/OBS HIGH 50: CPT | Performed by: INTERNAL MEDICINE

## 2022-05-13 PROCEDURE — 25010000002 HYDROMORPHONE PER 4 MG: Performed by: NURSE PRACTITIONER

## 2022-05-13 PROCEDURE — 97530 THERAPEUTIC ACTIVITIES: CPT

## 2022-05-13 PROCEDURE — 97110 THERAPEUTIC EXERCISES: CPT

## 2022-05-13 PROCEDURE — 85027 COMPLETE CBC AUTOMATED: CPT | Performed by: INTERNAL MEDICINE

## 2022-05-13 PROCEDURE — 25010000002 PHENYLEPHRINE 10 MG/ML SOLUTION: Performed by: NURSE PRACTITIONER

## 2022-05-13 PROCEDURE — 80053 COMPREHEN METABOLIC PANEL: CPT | Performed by: INTERNAL MEDICINE

## 2022-05-13 PROCEDURE — 74018 RADEX ABDOMEN 1 VIEW: CPT

## 2022-05-13 PROCEDURE — 72072 X-RAY EXAM THORAC SPINE 3VWS: CPT

## 2022-05-13 PROCEDURE — 25010000002 HEPARIN (PORCINE) PER 1000 UNITS: Performed by: NURSE PRACTITIONER

## 2022-05-13 PROCEDURE — 84145 PROCALCITONIN (PCT): CPT | Performed by: INTERNAL MEDICINE

## 2022-05-13 PROCEDURE — 99222 1ST HOSP IP/OBS MODERATE 55: CPT | Performed by: SURGERY

## 2022-05-13 PROCEDURE — 99024 POSTOP FOLLOW-UP VISIT: CPT | Performed by: NURSE PRACTITIONER

## 2022-05-13 RX ADMIN — HYDROCODONE BITARTRATE AND ACETAMINOPHEN 1 TABLET: 5; 325 TABLET ORAL at 09:01

## 2022-05-13 RX ADMIN — METHOCARBAMOL TABLETS 500 MG: 500 TABLET, COATED ORAL at 00:06

## 2022-05-13 RX ADMIN — SIMETHICONE 80 MG: 80 TABLET, CHEWABLE ORAL at 15:18

## 2022-05-13 RX ADMIN — HEPARIN SODIUM 5000 UNITS: 5000 INJECTION INTRAVENOUS; SUBCUTANEOUS at 21:20

## 2022-05-13 RX ADMIN — Medication 10 ML: at 21:21

## 2022-05-13 RX ADMIN — Medication 10 ML: at 11:02

## 2022-05-13 RX ADMIN — Medication 10 ML: at 11:04

## 2022-05-13 RX ADMIN — HYDROCODONE BITARTRATE AND ACETAMINOPHEN 1 TABLET: 5; 325 TABLET ORAL at 13:48

## 2022-05-13 RX ADMIN — ONDANSETRON 4 MG: 2 INJECTION INTRAMUSCULAR; INTRAVENOUS at 11:44

## 2022-05-13 RX ADMIN — TAMOXIFEN CITRATE 20 MG: 10 TABLET ORAL at 09:02

## 2022-05-13 RX ADMIN — Medication 10 ML: at 21:20

## 2022-05-13 RX ADMIN — HYDROXYZINE HYDROCHLORIDE 25 MG: 25 TABLET ORAL at 13:28

## 2022-05-13 RX ADMIN — Medication 10 ML: at 11:03

## 2022-05-13 RX ADMIN — HYDROCODONE BITARTRATE AND ACETAMINOPHEN 1 TABLET: 5; 325 TABLET ORAL at 17:27

## 2022-05-13 RX ADMIN — HYDROCODONE BITARTRATE AND ACETAMINOPHEN 1 TABLET: 5; 325 TABLET ORAL at 00:48

## 2022-05-13 RX ADMIN — HEPARIN SODIUM 5000 UNITS: 5000 INJECTION INTRAVENOUS; SUBCUTANEOUS at 15:17

## 2022-05-13 RX ADMIN — HYDROMORPHONE HYDROCHLORIDE 0.5 MG: 1 INJECTION, SOLUTION INTRAMUSCULAR; INTRAVENOUS; SUBCUTANEOUS at 11:42

## 2022-05-13 RX ADMIN — METHOCARBAMOL TABLETS 500 MG: 500 TABLET, COATED ORAL at 11:02

## 2022-05-13 RX ADMIN — ACETAMINOPHEN 650 MG: 325 TABLET, FILM COATED ORAL at 23:02

## 2022-05-13 RX ADMIN — PHENYLEPHRINE HYDROCHLORIDE 1 MCG/KG/MIN: 10 INJECTION INTRAVENOUS at 00:54

## 2022-05-13 RX ADMIN — SODIUM CHLORIDE 1000 ML: 9 INJECTION, SOLUTION INTRAVENOUS at 12:00

## 2022-05-13 RX ADMIN — HEPARIN SODIUM 5000 UNITS: 5000 INJECTION INTRAVENOUS; SUBCUTANEOUS at 06:24

## 2022-05-13 RX ADMIN — METHOCARBAMOL TABLETS 500 MG: 500 TABLET, COATED ORAL at 17:25

## 2022-05-13 RX ADMIN — METHOCARBAMOL TABLETS 500 MG: 500 TABLET, COATED ORAL at 06:23

## 2022-05-13 RX ADMIN — METHOCARBAMOL TABLETS 500 MG: 500 TABLET, COATED ORAL at 23:02

## 2022-05-13 RX ADMIN — HYDROCODONE BITARTRATE AND ACETAMINOPHEN 1 TABLET: 5; 325 TABLET ORAL at 05:10

## 2022-05-13 RX ADMIN — HYDROCODONE BITARTRATE AND ACETAMINOPHEN 1 TABLET: 5; 325 TABLET ORAL at 21:20

## 2022-05-14 ENCOUNTER — APPOINTMENT (OUTPATIENT)
Dept: CT IMAGING | Facility: HOSPITAL | Age: 52
End: 2022-05-14

## 2022-05-14 LAB
ABO GROUP BLD: NORMAL
ALBUMIN SERPL-MCNC: 2.2 G/DL (ref 3.5–5.2)
ALBUMIN/GLOB SERPL: 1 G/DL
ALP SERPL-CCNC: 44 U/L (ref 39–117)
ALT SERPL W P-5'-P-CCNC: 16 U/L (ref 1–33)
ANION GAP SERPL CALCULATED.3IONS-SCNC: 10.3 MMOL/L (ref 5–15)
AST SERPL-CCNC: 15 U/L (ref 1–32)
BILIRUB SERPL-MCNC: <0.2 MG/DL (ref 0–1.2)
BLD GP AB SCN SERPL QL: NEGATIVE
BUN SERPL-MCNC: 8 MG/DL (ref 6–20)
BUN/CREAT SERPL: 21.6 (ref 7–25)
CALCIUM SPEC-SCNC: 7.2 MG/DL (ref 8.6–10.5)
CHLORIDE SERPL-SCNC: 113 MMOL/L (ref 98–107)
CO2 SERPL-SCNC: 16.7 MMOL/L (ref 22–29)
CREAT SERPL-MCNC: 0.37 MG/DL (ref 0.57–1)
DEPRECATED RDW RBC AUTO: 44.2 FL (ref 37–54)
EGFRCR SERPLBLD CKD-EPI 2021: 123 ML/MIN/1.73
ERYTHROCYTE [DISTWIDTH] IN BLOOD BY AUTOMATED COUNT: 12.6 % (ref 12.3–15.4)
GLOBULIN UR ELPH-MCNC: 2.1 GM/DL
GLUCOSE BLDC GLUCOMTR-MCNC: 86 MG/DL (ref 70–130)
GLUCOSE SERPL-MCNC: 73 MG/DL (ref 65–99)
HCT VFR BLD AUTO: 21.8 % (ref 34–46.6)
HCT VFR BLD AUTO: 23 % (ref 34–46.6)
HGB BLD-MCNC: 7.4 G/DL (ref 12–15.9)
HGB BLD-MCNC: 7.4 G/DL (ref 12–15.9)
MCH RBC QN AUTO: 30.7 PG (ref 26.6–33)
MCHC RBC AUTO-ENTMCNC: 32.2 G/DL (ref 31.5–35.7)
MCV RBC AUTO: 95.4 FL (ref 79–97)
PLATELET # BLD AUTO: 169 10*3/MM3 (ref 140–450)
PMV BLD AUTO: 9.9 FL (ref 6–12)
POTASSIUM SERPL-SCNC: 3.4 MMOL/L (ref 3.5–5.2)
PROT SERPL-MCNC: 4.3 G/DL (ref 6–8.5)
RBC # BLD AUTO: 2.41 10*6/MM3 (ref 3.77–5.28)
RH BLD: POSITIVE
SODIUM SERPL-SCNC: 140 MMOL/L (ref 136–145)
T&S EXPIRATION DATE: NORMAL
WBC NRBC COR # BLD: 11.96 10*3/MM3 (ref 3.4–10.8)

## 2022-05-14 PROCEDURE — 99024 POSTOP FOLLOW-UP VISIT: CPT | Performed by: NEUROLOGICAL SURGERY

## 2022-05-14 PROCEDURE — 85014 HEMATOCRIT: CPT | Performed by: INTERNAL MEDICINE

## 2022-05-14 PROCEDURE — 82962 GLUCOSE BLOOD TEST: CPT

## 2022-05-14 PROCEDURE — 74176 CT ABD & PELVIS W/O CONTRAST: CPT

## 2022-05-14 PROCEDURE — 85027 COMPLETE CBC AUTOMATED: CPT | Performed by: INTERNAL MEDICINE

## 2022-05-14 PROCEDURE — 25010000002 CEFAZOLIN IN DEXTROSE 2-4 GM/100ML-% SOLUTION: Performed by: SURGERY

## 2022-05-14 PROCEDURE — 86900 BLOOD TYPING SEROLOGIC ABO: CPT | Performed by: INTERNAL MEDICINE

## 2022-05-14 PROCEDURE — 25010000002 HEPARIN (PORCINE) PER 1000 UNITS: Performed by: NURSE PRACTITIONER

## 2022-05-14 PROCEDURE — 25010000002 ONDANSETRON PER 1 MG: Performed by: NEUROLOGICAL SURGERY

## 2022-05-14 PROCEDURE — 97530 THERAPEUTIC ACTIVITIES: CPT

## 2022-05-14 PROCEDURE — 85018 HEMOGLOBIN: CPT | Performed by: INTERNAL MEDICINE

## 2022-05-14 PROCEDURE — 86850 RBC ANTIBODY SCREEN: CPT | Performed by: INTERNAL MEDICINE

## 2022-05-14 PROCEDURE — 25010000002 HYDROMORPHONE PER 4 MG: Performed by: NURSE PRACTITIONER

## 2022-05-14 PROCEDURE — 86901 BLOOD TYPING SEROLOGIC RH(D): CPT | Performed by: INTERNAL MEDICINE

## 2022-05-14 PROCEDURE — 80053 COMPREHEN METABOLIC PANEL: CPT | Performed by: INTERNAL MEDICINE

## 2022-05-14 PROCEDURE — 99232 SBSQ HOSP IP/OBS MODERATE 35: CPT | Performed by: INTERNAL MEDICINE

## 2022-05-14 PROCEDURE — 99232 SBSQ HOSP IP/OBS MODERATE 35: CPT | Performed by: SURGERY

## 2022-05-14 RX ORDER — CEFAZOLIN SODIUM 2 G/100ML
2 INJECTION, SOLUTION INTRAVENOUS EVERY 8 HOURS
Status: DISCONTINUED | OUTPATIENT
Start: 2022-05-14 | End: 2022-05-17

## 2022-05-14 RX ORDER — METRONIDAZOLE 500 MG/100ML
500 INJECTION, SOLUTION INTRAVENOUS EVERY 8 HOURS
Status: DISCONTINUED | OUTPATIENT
Start: 2022-05-14 | End: 2022-05-17

## 2022-05-14 RX ADMIN — ONDANSETRON 4 MG: 2 INJECTION INTRAMUSCULAR; INTRAVENOUS at 09:33

## 2022-05-14 RX ADMIN — HEPARIN SODIUM 5000 UNITS: 5000 INJECTION INTRAVENOUS; SUBCUTANEOUS at 21:15

## 2022-05-14 RX ADMIN — CEFAZOLIN SODIUM 2 G: 2 INJECTION, SOLUTION INTRAVENOUS at 21:15

## 2022-05-14 RX ADMIN — METRONIDAZOLE 500 MG: 500 INJECTION, SOLUTION INTRAVENOUS at 13:50

## 2022-05-14 RX ADMIN — BISACODYL 10 MG: 10 SUPPOSITORY RECTAL at 08:43

## 2022-05-14 RX ADMIN — Medication 3 MG: at 21:23

## 2022-05-14 RX ADMIN — METHOCARBAMOL TABLETS 500 MG: 500 TABLET, COATED ORAL at 12:21

## 2022-05-14 RX ADMIN — Medication 10 ML: at 21:15

## 2022-05-14 RX ADMIN — HYDROCODONE BITARTRATE AND ACETAMINOPHEN 1 TABLET: 5; 325 TABLET ORAL at 21:23

## 2022-05-14 RX ADMIN — METHOCARBAMOL TABLETS 500 MG: 500 TABLET, COATED ORAL at 17:07

## 2022-05-14 RX ADMIN — HYDROCODONE BITARTRATE AND ACETAMINOPHEN 1 TABLET: 5; 325 TABLET ORAL at 01:40

## 2022-05-14 RX ADMIN — METRONIDAZOLE 500 MG: 500 INJECTION, SOLUTION INTRAVENOUS at 21:16

## 2022-05-14 RX ADMIN — CEFAZOLIN SODIUM 2 G: 2 INJECTION, SOLUTION INTRAVENOUS at 13:51

## 2022-05-14 RX ADMIN — Medication 10 ML: at 08:44

## 2022-05-14 RX ADMIN — HYDROCODONE BITARTRATE AND ACETAMINOPHEN 1 TABLET: 5; 325 TABLET ORAL at 16:42

## 2022-05-14 RX ADMIN — HYDROXYZINE HYDROCHLORIDE 25 MG: 25 TABLET ORAL at 09:33

## 2022-05-14 RX ADMIN — Medication 10 ML: at 21:16

## 2022-05-14 RX ADMIN — HYDROCODONE BITARTRATE AND ACETAMINOPHEN 1 TABLET: 5; 325 TABLET ORAL at 12:21

## 2022-05-14 RX ADMIN — HEPARIN SODIUM 5000 UNITS: 5000 INJECTION INTRAVENOUS; SUBCUTANEOUS at 13:50

## 2022-05-14 RX ADMIN — HYDROCODONE BITARTRATE AND ACETAMINOPHEN 1 TABLET: 5; 325 TABLET ORAL at 08:43

## 2022-05-14 RX ADMIN — HYDROXYZINE HYDROCHLORIDE 25 MG: 25 TABLET ORAL at 17:07

## 2022-05-14 RX ADMIN — METHOCARBAMOL TABLETS 500 MG: 500 TABLET, COATED ORAL at 05:43

## 2022-05-14 RX ADMIN — TAMOXIFEN CITRATE 20 MG: 10 TABLET ORAL at 08:43

## 2022-05-14 RX ADMIN — HYDROMORPHONE HYDROCHLORIDE 0.5 MG: 1 INJECTION, SOLUTION INTRAMUSCULAR; INTRAVENOUS; SUBCUTANEOUS at 09:33

## 2022-05-15 LAB
ALBUMIN SERPL-MCNC: 2.7 G/DL (ref 3.5–5.2)
ALBUMIN/GLOB SERPL: 1.2 G/DL
ALP SERPL-CCNC: 49 U/L (ref 39–117)
ALT SERPL W P-5'-P-CCNC: 17 U/L (ref 1–33)
ANION GAP SERPL CALCULATED.3IONS-SCNC: 13 MMOL/L (ref 5–15)
AST SERPL-CCNC: 17 U/L (ref 1–32)
BILIRUB SERPL-MCNC: <0.2 MG/DL (ref 0–1.2)
BUN SERPL-MCNC: 8 MG/DL (ref 6–20)
BUN/CREAT SERPL: 21.1 (ref 7–25)
CALCIUM SPEC-SCNC: 8.4 MG/DL (ref 8.6–10.5)
CHLORIDE SERPL-SCNC: 108 MMOL/L (ref 98–107)
CO2 SERPL-SCNC: 18 MMOL/L (ref 22–29)
CREAT SERPL-MCNC: 0.38 MG/DL (ref 0.57–1)
D-LACTATE SERPL-SCNC: 0.5 MMOL/L (ref 0.5–2)
DEPRECATED RDW RBC AUTO: 40.1 FL (ref 37–54)
EGFRCR SERPLBLD CKD-EPI 2021: 122.3 ML/MIN/1.73
ERYTHROCYTE [DISTWIDTH] IN BLOOD BY AUTOMATED COUNT: 12.4 % (ref 12.3–15.4)
GLOBULIN UR ELPH-MCNC: 2.3 GM/DL
GLUCOSE SERPL-MCNC: 87 MG/DL (ref 65–99)
HCT VFR BLD AUTO: 24.4 % (ref 34–46.6)
HGB BLD-MCNC: 8.3 G/DL (ref 12–15.9)
MCH RBC QN AUTO: 30.3 PG (ref 26.6–33)
MCHC RBC AUTO-ENTMCNC: 34 G/DL (ref 31.5–35.7)
MCV RBC AUTO: 89.1 FL (ref 79–97)
PLATELET # BLD AUTO: 214 10*3/MM3 (ref 140–450)
PMV BLD AUTO: 10 FL (ref 6–12)
POTASSIUM SERPL-SCNC: 3.6 MMOL/L (ref 3.5–5.2)
PROCALCITONIN SERPL-MCNC: 0.14 NG/ML (ref 0–0.25)
PROT SERPL-MCNC: 5 G/DL (ref 6–8.5)
RBC # BLD AUTO: 2.74 10*6/MM3 (ref 3.77–5.28)
SODIUM SERPL-SCNC: 139 MMOL/L (ref 136–145)
WBC NRBC COR # BLD: 9.1 10*3/MM3 (ref 3.4–10.8)

## 2022-05-15 PROCEDURE — 99024 POSTOP FOLLOW-UP VISIT: CPT | Performed by: NEUROLOGICAL SURGERY

## 2022-05-15 PROCEDURE — 99233 SBSQ HOSP IP/OBS HIGH 50: CPT | Performed by: INTERNAL MEDICINE

## 2022-05-15 PROCEDURE — 85027 COMPLETE CBC AUTOMATED: CPT | Performed by: INTERNAL MEDICINE

## 2022-05-15 PROCEDURE — 84145 PROCALCITONIN (PCT): CPT | Performed by: INTERNAL MEDICINE

## 2022-05-15 PROCEDURE — 83605 ASSAY OF LACTIC ACID: CPT | Performed by: INTERNAL MEDICINE

## 2022-05-15 PROCEDURE — 25010000002 ONDANSETRON PER 1 MG: Performed by: NEUROLOGICAL SURGERY

## 2022-05-15 PROCEDURE — 25010000002 ONDANSETRON PER 1 MG: Performed by: INTERNAL MEDICINE

## 2022-05-15 PROCEDURE — 25010000002 HEPARIN (PORCINE) PER 1000 UNITS: Performed by: NURSE PRACTITIONER

## 2022-05-15 PROCEDURE — 99232 SBSQ HOSP IP/OBS MODERATE 35: CPT | Performed by: SURGERY

## 2022-05-15 PROCEDURE — 25010000002 CEFAZOLIN IN DEXTROSE 2-4 GM/100ML-% SOLUTION: Performed by: SURGERY

## 2022-05-15 PROCEDURE — 97530 THERAPEUTIC ACTIVITIES: CPT

## 2022-05-15 PROCEDURE — 80053 COMPREHEN METABOLIC PANEL: CPT | Performed by: INTERNAL MEDICINE

## 2022-05-15 RX ORDER — ONDANSETRON 2 MG/ML
4 INJECTION INTRAMUSCULAR; INTRAVENOUS ONCE
Status: COMPLETED | OUTPATIENT
Start: 2022-05-15 | End: 2022-05-15

## 2022-05-15 RX ADMIN — Medication 10 ML: at 08:34

## 2022-05-15 RX ADMIN — HYDROXYZINE HYDROCHLORIDE 25 MG: 25 TABLET ORAL at 22:05

## 2022-05-15 RX ADMIN — HYDROCODONE BITARTRATE AND ACETAMINOPHEN 1 TABLET: 5; 325 TABLET ORAL at 21:51

## 2022-05-15 RX ADMIN — POTASSIUM CHLORIDE 40 MEQ: 750 TABLET, EXTENDED RELEASE ORAL at 08:33

## 2022-05-15 RX ADMIN — CEFAZOLIN SODIUM 2 G: 2 INJECTION, SOLUTION INTRAVENOUS at 05:21

## 2022-05-15 RX ADMIN — TAMOXIFEN CITRATE 20 MG: 10 TABLET ORAL at 08:33

## 2022-05-15 RX ADMIN — Medication 10 ML: at 21:58

## 2022-05-15 RX ADMIN — Medication 10 ML: at 21:57

## 2022-05-15 RX ADMIN — HYDROCODONE BITARTRATE AND ACETAMINOPHEN 1 TABLET: 5; 325 TABLET ORAL at 08:33

## 2022-05-15 RX ADMIN — METHOCARBAMOL TABLETS 500 MG: 500 TABLET, COATED ORAL at 08:33

## 2022-05-15 RX ADMIN — HEPARIN SODIUM 5000 UNITS: 5000 INJECTION INTRAVENOUS; SUBCUTANEOUS at 13:35

## 2022-05-15 RX ADMIN — CEFAZOLIN SODIUM 2 G: 2 INJECTION, SOLUTION INTRAVENOUS at 21:32

## 2022-05-15 RX ADMIN — SODIUM CHLORIDE 100 ML/HR: 9 INJECTION, SOLUTION INTRAVENOUS at 18:11

## 2022-05-15 RX ADMIN — HEPARIN SODIUM 5000 UNITS: 5000 INJECTION INTRAVENOUS; SUBCUTANEOUS at 05:20

## 2022-05-15 RX ADMIN — HYDROCODONE BITARTRATE AND ACETAMINOPHEN 1 TABLET: 5; 325 TABLET ORAL at 12:12

## 2022-05-15 RX ADMIN — METHOCARBAMOL TABLETS 500 MG: 500 TABLET, COATED ORAL at 18:11

## 2022-05-15 RX ADMIN — METRONIDAZOLE 500 MG: 500 INJECTION, SOLUTION INTRAVENOUS at 21:35

## 2022-05-15 RX ADMIN — METHOCARBAMOL TABLETS 500 MG: 500 TABLET, COATED ORAL at 12:12

## 2022-05-15 RX ADMIN — CEFAZOLIN SODIUM 2 G: 2 INJECTION, SOLUTION INTRAVENOUS at 12:15

## 2022-05-15 RX ADMIN — HYDROCODONE BITARTRATE AND ACETAMINOPHEN 1 TABLET: 5; 325 TABLET ORAL at 15:52

## 2022-05-15 RX ADMIN — HYDROCODONE BITARTRATE AND ACETAMINOPHEN 1 TABLET: 5; 325 TABLET ORAL at 03:31

## 2022-05-15 RX ADMIN — METHOCARBAMOL TABLETS 500 MG: 500 TABLET, COATED ORAL at 00:08

## 2022-05-15 RX ADMIN — ONDANSETRON 4 MG: 2 INJECTION INTRAMUSCULAR; INTRAVENOUS at 21:27

## 2022-05-15 RX ADMIN — METRONIDAZOLE 500 MG: 500 INJECTION, SOLUTION INTRAVENOUS at 05:21

## 2022-05-15 RX ADMIN — METRONIDAZOLE 500 MG: 500 INJECTION, SOLUTION INTRAVENOUS at 13:35

## 2022-05-15 RX ADMIN — BISACODYL 10 MG: 10 SUPPOSITORY RECTAL at 08:33

## 2022-05-15 RX ADMIN — HEPARIN SODIUM 5000 UNITS: 5000 INJECTION INTRAVENOUS; SUBCUTANEOUS at 21:43

## 2022-05-15 RX ADMIN — Medication 3 MG: at 22:05

## 2022-05-15 RX ADMIN — ONDANSETRON 4 MG: 2 INJECTION INTRAMUSCULAR; INTRAVENOUS at 08:45

## 2022-05-15 RX ADMIN — ONDANSETRON 4 MG: 2 INJECTION INTRAMUSCULAR; INTRAVENOUS at 16:34

## 2022-05-15 RX ADMIN — POTASSIUM CHLORIDE 40 MEQ: 750 TABLET, EXTENDED RELEASE ORAL at 15:52

## 2022-05-16 ENCOUNTER — APPOINTMENT (OUTPATIENT)
Dept: CT IMAGING | Facility: HOSPITAL | Age: 52
End: 2022-05-16

## 2022-05-16 LAB
ALBUMIN SERPL-MCNC: 3 G/DL (ref 3.5–5.2)
ALBUMIN/GLOB SERPL: 1.4 G/DL
ALP SERPL-CCNC: 52 U/L (ref 39–117)
ALT SERPL W P-5'-P-CCNC: 25 U/L (ref 1–33)
ANION GAP SERPL CALCULATED.3IONS-SCNC: 11 MMOL/L (ref 5–15)
AST SERPL-CCNC: 36 U/L (ref 1–32)
BILIRUB SERPL-MCNC: <0.2 MG/DL (ref 0–1.2)
BUN SERPL-MCNC: 8 MG/DL (ref 6–20)
BUN/CREAT SERPL: 19 (ref 7–25)
CALCIUM SPEC-SCNC: 8.6 MG/DL (ref 8.6–10.5)
CHLORIDE SERPL-SCNC: 107 MMOL/L (ref 98–107)
CO2 SERPL-SCNC: 21 MMOL/L (ref 22–29)
CREAT SERPL-MCNC: 0.42 MG/DL (ref 0.57–1)
DEPRECATED RDW RBC AUTO: 45.2 FL (ref 37–54)
EGFRCR SERPLBLD CKD-EPI 2021: 119.3 ML/MIN/1.73
ERYTHROCYTE [DISTWIDTH] IN BLOOD BY AUTOMATED COUNT: 13.1 % (ref 12.3–15.4)
GLOBULIN UR ELPH-MCNC: 2.2 GM/DL
GLUCOSE SERPL-MCNC: 94 MG/DL (ref 65–99)
HCT VFR BLD AUTO: 27.3 % (ref 34–46.6)
HGB BLD-MCNC: 9 G/DL (ref 12–15.9)
MCH RBC QN AUTO: 30.9 PG (ref 26.6–33)
MCHC RBC AUTO-ENTMCNC: 33 G/DL (ref 31.5–35.7)
MCV RBC AUTO: 93.8 FL (ref 79–97)
PLATELET # BLD AUTO: 229 10*3/MM3 (ref 140–450)
PMV BLD AUTO: 9.5 FL (ref 6–12)
POTASSIUM SERPL-SCNC: 4.2 MMOL/L (ref 3.5–5.2)
PROT SERPL-MCNC: 5.2 G/DL (ref 6–8.5)
RBC # BLD AUTO: 2.91 10*6/MM3 (ref 3.77–5.28)
SODIUM SERPL-SCNC: 139 MMOL/L (ref 136–145)
WBC NRBC COR # BLD: 9.38 10*3/MM3 (ref 3.4–10.8)

## 2022-05-16 PROCEDURE — 25010000002 PROCHLORPERAZINE 10 MG/2ML SOLUTION: Performed by: NEUROLOGICAL SURGERY

## 2022-05-16 PROCEDURE — 25010000002 PROCHLORPERAZINE 10 MG/2ML SOLUTION: Performed by: INTERNAL MEDICINE

## 2022-05-16 PROCEDURE — 80053 COMPREHEN METABOLIC PANEL: CPT | Performed by: INTERNAL MEDICINE

## 2022-05-16 PROCEDURE — 25010000002 IOPAMIDOL 61 % SOLUTION: Performed by: INTERNAL MEDICINE

## 2022-05-16 PROCEDURE — 25010000002 CEFAZOLIN IN DEXTROSE 2-4 GM/100ML-% SOLUTION: Performed by: SURGERY

## 2022-05-16 PROCEDURE — 25010000002 HEPARIN (PORCINE) PER 1000 UNITS: Performed by: NURSE PRACTITIONER

## 2022-05-16 PROCEDURE — 99024 POSTOP FOLLOW-UP VISIT: CPT | Performed by: NURSE PRACTITIONER

## 2022-05-16 PROCEDURE — 97530 THERAPEUTIC ACTIVITIES: CPT

## 2022-05-16 PROCEDURE — 99233 SBSQ HOSP IP/OBS HIGH 50: CPT | Performed by: INTERNAL MEDICINE

## 2022-05-16 PROCEDURE — 25010000002 ONDANSETRON PER 1 MG: Performed by: NEUROLOGICAL SURGERY

## 2022-05-16 PROCEDURE — 25010000002 HEPARIN (PORCINE) PER 1000 UNITS: Performed by: INTERNAL MEDICINE

## 2022-05-16 PROCEDURE — 25010000002 CEFAZOLIN IN DEXTROSE 2-4 GM/100ML-% SOLUTION: Performed by: INTERNAL MEDICINE

## 2022-05-16 PROCEDURE — 85027 COMPLETE CBC AUTOMATED: CPT | Performed by: INTERNAL MEDICINE

## 2022-05-16 PROCEDURE — 25010000002 ONDANSETRON PER 1 MG: Performed by: INTERNAL MEDICINE

## 2022-05-16 PROCEDURE — 74177 CT ABD & PELVIS W/CONTRAST: CPT

## 2022-05-16 PROCEDURE — 99231 SBSQ HOSP IP/OBS SF/LOW 25: CPT | Performed by: SURGERY

## 2022-05-16 PROCEDURE — 97110 THERAPEUTIC EXERCISES: CPT

## 2022-05-16 RX ORDER — BISACODYL 10 MG
10 SUPPOSITORY, RECTAL RECTAL DAILY PRN
Status: DISCONTINUED | OUTPATIENT
Start: 2022-05-16 | End: 2022-05-17

## 2022-05-16 RX ADMIN — Medication 10 ML: at 20:55

## 2022-05-16 RX ADMIN — Medication 10 ML: at 08:08

## 2022-05-16 RX ADMIN — ONDANSETRON 4 MG: 2 INJECTION INTRAMUSCULAR; INTRAVENOUS at 19:52

## 2022-05-16 RX ADMIN — METHOCARBAMOL TABLETS 500 MG: 500 TABLET, COATED ORAL at 11:51

## 2022-05-16 RX ADMIN — METRONIDAZOLE 500 MG: 500 INJECTION, SOLUTION INTRAVENOUS at 06:49

## 2022-05-16 RX ADMIN — CEFAZOLIN SODIUM 2 G: 2 INJECTION, SOLUTION INTRAVENOUS at 20:55

## 2022-05-16 RX ADMIN — HYDROCODONE BITARTRATE AND ACETAMINOPHEN 1 TABLET: 5; 325 TABLET ORAL at 14:31

## 2022-05-16 RX ADMIN — Medication 10 ML: at 08:09

## 2022-05-16 RX ADMIN — HYDROCODONE BITARTRATE AND ACETAMINOPHEN 1 TABLET: 5; 325 TABLET ORAL at 03:15

## 2022-05-16 RX ADMIN — HEPARIN SODIUM 5000 UNITS: 5000 INJECTION INTRAVENOUS; SUBCUTANEOUS at 06:50

## 2022-05-16 RX ADMIN — IOPAMIDOL 85 ML: 612 INJECTION, SOLUTION INTRAVENOUS at 14:46

## 2022-05-16 RX ADMIN — SIMETHICONE 80 MG: 80 TABLET, CHEWABLE ORAL at 20:54

## 2022-05-16 RX ADMIN — PROCHLORPERAZINE EDISYLATE 5 MG: 5 INJECTION INTRAMUSCULAR; INTRAVENOUS at 22:05

## 2022-05-16 RX ADMIN — CEFAZOLIN SODIUM 2 G: 2 INJECTION, SOLUTION INTRAVENOUS at 04:33

## 2022-05-16 RX ADMIN — METHOCARBAMOL TABLETS 500 MG: 500 TABLET, COATED ORAL at 00:44

## 2022-05-16 RX ADMIN — METRONIDAZOLE 500 MG: 500 INJECTION, SOLUTION INTRAVENOUS at 22:05

## 2022-05-16 RX ADMIN — METHOCARBAMOL TABLETS 500 MG: 500 TABLET, COATED ORAL at 17:02

## 2022-05-16 RX ADMIN — METRONIDAZOLE 500 MG: 500 INJECTION, SOLUTION INTRAVENOUS at 15:08

## 2022-05-16 RX ADMIN — HEPARIN SODIUM 5000 UNITS: 5000 INJECTION INTRAVENOUS; SUBCUTANEOUS at 15:08

## 2022-05-16 RX ADMIN — CEFAZOLIN SODIUM 2 G: 2 INJECTION, SOLUTION INTRAVENOUS at 13:57

## 2022-05-16 RX ADMIN — ONDANSETRON 4 MG: 2 INJECTION INTRAMUSCULAR; INTRAVENOUS at 12:59

## 2022-05-16 RX ADMIN — HYDROXYZINE HYDROCHLORIDE 25 MG: 25 TABLET ORAL at 20:54

## 2022-05-16 RX ADMIN — SODIUM CHLORIDE 100 ML/HR: 9 INJECTION, SOLUTION INTRAVENOUS at 13:57

## 2022-05-16 RX ADMIN — HEPARIN SODIUM 5000 UNITS: 5000 INJECTION INTRAVENOUS; SUBCUTANEOUS at 22:06

## 2022-05-16 RX ADMIN — HYDROCODONE BITARTRATE AND ACETAMINOPHEN 1 TABLET: 5; 325 TABLET ORAL at 10:34

## 2022-05-16 RX ADMIN — PROCHLORPERAZINE EDISYLATE 5 MG: 5 INJECTION INTRAMUSCULAR; INTRAVENOUS at 17:06

## 2022-05-16 RX ADMIN — HYDROCODONE BITARTRATE AND ACETAMINOPHEN 1 TABLET: 5; 325 TABLET ORAL at 19:49

## 2022-05-16 RX ADMIN — HYDROXYZINE HYDROCHLORIDE 25 MG: 25 TABLET ORAL at 09:36

## 2022-05-16 RX ADMIN — PROCHLORPERAZINE EDISYLATE 5 MG: 5 INJECTION INTRAMUSCULAR; INTRAVENOUS at 09:37

## 2022-05-16 RX ADMIN — ONDANSETRON 4 MG: 2 INJECTION INTRAMUSCULAR; INTRAVENOUS at 03:13

## 2022-05-16 RX ADMIN — METHOCARBAMOL TABLETS 500 MG: 500 TABLET, COATED ORAL at 06:51

## 2022-05-17 LAB
ANION GAP SERPL CALCULATED.3IONS-SCNC: 10 MMOL/L (ref 5–15)
BUN SERPL-MCNC: 10 MG/DL (ref 6–20)
BUN/CREAT SERPL: 27.8 (ref 7–25)
CALCIUM SPEC-SCNC: 8.5 MG/DL (ref 8.6–10.5)
CHLORIDE SERPL-SCNC: 105 MMOL/L (ref 98–107)
CO2 SERPL-SCNC: 21 MMOL/L (ref 22–29)
CREAT SERPL-MCNC: 0.36 MG/DL (ref 0.57–1)
DEPRECATED RDW RBC AUTO: 45.6 FL (ref 37–54)
EGFRCR SERPLBLD CKD-EPI 2021: 123.9 ML/MIN/1.73
ERYTHROCYTE [DISTWIDTH] IN BLOOD BY AUTOMATED COUNT: 13.2 % (ref 12.3–15.4)
GLUCOSE SERPL-MCNC: 93 MG/DL (ref 65–99)
HCT VFR BLD AUTO: 28.5 % (ref 34–46.6)
HGB BLD-MCNC: 9.1 G/DL (ref 12–15.9)
MCH RBC QN AUTO: 30.1 PG (ref 26.6–33)
MCHC RBC AUTO-ENTMCNC: 31.9 G/DL (ref 31.5–35.7)
MCV RBC AUTO: 94.4 FL (ref 79–97)
PLATELET # BLD AUTO: 275 10*3/MM3 (ref 140–450)
PMV BLD AUTO: 9.9 FL (ref 6–12)
POTASSIUM SERPL-SCNC: 4 MMOL/L (ref 3.5–5.2)
RBC # BLD AUTO: 3.02 10*6/MM3 (ref 3.77–5.28)
SODIUM SERPL-SCNC: 136 MMOL/L (ref 136–145)
WBC NRBC COR # BLD: 9.48 10*3/MM3 (ref 3.4–10.8)

## 2022-05-17 PROCEDURE — 80048 BASIC METABOLIC PNL TOTAL CA: CPT | Performed by: INTERNAL MEDICINE

## 2022-05-17 PROCEDURE — 97530 THERAPEUTIC ACTIVITIES: CPT

## 2022-05-17 PROCEDURE — 85027 COMPLETE CBC AUTOMATED: CPT | Performed by: INTERNAL MEDICINE

## 2022-05-17 PROCEDURE — 97110 THERAPEUTIC EXERCISES: CPT

## 2022-05-17 PROCEDURE — 99231 SBSQ HOSP IP/OBS SF/LOW 25: CPT | Performed by: SURGERY

## 2022-05-17 PROCEDURE — 25010000002 HEPARIN (PORCINE) PER 1000 UNITS: Performed by: INTERNAL MEDICINE

## 2022-05-17 PROCEDURE — 97535 SELF CARE MNGMENT TRAINING: CPT

## 2022-05-17 PROCEDURE — 99024 POSTOP FOLLOW-UP VISIT: CPT | Performed by: NURSE PRACTITIONER

## 2022-05-17 PROCEDURE — 25010000002 PROCHLORPERAZINE 10 MG/2ML SOLUTION: Performed by: INTERNAL MEDICINE

## 2022-05-17 PROCEDURE — 25010000002 CEFAZOLIN IN DEXTROSE 2-4 GM/100ML-% SOLUTION: Performed by: INTERNAL MEDICINE

## 2022-05-17 PROCEDURE — 99232 SBSQ HOSP IP/OBS MODERATE 35: CPT | Performed by: INTERNAL MEDICINE

## 2022-05-17 RX ORDER — BISACODYL 10 MG
10 SUPPOSITORY, RECTAL RECTAL DAILY
Status: DISCONTINUED | OUTPATIENT
Start: 2022-05-17 | End: 2022-05-17

## 2022-05-17 RX ORDER — CEPHALEXIN 500 MG/1
500 CAPSULE ORAL EVERY 8 HOURS SCHEDULED
Status: COMPLETED | OUTPATIENT
Start: 2022-05-17 | End: 2022-05-21

## 2022-05-17 RX ORDER — BISACODYL 10 MG
10 SUPPOSITORY, RECTAL RECTAL EVERY OTHER DAY
Status: DISCONTINUED | OUTPATIENT
Start: 2022-05-18 | End: 2022-05-21 | Stop reason: HOSPADM

## 2022-05-17 RX ORDER — METRONIDAZOLE 500 MG/1
500 TABLET ORAL EVERY 8 HOURS SCHEDULED
Status: COMPLETED | OUTPATIENT
Start: 2022-05-17 | End: 2022-05-21

## 2022-05-17 RX ADMIN — HEPARIN SODIUM 5000 UNITS: 5000 INJECTION INTRAVENOUS; SUBCUTANEOUS at 06:08

## 2022-05-17 RX ADMIN — Medication 10 ML: at 09:05

## 2022-05-17 RX ADMIN — Medication 10 ML: at 22:00

## 2022-05-17 RX ADMIN — TAMOXIFEN CITRATE 20 MG: 10 TABLET ORAL at 08:42

## 2022-05-17 RX ADMIN — Medication 10 ML: at 21:00

## 2022-05-17 RX ADMIN — HYDROCODONE BITARTRATE AND ACETAMINOPHEN 1 TABLET: 5; 325 TABLET ORAL at 20:37

## 2022-05-17 RX ADMIN — HYDROCODONE BITARTRATE AND ACETAMINOPHEN 1 TABLET: 5; 325 TABLET ORAL at 07:49

## 2022-05-17 RX ADMIN — HEPARIN SODIUM 5000 UNITS: 5000 INJECTION INTRAVENOUS; SUBCUTANEOUS at 14:02

## 2022-05-17 RX ADMIN — PROCHLORPERAZINE EDISYLATE 5 MG: 5 INJECTION INTRAMUSCULAR; INTRAVENOUS at 23:33

## 2022-05-17 RX ADMIN — PROCHLORPERAZINE EDISYLATE 5 MG: 5 INJECTION INTRAMUSCULAR; INTRAVENOUS at 06:15

## 2022-05-17 RX ADMIN — METHOCARBAMOL TABLETS 500 MG: 500 TABLET, COATED ORAL at 11:38

## 2022-05-17 RX ADMIN — CEPHALEXIN 500 MG: 500 CAPSULE ORAL at 14:03

## 2022-05-17 RX ADMIN — HYDROCODONE BITARTRATE AND ACETAMINOPHEN 1 TABLET: 5; 325 TABLET ORAL at 11:38

## 2022-05-17 RX ADMIN — CEPHALEXIN 500 MG: 500 CAPSULE ORAL at 23:21

## 2022-05-17 RX ADMIN — METHOCARBAMOL TABLETS 500 MG: 500 TABLET, COATED ORAL at 00:31

## 2022-05-17 RX ADMIN — METRONIDAZOLE 500 MG: 500 TABLET, FILM COATED ORAL at 14:03

## 2022-05-17 RX ADMIN — HEPARIN SODIUM 5000 UNITS: 5000 INJECTION INTRAVENOUS; SUBCUTANEOUS at 22:00

## 2022-05-17 RX ADMIN — METHOCARBAMOL TABLETS 500 MG: 500 TABLET, COATED ORAL at 17:25

## 2022-05-17 RX ADMIN — CEFAZOLIN SODIUM 2 G: 2 INJECTION, SOLUTION INTRAVENOUS at 06:36

## 2022-05-17 RX ADMIN — METRONIDAZOLE 500 MG: 500 INJECTION, SOLUTION INTRAVENOUS at 06:09

## 2022-05-17 RX ADMIN — HYDROCODONE BITARTRATE AND ACETAMINOPHEN 1 TABLET: 5; 325 TABLET ORAL at 01:47

## 2022-05-17 RX ADMIN — METHOCARBAMOL TABLETS 500 MG: 500 TABLET, COATED ORAL at 23:22

## 2022-05-17 RX ADMIN — HYDROXYZINE HYDROCHLORIDE 25 MG: 25 TABLET ORAL at 18:43

## 2022-05-17 RX ADMIN — METHOCARBAMOL TABLETS 500 MG: 500 TABLET, COATED ORAL at 06:09

## 2022-05-17 RX ADMIN — HYDROCODONE BITARTRATE AND ACETAMINOPHEN 1 TABLET: 5; 325 TABLET ORAL at 15:38

## 2022-05-17 RX ADMIN — SODIUM CHLORIDE 100 ML/HR: 9 INJECTION, SOLUTION INTRAVENOUS at 03:22

## 2022-05-17 RX ADMIN — METRONIDAZOLE 500 MG: 500 TABLET, FILM COATED ORAL at 23:22

## 2022-05-18 LAB
ANION GAP SERPL CALCULATED.3IONS-SCNC: 12.6 MMOL/L (ref 5–15)
BASOPHILS # BLD AUTO: 0.07 10*3/MM3 (ref 0–0.2)
BASOPHILS NFR BLD AUTO: 0.7 % (ref 0–1.5)
BUN SERPL-MCNC: 15 MG/DL (ref 6–20)
BUN/CREAT SERPL: 34.9 (ref 7–25)
CALCIUM SPEC-SCNC: 9.1 MG/DL (ref 8.6–10.5)
CHLORIDE SERPL-SCNC: 103 MMOL/L (ref 98–107)
CO2 SERPL-SCNC: 22.4 MMOL/L (ref 22–29)
CREAT SERPL-MCNC: 0.43 MG/DL (ref 0.57–1)
DEPRECATED RDW RBC AUTO: 40.9 FL (ref 37–54)
EGFRCR SERPLBLD CKD-EPI 2021: 118.7 ML/MIN/1.73
EOSINOPHIL # BLD AUTO: 0.16 10*3/MM3 (ref 0–0.4)
EOSINOPHIL NFR BLD AUTO: 1.6 % (ref 0.3–6.2)
ERYTHROCYTE [DISTWIDTH] IN BLOOD BY AUTOMATED COUNT: 12.8 % (ref 12.3–15.4)
GLUCOSE SERPL-MCNC: 94 MG/DL (ref 65–99)
HCT VFR BLD AUTO: 28.3 % (ref 34–46.6)
HGB BLD-MCNC: 9.4 G/DL (ref 12–15.9)
IMM GRANULOCYTES # BLD AUTO: 0.43 10*3/MM3 (ref 0–0.05)
IMM GRANULOCYTES NFR BLD AUTO: 4.4 % (ref 0–0.5)
LYMPHOCYTES # BLD AUTO: 1.87 10*3/MM3 (ref 0.7–3.1)
LYMPHOCYTES NFR BLD AUTO: 19.1 % (ref 19.6–45.3)
MCH RBC QN AUTO: 29.8 PG (ref 26.6–33)
MCHC RBC AUTO-ENTMCNC: 33.2 G/DL (ref 31.5–35.7)
MCV RBC AUTO: 89.8 FL (ref 79–97)
MONOCYTES # BLD AUTO: 0.86 10*3/MM3 (ref 0.1–0.9)
MONOCYTES NFR BLD AUTO: 8.8 % (ref 5–12)
NEUTROPHILS NFR BLD AUTO: 6.42 10*3/MM3 (ref 1.7–7)
NEUTROPHILS NFR BLD AUTO: 65.4 % (ref 42.7–76)
NRBC BLD AUTO-RTO: 0.1 /100 WBC (ref 0–0.2)
PLATELET # BLD AUTO: 298 10*3/MM3 (ref 140–450)
PMV BLD AUTO: 9.9 FL (ref 6–12)
POTASSIUM SERPL-SCNC: 4.1 MMOL/L (ref 3.5–5.2)
RBC # BLD AUTO: 3.15 10*6/MM3 (ref 3.77–5.28)
SODIUM SERPL-SCNC: 138 MMOL/L (ref 136–145)
WBC NRBC COR # BLD: 9.81 10*3/MM3 (ref 3.4–10.8)

## 2022-05-18 PROCEDURE — 97530 THERAPEUTIC ACTIVITIES: CPT

## 2022-05-18 PROCEDURE — 25010000002 HEPARIN (PORCINE) PER 1000 UNITS: Performed by: INTERNAL MEDICINE

## 2022-05-18 PROCEDURE — 97110 THERAPEUTIC EXERCISES: CPT

## 2022-05-18 PROCEDURE — 85025 COMPLETE CBC W/AUTO DIFF WBC: CPT | Performed by: INTERNAL MEDICINE

## 2022-05-18 PROCEDURE — 25010000002 PROCHLORPERAZINE 10 MG/2ML SOLUTION: Performed by: INTERNAL MEDICINE

## 2022-05-18 PROCEDURE — 80048 BASIC METABOLIC PNL TOTAL CA: CPT | Performed by: INTERNAL MEDICINE

## 2022-05-18 RX ADMIN — METRONIDAZOLE 500 MG: 500 TABLET, FILM COATED ORAL at 06:05

## 2022-05-18 RX ADMIN — Medication 10 ML: at 21:44

## 2022-05-18 RX ADMIN — METHOCARBAMOL TABLETS 500 MG: 500 TABLET, COATED ORAL at 17:44

## 2022-05-18 RX ADMIN — HYDROXYZINE HYDROCHLORIDE 25 MG: 25 TABLET ORAL at 21:44

## 2022-05-18 RX ADMIN — Medication 10 ML: at 21:45

## 2022-05-18 RX ADMIN — HYDROCODONE BITARTRATE AND ACETAMINOPHEN 1 TABLET: 5; 325 TABLET ORAL at 17:44

## 2022-05-18 RX ADMIN — METRONIDAZOLE 500 MG: 500 TABLET, FILM COATED ORAL at 14:33

## 2022-05-18 RX ADMIN — HEPARIN SODIUM 5000 UNITS: 5000 INJECTION INTRAVENOUS; SUBCUTANEOUS at 14:32

## 2022-05-18 RX ADMIN — PROCHLORPERAZINE EDISYLATE 5 MG: 5 INJECTION INTRAMUSCULAR; INTRAVENOUS at 06:09

## 2022-05-18 RX ADMIN — HEPARIN SODIUM 5000 UNITS: 5000 INJECTION INTRAVENOUS; SUBCUTANEOUS at 06:06

## 2022-05-18 RX ADMIN — HYDROXYZINE HYDROCHLORIDE 25 MG: 25 TABLET ORAL at 13:14

## 2022-05-18 RX ADMIN — METHOCARBAMOL TABLETS 500 MG: 500 TABLET, COATED ORAL at 13:14

## 2022-05-18 RX ADMIN — METRONIDAZOLE 500 MG: 500 TABLET, FILM COATED ORAL at 21:44

## 2022-05-18 RX ADMIN — CEPHALEXIN 500 MG: 500 CAPSULE ORAL at 06:05

## 2022-05-18 RX ADMIN — BISACODYL 10 MG: 10 SUPPOSITORY RECTAL at 09:49

## 2022-05-18 RX ADMIN — HEPARIN SODIUM 5000 UNITS: 5000 INJECTION INTRAVENOUS; SUBCUTANEOUS at 21:44

## 2022-05-18 RX ADMIN — PROCHLORPERAZINE EDISYLATE 5 MG: 5 INJECTION INTRAMUSCULAR; INTRAVENOUS at 16:53

## 2022-05-18 RX ADMIN — PROCHLORPERAZINE EDISYLATE 5 MG: 5 INJECTION INTRAMUSCULAR; INTRAVENOUS at 13:14

## 2022-05-18 RX ADMIN — METHOCARBAMOL TABLETS 500 MG: 500 TABLET, COATED ORAL at 06:05

## 2022-05-18 RX ADMIN — CEPHALEXIN 500 MG: 500 CAPSULE ORAL at 14:33

## 2022-05-18 RX ADMIN — HYDROCODONE BITARTRATE AND ACETAMINOPHEN 1 TABLET: 5; 325 TABLET ORAL at 09:48

## 2022-05-18 RX ADMIN — CEPHALEXIN 500 MG: 500 CAPSULE ORAL at 21:44

## 2022-05-18 RX ADMIN — HYDROCODONE BITARTRATE AND ACETAMINOPHEN 1 TABLET: 5; 325 TABLET ORAL at 04:00

## 2022-05-18 RX ADMIN — TAMOXIFEN CITRATE 20 MG: 10 TABLET ORAL at 09:49

## 2022-05-18 RX ADMIN — PROCHLORPERAZINE EDISYLATE 5 MG: 5 INJECTION INTRAMUSCULAR; INTRAVENOUS at 21:44

## 2022-05-18 RX ADMIN — HYDROCODONE BITARTRATE AND ACETAMINOPHEN 1 TABLET: 5; 325 TABLET ORAL at 13:37

## 2022-05-19 LAB
ANION GAP SERPL CALCULATED.3IONS-SCNC: 12 MMOL/L (ref 5–15)
BASOPHILS # BLD MANUAL: 0.23 10*3/MM3 (ref 0–0.2)
BASOPHILS NFR BLD MANUAL: 2 % (ref 0–1.5)
BUN SERPL-MCNC: 16 MG/DL (ref 6–20)
BUN/CREAT SERPL: 33.3 (ref 7–25)
CALCIUM SPEC-SCNC: 9 MG/DL (ref 8.6–10.5)
CHLORIDE SERPL-SCNC: 101 MMOL/L (ref 98–107)
CO2 SERPL-SCNC: 22 MMOL/L (ref 22–29)
CREAT SERPL-MCNC: 0.48 MG/DL (ref 0.57–1)
DEPRECATED RDW RBC AUTO: 42.9 FL (ref 37–54)
EGFRCR SERPLBLD CKD-EPI 2021: 115.6 ML/MIN/1.73
ERYTHROCYTE [DISTWIDTH] IN BLOOD BY AUTOMATED COUNT: 12.8 % (ref 12.3–15.4)
GLUCOSE SERPL-MCNC: 101 MG/DL (ref 65–99)
HCT VFR BLD AUTO: 32.3 % (ref 34–46.6)
HGB BLD-MCNC: 10.5 G/DL (ref 12–15.9)
HYPOCHROMIA BLD QL: ABNORMAL
LYMPHOCYTES # BLD MANUAL: 2.28 10*3/MM3 (ref 0.7–3.1)
LYMPHOCYTES NFR BLD MANUAL: 7 % (ref 5–12)
MCH RBC QN AUTO: 29.6 PG (ref 26.6–33)
MCHC RBC AUTO-ENTMCNC: 32.5 G/DL (ref 31.5–35.7)
MCV RBC AUTO: 91 FL (ref 79–97)
METAMYELOCYTES NFR BLD MANUAL: 1 % (ref 0–0)
MONOCYTES # BLD: 0.8 10*3/MM3 (ref 0.1–0.9)
MYELOCYTES NFR BLD MANUAL: 2 % (ref 0–0)
NEUTROPHILS # BLD AUTO: 7.75 10*3/MM3 (ref 1.7–7)
NEUTROPHILS NFR BLD MANUAL: 68 % (ref 42.7–76)
NRBC BLD AUTO-RTO: 0.1 /100 WBC (ref 0–0.2)
PLAT MORPH BLD: NORMAL
PLATELET # BLD AUTO: 385 10*3/MM3 (ref 140–450)
PMV BLD AUTO: 9.8 FL (ref 6–12)
POTASSIUM SERPL-SCNC: 4.2 MMOL/L (ref 3.5–5.2)
RBC # BLD AUTO: 3.55 10*6/MM3 (ref 3.77–5.28)
SODIUM SERPL-SCNC: 135 MMOL/L (ref 136–145)
VARIANT LYMPHS NFR BLD MANUAL: 20 % (ref 19.6–45.3)
WBC MORPH BLD: NORMAL
WBC NRBC COR # BLD: 11.4 10*3/MM3 (ref 3.4–10.8)

## 2022-05-19 PROCEDURE — 99024 POSTOP FOLLOW-UP VISIT: CPT | Performed by: NURSE PRACTITIONER

## 2022-05-19 PROCEDURE — 85007 BL SMEAR W/DIFF WBC COUNT: CPT | Performed by: INTERNAL MEDICINE

## 2022-05-19 PROCEDURE — 25010000002 HEPARIN (PORCINE) PER 1000 UNITS: Performed by: INTERNAL MEDICINE

## 2022-05-19 PROCEDURE — 97535 SELF CARE MNGMENT TRAINING: CPT

## 2022-05-19 PROCEDURE — 25010000002 PROCHLORPERAZINE 10 MG/2ML SOLUTION: Performed by: INTERNAL MEDICINE

## 2022-05-19 PROCEDURE — 85025 COMPLETE CBC W/AUTO DIFF WBC: CPT | Performed by: INTERNAL MEDICINE

## 2022-05-19 PROCEDURE — 97112 NEUROMUSCULAR REEDUCATION: CPT

## 2022-05-19 PROCEDURE — 80048 BASIC METABOLIC PNL TOTAL CA: CPT | Performed by: INTERNAL MEDICINE

## 2022-05-19 PROCEDURE — 97110 THERAPEUTIC EXERCISES: CPT

## 2022-05-19 RX ADMIN — METHOCARBAMOL TABLETS 500 MG: 500 TABLET, COATED ORAL at 13:01

## 2022-05-19 RX ADMIN — Medication 10 ML: at 08:06

## 2022-05-19 RX ADMIN — METHOCARBAMOL TABLETS 500 MG: 500 TABLET, COATED ORAL at 17:53

## 2022-05-19 RX ADMIN — PROCHLORPERAZINE EDISYLATE 5 MG: 5 INJECTION INTRAMUSCULAR; INTRAVENOUS at 13:11

## 2022-05-19 RX ADMIN — METHOCARBAMOL TABLETS 500 MG: 500 TABLET, COATED ORAL at 23:21

## 2022-05-19 RX ADMIN — HYDROCODONE BITARTRATE AND ACETAMINOPHEN 1 TABLET: 5; 325 TABLET ORAL at 08:13

## 2022-05-19 RX ADMIN — CEPHALEXIN 500 MG: 500 CAPSULE ORAL at 23:21

## 2022-05-19 RX ADMIN — CEPHALEXIN 500 MG: 500 CAPSULE ORAL at 13:01

## 2022-05-19 RX ADMIN — TAMOXIFEN CITRATE 20 MG: 10 TABLET ORAL at 08:06

## 2022-05-19 RX ADMIN — CEPHALEXIN 500 MG: 500 CAPSULE ORAL at 06:16

## 2022-05-19 RX ADMIN — PROCHLORPERAZINE EDISYLATE 5 MG: 5 INJECTION INTRAMUSCULAR; INTRAVENOUS at 06:19

## 2022-05-19 RX ADMIN — HEPARIN SODIUM 5000 UNITS: 5000 INJECTION INTRAVENOUS; SUBCUTANEOUS at 06:16

## 2022-05-19 RX ADMIN — METRONIDAZOLE 500 MG: 500 TABLET, FILM COATED ORAL at 06:16

## 2022-05-19 RX ADMIN — HYDROXYZINE HYDROCHLORIDE 25 MG: 25 TABLET ORAL at 15:35

## 2022-05-19 RX ADMIN — HEPARIN SODIUM 5000 UNITS: 5000 INJECTION INTRAVENOUS; SUBCUTANEOUS at 23:21

## 2022-05-19 RX ADMIN — PROCHLORPERAZINE EDISYLATE 5 MG: 5 INJECTION INTRAMUSCULAR; INTRAVENOUS at 23:27

## 2022-05-19 RX ADMIN — METHOCARBAMOL TABLETS 500 MG: 500 TABLET, COATED ORAL at 00:43

## 2022-05-19 RX ADMIN — HEPARIN SODIUM 5000 UNITS: 5000 INJECTION INTRAVENOUS; SUBCUTANEOUS at 13:01

## 2022-05-19 RX ADMIN — METRONIDAZOLE 500 MG: 500 TABLET, FILM COATED ORAL at 13:01

## 2022-05-19 RX ADMIN — METRONIDAZOLE 500 MG: 500 TABLET, FILM COATED ORAL at 23:21

## 2022-05-19 RX ADMIN — METHOCARBAMOL TABLETS 500 MG: 500 TABLET, COATED ORAL at 06:16

## 2022-05-20 LAB
ANION GAP SERPL CALCULATED.3IONS-SCNC: 14.3 MMOL/L (ref 5–15)
BASOPHILS # BLD AUTO: 0.15 10*3/MM3 (ref 0–0.2)
BASOPHILS NFR BLD AUTO: 1.3 % (ref 0–1.5)
BUN SERPL-MCNC: 19 MG/DL (ref 6–20)
BUN/CREAT SERPL: 38 (ref 7–25)
CALCIUM SPEC-SCNC: 9.3 MG/DL (ref 8.6–10.5)
CHLORIDE SERPL-SCNC: 102 MMOL/L (ref 98–107)
CO2 SERPL-SCNC: 20.7 MMOL/L (ref 22–29)
CREAT SERPL-MCNC: 0.5 MG/DL (ref 0.57–1)
DEPRECATED RDW RBC AUTO: 42 FL (ref 37–54)
EGFRCR SERPLBLD CKD-EPI 2021: 114.4 ML/MIN/1.73
EOSINOPHIL # BLD AUTO: 0.17 10*3/MM3 (ref 0–0.4)
EOSINOPHIL NFR BLD AUTO: 1.5 % (ref 0.3–6.2)
ERYTHROCYTE [DISTWIDTH] IN BLOOD BY AUTOMATED COUNT: 12.9 % (ref 12.3–15.4)
GLUCOSE SERPL-MCNC: 107 MG/DL (ref 65–99)
HCT VFR BLD AUTO: 34 % (ref 34–46.6)
HGB BLD-MCNC: 11.1 G/DL (ref 12–15.9)
IMM GRANULOCYTES # BLD AUTO: 0.57 10*3/MM3 (ref 0–0.05)
IMM GRANULOCYTES NFR BLD AUTO: 4.9 % (ref 0–0.5)
LYMPHOCYTES # BLD AUTO: 2.34 10*3/MM3 (ref 0.7–3.1)
LYMPHOCYTES NFR BLD AUTO: 20.1 % (ref 19.6–45.3)
MCH RBC QN AUTO: 29.7 PG (ref 26.6–33)
MCHC RBC AUTO-ENTMCNC: 32.6 G/DL (ref 31.5–35.7)
MCV RBC AUTO: 90.9 FL (ref 79–97)
MONOCYTES # BLD AUTO: 1.19 10*3/MM3 (ref 0.1–0.9)
MONOCYTES NFR BLD AUTO: 10.2 % (ref 5–12)
NEUTROPHILS NFR BLD AUTO: 62 % (ref 42.7–76)
NEUTROPHILS NFR BLD AUTO: 7.22 10*3/MM3 (ref 1.7–7)
NRBC BLD AUTO-RTO: 0.1 /100 WBC (ref 0–0.2)
PLATELET # BLD AUTO: 393 10*3/MM3 (ref 140–450)
PMV BLD AUTO: 9.6 FL (ref 6–12)
POTASSIUM SERPL-SCNC: 4.1 MMOL/L (ref 3.5–5.2)
RBC # BLD AUTO: 3.74 10*6/MM3 (ref 3.77–5.28)
SARS-COV-2 ORF1AB RESP QL NAA+PROBE: NOT DETECTED
SODIUM SERPL-SCNC: 137 MMOL/L (ref 136–145)
WBC NRBC COR # BLD: 11.64 10*3/MM3 (ref 3.4–10.8)

## 2022-05-20 PROCEDURE — 80048 BASIC METABOLIC PNL TOTAL CA: CPT | Performed by: INTERNAL MEDICINE

## 2022-05-20 PROCEDURE — 97530 THERAPEUTIC ACTIVITIES: CPT

## 2022-05-20 PROCEDURE — 25010000002 HEPARIN (PORCINE) PER 1000 UNITS: Performed by: INTERNAL MEDICINE

## 2022-05-20 PROCEDURE — 63710000001 ONDANSETRON PER 8 MG: Performed by: INTERNAL MEDICINE

## 2022-05-20 PROCEDURE — 85025 COMPLETE CBC W/AUTO DIFF WBC: CPT | Performed by: INTERNAL MEDICINE

## 2022-05-20 PROCEDURE — 97110 THERAPEUTIC EXERCISES: CPT

## 2022-05-20 PROCEDURE — U0004 COV-19 TEST NON-CDC HGH THRU: HCPCS | Performed by: INTERNAL MEDICINE

## 2022-05-20 RX ORDER — METHOCARBAMOL 500 MG/1
500 TABLET, FILM COATED ORAL EVERY 6 HOURS SCHEDULED
Start: 2022-05-20 | End: 2022-06-16

## 2022-05-20 RX ORDER — HYDROXYZINE HYDROCHLORIDE 25 MG/1
25 TABLET, FILM COATED ORAL 3 TIMES DAILY PRN
Start: 2022-05-20

## 2022-05-20 RX ORDER — CEPHALEXIN 500 MG/1
500 CAPSULE ORAL EVERY 8 HOURS SCHEDULED
Qty: 2 CAPSULE | Refills: 0 | Status: SHIPPED | OUTPATIENT
Start: 2022-05-20 | End: 2022-05-21

## 2022-05-20 RX ORDER — LANOLIN ALCOHOL/MO/W.PET/CERES
3 CREAM (GRAM) TOPICAL NIGHTLY PRN
Qty: 30 TABLET
Start: 2022-05-20 | End: 2022-06-16

## 2022-05-20 RX ORDER — HEPARIN SODIUM 5000 [USP'U]/ML
5000 INJECTION, SOLUTION INTRAVENOUS; SUBCUTANEOUS EVERY 8 HOURS SCHEDULED
Start: 2022-05-20 | End: 2022-06-16

## 2022-05-20 RX ORDER — ACETAMINOPHEN 325 MG/1
650 TABLET ORAL EVERY 4 HOURS PRN
Start: 2022-05-20

## 2022-05-20 RX ORDER — BISACODYL 10 MG
10 SUPPOSITORY, RECTAL RECTAL EVERY OTHER DAY
Start: 2022-05-22

## 2022-05-20 RX ORDER — SIMETHICONE 80 MG
80 TABLET,CHEWABLE ORAL 4 TIMES DAILY PRN
Start: 2022-05-20 | End: 2022-06-16

## 2022-05-20 RX ORDER — METRONIDAZOLE 500 MG/1
500 TABLET ORAL EVERY 8 HOURS SCHEDULED
Qty: 2 TABLET | Refills: 0 | Status: SHIPPED | OUTPATIENT
Start: 2022-05-20 | End: 2022-05-21

## 2022-05-20 RX ORDER — HYDROCODONE BITARTRATE AND ACETAMINOPHEN 5; 325 MG/1; MG/1
1 TABLET ORAL EVERY 4 HOURS PRN
Refills: 0
Start: 2022-05-20 | End: 2022-05-21 | Stop reason: SDUPTHER

## 2022-05-20 RX ADMIN — METRONIDAZOLE 500 MG: 500 TABLET, FILM COATED ORAL at 14:02

## 2022-05-20 RX ADMIN — HEPARIN SODIUM 5000 UNITS: 5000 INJECTION INTRAVENOUS; SUBCUTANEOUS at 14:02

## 2022-05-20 RX ADMIN — CEPHALEXIN 500 MG: 500 CAPSULE ORAL at 06:51

## 2022-05-20 RX ADMIN — ONDANSETRON HYDROCHLORIDE 4 MG: 4 TABLET, FILM COATED ORAL at 17:06

## 2022-05-20 RX ADMIN — HYDROCODONE BITARTRATE AND ACETAMINOPHEN 1 TABLET: 5; 325 TABLET ORAL at 08:07

## 2022-05-20 RX ADMIN — BISACODYL 10 MG: 10 SUPPOSITORY RECTAL at 08:29

## 2022-05-20 RX ADMIN — METHOCARBAMOL TABLETS 500 MG: 500 TABLET, COATED ORAL at 18:21

## 2022-05-20 RX ADMIN — TAMOXIFEN CITRATE 20 MG: 10 TABLET ORAL at 08:00

## 2022-05-20 RX ADMIN — HYDROXYZINE HYDROCHLORIDE 25 MG: 25 TABLET ORAL at 12:30

## 2022-05-20 RX ADMIN — METHOCARBAMOL TABLETS 500 MG: 500 TABLET, COATED ORAL at 06:51

## 2022-05-20 RX ADMIN — CEPHALEXIN 500 MG: 500 CAPSULE ORAL at 14:02

## 2022-05-20 RX ADMIN — Medication 1 APPLICATION: at 17:07

## 2022-05-20 RX ADMIN — METHOCARBAMOL TABLETS 500 MG: 500 TABLET, COATED ORAL at 12:07

## 2022-05-20 RX ADMIN — HYDROXYZINE HYDROCHLORIDE 25 MG: 25 TABLET ORAL at 21:14

## 2022-05-20 RX ADMIN — CEPHALEXIN 500 MG: 500 CAPSULE ORAL at 23:27

## 2022-05-20 RX ADMIN — HEPARIN SODIUM 5000 UNITS: 5000 INJECTION INTRAVENOUS; SUBCUTANEOUS at 23:27

## 2022-05-20 RX ADMIN — METHOCARBAMOL TABLETS 500 MG: 500 TABLET, COATED ORAL at 23:27

## 2022-05-20 RX ADMIN — HYDROCODONE BITARTRATE AND ACETAMINOPHEN 1 TABLET: 5; 325 TABLET ORAL at 17:07

## 2022-05-20 RX ADMIN — METRONIDAZOLE 500 MG: 500 TABLET, FILM COATED ORAL at 23:27

## 2022-05-20 RX ADMIN — METRONIDAZOLE 500 MG: 500 TABLET, FILM COATED ORAL at 06:51

## 2022-05-20 RX ADMIN — HEPARIN SODIUM 5000 UNITS: 5000 INJECTION INTRAVENOUS; SUBCUTANEOUS at 06:51

## 2022-05-21 ENCOUNTER — HOME HEALTH ADMISSION (OUTPATIENT)
Dept: HOME HEALTH SERVICES | Facility: HOME HEALTHCARE | Age: 52
End: 2022-05-21

## 2022-05-21 VITALS
TEMPERATURE: 98.5 F | HEIGHT: 62 IN | WEIGHT: 144.62 LBS | SYSTOLIC BLOOD PRESSURE: 116 MMHG | OXYGEN SATURATION: 93 % | RESPIRATION RATE: 16 BRPM | HEART RATE: 92 BPM | BODY MASS INDEX: 26.61 KG/M2 | DIASTOLIC BLOOD PRESSURE: 74 MMHG

## 2022-05-21 LAB
ANION GAP SERPL CALCULATED.3IONS-SCNC: 10.4 MMOL/L (ref 5–15)
BASOPHILS # BLD AUTO: 0.1 10*3/MM3 (ref 0–0.2)
BASOPHILS NFR BLD AUTO: 0.8 % (ref 0–1.5)
BUN SERPL-MCNC: 16 MG/DL (ref 6–20)
BUN/CREAT SERPL: 32 (ref 7–25)
CALCIUM SPEC-SCNC: 9.2 MG/DL (ref 8.6–10.5)
CHLORIDE SERPL-SCNC: 103 MMOL/L (ref 98–107)
CO2 SERPL-SCNC: 19.6 MMOL/L (ref 22–29)
CREAT SERPL-MCNC: 0.5 MG/DL (ref 0.57–1)
DEPRECATED RDW RBC AUTO: 43.1 FL (ref 37–54)
EGFRCR SERPLBLD CKD-EPI 2021: 114.4 ML/MIN/1.73
EOSINOPHIL # BLD AUTO: 0.15 10*3/MM3 (ref 0–0.4)
EOSINOPHIL NFR BLD AUTO: 1.2 % (ref 0.3–6.2)
ERYTHROCYTE [DISTWIDTH] IN BLOOD BY AUTOMATED COUNT: 13 % (ref 12.3–15.4)
GLUCOSE SERPL-MCNC: 98 MG/DL (ref 65–99)
HCT VFR BLD AUTO: 33.6 % (ref 34–46.6)
HGB BLD-MCNC: 11 G/DL (ref 12–15.9)
IMM GRANULOCYTES # BLD AUTO: 0.42 10*3/MM3 (ref 0–0.05)
IMM GRANULOCYTES NFR BLD AUTO: 3.4 % (ref 0–0.5)
LYMPHOCYTES # BLD AUTO: 2.51 10*3/MM3 (ref 0.7–3.1)
LYMPHOCYTES NFR BLD AUTO: 20.2 % (ref 19.6–45.3)
MCH RBC QN AUTO: 29.7 PG (ref 26.6–33)
MCHC RBC AUTO-ENTMCNC: 32.7 G/DL (ref 31.5–35.7)
MCV RBC AUTO: 90.8 FL (ref 79–97)
MONOCYTES # BLD AUTO: 1.07 10*3/MM3 (ref 0.1–0.9)
MONOCYTES NFR BLD AUTO: 8.6 % (ref 5–12)
NEUTROPHILS NFR BLD AUTO: 65.8 % (ref 42.7–76)
NEUTROPHILS NFR BLD AUTO: 8.2 10*3/MM3 (ref 1.7–7)
NRBC BLD AUTO-RTO: 0 /100 WBC (ref 0–0.2)
PLATELET # BLD AUTO: 418 10*3/MM3 (ref 140–450)
PMV BLD AUTO: 9.5 FL (ref 6–12)
POTASSIUM SERPL-SCNC: 4.1 MMOL/L (ref 3.5–5.2)
RBC # BLD AUTO: 3.7 10*6/MM3 (ref 3.77–5.28)
SODIUM SERPL-SCNC: 133 MMOL/L (ref 136–145)
WBC NRBC COR # BLD: 12.45 10*3/MM3 (ref 3.4–10.8)

## 2022-05-21 PROCEDURE — 99024 POSTOP FOLLOW-UP VISIT: CPT | Performed by: NURSE PRACTITIONER

## 2022-05-21 PROCEDURE — 85025 COMPLETE CBC W/AUTO DIFF WBC: CPT | Performed by: INTERNAL MEDICINE

## 2022-05-21 PROCEDURE — 80048 BASIC METABOLIC PNL TOTAL CA: CPT | Performed by: INTERNAL MEDICINE

## 2022-05-21 PROCEDURE — 63710000001 ONDANSETRON PER 8 MG: Performed by: INTERNAL MEDICINE

## 2022-05-21 PROCEDURE — 25010000002 HEPARIN (PORCINE) PER 1000 UNITS: Performed by: INTERNAL MEDICINE

## 2022-05-21 RX ORDER — HYDROCODONE BITARTRATE AND ACETAMINOPHEN 5; 325 MG/1; MG/1
1 TABLET ORAL EVERY 6 HOURS PRN
Qty: 12 TABLET | Refills: 0 | Status: SHIPPED | OUTPATIENT
Start: 2022-05-21 | End: 2022-05-21 | Stop reason: SDUPTHER

## 2022-05-21 RX ORDER — HYDROCODONE BITARTRATE AND ACETAMINOPHEN 5; 325 MG/1; MG/1
1 TABLET ORAL EVERY 6 HOURS PRN
Qty: 12 TABLET | Refills: 0 | Status: SHIPPED | OUTPATIENT
Start: 2022-05-21 | End: 2022-05-24

## 2022-05-21 RX ADMIN — HEPARIN SODIUM 5000 UNITS: 5000 INJECTION INTRAVENOUS; SUBCUTANEOUS at 13:51

## 2022-05-21 RX ADMIN — HEPARIN SODIUM 5000 UNITS: 5000 INJECTION INTRAVENOUS; SUBCUTANEOUS at 06:11

## 2022-05-21 RX ADMIN — ONDANSETRON HYDROCHLORIDE 4 MG: 4 TABLET, FILM COATED ORAL at 16:08

## 2022-05-21 RX ADMIN — METHOCARBAMOL TABLETS 500 MG: 500 TABLET, COATED ORAL at 11:54

## 2022-05-21 RX ADMIN — HYDROXYZINE HYDROCHLORIDE 25 MG: 25 TABLET ORAL at 16:08

## 2022-05-21 RX ADMIN — METHOCARBAMOL TABLETS 500 MG: 500 TABLET, COATED ORAL at 17:10

## 2022-05-21 RX ADMIN — ONDANSETRON HYDROCHLORIDE 4 MG: 4 TABLET, FILM COATED ORAL at 06:12

## 2022-05-21 RX ADMIN — CEPHALEXIN 500 MG: 500 CAPSULE ORAL at 06:22

## 2022-05-21 RX ADMIN — HYDROCODONE BITARTRATE AND ACETAMINOPHEN 1 TABLET: 5; 325 TABLET ORAL at 16:08

## 2022-05-21 RX ADMIN — METRONIDAZOLE 500 MG: 500 TABLET, FILM COATED ORAL at 06:22

## 2022-05-21 RX ADMIN — HYDROXYZINE HYDROCHLORIDE 25 MG: 25 TABLET ORAL at 10:56

## 2022-05-21 RX ADMIN — TAMOXIFEN CITRATE 20 MG: 10 TABLET ORAL at 08:11

## 2022-05-21 RX ADMIN — HYDROCODONE BITARTRATE AND ACETAMINOPHEN 1 TABLET: 5; 325 TABLET ORAL at 06:11

## 2022-05-21 RX ADMIN — METHOCARBAMOL TABLETS 500 MG: 500 TABLET, COATED ORAL at 06:12

## 2022-05-31 ENCOUNTER — APPOINTMENT (OUTPATIENT)
Dept: WOUND CARE | Facility: HOSPITAL | Age: 52
End: 2022-05-31

## 2022-06-15 NOTE — PROGRESS NOTES
Neurosurgical Consultation      Cesia Workman is a 51 y.o. female is here today for a post- op wound check. She has back pain. She has just finished rehab and is currently in outpatient physical therapy.  She has back pain and muscle spasms.    Chief Complaint   Patient presents with   • Post-op        Previous treatment:  T10-T11 left-sided discectomy via a T11 left-sided transpedicular approach, T10-T12 instrumented pedicle fixation and posterior lateral arthrodesis.  Utilization of intraoperative neuro navigation.  Utilization of intraoperative autograft and allograft.  Utilization of intraoperative neuro monitoring 5/8/2022    HPI: This is a 51-year-old woman with acute onset Becky a spinal cord injury after a thoracic disc herniation.  She underwent emergent thoracic discectomy via a transpedicular approach.  Fusion was required secondary to this approach.  She required extensive in-hospital rehab as well as acute rehab.  She returns to see me in approximately 6 weeks.  She is doing relatively well.  She has made significant progress with respect to her lower extremity strength and sensation however continues to not be ambulatory at this juncture.  Her incision is open but being packed on a regular basis.  This is being managed by wound care and plastic surgery.    Past Medical History:   Diagnosis Date   • Anxiety    • Breast cancer (HCC) 02/14/2018    Left breast, Upper Inner Quadrant, Invasive Mammary Carcinoma, Intermediate grade, measuring at least 0.2 cm in dimension, with associated high grade solid and cribiform ductal carcinoma in situ with comedonecrosis, ER/NH positive, [id number] negative   • Heartburn    • Infectious mononucleosis    • Migraines    • PONV (postoperative nausea and vomiting)    • Pre-diabetes    • PVC's (premature ventricular contractions)    • Wears contact lenses         Past Surgical History:   Procedure Laterality Date   • BREAST BIOPSY Left 02/14/2018    Left breast stereotactic  biopsy w/ marker clip placement & positive radiograph-Dr. Lashaun Gamble, [facility]   • BREAST BIOPSY Left 02/12/2018    INCOMPLETE - Biopsy could not be completed as patient had severe vasovagal reaction & subsequent syncope-[facility]   • BREAST BIOPSY Left 5/23/2018    Procedure: REEXCISION OF POSITIVE LUMPECTOMY MARGIN LEFT BREAST;  Surgeon: Adrián Pereyra MD;  Location: [facility];  Service: General   • BREAST LUMPECTOMY WITH SENTINEL NODE BIOPSY Left 4/11/2018    Procedure: BREAST LUMPECTOMY WITH SENTINEL NODE BIOPSY AND NEEDLE LOCALIZATION;  Surgeon: Adrián Pereyra MD;  Location: [facility];  Service: General   • THORACIC DECOMPRESSION POSTERIOR FUSION WITH INSTRUMENTATION Left 5/8/2022    Procedure: T10-T12 BILATERAL POSTERIOR LATERAL FUSION WITH T11 LEFT SIDED TRANSPEDICULAR DISCECTOMY FOR SPINAL CANAL DECOMPRESSION;  Surgeon: Brady Vega MD;  Location: [facility];  Service: Neurosurgery;  Laterality: Left;        Current Outpatient Medications on File Prior to Visit   Medication Sig Dispense Refill   • acetaminophen (TYLENOL) 325 MG tablet Take 2 tablets by mouth Every 4 (Four) Hours As Needed for Mild Pain .     • apixaban (ELIQUIS) 2.5 MG tablet tablet      • baclofen (LIORESAL) 10 MG tablet 10 mg.     • bisacodyl (DULCOLAX) 10 MG suppository Insert 1 suppository into the rectum Every Other Day.     • gabapentin (NEURONTIN) 100 MG capsule 100 mg.     • HYDROcodone-acetaminophen (NORCO) 5-325 MG per tablet      • hydrOXYzine (ATARAX) 25 MG tablet Take 1 tablet by mouth 3 (Three) Times a Day As Needed for Itching or Anxiety.     • nitrofurantoin, macrocrystal-monohydrate, (MACROBID) 100 MG capsule 100 mg.     • sennosides-docusate (PERICOLACE) 8.6-50 MG per tablet   1 Tab, Oral, Tab, BID, X 14 Day(s), # 28 Tab, 0 Refill(s), Pharmacy: [facility], , 05/21/22 19:32:00 EDT, CLINICALHEIGHT, 57.64, kg, 05/21/22 19:32:00 EDT, CLINICALWEIGHT, 157.48     • tamoxifen (NOLVADEX) 20  MG chemo tablet TAKE 1 TABLET BY MOUTH EVERY DAY 90 tablet 3   • tiZANidine (ZANAFLEX) 2 MG tablet 2 mg.     • [DISCONTINUED] hydrOXYzine (ATARAX) 25 MG tablet      • [DISCONTINUED] tamoxifen (NOLVADEX) 10 MG tablet 20 mg.     • [DISCONTINUED] cadexomer iodine (IODOSORB) 0.9 % gel Apply 1 application topically to the appropriate area as directed Every Other Day.     • [DISCONTINUED] Heparin Sodium, Porcine, (heparin, porcine,) 5000 UNIT/ML injection Inject 1 mL under the skin into the appropriate area as directed Every 8 (Eight) Hours. Indications: Prevention of Unwanted Clot in Veins     • [DISCONTINUED] melatonin 3 MG tablet Take 1 tablet by mouth At Night As Needed for Sleep. 30 tablet    • [DISCONTINUED] methocarbamol (ROBAXIN) 500 MG tablet Take 1 tablet by mouth Every 6 (Six) Hours.     • [DISCONTINUED] simethicone (MYLICON) 80 MG chewable tablet Chew 1 tablet 4 (Four) Times a Day As Needed for Flatulence.       No current facility-administered medications on file prior to visit.        No Known Allergies     Social History     Socioeconomic History   • Marital status:      Spouse name: Nikita Workman   • Number of children: 3   • Years of education: College   Tobacco Use   • Smoking status: Former Smoker     Packs/day: 0.50     Years: 2.00     Pack years: 1.00     Types: Cigarettes     Start date:      Quit date:      Years since quittin.4   • Smokeless tobacco: Never Used   Vaping Use   • Vaping Use: Never used   Substance and Sexual Activity   • Alcohol use: Yes     Comment: RARE MONTHLY   • Drug use: No   • Sexual activity: Defer     Birth control/protection: None          Review of Systems   Constitutional: Positive for activity change. Negative for fever.   Respiratory: Negative for chest tightness and shortness of breath.    Cardiovascular: Negative for chest pain.   Musculoskeletal: Positive for back pain and myalgias.   Neurological: Positive for numbness.        Positive for  "tingling        Physical Examination:     Vitals:    06/16/22 1332   BP: 102/69   Pulse: 80   Resp: 18   Temp: 99.6 °F (37.6 °C)   SpO2: 97%   Weight: 65.3 kg (144 lb)   Height: 157.5 cm (62.01\")        Physical Exam  Eyes:      General: Lids are normal.      Extraocular Movements: Extraocular movements intact.      Pupils: Pupils are equal, round, and reactive to light.   Psychiatric:         Speech: Speech normal.          Neurological Exam  Mental Status  Awake, alert and oriented to person, place and time. Speech is normal. Language is fluent with no aphasia.    Cranial Nerves  CN II: Visual acuity is normal. Visual fields full to confrontation.  CN III, IV, VI: Extraocular movements intact bilaterally. Normal lids and orbits bilaterally. Pupils equal round and reactive to light bilaterally.  CN V: Facial sensation is normal.  CN VII: Full and symmetric facial movement.  CN IX, X: Palate elevates symmetrically. Normal gag reflex.  CN XI: Shoulder shrug strength is normal.  CN XII: Tongue midline without atrophy or fasciculations.    Motor  Normal muscle bulk throughout. No fasciculations present. Decreased muscle tone.  4- out of 5 in hip flexion, 4 out of 5 knee extension, 3 out of 5 ankle dorsiflexion on the right, 2 out of 5 ankle dorsiflexion on the left, 3 out of 5 plantar flexion on the right, 2 out of 5 plantar flexion on the left..    Sensory  Full but abnormal sensation bilateral lower extremities.  Significant improvement compared to preoperative..    Gait    Deferred.  Present in a wheelchair..       Result Review  The following data was reviewed by: Brady Vega MD on 06/16/2022:    Data reviewed: Radiologic studies No new pictures obtained to review today.     Assessment/plan:  This is a 51-year-old woman with acute onset thoracic Becky a spinal cord injury after a disc herniation.  She underwent a transpedicular thoracic discectomy and fusion.  She is now 6 weeks removed.  She has made " significant improvement in her motor function as well as improvements in sensation.  She continues to be nonambulatory but is actively working with outpatient physical therapy.  Her wound is open but being managed with plastic surgery and wound care.  I recommend continuation with the services in order to make sure it fully heals.  She will follow-up with me in 6 weeks at the [place] office early in the morning on a Tuesday or Thursday.  We will work to schedule her for 1 of these days.  I have encouraged her to call me with any questions or concerns.  I would like her to undergo thoracic x-rays in an upright position prior to this appointment to evaluate the hardware.    Diagnoses and all orders for this visit:    1. Paraplegia (HCC) (Primary)  -     XR Spine Thoracic 2 View; Future         Return in about 6 weeks (around 7/28/2022).            Brady Vega MD

## 2022-06-16 ENCOUNTER — OFFICE VISIT (OUTPATIENT)
Dept: NEUROSURGERY | Facility: CLINIC | Age: 52
End: 2022-06-16

## 2022-06-16 VITALS
BODY MASS INDEX: 26.5 KG/M2 | TEMPERATURE: 99.6 F | DIASTOLIC BLOOD PRESSURE: 69 MMHG | HEART RATE: 80 BPM | SYSTOLIC BLOOD PRESSURE: 102 MMHG | OXYGEN SATURATION: 97 % | WEIGHT: 144 LBS | HEIGHT: 62 IN | RESPIRATION RATE: 18 BRPM

## 2022-06-16 DIAGNOSIS — G82.20 PARAPLEGIA: Primary | ICD-10-CM

## 2022-06-16 PROCEDURE — 99024 POSTOP FOLLOW-UP VISIT: CPT | Performed by: NEUROLOGICAL SURGERY

## 2022-06-16 RX ORDER — NITROFURANTOIN 25; 75 MG/1; MG/1
100 CAPSULE ORAL
COMMUNITY
Start: 2022-06-11 | End: 2022-06-18

## 2022-06-16 RX ORDER — TAMOXIFEN CITRATE 10 MG/1
20 TABLET ORAL
COMMUNITY
Start: 2022-06-07 | End: 2022-06-16 | Stop reason: ALTCHOICE

## 2022-06-16 RX ORDER — GABAPENTIN 100 MG/1
100 CAPSULE ORAL
COMMUNITY
Start: 2022-05-21 | End: 2022-08-20

## 2022-06-16 RX ORDER — AMOXICILLIN 250 MG
CAPSULE ORAL
COMMUNITY
Start: 2022-05-21 | End: 2022-06-21

## 2022-06-16 RX ORDER — HYDROCODONE BITARTRATE AND ACETAMINOPHEN 5; 325 MG/1; MG/1
TABLET ORAL
COMMUNITY
Start: 2022-06-07 | End: 2022-08-20

## 2022-06-16 RX ORDER — BACLOFEN 10 MG/1
10 TABLET ORAL
COMMUNITY
Start: 2022-05-21 | End: 2022-09-19

## 2022-06-16 RX ORDER — TIZANIDINE 2 MG/1
2 TABLET ORAL
COMMUNITY
Start: 2022-06-07 | End: 2022-07-07

## 2022-06-16 RX ORDER — HYDROXYZINE HYDROCHLORIDE 25 MG/1
TABLET, FILM COATED ORAL
COMMUNITY
Start: 2022-05-21 | End: 2022-06-16 | Stop reason: ALTCHOICE

## 2022-06-21 ENCOUNTER — OFFICE VISIT (OUTPATIENT)
Dept: WOUND CARE | Facility: HOSPITAL | Age: 52
End: 2022-06-21

## 2022-06-21 PROCEDURE — G0463 HOSPITAL OUTPT CLINIC VISIT: HCPCS

## 2022-06-21 RX ORDER — SODIUM HYPOCHLORITE 2.5 MG/ML
SOLUTION TOPICAL ONCE
Qty: 250 ML | Refills: 4 | Status: SHIPPED | OUTPATIENT
Start: 2022-06-21 | End: 2022-06-21

## 2022-06-28 RX ORDER — TAMOXIFEN CITRATE 20 MG/1
TABLET ORAL
Qty: 90 TABLET | Refills: 3 | Status: SHIPPED | OUTPATIENT
Start: 2022-06-28 | End: 2022-11-10 | Stop reason: SDUPTHER

## 2022-07-05 ENCOUNTER — APPOINTMENT (OUTPATIENT)
Dept: WOUND CARE | Facility: HOSPITAL | Age: 52
End: 2022-07-05

## 2022-07-06 ENCOUNTER — APPOINTMENT (OUTPATIENT)
Dept: WOUND CARE | Facility: HOSPITAL | Age: 52
End: 2022-07-06

## 2022-07-06 ENCOUNTER — LAB (OUTPATIENT)
Dept: LAB | Facility: HOSPITAL | Age: 52
End: 2022-07-06

## 2022-07-06 ENCOUNTER — HOSPITAL ENCOUNTER (OUTPATIENT)
Dept: GENERAL RADIOLOGY | Facility: HOSPITAL | Age: 52
Discharge: HOME | End: 2022-07-06

## 2022-07-06 ENCOUNTER — TRANSCRIBE ORDERS (OUTPATIENT)
Dept: ADMINISTRATIVE | Facility: HOSPITAL | Age: 52
End: 2022-07-06

## 2022-07-06 ENCOUNTER — LAB REQUISITION (OUTPATIENT)
Dept: LAB | Facility: HOSPITAL | Age: 52
End: 2022-07-06

## 2022-07-06 DIAGNOSIS — T81.31XA DISRUPTION OF CLOSURE OF SKIN, INITIAL ENCOUNTER: ICD-10-CM

## 2022-07-06 DIAGNOSIS — T81.31XA DISRUPTION OF CLOSURE OF SKIN, INITIAL ENCOUNTER: Primary | ICD-10-CM

## 2022-07-06 DIAGNOSIS — L98.422 NON-PRESSURE CHRONIC ULCER OF BACK WITH FAT LAYER EXPOSED: ICD-10-CM

## 2022-07-06 LAB
BASOPHILS # BLD AUTO: 0.07 10*3/MM3 (ref 0–0.2)
BASOPHILS NFR BLD AUTO: 0.9 % (ref 0–1.5)
CRP SERPL-MCNC: <0.3 MG/DL (ref 0–0.5)
DEPRECATED RDW RBC AUTO: 38.9 FL (ref 37–54)
EOSINOPHIL # BLD AUTO: 0.1 10*3/MM3 (ref 0–0.4)
EOSINOPHIL NFR BLD AUTO: 1.3 % (ref 0.3–6.2)
ERYTHROCYTE [DISTWIDTH] IN BLOOD BY AUTOMATED COUNT: 12.2 % (ref 12.3–15.4)
ERYTHROCYTE [SEDIMENTATION RATE] IN BLOOD: 7 MM/HR (ref 0–20)
HBA1C MFR BLD: 5.5 % (ref 4.8–5.6)
HCT VFR BLD AUTO: 35.4 % (ref 34–46.6)
HGB BLD-MCNC: 11.2 G/DL (ref 12–15.9)
IMM GRANULOCYTES # BLD AUTO: 0.03 10*3/MM3 (ref 0–0.05)
IMM GRANULOCYTES NFR BLD AUTO: 0.4 % (ref 0–0.5)
LYMPHOCYTES # BLD AUTO: 2.49 10*3/MM3 (ref 0.7–3.1)
LYMPHOCYTES NFR BLD AUTO: 31.9 % (ref 19.6–45.3)
MCH RBC QN AUTO: 27.7 PG (ref 26.6–33)
MCHC RBC AUTO-ENTMCNC: 31.6 G/DL (ref 31.5–35.7)
MCV RBC AUTO: 87.6 FL (ref 79–97)
MONOCYTES # BLD AUTO: 0.62 10*3/MM3 (ref 0.1–0.9)
MONOCYTES NFR BLD AUTO: 7.9 % (ref 5–12)
NEUTROPHILS NFR BLD AUTO: 4.5 10*3/MM3 (ref 1.7–7)
NEUTROPHILS NFR BLD AUTO: 57.6 % (ref 42.7–76)
NRBC BLD AUTO-RTO: 0 /100 WBC (ref 0–0.2)
PLATELET # BLD AUTO: 335 10*3/MM3 (ref 140–450)
PMV BLD AUTO: 10.9 FL (ref 6–12)
PREALB SERPL-MCNC: 23.5 MG/DL (ref 20–40)
RBC # BLD AUTO: 4.04 10*6/MM3 (ref 3.77–5.28)
WBC NRBC COR # BLD: 7.81 10*3/MM3 (ref 3.4–10.8)

## 2022-07-06 PROCEDURE — 97602 WOUND(S) CARE NON-SELECTIVE: CPT

## 2022-07-06 PROCEDURE — 72070 X-RAY EXAM THORAC SPINE 2VWS: CPT

## 2022-07-06 PROCEDURE — 85025 COMPLETE CBC W/AUTO DIFF WBC: CPT

## 2022-07-06 PROCEDURE — 87205 SMEAR GRAM STAIN: CPT | Performed by: NURSE PRACTITIONER

## 2022-07-06 PROCEDURE — 84134 ASSAY OF PREALBUMIN: CPT

## 2022-07-06 PROCEDURE — G0463 HOSPITAL OUTPT CLINIC VISIT: HCPCS

## 2022-07-06 PROCEDURE — 83036 HEMOGLOBIN GLYCOSYLATED A1C: CPT

## 2022-07-06 PROCEDURE — 85652 RBC SED RATE AUTOMATED: CPT

## 2022-07-06 PROCEDURE — 86140 C-REACTIVE PROTEIN: CPT

## 2022-07-06 PROCEDURE — 87070 CULTURE OTHR SPECIMN AEROBIC: CPT | Performed by: NURSE PRACTITIONER

## 2022-07-06 PROCEDURE — 36415 COLL VENOUS BLD VENIPUNCTURE: CPT

## 2022-07-09 LAB
BACTERIA SPEC AEROBE CULT: NORMAL
GRAM STN SPEC: NORMAL
GRAM STN SPEC: NORMAL

## 2022-07-12 ENCOUNTER — TRANSCRIBE ORDERS (OUTPATIENT)
Dept: ADMINISTRATIVE | Facility: HOSPITAL | Age: 52
End: 2022-07-12

## 2022-07-12 DIAGNOSIS — N31.9 NEUROGENIC BLADDER: Primary | ICD-10-CM

## 2022-07-14 ENCOUNTER — APPOINTMENT (OUTPATIENT)
Dept: WOUND CARE | Facility: HOSPITAL | Age: 52
End: 2022-07-14

## 2022-07-14 ENCOUNTER — LAB (OUTPATIENT)
Dept: LAB | Facility: HOSPITAL | Age: 52
End: 2022-07-14

## 2022-07-14 ENCOUNTER — TRANSCRIBE ORDERS (OUTPATIENT)
Dept: ADMINISTRATIVE | Facility: HOSPITAL | Age: 52
End: 2022-07-14

## 2022-07-14 DIAGNOSIS — N31.9 HIGH COMPLIANCE BLADDER: Primary | ICD-10-CM

## 2022-07-14 DIAGNOSIS — N31.9 HIGH COMPLIANCE BLADDER: ICD-10-CM

## 2022-07-14 LAB
ANION GAP SERPL CALCULATED.3IONS-SCNC: 12 MMOL/L (ref 5–15)
BUN SERPL-MCNC: 13 MG/DL (ref 6–20)
BUN/CREAT SERPL: 24.5 (ref 7–25)
CALCIUM SPEC-SCNC: 9.3 MG/DL (ref 8.6–10.5)
CHLORIDE SERPL-SCNC: 105 MMOL/L (ref 98–107)
CO2 SERPL-SCNC: 23 MMOL/L (ref 22–29)
CREAT SERPL-MCNC: 0.53 MG/DL (ref 0.57–1)
EGFRCR SERPLBLD CKD-EPI 2021: 112.8 ML/MIN/1.73
GLUCOSE SERPL-MCNC: 98 MG/DL (ref 65–99)
POTASSIUM SERPL-SCNC: 3.9 MMOL/L (ref 3.5–5.2)
SODIUM SERPL-SCNC: 140 MMOL/L (ref 136–145)

## 2022-07-14 PROCEDURE — 80048 BASIC METABOLIC PNL TOTAL CA: CPT

## 2022-07-14 PROCEDURE — 36415 COLL VENOUS BLD VENIPUNCTURE: CPT

## 2022-07-20 ENCOUNTER — APPOINTMENT (OUTPATIENT)
Dept: WOUND CARE | Facility: HOSPITAL | Age: 52
End: 2022-07-20

## 2022-07-20 PROCEDURE — G0463 HOSPITAL OUTPT CLINIC VISIT: HCPCS

## 2022-07-27 ENCOUNTER — TELEPHONE (OUTPATIENT)
Dept: NEUROSURGERY | Facility: CLINIC | Age: 52
End: 2022-07-27

## 2022-07-27 ENCOUNTER — APPOINTMENT (OUTPATIENT)
Dept: WOUND CARE | Facility: HOSPITAL | Age: 52
End: 2022-07-27

## 2022-07-27 NOTE — TELEPHONE ENCOUNTER
Returned the patient's 's call, I explained to him that she was inpatient at [facility] when the surgery was performed therefore our office does not do anything with the authorizations, the hospital auth team obtains those authorizations. I told him we can give him the number to billing and they should be able to help him. If we need to do anything we are happy to help any way possible. She has a visit tomorrow and she will be provided that number [phone].

## 2022-07-27 NOTE — TELEPHONE ENCOUNTER
Caller: Nikita Workman    Relationship: Emergency Contact    Best call back number: [phone]    What is the best time to reach you:ANYTIME    Who are you requesting to speak with (clinical staff, provider,  specific staff member):CLINICAL STAFF    Do you know the name of the person who called:NA    What was the call regarding: [facility]  CALLED AND STATES THAT THEY RECEIVED A NOTICE THAT INSURANCE WAS DENYING THE SURGERY DUE TO MISSING INFORMATION. [facility]  STATES THIS WAS AN EMERGENCY SURGERY-PLEASE CALL PTS  BACK TO COLLECT INFORMATION ON WHAT IS NEEDED TO HAVE CLAIM APPROVED-THANK YOU    Do you require a callback:YES

## 2022-07-27 NOTE — PROGRESS NOTES
Neurosurgical Consultation      Cesia Workman is a 52 y.o. female is here today for follow-up.    Chief Complaint   Patient presents with   • Post-op     televisit        Previous treatment:  T10-T11 left-sided discectomy via a T11 left-sided transpedicular approach, T10-T12 instrumented pedicle fixation and posterior lateral arthrodesis.  Utilization of intraoperative neuro navigation.  Utilization of intraoperative autograft and allograft.  Utilization of intraoperative neuro monitoring 5/8/2022    HPI: This is a 51-year-old woman with a history of of thoracic disc herniation and resultant spinal cord injury who underwent an emergent T10-T11 left-sided transpedicular discectomy as well as T10-T12 instrumented pedicle fixation and posterior lateral arthrodesis.  She is almost 3 months removed from the surgery.  She informs me that in general she is doing better.  She does have continued back as well as leg spasms which significantly affect her quality of life.  The symptoms are being managed by physical medicine and rehabilitation.  At this juncture she comments that she is able to walk with a walker short distances.  She has noticed improvement in her ability to manage her ADLs.  She is working with wound care management and per her report they are appreciating significant improvement in her wound healing noting that at this juncture it is only a superficial wound.    Past Medical History:   Diagnosis Date   • Anxiety    • Breast cancer (HCC) 02/14/2018    Left breast, Upper Inner Quadrant, Invasive Mammary Carcinoma, Intermediate grade, measuring at least 0.2 cm in dimension, with associated high grade solid and cribiform ductal carcinoma in situ with comedonecrosis, ER/MI positive, [id number] negative   • Heartburn    • Infectious mononucleosis    • Migraines    • PONV (postoperative nausea and vomiting)    • Pre-diabetes    • PVC's (premature ventricular contractions)    • Wears contact lenses         Past  Surgical History:   Procedure Laterality Date   • BREAST BIOPSY Left 02/14/2018    Left breast stereotactic biopsy w/ marker clip placement & positive radiograph-Dr. Lashaun Gamble, [facility]   • BREAST BIOPSY Left 02/12/2018    INCOMPLETE - Biopsy could not be completed as patient had severe vasovagal reaction & subsequent syncope-[facility]   • BREAST BIOPSY Left 5/23/2018    Procedure: REEXCISION OF POSITIVE LUMPECTOMY MARGIN LEFT BREAST;  Surgeon: Adrián Pereyra MD;  Location: [facility];  Service: General   • BREAST LUMPECTOMY WITH SENTINEL NODE BIOPSY Left 4/11/2018    Procedure: BREAST LUMPECTOMY WITH SENTINEL NODE BIOPSY AND NEEDLE LOCALIZATION;  Surgeon: Adrián Pereyra MD;  Location: [facility];  Service: General   • THORACIC DECOMPRESSION POSTERIOR FUSION WITH INSTRUMENTATION Left 5/8/2022    Procedure: T10-T12 BILATERAL POSTERIOR LATERAL FUSION WITH T11 LEFT SIDED TRANSPEDICULAR DISCECTOMY FOR SPINAL CANAL DECOMPRESSION;  Surgeon: Brady Vega MD;  Location: [facility];  Service: Neurosurgery;  Laterality: Left;        Current Outpatient Medications on File Prior to Visit   Medication Sig Dispense Refill   • acetaminophen (TYLENOL) 325 MG tablet Take 2 tablets by mouth Every 4 (Four) Hours As Needed for Mild Pain .     • baclofen (LIORESAL) 10 MG tablet 10 mg.     • bisacodyl (DULCOLAX) 10 MG suppository Insert 1 suppository into the rectum Every Other Day.     • gabapentin (NEURONTIN) 100 MG capsule 100 mg.     • HYDROcodone-acetaminophen (NORCO) 5-325 MG per tablet      • hydrOXYzine (ATARAX) 25 MG tablet Take 1 tablet by mouth 3 (Three) Times a Day As Needed for Itching or Anxiety.     • tamoxifen (NOLVADEX) 20 MG chemo tablet TAKE 1 TABLET BY MOUTH EVERY DAY 90 tablet 3     No current facility-administered medications on file prior to visit.        No Known Allergies     Social History     Socioeconomic History   • Marital status:      Spouse name: Nikita Workman   • Number of  "children: 3   • Years of education: College   Tobacco Use   • Smoking status: Former Smoker     Packs/day: 0.50     Years: 2.00     Pack years: 1.00     Types: Cigarettes     Start date:      Quit date:      Years since quittin.6   • Smokeless tobacco: Never Used   Vaping Use   • Vaping Use: Never used   Substance and Sexual Activity   • Alcohol use: Yes     Comment: RARE MONTHLY   • Drug use: No   • Sexual activity: Defer     Birth control/protection: None          Review of Systems   Constitutional: Negative for chills, fatigue and fever.   HENT: Negative for congestion, postnasal drip, sinus pressure and sore throat.    Eyes: Negative for photophobia, pain and visual disturbance.   Respiratory: Negative for cough, chest tightness, shortness of breath and wheezing.    Cardiovascular: Negative for chest pain and palpitations.   Gastrointestinal: Negative for abdominal pain, diarrhea, nausea, vomiting and indigestion.   Genitourinary: Positive for difficulty urinating. Negative for dysuria and pelvic pain.   Musculoskeletal: Positive for back pain.   Skin: Negative for rash and wound.   Neurological: Negative for tremors, seizures and syncope.   Psychiatric/Behavioral: Positive for sleep disturbance. Negative for hallucinations. The patient is not nervous/anxious.         Physical Examination:     Vitals:    22 1545   Weight: 59 kg (130 lb)   Height: 157.5 cm (62\")        Physical Exam     Neurological Exam   No physical exam was able to be accomplished today secondary to this visit being via telephone.    Result Review  The following data was reviewed by: Brady Vega MD on 2022:    Data reviewed: Radiologic studies X-rays of the thoracic spine from earlier this month were personally reviewed.  There is no indication of hardware malfunction or displacement.     Assessment/plan:  This is a 51-year-old woman with Becky a spinal cord injury after thoracic disc herniation.  She underwent an " emergent T10-T11 discectomy and fusion.  She is continuing to work with physical therapy and making slow progress.  She is now able to walk short distances with a walker.  She is better able to manage her personal ADLs.  She does continue to have significant spasms in the back as well as the legs.  I will defer management of this issue at this juncture to the physical medicine and rehabilitation provider.  If in the future intrathecal baclofen is considered I will be happy to evaluate her for this possibility.  I do encourage her to continue working with wound care to make sure that the wound successfully is fully healed.  I do recommend undergoing thoracic x-rays in 3 months from the last picture which would be early October.  When she completes this imaging she should return to see me in person or via telephone visit whichever she prefers.  Otherwise I have encouraged her to call me with any questions or concerns.    Diagnoses and all orders for this visit:    1. Weakness of both lower extremities (Primary)  -     XR Spine Thoracic 2 View; Future         Return in about 3 months (around 10/28/2022).    30 minutes of time was spent on patient care with this patient.        Brady Vega MD

## 2022-07-28 ENCOUNTER — OFFICE VISIT (OUTPATIENT)
Dept: NEUROSURGERY | Facility: CLINIC | Age: 52
End: 2022-07-28

## 2022-07-28 VITALS — HEIGHT: 62 IN | WEIGHT: 130 LBS | BODY MASS INDEX: 23.92 KG/M2

## 2022-07-28 DIAGNOSIS — R29.898 WEAKNESS OF BOTH LOWER EXTREMITIES: Primary | ICD-10-CM

## 2022-07-28 PROCEDURE — 99024 POSTOP FOLLOW-UP VISIT: CPT | Performed by: NEUROLOGICAL SURGERY

## 2022-08-02 ENCOUNTER — HOSPITAL ENCOUNTER (OUTPATIENT)
Dept: ULTRASOUND IMAGING | Facility: HOSPITAL | Age: 52
Discharge: HOME | End: 2022-08-02
Admitting: UROLOGY

## 2022-08-02 DIAGNOSIS — N31.9 NEUROGENIC BLADDER: ICD-10-CM

## 2022-08-02 PROCEDURE — 76775 US EXAM ABDO BACK WALL LIM: CPT

## 2022-08-03 ENCOUNTER — APPOINTMENT (OUTPATIENT)
Dept: WOUND CARE | Facility: HOSPITAL | Age: 52
End: 2022-08-03

## 2022-08-12 ENCOUNTER — TELEPHONE (OUTPATIENT)
Dept: ONCOLOGY | Facility: CLINIC | Age: 52
End: 2022-08-12

## 2022-08-12 NOTE — TELEPHONE ENCOUNTER
Returned call to patient who suffered a spinal cord injury on 5/7/2022 with surgical repair on 5/8/2022.  She had a ruptured T10 disc, which caused the injury.  She wanted Dr. Gutierrez to have this information, because the surgeon did send part of the removed vertebra for pathology, but does not know the final results of that.  She was told that this is rare to occur in the thoracic spine with no injury, most common happens in the lumbar spine.  She is wondering if the Radiation Treatments could have caused weakening of her spine in that region.  She had RT in 2018.  She just wanted to update Dr. Gutierrez about what is going on with her.

## 2022-08-12 NOTE — TELEPHONE ENCOUNTER
Caller: Cesia Workman     Relationship: SELF    Best call back number: [phone]     What is your medical concern? WALTER HAS HAD A RECENT SPINAL CHORD INJURY. PATIENT IS CONCERNED THAT THIS MAY BE IN PART DUE TO RADIATION AND WOULD LIKE TO DISCUSS WITH MD. BRADLEY.    How long has this issue been going on? MAY 7TH    Is your provider already aware of this issue? NO    Have you been treated for this issue? YES

## 2022-08-12 NOTE — TELEPHONE ENCOUNTER
Caller: Cesia Workman    Relationship to patient: Self    Best call back number: [phone]      Chief complaint: PATIENT REQUESTING TO RESCHEDULE 9/12/22 APPT    Type of visit:LAB AND FU    Requested date: HAS TO BE EASTPOINT AND MORNING. PATIENT WILL BE HAVING PHYSICAL THERAPY @ 3PM

## 2022-08-17 ENCOUNTER — APPOINTMENT (OUTPATIENT)
Dept: WOUND CARE | Facility: HOSPITAL | Age: 52
End: 2022-08-17

## 2022-08-24 ENCOUNTER — APPOINTMENT (OUTPATIENT)
Dept: WOUND CARE | Facility: HOSPITAL | Age: 52
End: 2022-08-24

## 2022-08-31 ENCOUNTER — APPOINTMENT (OUTPATIENT)
Dept: WOUND CARE | Facility: HOSPITAL | Age: 52
End: 2022-08-31

## 2022-09-07 ENCOUNTER — APPOINTMENT (OUTPATIENT)
Dept: WOUND CARE | Facility: HOSPITAL | Age: 52
End: 2022-09-07

## 2022-09-14 ENCOUNTER — APPOINTMENT (OUTPATIENT)
Dept: WOUND CARE | Facility: HOSPITAL | Age: 52
End: 2022-09-14

## 2022-09-14 ENCOUNTER — LAB (OUTPATIENT)
Dept: LAB | Facility: HOSPITAL | Age: 52
End: 2022-09-14

## 2022-09-14 ENCOUNTER — TRANSCRIBE ORDERS (OUTPATIENT)
Dept: ADMINISTRATIVE | Facility: HOSPITAL | Age: 52
End: 2022-09-14

## 2022-09-14 DIAGNOSIS — G82.20 PARAPLEGIA: Primary | ICD-10-CM

## 2022-09-14 DIAGNOSIS — G82.20 PARAPLEGIA: ICD-10-CM

## 2022-09-14 LAB
ALBUMIN SERPL-MCNC: 4.2 G/DL (ref 3.5–5.2)
ALBUMIN/GLOB SERPL: 1.8 G/DL
ALP SERPL-CCNC: 41 U/L (ref 39–117)
ALT SERPL W P-5'-P-CCNC: 11 U/L (ref 1–33)
ANION GAP SERPL CALCULATED.3IONS-SCNC: 11.6 MMOL/L (ref 5–15)
AST SERPL-CCNC: 12 U/L (ref 1–32)
BILIRUB SERPL-MCNC: <0.2 MG/DL (ref 0–1.2)
BUN SERPL-MCNC: 14 MG/DL (ref 6–20)
BUN/CREAT SERPL: 24.6 (ref 7–25)
CALCIUM SPEC-SCNC: 9.6 MG/DL (ref 8.6–10.5)
CHLORIDE SERPL-SCNC: 106 MMOL/L (ref 98–107)
CO2 SERPL-SCNC: 23.4 MMOL/L (ref 22–29)
CREAT SERPL-MCNC: 0.57 MG/DL (ref 0.57–1)
EGFRCR SERPLBLD CKD-EPI 2021: 110.2 ML/MIN/1.73
GLOBULIN UR ELPH-MCNC: 2.4 GM/DL
GLUCOSE SERPL-MCNC: 90 MG/DL (ref 65–99)
POTASSIUM SERPL-SCNC: 4.1 MMOL/L (ref 3.5–5.2)
PROT SERPL-MCNC: 6.6 G/DL (ref 6–8.5)
SODIUM SERPL-SCNC: 141 MMOL/L (ref 136–145)

## 2022-09-14 PROCEDURE — G0463 HOSPITAL OUTPT CLINIC VISIT: HCPCS

## 2022-09-14 PROCEDURE — 80053 COMPREHEN METABOLIC PANEL: CPT

## 2022-09-14 PROCEDURE — 36415 COLL VENOUS BLD VENIPUNCTURE: CPT

## 2022-09-19 ENCOUNTER — OFFICE VISIT (OUTPATIENT)
Dept: ONCOLOGY | Facility: CLINIC | Age: 52
End: 2022-09-19

## 2022-09-19 ENCOUNTER — LAB (OUTPATIENT)
Dept: OTHER | Facility: HOSPITAL | Age: 52
End: 2022-09-19

## 2022-09-19 VITALS
HEIGHT: 62 IN | SYSTOLIC BLOOD PRESSURE: 123 MMHG | HEART RATE: 68 BPM | RESPIRATION RATE: 16 BRPM | DIASTOLIC BLOOD PRESSURE: 80 MMHG | TEMPERATURE: 97.7 F | BODY MASS INDEX: 24.86 KG/M2 | OXYGEN SATURATION: 97 % | WEIGHT: 135.1 LBS

## 2022-09-19 DIAGNOSIS — Z79.810 LONG-TERM CURRENT USE OF TAMOXIFEN: ICD-10-CM

## 2022-09-19 DIAGNOSIS — Z17.0 MALIGNANT NEOPLASM OF UPPER-INNER QUADRANT OF LEFT BREAST IN FEMALE, ESTROGEN RECEPTOR POSITIVE: ICD-10-CM

## 2022-09-19 DIAGNOSIS — R29.898 WEAKNESS OF BOTH LOWER EXTREMITIES: ICD-10-CM

## 2022-09-19 DIAGNOSIS — C50.212 MALIGNANT NEOPLASM OF UPPER-INNER QUADRANT OF LEFT BREAST IN FEMALE, ESTROGEN RECEPTOR POSITIVE: Primary | ICD-10-CM

## 2022-09-19 DIAGNOSIS — C50.212 MALIGNANT NEOPLASM OF UPPER-INNER QUADRANT OF LEFT BREAST IN FEMALE, ESTROGEN RECEPTOR POSITIVE: ICD-10-CM

## 2022-09-19 DIAGNOSIS — Z17.0 MALIGNANT NEOPLASM OF UPPER-INNER QUADRANT OF LEFT BREAST IN FEMALE, ESTROGEN RECEPTOR POSITIVE: Primary | ICD-10-CM

## 2022-09-19 LAB
ALBUMIN SERPL-MCNC: 4.2 G/DL (ref 3.5–5.2)
ALBUMIN/GLOB SERPL: 1.4 G/DL
ALP SERPL-CCNC: 44 U/L (ref 39–117)
ALT SERPL W P-5'-P-CCNC: 10 U/L (ref 1–33)
ANION GAP SERPL CALCULATED.3IONS-SCNC: 8.7 MMOL/L (ref 5–15)
AST SERPL-CCNC: 16 U/L (ref 1–32)
BASOPHILS # BLD AUTO: 0.06 10*3/MM3 (ref 0–0.2)
BASOPHILS NFR BLD AUTO: 0.7 % (ref 0–1.5)
BILIRUB SERPL-MCNC: <0.2 MG/DL (ref 0–1.2)
BUN SERPL-MCNC: 16 MG/DL (ref 6–20)
BUN/CREAT SERPL: 31.4 (ref 7–25)
CALCIUM SPEC-SCNC: 9.6 MG/DL (ref 8.6–10.5)
CHLORIDE SERPL-SCNC: 106 MMOL/L (ref 98–107)
CO2 SERPL-SCNC: 25.3 MMOL/L (ref 22–29)
CREAT SERPL-MCNC: 0.51 MG/DL (ref 0.57–1)
DEPRECATED RDW RBC AUTO: 48.9 FL (ref 37–54)
EGFRCR SERPLBLD CKD-EPI 2021: 113.2 ML/MIN/1.73
EOSINOPHIL # BLD AUTO: 0.14 10*3/MM3 (ref 0–0.4)
EOSINOPHIL NFR BLD AUTO: 1.6 % (ref 0.3–6.2)
ERYTHROCYTE [DISTWIDTH] IN BLOOD BY AUTOMATED COUNT: 15.3 % (ref 12.3–15.4)
GLOBULIN UR ELPH-MCNC: 2.9 GM/DL
GLUCOSE SERPL-MCNC: 133 MG/DL (ref 65–99)
HCT VFR BLD AUTO: 39.4 % (ref 34–46.6)
HGB BLD-MCNC: 12.3 G/DL (ref 12–15.9)
IMM GRANULOCYTES # BLD AUTO: 0.03 10*3/MM3 (ref 0–0.05)
IMM GRANULOCYTES NFR BLD AUTO: 0.3 % (ref 0–0.5)
LYMPHOCYTES # BLD AUTO: 2.24 10*3/MM3 (ref 0.7–3.1)
LYMPHOCYTES NFR BLD AUTO: 24.8 % (ref 19.6–45.3)
MCH RBC QN AUTO: 27.1 PG (ref 26.6–33)
MCHC RBC AUTO-ENTMCNC: 31.2 G/DL (ref 31.5–35.7)
MCV RBC AUTO: 86.8 FL (ref 79–97)
MONOCYTES # BLD AUTO: 0.48 10*3/MM3 (ref 0.1–0.9)
MONOCYTES NFR BLD AUTO: 5.3 % (ref 5–12)
NEUTROPHILS NFR BLD AUTO: 6.08 10*3/MM3 (ref 1.7–7)
NEUTROPHILS NFR BLD AUTO: 67.3 % (ref 42.7–76)
NRBC BLD AUTO-RTO: 0 /100 WBC (ref 0–0.2)
PLATELET # BLD AUTO: 306 10*3/MM3 (ref 140–450)
PMV BLD AUTO: 10.3 FL (ref 6–12)
POTASSIUM SERPL-SCNC: 4.2 MMOL/L (ref 3.5–5.2)
PROT SERPL-MCNC: 7.1 G/DL (ref 6–8.5)
RBC # BLD AUTO: 4.54 10*6/MM3 (ref 3.77–5.28)
SODIUM SERPL-SCNC: 140 MMOL/L (ref 136–145)
WBC NRBC COR # BLD: 9.03 10*3/MM3 (ref 3.4–10.8)

## 2022-09-19 PROCEDURE — 85025 COMPLETE CBC W/AUTO DIFF WBC: CPT | Performed by: INTERNAL MEDICINE

## 2022-09-19 PROCEDURE — 36415 COLL VENOUS BLD VENIPUNCTURE: CPT

## 2022-09-19 PROCEDURE — 99214 OFFICE O/P EST MOD 30 MIN: CPT | Performed by: INTERNAL MEDICINE

## 2022-09-19 PROCEDURE — 80053 COMPREHEN METABOLIC PANEL: CPT | Performed by: INTERNAL MEDICINE

## 2022-09-19 RX ORDER — COLLAGENASE SANTYL 250 [ARB'U]/G
OINTMENT TOPICAL
COMMUNITY
Start: 2022-08-31 | End: 2022-10-20

## 2022-09-19 RX ORDER — DANTROLENE SODIUM 50 MG/1
50 CAPSULE ORAL 3 TIMES DAILY
COMMUNITY
Start: 2022-09-06 | End: 2022-11-29

## 2022-09-19 NOTE — PROGRESS NOTES
Subjective     REASON FOR CONSULTATION:  1.  T1bN0 M0 strongly ER/NV positive HER-2 negative grade 2 left breast cancer post lumpectomy with close margins for DCIS-Oncotype score of 16 radiation and tamoxifen started 7/18                             REQUESTING PHYSICIAN:  Adrián Pereyra M.D. and Nikita Gallegos M.D.      History of Present Illness is a 52-year-old perimenopausal female with a T1b N0 strongly ER/NV positive breast cancer with a low Oncotype score of 16.  She completed radiation and began tamoxifen 7/20/2018.    Patient's last mammogram was in April 2022 and was benign.  Her mammogram is set up for April 22 along with a DEXA scan    Patient denies any concerns on self breast exams.  Occasionally she has tenderness at the area of the lumpectomy and lymph node removal but this is been stable since surgery.    Unfortunately she developed cord compression from a disc which was calcified and had a long stay in rehab and is finally home walking with a walker but is in a wheelchair when she goes for office visits.  She still has no bladder sensation and has to Catheterize herself and she is very depressed about this    Thankfully she has not developed any DVTs and we will do a breast cancer index to see if we can get off at 5 years to simplify the situation for her    She is vaccinated against COVID-19    Past Medical History:   Diagnosis Date   • Anxiety    • Breast cancer (HCC) 02/14/2018    Left breast, Upper Inner Quadrant, Invasive Mammary Carcinoma, Intermediate grade, measuring at least 0.2 cm in dimension, with associated high grade solid and cribiform ductal carcinoma in situ with comedonecrosis, ER/NV positive, POW5hid negative   • Heartburn    • Infectious mononucleosis    • Migraines    • PONV (postoperative nausea and vomiting)    • Pre-diabetes    • PVC's (premature ventricular contractions)    • Wears contact lenses         Past Surgical History:   Procedure Laterality Date   • BREAST BIOPSY  Left 02/14/2018    Left breast stereotactic biopsy w/ marker clip placement & positive radiograph-Dr. Lashaun Gamble, [facility]   • BREAST BIOPSY Left 02/12/2018    INCOMPLETE - Biopsy could not be completed as patient had severe vasovagal reaction & subsequent syncope-[facility]   • BREAST BIOPSY Left 5/23/2018    Procedure: REEXCISION OF POSITIVE LUMPECTOMY MARGIN LEFT BREAST;  Surgeon: Adrián Pereyra MD;  Location: [facility];  Service: General   • BREAST LUMPECTOMY WITH SENTINEL NODE BIOPSY Left 4/11/2018    Procedure: BREAST LUMPECTOMY WITH SENTINEL NODE BIOPSY AND NEEDLE LOCALIZATION;  Surgeon: Adrián Pereyra MD;  Location: [facility];  Service: General   • THORACIC DECOMPRESSION POSTERIOR FUSION WITH INSTRUMENTATION Left 5/8/2022    Procedure: T10-T12 BILATERAL POSTERIOR LATERAL FUSION WITH T11 LEFT SIDED TRANSPEDICULAR DISCECTOMY FOR SPINAL CANAL DECOMPRESSION;  Surgeon: Brady Vega MD;  Location: [facility];  Service: Neurosurgery;  Laterality: Left;      ONCOLOGIC HISTORY:  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  A diagnostic mammogram gram showed a 2.3 cm area of clustered microcalcifications very suspicious for malignancy and the patient underwent a stereotactic biopsy on 2/14/18 with the findings of intermediate grade invasive mammary carcinoma measuring 2 mm with associated high-grade and solid DCIS--ER was 65% TN was 96% HER-2 0 Ki-67 was 2%.  The patient was referred to Dr. Pereyra and underwent bilateral breast MRIs with the findings of a 2.1 cm abnormality in the left breast with no axillary adenopathy and she was taken for surgery when lumpectomy and sentinel node biopsy was performed on 4/11/18.  This revealed a residual 8 mm area of invasive carcinoma grade 2 with associated  high-grade DCIS spanning 16 mm-the lateral and medial margins were 0.5 mm from the DCIS-new anterior, superior and lateral margins were obtained which were negative but the medial margin was still felt to be close.  She went to see Dr. Pereyra to wanted to discuss further the status of her margins but sent her to us also to discuss adjuvant treatment for her invasive cancer.    She is healing well from surgery.  She is obviously anxious about the margins but has no qualms about having further surgery even if the cosmesis is not perfect because she is more worried about recurrence of her cancer.  She is  3 para 3 -Menarche was age 14 she is premenopausal first childbirth was age 28 she breast-fed for 9 months she's been on no hormonal replacement since her last child 13 years ago  She is having bladder issues and her gynecologist just prescribed vaginal estrogens which she has not yet started.    Family history is positive for her father having thyroid cancer age 64 and he is currently dying from this is a 78 she's one sister is healthy her mother's 72 and healthy she has a maternal aunt with breast cancers at an unknown age paternal aunt with breast cancer in her 60s paternal grandfather with bone cancer and a maternal grandfather with bone cancer is no ovarian cancer in the family.      I explained to Cesia and her  that her tumor is very unlikely to have a high Oncotype score and it is very likely that the mainstay of her treatment would be tamoxifen.  But we will send an Oncotype score because of the small chance that there is a higher recurrence risk than expected based on her pathology in tumor size.  I think it is very reasonable to get genetic testing because of her family history although it is unlikely to change our treatment.  I've asked her to hold off and vaginal estrogens for the time being and to discuss this with her gynecologist.  She will stop her Zoloft and we can prescribe Effexor  if needed      we discussed her case at the multiple this may conference because of the close margins for high-grade DCIS and we all felt that radiation would take care of this and no further surgery was needed.    We discussed the low risk Oncotype in the lack of benefit for chemotherapy in this situation which we had already discussed with her on the phone and we talked about tamoxifen.The side effects and toxicities of Tamoxifen were discussed with the patient, including hot flashes, mood swings,depression, DVT and endometrial cancer. A list of drugs that interfere with the efficacy of tamoxifen and are to be avoided were given to the patient.    At her last visit she was having a lot of pelvic pain with her menstrual cycle and an ultrasound which showed some adenomyosis and her ovarian cyst had disappeared thankfully-she started menstruating regularly-we ordered CAT scans because of the pain in her left side and thankfully chest and abdomen are negative except for thickening of her bladder from her interstitial cystitis and she had fibroids-this discomfort has gradually improved with time        Father  with metastatic thyroid cancer I have again stressed the need for genetic testing and she did this and it was thankfully benign except for 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  This is actually gotten better and she thinks the Lexapro has helped this also there may have been a component of anxiety.  I told her if the dysphagia recurs she needs an EGD          Current Outpatient Medications on File Prior to Visit   Medication Sig Dispense Refill   • acetaminophen (TYLENOL) 325 MG tablet Take 2 tablets by mouth Every 4 (Four) Hours As Needed for Mild Pain .     • baclofen (LIORESAL) 20 MG tablet Take 1 tablet QAM, 1 tablet midday, 1.5 tablets QHS     • bisacodyl (DULCOLAX) 10 MG suppository Insert 1 suppository into the rectum  Every Other Day.     • collagenase (Santyl) 250 UNIT/GM ointment      • dantrolene (DANTRIUM) 50 MG capsule Take 50 mg by mouth 3 (Three) Times a Day.     • hydrOXYzine (ATARAX) 25 MG tablet Take 1 tablet by mouth 3 (Three) Times a Day As Needed for Itching or Anxiety.     • tamoxifen (NOLVADEX) 20 MG chemo tablet TAKE 1 TABLET BY MOUTH EVERY DAY 90 tablet 3   • baclofen (LIORESAL) 10 MG tablet 10 mg.     • gabapentin (NEURONTIN) 100 MG capsule Take 200 mg by mouth 3 (Three) Times a Day.       No current facility-administered medications on file prior to visit.        ALLERGIES:  No Known Allergies     Social History     Socioeconomic History   • Marital status:      Spouse name: Nikita Workman   • Number of children: 3   • Years of education: College   Tobacco Use   • Smoking status: Former Smoker     Packs/day: 0.50     Years: 2.00     Pack years: 1.00     Types: Cigarettes     Start date:      Quit date:      Years since quittin.7   • Smokeless tobacco: Never Used   Vaping Use   • Vaping Use: Never used   Substance and Sexual Activity   • Alcohol use: Yes     Comment: RARE MONTHLY   • Drug use: No   • Sexual activity: Defer     Birth control/protection: None        Family History   Problem Relation Age of Onset   • Breast cancer Maternal Aunt         In her 60s   • Breast cancer Paternal Aunt    • Hyperlipidemia Mother    • Asthma Father    • Diabetes Father    • Hearing loss Father    • Heart disease Father    • Hypertension Father    • Thyroid cancer Father 64        Follicular Tyroid Cancer   • Alcohol abuse Sister    • Depression Sister    • Diabetes Maternal Grandmother    • Bone cancer Maternal Grandfather    • Breast cancer Cousin    • Malig Hyperthermia Neg Hx         Review of Systems   Constitutional: Negative for chills, fatigue, fever and unexpected weight change.   HENT: Negative for congestion, mouth sores, nosebleeds and trouble swallowing.    Respiratory: Negative for choking,  "chest tightness and shortness of breath.    Cardiovascular: Negative for chest pain.   Gastrointestinal: Negative for constipation, diarrhea, nausea and vomiting.   Genitourinary: Negative for difficulty urinating and pelvic pain.        Continued decreased libido   Musculoskeletal: Positive for arthralgias (hands and thumbs intermittently). Negative for back pain and gait problem.   Neurological: Negative for dizziness, numbness and headaches (Chronic migraine headaches).   Psychiatric/Behavioral: Positive for decreased concentration (Word finding issues). The patient is not nervous/anxious.         Objective     Vitals:    09/19/22 1038   BP: 123/80   Pulse: 68   Resp: 16   Temp: 97.7 °F (36.5 °C)   TempSrc: Temporal   SpO2: 97%   Weight: 61.3 kg (135 lb 1.6 oz)   Height: 157 cm (61.81\")   PainSc: 6  Comment: lower body   PainLoc: Generalized  Comment: lower body       Current Status 9/19/2022   ECOG score 2       Physical Exam    GENERAL:  Well-developed, well-nourished in no acute distress.   SKIN:  Warm, dry without rashes, purpura or petechiae.  EYES:  Pupils equal, round and reactive to light.  EOMs intact.  Conjunctivae normal.  EARS:  Hearing intact.  NOSE:  Septum midline.  No excoriations or nasal discharge.  MOUTH:  Tongue is well-papillated; no stomatitis or ulcers.  Lips normal.  THROAT:  Oropharynx without lesions or exudates.  NECK:  Supple with good range of motion; no thyromegaly or masses, no JVD.  LYMPHATICS:  No cervical, supraclavicular, axillary adenopathy.  CHEST:  Lungs clear to auscultation. Good airflow.  BREASTS: Right breast is benign left  shows a lumpectomy scar with minimal scarring at   the lumpectomy site, otherwise soft, no nodules, no skin changes, no nipple discharge.  Left breast and axilla soft, no nodules, no skin changes, no nipple discharge.  Right axilla is tender to palpation no definite adenopathy  CARDIAC:  Regular rate and rhythm without murmurs, rubs or gallops. Normal " "S1,S2.  ABDOMEN:  Soft, minimal left lower quadrant tenderness without mass - with no hepatosplenomegaly .  EXTREMITIES:  No clubbing, cyanosis or edema.  NEUROLOGICAL:  Cranial Nerves II-XII grossly intact.  Foot drop bilaterally left greater than right and atrophy of calf muscles   PSYCHIATRIC:  Normal affect and mood.    I have reexamined the patient and the results are consistent with the previously documented exam. Dick Gutierrez MD         RECENT LABS:  Hematology WBC   Date Value Ref Range Status   09/19/2022 9.03 3.40 - 10.80 10*3/mm3 Final     RBC   Date Value Ref Range Status   09/19/2022 4.54 3.77 - 5.28 10*6/mm3 Final     Hemoglobin   Date Value Ref Range Status   09/19/2022 12.3 12.0 - 15.9 g/dL Final     Hematocrit   Date Value Ref Range Status   09/19/2022 39.4 34.0 - 46.6 % Final     Platelets   Date Value Ref Range Status   09/19/2022 306 140 - 450 10*3/mm3 Final         IMPRESSION:  1. Biopsy-proven malignancy in the left breast at the 11:30 position  measuring on the order of 2.1 cm in greatest dimension. No other  suspicious findings are seen within the left breast and there is no  evidence for left axillary adenopathy.  2. There are no findings suspicious for malignancy in the right breast.     BI-RADS Category 6: Known biopsy-proven malignancy.     4/11/18  1. \"LEFT BREAST MASS,\" WIRE GUIDED LUMPECTOMY:             INVASIVE DUCTAL CARCINOMA, INTERMEDIATE GRADE, 8 MM, MARGINS CLEAR.             CLOSEST MARGIN TO INVASIVE TUMOR: 1.5 MM (INFERIOR).             ASSOCIATED HIGH GRADE DUCTAL CARCINOMA IN SITU SPANNING APPROXIMATELY 16 MM (FOUR                    BLOCKS x 4 MM PER BLOCK).             CLOSEST MARGIN TO DUCTAL CARCINOMA IN SITU: 0.5 MM (LATERAL AND MEDIAL MARGINS).             HORMONE RECEPTORS REPORTED ON PRIOR BIOPSY, [facility] [id number]. REPEAT/                   CONFIRMATORY STUDIES ARE AVAILABLE UPON REQUEST.                         BIOPSY SITE CHANGES.     NOTE: " "ADDITIONAL MARGINS SUBMITTED AS PARTS 2, 3, AND 4.        2. \"LEFT BREAST NEW ANTERIOR MARGIN\":              BENIGN FIBROADIPOSE TISSUE.              NEW MARGIN - NO ATYPIA.     3. \"LEFT BREAST NEW SUPERIOR MARGIN\":             BENIGN BREAST TISSUE.             NEW MARGIN - NO ATYPIA.     4. \"LEFT BREAST NEW LATERAL MARGIN\":             BENIGN BREAST TISSUE.             NEW MARGIN - NO ATYPIA.     5. \"SENTINEL NODE #1\":             LYMPH NODE, MULTIPLE STEP SECTIONS: NO METASTATIC CARCINOMA SEEN.     6. \"SENTINEL NODE #2\":             LYMPH NODE, MULTIPLE STEP SECTIONS: NO METASTATIC CARCINOMA SEEN.     [facility]/ IHC/a/CMK                                Electronically signed by Darin Claire MD on 4/12/2018 at 1511   Synoptic Checklist   INVASIVE CARCINOMA OF THE BREAST   Breast Invasive - All Specimens   SPECIMEN   Procedure  Excision (less than total mastectomy)   Specimen Laterality  Left   TUMOR   Histologic Type  Invasive carcinoma of no special type (ductal, not otherwise specified)   Histologic Grade ([place] Histologic Score)     Glandular (Acinar) / Tubular Differentiation  Score 3 (< 10% of tumor area forming glandular / tubular structures)   Nuclear Pleomorphism  Score 2 (Cells larger than normal with open vesicular nuclei, visible nucleoli, and moderate variability in both size and shape)   Mitotic Rate  Score 1 (<=3 mitoses per mm2)   Overall Grade  Grade 2 (scores of 6 or 7)   Tumor Size  Greatest dimension of largest invasive focus > 1 mm (indicate exact measurement in Millimeters):: 8 mm   Tumor Focality  Single focus of invasive carcinoma   Ductal Carcinoma In Situ (DCIS)  DCIS is present in specimen   Ductal Carcinoma In Situ (DCIS)  Positive for EIC   Size (Extent) of DCIS  Estimated size (extent) of DCIS (greatest dimension using gross and microscopic evaluation) in Millimeters (mm) is at least: 16  mm   Nuclear Grade  Grade III (high)   Lymphovascular Invasion  Not identified   Treatment " Effect  No known presurgical therapy   MARGINS   Invasive Carcinoma Margins  Uninvolved by invasive carcinoma   Distance from Closest Margin in Millimeters (mm)  Specify distance: 1.5 mm   Closest Margin  Inferior   DCIS Margins  Uninvolved by DCIS (DCIS present in specimen)   Distance of DCIS from Closest Margin in Millimeters (mm)  Specify distance: 0.5 mm   Closest Margin  Medial     Lateral   LYMPH NODES   Regional Lymph Nodes     Number of Lymph Nodes with Macrometastases (> 2 mm)  Specify number: 0   Number of Lymph Nodes with Micrometastases (> 0.2 mm to 2 mm and / or > 200 cells)  Specify number: 0   Number of Lymph Nodes with Isolated Tumor Cells (<= 0.2 mm and <= 200 cells)  Specify number: 0   Number of Lymph Nodes Examined  Specify number: 2   Number of Ideal Nodes Examined  Specify number: 2   PATHOLOGIC STAGE CLASSIFICATION (pTNM)   Primary Tumor (Invasive Carcinoma) (pT)  pT1c: Tumor > 10 mm but <= 20 mm in greatest dimension   Modifier  (sn): Only sentinel node(s) evaluated. If 6 or more nodes (sentinel or nonsentinel) are removed, this modifier should not be used.   Category (pN)  pN0: No regional lymph node metastasis identified or ITCs only   .        COMPLETE PELVIC SONOGRAM   FINDINGS: Transabdominal and transvaginal pelvic sonography was  performed. The uterus is anteflexed and measures 12 x 5 x 7 cm. The  endometrial bilayer measures 0.9 cm in thickness. Scattered  heterogeneity is seen throughout the uterus. Within the posterior mid  uterine body there is a 1.9 x 1.4 x 1.3 cm complex hypoechoic lesion. In  the mid uterine body located in the anterior aspect of the uterus there  is a 1.6 x 1.4 x 1.9 cm hypoechoic lesion.     The right ovary measures 4.2 x 1.9 x 3.2 cm and the left ovary measures  3.4 x 1.9 x 1.4 cm. Normal intraovarian venous vascularity is seen in  both ovaries. Normal follicles are seen within both ovaries. No free  fluid is seen within the pelvic cul-de-sac.      IMPRESSION:  1. Enlarged uterus with 2 focal myometrial lesions measuring on the  order of 1.9 cm in greatest dimension. The sonographic features suggest  adenomyosis, possibly with focal adenomyomas versus fibroids. It is  difficult to differentiate an adenomyoma versus a fibroid, but I believe  adenomyosis is likely present given the enlarged heterogenous appearance  of the uterus. If imaging differentiation is desired it can be performed  with a pelvic MRI without and with contrast.  2. Normal sonographic appearance of both ovaries.     This report was finalized on 9/21/2018     Interpretation Summary 11/18            · Normal bilateral lower extremity venous duplex scan.                     Assessment & Plan   1.[id number] HER-2 negative grade 2 left breast cancer post lumpectomy and sentinel node biopsy-with close margins from high-grade DCIS medially-Oncotype score 16.  Tamoxifen initiated 7/2018  · 1 month break from tamoxifen to see if her memory issues improve and then resume  · Doing well on tamoxifen in 3/22-becoming perimenopausal    2.  Interstitial cystitis undergoing instillation of lidocaine/heparin in  the bladder intermittently.  Pelvic discomfort approximately 10 days prior to menses currently.  Ovarian cyst found by gynecology as possible culprit versus interstitial cystitis potential flare prior to menses.    3.  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 Worsening dysphagia to solids-ENT exam negative symptoms improved with Lexapro-but persistent as of 5/20's referred to GI  · Ama to be more anxiety than mechanical issues    6.  Depression.  Patient on Lexapro, initiated during the time of family deaths and recovery from her breast surgery.  Patient feels she has improved would like to wean off the medication  · Patient  weaning off Lexapro and 9/21.    7.  Cord compression from a disc at T10-slow rehab underway    Plan  1.continue tamoxifen 20 mg daily   2.  Follow-up with Dr. Gutierrez in 6 months,   3.  Mammogram and DEXA to be completed around April 1, 2023  4.  Breast cancer index to see if we can stop next year with her tamoxifen    Discussed the implications and data regarding breast cancer index

## 2022-09-21 ENCOUNTER — APPOINTMENT (OUTPATIENT)
Dept: WOUND CARE | Facility: HOSPITAL | Age: 52
End: 2022-09-21

## 2022-09-21 PROCEDURE — G0463 HOSPITAL OUTPT CLINIC VISIT: HCPCS

## 2022-10-04 ENCOUNTER — TELEPHONE (OUTPATIENT)
Dept: NEUROSURGERY | Facility: CLINIC | Age: 52
End: 2022-10-04

## 2022-10-04 NOTE — TELEPHONE ENCOUNTER
Caller: Cesia Workman    Relationship: Self    Best call back number: [phone]    What orders are you requesting (i.e. lab or imaging): MRI CSPINE (IF ADVISED)    In what timeframe would the patient need to come in: PATIENT IS SCHEDULED FOR F/U 10/20/22    Where will you receive your lab/imaging services:     Additional notes: PATIENT STATES THAT SHE WOULD LIKE TO KNOW WHETHER THERE ARE ANY OTHER ISSUES WITH HER SPINE, AND WOULD LIKE TO KNOW IF DR PICHARDO IS ABLE TO ORDER CSPINE MRI SINCE THIS WAS NOT DONE IN MAY PRIOR TO HER SURGERY. SHE WILL BE GETTING HER XRAYS FOR UPCOMING APPT TOMORROW. PLEASE CALL PATIENT TO ADVISE.

## 2022-10-05 ENCOUNTER — HOSPITAL ENCOUNTER (OUTPATIENT)
Dept: GENERAL RADIOLOGY | Facility: HOSPITAL | Age: 52
Discharge: HOME | End: 2022-10-05

## 2022-10-05 ENCOUNTER — APPOINTMENT (OUTPATIENT)
Dept: WOUND CARE | Facility: HOSPITAL | Age: 52
End: 2022-10-05

## 2022-10-05 DIAGNOSIS — R29.898 WEAKNESS OF BOTH LOWER EXTREMITIES: ICD-10-CM

## 2022-10-05 PROCEDURE — 72070 X-RAY EXAM THORAC SPINE 2VWS: CPT

## 2022-10-05 PROCEDURE — G0463 HOSPITAL OUTPT CLINIC VISIT: HCPCS

## 2022-10-07 NOTE — TELEPHONE ENCOUNTER
Patient is aware we will discuss this at her next visit. I told her she would need to be evaluated as we would need to submit that to her insurance. She verbalized understanding and will discuss it at her next visit.

## 2022-10-19 NOTE — PROGRESS NOTES
Neurosurgical Consultation      Cesia Workman is a 52 y.o. female is here today for follow-up. In the office today patient reports some moderate neck pain with numbness and tingling in bilateral hands.    Chief Complaint   Patient presents with   • Post-op     Follow op        Previous treatment:    HPI: This is a 52-year-old woman with a history of a T10-T11 acute disc herniation with Becky a spinal cord injury.  She was taken emergently for a thoracic transpedicular discectomy and fusion from T10-T12.  This occurred on May 8, 2022.  She returns to me slightly more than 5 months removed from surgical intervention.  She is continuing with physical therapy 3 days a week.  She has continued to make slow progressive improvements.  She is ambulatory with a walker for short distances including 50 to 100 feet.  She does have some balance issues that make it hard to be without the walker.  She does have subtle sensory issues residual in her legs.  She does have continued bowel and bladder dysfunction.  Her incision has at this juncture now fully healed.    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 Lashaun Gamble, [facility]   • BREAST BIOPSY Left 02/12/2018    INCOMPLETE - Biopsy could not be completed as patient had severe vasovagal reaction & subsequent syncope-[facility]    • BREAST BIOPSY Left 5/23/2018    Procedure: REEXCISION OF POSITIVE LUMPECTOMY MARGIN LEFT BREAST;  Surgeon: Adrián Pereyra MD;  Location: [facility];  Service: General   • BREAST LUMPECTOMY WITH SENTINEL NODE BIOPSY Left 4/11/2018    Procedure: BREAST LUMPECTOMY WITH SENTINEL NODE BIOPSY AND NEEDLE LOCALIZATION;  Surgeon: Adrián Pereyra MD;  Location: [facility] OR;  Service: General   • THORACIC DECOMPRESSION POSTERIOR FUSION WITH INSTRUMENTATION Left 5/8/2022    Procedure: T10-T12 BILATERAL POSTERIOR LATERAL FUSION WITH T11 LEFT SIDED TRANSPEDICULAR DISCECTOMY FOR SPINAL CANAL DECOMPRESSION;  Surgeon: Brady Vega MD;  Location: [facility];  Service: Neurosurgery;  Laterality: Left;        Current Outpatient Medications on File Prior to Visit   Medication Sig Dispense Refill   • acetaminophen (TYLENOL) 325 MG tablet Take 2 tablets by mouth Every 4 (Four) Hours As Needed for Mild Pain .     • baclofen (LIORESAL) 20 MG tablet Take 1 tablet QAM, 1 tablet midday, 1.5 tablets QHS     • bisacodyl (DULCOLAX) 10 MG suppository Insert 1 suppository into the rectum Every Other Day.     • hydrOXYzine (ATARAX) 25 MG tablet Take 1 tablet by mouth 3 (Three) Times a Day As Needed for Itching or Anxiety.     • tamoxifen (NOLVADEX) 20 MG chemo tablet TAKE 1 TABLET BY MOUTH EVERY DAY 90 tablet 3   • dantrolene (DANTRIUM) 50 MG capsule Take 50 mg by mouth 3 (Three) Times a Day.     • gabapentin (NEURONTIN) 100 MG capsule Take 200 mg by mouth 3 (Three) Times a Day.     • [DISCONTINUED] collagenase (Santyl) 250 UNIT/GM ointment        No current facility-administered medications on file prior to visit.        No Known Allergies     Social History     Socioeconomic History   • Marital status:      Spouse name: Nikita Workman   • Number of children: 3   • Years of education: College   Tobacco Use   • Smoking status: Former     Packs/day: 0.50     Years: 2.00     Pack years: 1.00     Types: Cigarettes      "Start date:      Quit date:      Years since quittin.8   • Smokeless tobacco: Never   Vaping Use   • Vaping Use: Never used   Substance and Sexual Activity   • Alcohol use: Yes     Comment: RARE MONTHLY   • Drug use: No   • Sexual activity: Defer     Birth control/protection: None          Review of Systems   Constitutional: Positive for activity change and fatigue.   HENT: Positive for trouble swallowing.    Eyes: Positive for visual disturbance.   Respiratory: Negative.    Cardiovascular: Positive for leg swelling.   Gastrointestinal: Positive for constipation and diarrhea.   Endocrine: Positive for cold intolerance and heat intolerance.   Genitourinary: Positive for difficulty urinating.   Musculoskeletal: Positive for back pain, myalgias and neck pain.   Allergic/Immunologic: Negative.    Neurological: Positive for numbness (bilateral hands and lower extremities).   Hematological: Negative.    Psychiatric/Behavioral: Positive for stress.        Physical Examination:     Vitals:    10/20/22 1112   BP: 123/83   Pulse: 74   SpO2: 99%   Weight: 61.2 kg (135 lb)   Height: 157 cm (61.81\")        Physical Exam  Eyes:      General: Lids are normal.      Extraocular Movements: Extraocular movements intact.      Pupils: Pupils are equal, round, and reactive to light.   Psychiatric:         Speech: Speech normal.          Neurological Exam  Mental Status  Awake, alert and oriented to person, place and time. Speech is normal. Language is fluent with no aphasia. Attention and concentration are normal.    Cranial Nerves  CN II: Visual acuity is normal. Visual fields full to confrontation.  CN III, IV, VI: Extraocular movements intact bilaterally. Normal lids and orbits bilaterally. Pupils equal round and reactive to light bilaterally.  CN V: Facial sensation is normal.  CN VII: Full and symmetric facial movement.  CN IX, X: Palate elevates symmetrically. Normal gag reflex.  CN XI: Shoulder shrug strength is " normal.  CN XII: Tongue midline without atrophy or fasciculations.    Motor  Normal muscle bulk throughout. Strength is 5/5 in all four extremities except as noted.  On cursory examination her lower extremity strength is at least 4 out of 5 diffusely..    Sensory  Right: There is a sensory level at T12.  Left: There is a sensory level at T12.    Gait    Able to rise from her wheelchair without significant difficulty..       Result Review  The following data was reviewed by: Brady Vega MD on 10/20/2022:    Data reviewed: Radiologic studies X-rays of the thoracic spine show stable hardware placement without any signs of hardware complications or acute pathology.     Assessment/plan:  This is a 52-year-old woman with acute spinal cord injury after a thoracic disc herniation.  She went emergently for a transpedicular discectomy and fusion approximately 5 months ago.  She continues to improve neurologically.  She is ambulatory with an assistive device.  She does have persistent bowel and bladder issues.  Her incision is well-healed at this juncture.  I recommend she continue to aggressively work with physical therapy and her PM&R physician.  She will follow-up with me in approximately 3 months in [place] to continue clinical evaluation.  I have encouraged her to call with any questions or concerns.  With respect to her intermittent neck pain and arm numbness and tingling we will defer any imaging of her neck at this juncture.    Diagnoses and all orders for this visit:    1. Thoracic spinal cord injury, subsequent encounter (HCC) (Primary)         Return in about 3 months (around 1/20/2023).            Brady Vega MD

## 2022-10-20 ENCOUNTER — TELEPHONE (OUTPATIENT)
Dept: NEUROSURGERY | Facility: CLINIC | Age: 52
End: 2022-10-20

## 2022-10-20 ENCOUNTER — OFFICE VISIT (OUTPATIENT)
Dept: NEUROSURGERY | Facility: CLINIC | Age: 52
End: 2022-10-20

## 2022-10-20 VITALS
HEIGHT: 62 IN | HEART RATE: 74 BPM | OXYGEN SATURATION: 99 % | DIASTOLIC BLOOD PRESSURE: 83 MMHG | BODY MASS INDEX: 24.84 KG/M2 | SYSTOLIC BLOOD PRESSURE: 123 MMHG | WEIGHT: 135 LBS

## 2022-10-20 DIAGNOSIS — S24.109D THORACIC SPINAL CORD INJURY, SUBSEQUENT ENCOUNTER: Primary | ICD-10-CM

## 2022-10-20 PROCEDURE — 99214 OFFICE O/P EST MOD 30 MIN: CPT | Performed by: NEUROLOGICAL SURGERY

## 2022-10-20 NOTE — TELEPHONE ENCOUNTER
[facility]    [place] History and Physical    Date of Admission:  2018  8:32 AM    Primary Care Physician   Primary care provider: Lex Hernandez    Assessment & Plan   Baby1 Palak Skelton is a Term  appropriate for gestational age female  , doing well.   -Normal  care  -Anticipatory guidance given  -Encourage exclusive breastfeeding  -Anticipate follow-up with Dr. Hernandez after discharge 18,   -Hearing screen and first hepatitis B vaccine prior to discharge per orders  -GBS Positive with less than 4 hours antibiotic so will watch for the full 2 days and plan discharge on 18.  -meconium to be collected due to history of marijuana use.  Urine drug screens negative at time of delivery and yesterday  -Mom positive for genital HSV and on suppressive acyclovir    Sophia Figueroa    Pregnancy History   The details of the mother's pregnancy are as follows:  OBSTETRIC HISTORY:  Information for the patient's mother:  Maliha Skeltonfany ELIZABETH [[id number]]   23 year old    EDC:   Information for the patient's mother:  Nafisa Palak ELIZABETH [[id number]]   Estimated Date of Delivery: 3/6/18    Information for the patient's mother:  Nafisa Palak ELIZABETH [[id number]]     Obstetric History       T2      L2     SAB0   TAB0   Ectopic0   Multiple0   Live Births2       # Outcome Date GA Lbr Erick/2nd Weight Sex Delivery Anes PTL Lv   2 Term 18 40w3d 03:52 / 00:10 4.005 kg (8 lb 13.3 oz) F Vag-Spont Spinal N SHARMIN      Name: LAURA SKELTON      Complications: GBS      Apgar1:  8                Apgar5: 9   1 Term 16 40w5d 23:21 / 00:31 4.006 kg (8 lb 13.3 oz) M Vag-Spont  N SHARMIN      Apgar1:  8                Apgar5: 9          Prenatal Labs: Information for the patient's mother:  Palak Skelton [[id number]]     Lab Results   Component Value Date    ABO O 2018    RH Pos 2018    AS Neg 2018    HEPBANG Nonreactive 2017    CHPCRT Negative  I called and spoke with Cesia and let her know that her appointment is scheduled through the [place] office and will check her in and take her copay overt the phone and be see in the [place] office.   08/23/2017    GCPCRT Negative 08/23/2017    TREPAB Negative 08/23/2017    HGB 14.3 2018    PATH  10/15/2015       Patient Name: PALAK KIM  MR#: [id number]  Specimen #: [id number]  Collected: 10/15/2015  Received: 10/16/2015  Reported: 10/22/2015 08:42  Ordering Phy(s): ORA ARIAS    SPECIMEN/STAIN PROCESS:  Pap thin layer prep screening (Surepath)       Pap-Cyto x 1, Reflex HPV if ASCUS/LSIL x 1    SOURCE: Cervical  ----------------------------------------------------------------   Pap thin layer prep screening (Surepath)  SPECIMEN ADEQUACY:  Satisfactory for evaluation.  -Transformation zone component absent.    CYTOLOGIC INTERPRETATION:    Negative for Intraepithelial Lesion or Malignancy         -Moderate inflammation present.    Electronically signed out by:  Antoine Almeida    Processed and screened at [facility]    CLINICAL HISTORY:  LMP: 7/28/15  Pregnant, Previous normal pap: 8/14,    Papanicolaou Test Limitations:  Cervical cytology is a screening test  with limited sensitivity; regular screening is critical for cancer  prevention; Pap tests are primarily effective for the  diagnosis/prevention of squamous cell carcinoma, not adenocarcinomas or  other cancers.    TESTING LAB LOCATION:  [place] [address]  [phone]    COLLECTION SITE:  Client:  [facility]  Location: [facility]         Prenatal Ultrasound:  Information for the patient's mother:  Palak Kim [[id number]]     Results for orders placed or performed during the hospital encounter of 11/20/17   US OB Single Follow Up Repeat    Narrative    OB ULTRASOUND REPORT     Clinical:  Anatomy screen  Gestation Number:  1  Presentation:    Breech  Lie:    Oblique  Cardiac Activity:   Regular  BPM:  150  Movement:  Yes  Placenta:   Posterior      Previa:  No Previa  Adnexa:    Not Visualized  Cervix:    5.8 cm  in length  Amniotic Fluid:    Normal  ENMA:  14.33 cm    Measurements:    BPD       HC       AC       FL       Estimated Fetal Weight      grams  HC/AC       US age (Current US)       Gestational Age by LMP       US EDC (First Study)    US EDC (Current Study)                %WT for EGA  (Based on First Study)      %      Structural Survey:    Head  Unremarkable  Spine  Unremarkable  Stomach  Unremarkable  Kidneys  Unremarkable  Bladder  Unremarkable  3 Vessel Cord  Unremarkable  Cord Insertion  Unremarkable  4 Chamber Heart  Unremarkable  Ant. Abd Wall  Unremarkable  Diaphragm  Unremarkable  Situs  Unremarkable        Impression    Impression:    1.   Single living intrauterine pregnancy is currently in the breech  position. The profile of the head, four-chamber heart and outflow  tracts are better characterize and are unremarkable.    2.  Breech position.        BIRD ROBERTSON MD       GBS Status:   Information for the patient's mother:  Palak Skelton [[id number]]     Lab Results   Component Value Date    GBS Positive (A) 2018     Positive -   5 hours since Vancomycin given. No maternal fever or prolonged rupture of membranes.    Maternal History    Information for the patient's mother:  Palak Skelton [[id number]]     Past Medical History:   Diagnosis Date     Asthma 10/24/2011     Recurrent genital herpes 10/24/2011     Tobacco abuse    ,   Information for the patient's mother:  Palak Skelton [[id number]]     Patient Active Problem List   Diagnosis     Recurrent genital herpes     Tobacco abuse     Seasonal allergic rhinitis     Multiple body piercings     Marijuana use     ACP (advance care planning)     Encounter for supervision of normal pregnancy in multigravida, antepartum     Group B Streptococcus carrier state affecting pregnancy     Encounter for triage in pregnant patient     Normal labor      (spontaneous vaginal delivery)    and   Information for the patient's mother:   "Palak Skelton [[id number]]     Prescriptions Prior to Admission   Medication Sig Dispense Refill Last Dose     Prenatal Vit-Fe Fumarate-FA (PRENATAL VITAMINS) 28-0.8 MG TABS Take 1 tablet by mouth daily 90 tablet 2 2018 at 0800     acyclovir (ZOVIRAX) 400 MG tablet Take 1 tablet (400 mg) by mouth 2 times daily 180 tablet 3 2018 at 2000       Medications given to Mother since admit:  Information for the patient's mother:  Palak Skelton [[id number]]     No current outpatient prescriptions on file.       Family History - [place]   Information for the patient's mother:  Palak Skelton [[id number]]     Unremarkable    Social History - [place]   Information for the patient's mother:  NafisaPalak [[id number]]     Social History     Social History     Marital status: Single     Spouse name: N/A     Number of children: N/A     Years of education: N/A     Occupational History     CNA Unemployed     Social History Main Topics     Smoking status: Former Smoker     Packs/day: 1.00     Types: Cigarettes     Smokeless tobacco: Never Used     Alcohol use No     Drug use: No     Sexual activity: Yes     Birth control/ protection: Pill     Other Topics Concern     Parent/Sibling W/ Cabg, Mi Or Angioplasty Before 65f 55m? No     Caffeine Concern No     Social History Narrative       Birth History   Infant Resuscitation Needed: no    [place] Birth Information  Birth History     Birth     Length: 0.508 m (1' 8\")     Weight: 4.005 kg (8 lb 13.3 oz)     HC 35.6 cm (14\")     Apgar     One: 8     Five: 9     Delivery Method: Vaginal, Spontaneous Delivery     Gestation Age: 40 3/7 wks       I  was not present during birth.    Immunization History   Immunization History   Administered Date(s) Administered     Hep B, Peds or Adolescent 2018        Physical Exam   Vital Signs:  Patient Vitals for the past 24 hrs:   Temp Temp src Heart Rate Resp Height Weight   18 0935 98.8  F (37.1  C) Axillary 148 " "48 - -   1850 98.3  F (36.8  C) Axillary 162 52 - -   18 0840 - - 152 48 - -   1832 - - - - 0.508 m (1' 8\") 4.005 kg (8 lb 13.3 oz)     [place] Measurements:  Weight: 8 lb 13.3 oz (4005 g)    Length: 20\"    Head circumference: 35.6 cm      General:  alert and normally responsive  Skin: has 3 tiny scratches at top of head, no signs of edema redness or infection.   Head/Neck  normal anterior and posterior fontanelle, intact scalp; Neck without masses.  Eyes  normal red reflex  Ears/Nose/Mouth:  intact canals, patent nares, mouth normal (has tighter upper lip frenulum)  Thorax:  normal contour, clavicles intact  Lungs:  clear, no retractions, no increased work of breathing  Heart:  normal rate, rhythm.  No murmurs.  Normal femoral pulses.  Abdomen  soft without mass, tenderness, organomegaly, hernia.  Umbilicus normal.  Genitalia:  normal female external genitalia  Anus:  patent  Trunk/Spine  straight, intact  Musculoskeletal:  Normal Martinez and Ortolani maneuvers.  intact without deformity.  Normal digits.  Neurologic:  normal, symmetric tone and strength.  normal reflexes.    Data    No results found for this or any previous visit (from the past 24 hour(s)).  "

## 2022-11-01 LAB
CYTO UR: NORMAL
LAB AP CASE REPORT: NORMAL
LAB AP CLINICAL INFORMATION: NORMAL
LAB AP INTRADEPARTMENTAL CONSULT: NORMAL
LAB AP SYNOPTIC CHECKLIST: NORMAL
Lab: NORMAL
Lab: NORMAL
PATH REPORT.ADDENDUM SPEC: NORMAL
PATH REPORT.FINAL DX SPEC: NORMAL
PATH REPORT.GROSS SPEC: NORMAL

## 2022-11-08 ENCOUNTER — TELEPHONE (OUTPATIENT)
Dept: ONCOLOGY | Facility: CLINIC | Age: 52
End: 2022-11-08

## 2022-11-08 NOTE — TELEPHONE ENCOUNTER
Caller: Ajit Workman    Relationship: Self    Best call back number: [phone]    What is the best time to reach you: ANYTIME     EXCEPT TOMORROW BETWEEN 2:30-4PM     IF UNABLE TO REACH PLEASE LEAVE A DETAILED VOICEMAIL     Who are you requesting to speak with (clinical staff, provider,  specific staff member): DR BRADLEY OR HER NURSE        What was the call regarding:     LAST SEEN DR BRADLEY 09/19 AND SHE WAS SENDING HER TUMOR OFF TO A LAB TO GET TESTED , TO SEE IF CAN COME OFF THE MEDICATION TAMOXIFEN,     THE LAB HAS CONTACTED [facility] FOR FINANCIAL ASSISTANCE, DUE TO INSURANCE NOT COVERING AND IS OUT OF NETWORK.    IS WONDERING IF HAS HAD THIS TEST DONE ONCE BEFORE AND HAD GOTTEN RESULT ?    AND IF ITS NECESSARY TO DO THIS CURRENT TEST THAT  WAS SENT OUT FOR?     Do you require a callback: YES TO DISCUSS

## 2022-11-09 NOTE — TELEPHONE ENCOUNTER
Returned pt's call and discussed the breast cancer index testing that was ordered. It has been cancelled as the lab is out of network for her and this is a costly test. Pt thought a similar test had already been done a few years ago, however I do not see anything other than her oncotype on her biopsy in 2018. Pt v/u. Let her know I would be in contact with the representative from [employer] re: any updates or changes to the financial assistance programs or insurance coverage for this in the future.

## 2022-11-10 RX ORDER — TAMOXIFEN CITRATE 20 MG/1
20 TABLET ORAL DAILY
Qty: 90 TABLET | Refills: 3 | Status: SHIPPED | OUTPATIENT
Start: 2022-11-10

## 2022-11-10 NOTE — TELEPHONE ENCOUNTER
Caller: AJIT    Relationship: SELF    Best call back number: [phone]    Requested Prescriptions:   Requested Prescriptions     Pending Prescriptions Disp Refills   • tamoxifen (NOLVADEX) 20 MG chemo tablet 90 tablet 3     Sig: Take 1 tablet by mouth Daily.        Pharmacy where request should be sent:    [facility] [address] [place] [address] [address] - [phone]  - [phone] FX  [phone]      Does the patient have less than a 3 day supply:  [] Yes  [x] No    Ángela Cobb   11/10/22 10:55 EST

## 2022-11-28 ENCOUNTER — TRANSCRIBE ORDERS (OUTPATIENT)
Dept: ADMINISTRATIVE | Facility: HOSPITAL | Age: 52
End: 2022-11-28

## 2022-11-28 ENCOUNTER — LAB (OUTPATIENT)
Dept: LAB | Facility: HOSPITAL | Age: 52
End: 2022-11-28

## 2022-11-28 DIAGNOSIS — N39.0 URINARY TRACT INFECTION WITHOUT HEMATURIA, SITE UNSPECIFIED: Primary | ICD-10-CM

## 2022-11-28 DIAGNOSIS — N39.0 URINARY TRACT INFECTION WITHOUT HEMATURIA, SITE UNSPECIFIED: ICD-10-CM

## 2022-11-28 LAB
BACTERIA UR QL AUTO: ABNORMAL /HPF
BILIRUB UR QL STRIP: NEGATIVE
CLARITY UR: CLEAR
COLOR UR: YELLOW
GLUCOSE UR STRIP-MCNC: ABNORMAL MG/DL
HGB UR QL STRIP.AUTO: NEGATIVE
HYALINE CASTS UR QL AUTO: ABNORMAL /LPF
KETONES UR QL STRIP: NEGATIVE
LEUKOCYTE ESTERASE UR QL STRIP.AUTO: ABNORMAL
NITRITE UR QL STRIP: NEGATIVE
PH UR STRIP.AUTO: 7 [PH] (ref 5–8)
PROT UR QL STRIP: NEGATIVE
RBC # UR STRIP: ABNORMAL /HPF
REF LAB TEST METHOD: ABNORMAL
SP GR UR STRIP: 1.01 (ref 1–1.03)
SQUAMOUS #/AREA URNS HPF: ABNORMAL /HPF
UROBILINOGEN UR QL STRIP: ABNORMAL
WBC # UR STRIP: ABNORMAL /HPF

## 2022-11-28 PROCEDURE — 87086 URINE CULTURE/COLONY COUNT: CPT

## 2022-11-28 PROCEDURE — 87077 CULTURE AEROBIC IDENTIFY: CPT

## 2022-11-28 PROCEDURE — 87186 SC STD MICRODIL/AGAR DIL: CPT

## 2022-11-28 PROCEDURE — 81001 URINALYSIS AUTO W/SCOPE: CPT

## 2022-11-29 ENCOUNTER — APPOINTMENT (OUTPATIENT)
Dept: CT IMAGING | Facility: HOSPITAL | Age: 52
End: 2022-11-29

## 2022-11-29 ENCOUNTER — HOSPITAL ENCOUNTER (EMERGENCY)
Facility: HOSPITAL | Age: 52
Discharge: HOME | End: 2022-11-29
Attending: EMERGENCY MEDICINE | Admitting: EMERGENCY MEDICINE

## 2022-11-29 VITALS
BODY MASS INDEX: 23.92 KG/M2 | HEIGHT: 62 IN | OXYGEN SATURATION: 95 % | SYSTOLIC BLOOD PRESSURE: 108 MMHG | RESPIRATION RATE: 16 BRPM | TEMPERATURE: 97.8 F | DIASTOLIC BLOOD PRESSURE: 54 MMHG | WEIGHT: 130 LBS | HEART RATE: 102 BPM

## 2022-11-29 DIAGNOSIS — M62.838 SPASM OF MUSCLE: ICD-10-CM

## 2022-11-29 DIAGNOSIS — R11.2 NAUSEA AND VOMITING, UNSPECIFIED VOMITING TYPE: Primary | ICD-10-CM

## 2022-11-29 LAB
ALBUMIN SERPL-MCNC: 4 G/DL (ref 3.5–5.2)
ALBUMIN/GLOB SERPL: 1.5 G/DL
ALP SERPL-CCNC: 37 U/L (ref 39–117)
ALT SERPL W P-5'-P-CCNC: 12 U/L (ref 1–33)
ANION GAP SERPL CALCULATED.3IONS-SCNC: 13.8 MMOL/L (ref 5–15)
AST SERPL-CCNC: 16 U/L (ref 1–32)
BACTERIA UR QL AUTO: ABNORMAL /HPF
BASOPHILS # BLD AUTO: 0.04 10*3/MM3 (ref 0–0.2)
BASOPHILS NFR BLD AUTO: 0.4 % (ref 0–1.5)
BILIRUB SERPL-MCNC: 0.2 MG/DL (ref 0–1.2)
BILIRUB UR QL STRIP: NEGATIVE
BUN SERPL-MCNC: 17 MG/DL (ref 6–20)
BUN/CREAT SERPL: 32.1 (ref 7–25)
CALCIUM SPEC-SCNC: 9.5 MG/DL (ref 8.6–10.5)
CHLORIDE SERPL-SCNC: 104 MMOL/L (ref 98–107)
CLARITY UR: CLEAR
CO2 SERPL-SCNC: 19.2 MMOL/L (ref 22–29)
COLOR UR: YELLOW
CREAT SERPL-MCNC: 0.53 MG/DL (ref 0.57–1)
DEPRECATED RDW RBC AUTO: 46.7 FL (ref 37–54)
EGFRCR SERPLBLD CKD-EPI 2021: 112.1 ML/MIN/1.73
EOSINOPHIL # BLD AUTO: 0.03 10*3/MM3 (ref 0–0.4)
EOSINOPHIL NFR BLD AUTO: 0.3 % (ref 0.3–6.2)
ERYTHROCYTE [DISTWIDTH] IN BLOOD BY AUTOMATED COUNT: 14.2 % (ref 12.3–15.4)
FLUAV RNA RESP QL NAA+PROBE: NOT DETECTED
FLUBV RNA RESP QL NAA+PROBE: NOT DETECTED
GLOBULIN UR ELPH-MCNC: 2.6 GM/DL
GLUCOSE SERPL-MCNC: 181 MG/DL (ref 65–99)
GLUCOSE UR STRIP-MCNC: ABNORMAL MG/DL
HCT VFR BLD AUTO: 35.8 % (ref 34–46.6)
HGB BLD-MCNC: 11.4 G/DL (ref 12–15.9)
HGB UR QL STRIP.AUTO: ABNORMAL
HYALINE CASTS UR QL AUTO: ABNORMAL /LPF
IMM GRANULOCYTES # BLD AUTO: 0.04 10*3/MM3 (ref 0–0.05)
IMM GRANULOCYTES NFR BLD AUTO: 0.4 % (ref 0–0.5)
KETONES UR QL STRIP: ABNORMAL
LEUKOCYTE ESTERASE UR QL STRIP.AUTO: NEGATIVE
LIPASE SERPL-CCNC: 57 U/L (ref 13–60)
LYMPHOCYTES # BLD AUTO: 1.68 10*3/MM3 (ref 0.7–3.1)
LYMPHOCYTES NFR BLD AUTO: 15.5 % (ref 19.6–45.3)
MCH RBC QN AUTO: 28.4 PG (ref 26.6–33)
MCHC RBC AUTO-ENTMCNC: 31.8 G/DL (ref 31.5–35.7)
MCV RBC AUTO: 89.1 FL (ref 79–97)
MONOCYTES # BLD AUTO: 0.24 10*3/MM3 (ref 0.1–0.9)
MONOCYTES NFR BLD AUTO: 2.2 % (ref 5–12)
NEUTROPHILS NFR BLD AUTO: 8.82 10*3/MM3 (ref 1.7–7)
NEUTROPHILS NFR BLD AUTO: 81.2 % (ref 42.7–76)
NITRITE UR QL STRIP: NEGATIVE
PH UR STRIP.AUTO: 7 [PH] (ref 4.5–8)
PLATELET # BLD AUTO: 301 10*3/MM3 (ref 140–450)
PMV BLD AUTO: 10.5 FL (ref 6–12)
POTASSIUM SERPL-SCNC: 3.5 MMOL/L (ref 3.5–5.2)
PROT SERPL-MCNC: 6.6 G/DL (ref 6–8.5)
PROT UR QL STRIP: ABNORMAL
RBC # BLD AUTO: 4.02 10*6/MM3 (ref 3.77–5.28)
RBC # UR STRIP: ABNORMAL /HPF
REF LAB TEST METHOD: ABNORMAL
SARS-COV-2 RNA RESP QL NAA+PROBE: NOT DETECTED
SODIUM SERPL-SCNC: 137 MMOL/L (ref 136–145)
SP GR UR STRIP: 1.03 (ref 1–1.03)
SQUAMOUS #/AREA URNS HPF: ABNORMAL /HPF
UROBILINOGEN UR QL STRIP: ABNORMAL
WBC # UR STRIP: ABNORMAL /HPF
WBC NRBC COR # BLD: 10.85 10*3/MM3 (ref 3.4–10.8)

## 2022-11-29 PROCEDURE — 87636 SARSCOV2 & INF A&B AMP PRB: CPT | Performed by: EMERGENCY MEDICINE

## 2022-11-29 PROCEDURE — 96375 TX/PRO/DX INJ NEW DRUG ADDON: CPT

## 2022-11-29 PROCEDURE — 0 IOPAMIDOL PER 1 ML: Performed by: EMERGENCY MEDICINE

## 2022-11-29 PROCEDURE — 96374 THER/PROPH/DIAG INJ IV PUSH: CPT

## 2022-11-29 PROCEDURE — 83690 ASSAY OF LIPASE: CPT | Performed by: EMERGENCY MEDICINE

## 2022-11-29 PROCEDURE — 81001 URINALYSIS AUTO W/SCOPE: CPT | Performed by: EMERGENCY MEDICINE

## 2022-11-29 PROCEDURE — 80053 COMPREHEN METABOLIC PANEL: CPT | Performed by: EMERGENCY MEDICINE

## 2022-11-29 PROCEDURE — 25010000002 MORPHINE PER 10 MG: Performed by: EMERGENCY MEDICINE

## 2022-11-29 PROCEDURE — P9612 CATHETERIZE FOR URINE SPEC: HCPCS

## 2022-11-29 PROCEDURE — 25010000002 DIAZEPAM PER 5 MG: Performed by: EMERGENCY MEDICINE

## 2022-11-29 PROCEDURE — 25010000002 ONDANSETRON PER 1 MG: Performed by: EMERGENCY MEDICINE

## 2022-11-29 PROCEDURE — 99284 EMERGENCY DEPT VISIT MOD MDM: CPT

## 2022-11-29 PROCEDURE — 85025 COMPLETE CBC W/AUTO DIFF WBC: CPT | Performed by: EMERGENCY MEDICINE

## 2022-11-29 PROCEDURE — 74177 CT ABD & PELVIS W/CONTRAST: CPT

## 2022-11-29 RX ORDER — TRAMADOL HYDROCHLORIDE 50 MG/1
50 TABLET ORAL EVERY 6 HOURS PRN
COMMUNITY

## 2022-11-29 RX ORDER — ONDANSETRON 2 MG/ML
4 INJECTION INTRAMUSCULAR; INTRAVENOUS ONCE
Status: COMPLETED | OUTPATIENT
Start: 2022-11-29 | End: 2022-11-29

## 2022-11-29 RX ORDER — CIPROFLOXACIN 500 MG/1
500 TABLET, FILM COATED ORAL 2 TIMES DAILY
COMMUNITY

## 2022-11-29 RX ORDER — ONDANSETRON 4 MG/1
4 TABLET, ORALLY DISINTEGRATING ORAL EVERY 8 HOURS PRN
Qty: 21 TABLET | Refills: 0 | Status: SHIPPED | OUTPATIENT
Start: 2022-11-29 | End: 2023-03-20

## 2022-11-29 RX ORDER — DIAZEPAM 5 MG/ML
5 INJECTION, SOLUTION INTRAMUSCULAR; INTRAVENOUS ONCE
Status: COMPLETED | OUTPATIENT
Start: 2022-11-29 | End: 2022-11-29

## 2022-11-29 RX ADMIN — SODIUM CHLORIDE, POTASSIUM CHLORIDE, SODIUM LACTATE AND CALCIUM CHLORIDE 1000 ML: 600; 310; 30; 20 INJECTION, SOLUTION INTRAVENOUS at 04:41

## 2022-11-29 RX ADMIN — ONDANSETRON 4 MG: 2 INJECTION INTRAMUSCULAR; INTRAVENOUS at 04:50

## 2022-11-29 RX ADMIN — MORPHINE SULFATE 4 MG: 4 INJECTION, SOLUTION INTRAMUSCULAR; INTRAVENOUS at 05:22

## 2022-11-29 RX ADMIN — DIAZEPAM 5 MG: 5 INJECTION INTRAMUSCULAR; INTRAVENOUS at 07:43

## 2022-11-29 RX ADMIN — IOPAMIDOL 100 ML: 755 INJECTION, SOLUTION INTRAVENOUS at 06:54

## 2022-11-29 NOTE — ANESTHESIA POSTPROCEDURE EVALUATION
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, estrogen receptor positive [C50.212, Z17.0])    Surgeon:  Adrián Pereyra MD Provider:  Jame Bagley MD    Anesthesia Type:  MAC ASA Status:  2          Anesthesia Type: MAC  Last vitals  BP   106/72 (05/23/18 1915)   Temp   36.6 °C (97.8 °F) (05/23/18 1900)   Pulse   62 (05/23/18 1915)   Resp   16 (05/23/18 1915)     SpO2   98 % (05/23/18 1915)     Post Anesthesia Care and Evaluation    Patient location during evaluation: bedside  Patient participation: complete - patient participated  Level of consciousness: awake  Pain score: 2  Pain management: adequate  Airway patency: patent  Anesthetic complications: No anesthetic complications  PONV Status: none  Cardiovascular status: acceptable  Respiratory status: acceptable  Hydration status: acceptable    Comments: /72   Pulse 62   Temp 36.6 °C (97.8 °F) (Oral)   Resp 16   Ht 157.5 cm (62\")   Wt 66.9 kg (147 lb 8 oz)   [facility] 05/18/2018   SpO2 98%   BMI 26.98 kg/m²         " Posterior Auricular Interpolation Flap Text: A decision was made to reconstruct the defect utilizing an interpolation axial flap and a staged reconstruction.  A telfa template was made of the defect.  This telfa template was then used to outline the posterior auricular interpolation flap.  The donor area for the pedicle flap was then injected with anesthesia.  The flap was excised through the skin and subcutaneous tissue down to the layer of the underlying musculature.  The pedicle flap was carefully excised within this deep plane to maintain its blood supply.  The edges of the donor site were undermined.   The donor site was closed in a primary fashion.  The pedicle was then rotated into position and sutured.  Once the tube was sutured into place, adequate blood supply was confirmed with blanching and refill.  The pedicle was then wrapped with xeroform gauze and dressed appropriately with a telfa and gauze bandage to ensure continued blood supply and protect the attached pedicle.

## 2022-11-30 LAB — BACTERIA SPEC AEROBE CULT: ABNORMAL

## 2022-12-01 ENCOUNTER — TELEPHONE (OUTPATIENT)
Dept: NEUROSURGERY | Facility: CLINIC | Age: 52
End: 2022-12-01

## 2022-12-01 NOTE — TELEPHONE ENCOUNTER
Provider: KYLEE  Caller: AJIT ROWLAND  Relationship to Patient: SELF  Phone Number: [phone]  Reason for Call: CONCERNED ABOUT SYMPTOMS POSSIBLY RELATED TO SPINAL CORD INJURY  When was the patient last seen: 10/20/22    PATIENT HAD SX BY DR PICHARDO 5/8/22. SHE HAS HAD A RECENT INCREASE IN NERVE PAIN AND IS SEEKING GUIDANCE ON WHETHER THIS IS TO BE EXPECTED WITH TIME FOR AN INJURY LIKE HERS OR WHETHER IT MAY POINT TO SOMETHING MORE SERIOUS. SHE WOULD LIKE TO KNOW WHAT SYMPTOMS TO LOOK OUT FOR. PLEASE CALL PATIENT ASAP TO ADVISE.    LESS URGENTLY, PATIENT ALSO ASKS WHETHER DR PICHARDO EVER SENDS PATIENTS TO HYPERBARIC CHAMBER FOR SPINAL CORD INJURY, AND WHETHER THIS IS SOMETHING THAT INSURANCE WOULD TYPICALLY COVER.

## 2022-12-12 ENCOUNTER — TRANSCRIBE ORDERS (OUTPATIENT)
Dept: ADMINISTRATIVE | Facility: HOSPITAL | Age: 52
End: 2022-12-12

## 2022-12-12 ENCOUNTER — LAB (OUTPATIENT)
Dept: LAB | Facility: HOSPITAL | Age: 52
End: 2022-12-12

## 2022-12-12 DIAGNOSIS — N39.0 URINARY TRACT INFECTION WITHOUT HEMATURIA, SITE UNSPECIFIED: Primary | ICD-10-CM

## 2022-12-12 DIAGNOSIS — N39.0 URINARY TRACT INFECTION WITHOUT HEMATURIA, SITE UNSPECIFIED: ICD-10-CM

## 2022-12-12 PROCEDURE — 87086 URINE CULTURE/COLONY COUNT: CPT

## 2022-12-13 LAB — BACTERIA SPEC AEROBE CULT: NO GROWTH

## 2023-03-01 ENCOUNTER — TRANSCRIBE ORDERS (OUTPATIENT)
Dept: ADMINISTRATIVE | Facility: HOSPITAL | Age: 53
End: 2023-03-01
Payer: COMMERCIAL

## 2023-03-01 DIAGNOSIS — Z12.31 SCREENING MAMMOGRAM, ENCOUNTER FOR: Primary | ICD-10-CM

## 2023-03-02 ENCOUNTER — TRANSCRIBE ORDERS (OUTPATIENT)
Dept: ADMINISTRATIVE | Facility: HOSPITAL | Age: 53
End: 2023-03-02
Payer: COMMERCIAL

## 2023-03-02 ENCOUNTER — LAB (OUTPATIENT)
Dept: LAB | Facility: HOSPITAL | Age: 53
End: 2023-03-02
Payer: COMMERCIAL

## 2023-03-02 DIAGNOSIS — E55.9 VITAMIN D DEFICIENCY: ICD-10-CM

## 2023-03-02 DIAGNOSIS — E58 CALCIUM DEFICIENCY: ICD-10-CM

## 2023-03-02 DIAGNOSIS — G82.20 PARAPLEGIA: ICD-10-CM

## 2023-03-02 DIAGNOSIS — G82.20 PARAPLEGIA: Primary | ICD-10-CM

## 2023-03-02 LAB
25(OH)D3 SERPL-MCNC: 33.8 NG/ML (ref 30–100)
ALBUMIN SERPL-MCNC: 4.3 G/DL (ref 3.5–5.2)
ALBUMIN/GLOB SERPL: 1.7 G/DL
ALP SERPL-CCNC: 43 U/L (ref 39–117)
ALT SERPL W P-5'-P-CCNC: 21 U/L (ref 1–33)
ANION GAP SERPL CALCULATED.3IONS-SCNC: 9.7 MMOL/L (ref 5–15)
AST SERPL-CCNC: 26 U/L (ref 1–32)
BILIRUB SERPL-MCNC: <0.2 MG/DL (ref 0–1.2)
BUN SERPL-MCNC: 16 MG/DL (ref 6–20)
BUN/CREAT SERPL: 26.7 (ref 7–25)
CALCIUM SPEC-SCNC: 9.5 MG/DL (ref 8.6–10.5)
CHLORIDE SERPL-SCNC: 110 MMOL/L (ref 98–107)
CO2 SERPL-SCNC: 22.3 MMOL/L (ref 22–29)
CREAT SERPL-MCNC: 0.6 MG/DL (ref 0.57–1)
EGFRCR SERPLBLD CKD-EPI 2021: 108.8 ML/MIN/1.73
GLOBULIN UR ELPH-MCNC: 2.5 GM/DL
GLUCOSE SERPL-MCNC: 123 MG/DL (ref 65–99)
POTASSIUM SERPL-SCNC: 4.1 MMOL/L (ref 3.5–5.2)
PROT SERPL-MCNC: 6.8 G/DL (ref 6–8.5)
SODIUM SERPL-SCNC: 142 MMOL/L (ref 136–145)

## 2023-03-02 PROCEDURE — 36415 COLL VENOUS BLD VENIPUNCTURE: CPT

## 2023-03-02 PROCEDURE — 80053 COMPREHEN METABOLIC PANEL: CPT

## 2023-03-02 PROCEDURE — 82306 VITAMIN D 25 HYDROXY: CPT

## 2023-03-10 ENCOUNTER — TRANSCRIBE ORDERS (OUTPATIENT)
Dept: ADMINISTRATIVE | Facility: HOSPITAL | Age: 53
End: 2023-03-10
Payer: COMMERCIAL

## 2023-03-10 DIAGNOSIS — M85.80 OSTEOPENIA, UNSPECIFIED LOCATION: Primary | ICD-10-CM

## 2023-03-20 ENCOUNTER — LAB (OUTPATIENT)
Dept: OTHER | Facility: HOSPITAL | Age: 53
End: 2023-03-20
Payer: COMMERCIAL

## 2023-03-20 ENCOUNTER — OFFICE VISIT (OUTPATIENT)
Dept: ONCOLOGY | Facility: CLINIC | Age: 53
End: 2023-03-20
Payer: COMMERCIAL

## 2023-03-20 VITALS
HEART RATE: 82 BPM | TEMPERATURE: 98.4 F | SYSTOLIC BLOOD PRESSURE: 116 MMHG | HEIGHT: 62 IN | BODY MASS INDEX: 26.15 KG/M2 | WEIGHT: 142.1 LBS | OXYGEN SATURATION: 98 % | RESPIRATION RATE: 16 BRPM | DIASTOLIC BLOOD PRESSURE: 79 MMHG

## 2023-03-20 DIAGNOSIS — C50.212 MALIGNANT NEOPLASM OF UPPER-INNER QUADRANT OF LEFT BREAST IN FEMALE, ESTROGEN RECEPTOR POSITIVE: ICD-10-CM

## 2023-03-20 DIAGNOSIS — Z79.810 LONG-TERM CURRENT USE OF TAMOXIFEN: ICD-10-CM

## 2023-03-20 DIAGNOSIS — Z17.0 MALIGNANT NEOPLASM OF UPPER-INNER QUADRANT OF LEFT BREAST IN FEMALE, ESTROGEN RECEPTOR POSITIVE: Primary | ICD-10-CM

## 2023-03-20 DIAGNOSIS — Z17.0 MALIGNANT NEOPLASM OF UPPER-INNER QUADRANT OF LEFT BREAST IN FEMALE, ESTROGEN RECEPTOR POSITIVE: ICD-10-CM

## 2023-03-20 DIAGNOSIS — C50.212 MALIGNANT NEOPLASM OF UPPER-INNER QUADRANT OF LEFT BREAST IN FEMALE, ESTROGEN RECEPTOR POSITIVE: Primary | ICD-10-CM

## 2023-03-20 LAB
BASOPHILS # BLD AUTO: 0.09 10*3/MM3 (ref 0–0.2)
BASOPHILS NFR BLD AUTO: 0.9 % (ref 0–1.5)
DEPRECATED RDW RBC AUTO: 51.7 FL (ref 37–54)
EOSINOPHIL # BLD AUTO: 0.35 10*3/MM3 (ref 0–0.4)
EOSINOPHIL NFR BLD AUTO: 3.7 % (ref 0.3–6.2)
ERYTHROCYTE [DISTWIDTH] IN BLOOD BY AUTOMATED COUNT: 15.9 % (ref 12.3–15.4)
HCT VFR BLD AUTO: 38.9 % (ref 34–46.6)
HGB BLD-MCNC: 12.7 G/DL (ref 12–15.9)
IMM GRANULOCYTES # BLD AUTO: 0.06 10*3/MM3 (ref 0–0.05)
IMM GRANULOCYTES NFR BLD AUTO: 0.6 % (ref 0–0.5)
LYMPHOCYTES # BLD AUTO: 2.38 10*3/MM3 (ref 0.7–3.1)
LYMPHOCYTES NFR BLD AUTO: 24.9 % (ref 19.6–45.3)
MCH RBC QN AUTO: 29.1 PG (ref 26.6–33)
MCHC RBC AUTO-ENTMCNC: 32.6 G/DL (ref 31.5–35.7)
MCV RBC AUTO: 89 FL (ref 79–97)
MONOCYTES # BLD AUTO: 0.52 10*3/MM3 (ref 0.1–0.9)
MONOCYTES NFR BLD AUTO: 5.4 % (ref 5–12)
NEUTROPHILS NFR BLD AUTO: 6.16 10*3/MM3 (ref 1.7–7)
NEUTROPHILS NFR BLD AUTO: 64.5 % (ref 42.7–76)
NRBC BLD AUTO-RTO: 0 /100 WBC (ref 0–0.2)
PLATELET # BLD AUTO: 213 10*3/MM3 (ref 140–450)
PMV BLD AUTO: 11.3 FL (ref 6–12)
RBC # BLD AUTO: 4.37 10*6/MM3 (ref 3.77–5.28)
WBC NRBC COR # BLD: 9.56 10*3/MM3 (ref 3.4–10.8)

## 2023-03-20 PROCEDURE — 99214 OFFICE O/P EST MOD 30 MIN: CPT | Performed by: INTERNAL MEDICINE

## 2023-03-20 PROCEDURE — 85025 COMPLETE CBC W/AUTO DIFF WBC: CPT | Performed by: INTERNAL MEDICINE

## 2023-03-20 RX ORDER — DULOXETIN HYDROCHLORIDE 30 MG/1
1 CAPSULE, DELAYED RELEASE ORAL DAILY
COMMUNITY
Start: 2023-02-26

## 2023-03-20 RX ORDER — PREGABALIN 100 MG/1
1 CAPSULE ORAL EVERY 12 HOURS SCHEDULED
COMMUNITY
Start: 2023-02-26

## 2023-03-20 RX ORDER — HYDROCODONE BITARTRATE AND ACETAMINOPHEN 5; 325 MG/1; MG/1
TABLET ORAL
COMMUNITY
Start: 2023-03-02

## 2023-03-20 RX ORDER — PROMETHAZINE HYDROCHLORIDE 25 MG/1
TABLET ORAL
COMMUNITY
Start: 2022-11-29

## 2023-03-20 RX ORDER — TRANEXAMIC ACID 650 MG/1
TABLET ORAL AS NEEDED
COMMUNITY
Start: 2023-01-26

## 2023-03-20 NOTE — PROGRESS NOTES
Subjective     REASON FOR CONSULTATION:  1.  T1bN0 M0 strongly ER/HI positive HER-2 negative grade 2 left breast cancer post lumpectomy with close margins for DCIS-Oncotype score of 16 radiation and tamoxifen started 7/18                             REQUESTING PHYSICIAN:  Adrián Pereyra M.D. and Nikita Gallegos M.D.      History of Present Illness is a 52-year-old perimenopausal female with a T1b N0 strongly ER/HI positive breast cancer with a low Oncotype score of 16.  She completed radiation and began tamoxifen 7/20/2018.    Patient's last mammogram was in April 2022 and was benign.  Her mammogram is set up for May of this year along with a DEXA scan    Patient denies any concerns on self breast exams.  Occasionally she has tenderness at the area of the lumpectomy and lymph node removal but this is been stable since surgery.    She declined the breast cancer index because she was worried about the cost and therefore we do not have this data but she had a very small 8 mm tumor with a low Oncotype but since she is worried about it we probably will continue through-year 7 although I do not feel strongly about continuing because of the size of the tumor    Unfortunately she developed cord compression from a disc which was calcified and had a long stay in rehab and is finally home walking with a walker  She still has no bladder sensation and has to Catheterize herself and she is very depressed about this    Thankfully she has not developed any DVTs   She is vaccinated against COVID-19    Past Medical History:   Diagnosis Date   • Anxiety    • Breast cancer (HCC) 02/14/2018    Left breast, Upper Inner Quadrant, Invasive Mammary Carcinoma, Intermediate grade, measuring at least 0.2 cm in dimension, with associated high grade solid and cribiform ductal carcinoma in situ with comedonecrosis, ER/HI positive, GCC2acj negative   • Heartburn    • Infectious mononucleosis    • Migraines    • PONV (postoperative nausea and  vomiting)    • Pre-diabetes    • PVC's (premature ventricular contractions)    • Wears contact lenses         Past Surgical History:   Procedure Laterality Date   • BREAST BIOPSY Left 02/14/2018    Left breast stereotactic biopsy w/ marker clip placement & positive radiograph-Dr. Lashaun Gamble, [facility]   • BREAST BIOPSY Left 02/12/2018    INCOMPLETE - Biopsy could not be completed as patient had severe vasovagal reaction & subsequent syncope-[facility]   • BREAST BIOPSY Left 5/23/2018    Procedure: REEXCISION OF POSITIVE LUMPECTOMY MARGIN LEFT BREAST;  Surgeon: Adrián Pereyra MD;  Location: [facility];  Service: General   • BREAST LUMPECTOMY WITH SENTINEL NODE BIOPSY Left 4/11/2018    Procedure: BREAST LUMPECTOMY WITH SENTINEL NODE BIOPSY AND NEEDLE LOCALIZATION;  Surgeon: Adrián Pereyra MD;  Location: [facility];  Service: General   • THORACIC DECOMPRESSION POSTERIOR FUSION WITH INSTRUMENTATION Left 5/8/2022    Procedure: T10-T12 BILATERAL POSTERIOR LATERAL FUSION WITH T11 LEFT SIDED TRANSPEDICULAR DISCECTOMY FOR SPINAL CANAL DECOMPRESSION;  Surgeon: Brady Vega MD;  Location: [facility];  Service: Neurosurgery;  Laterality: Left;      ONCOLOGIC HISTORY:  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  A diagnostic mammogram gram showed a 2.3 cm area of clustered microcalcifications very suspicious for malignancy and the patient underwent a stereotactic biopsy on 2/14/18 with the findings of intermediate grade invasive mammary carcinoma measuring 2 mm with associated high-grade and solid DCIS--ER was 65% SD was 96% HER-2 0 Ki-67 was 2%.  The patient was referred to Dr. Pereyra and underwent bilateral breast MRIs with the findings of a 2.1 cm abnormality in the left breast with no axillary adenopathy and  she was taken for surgery when lumpectomy and sentinel node biopsy was performed on 18.  This revealed a residual 8 mm area of invasive carcinoma grade 2 with associated high-grade DCIS spanning 16 mm-the lateral and medial margins were 0.5 mm from the DCIS-new anterior, superior and lateral margins were obtained which were negative but the medial margin was still felt to be close.  She went to see Dr. Pereyra to wanted to discuss further the status of her margins but sent her to us also to discuss adjuvant treatment for her invasive cancer.    She is healing well from surgery.  She is obviously anxious about the margins but has no qualms about having further surgery even if the cosmesis is not perfect because she is more worried about recurrence of her cancer.  She is  3 para 3 -Menarche was age 14 she is premenopausal first childbirth was age 28 she breast-fed for 9 months she's been on no hormonal replacement since her last child 13 years ago  She is having bladder issues and her gynecologist just prescribed vaginal estrogens which she has not yet started.    Family history is positive for her father having thyroid cancer age 64 and he is currently dying from this is a 78 she's one sister is healthy her mother's 72 and healthy she has a maternal aunt with breast cancers at an unknown age paternal aunt with breast cancer in her 60s paternal grandfather with bone cancer and a maternal grandfather with bone cancer is no ovarian cancer in the family.      I explained to Cesia and her  that her tumor is very unlikely to have a high Oncotype score and it is very likely that the mainstay of her treatment would be tamoxifen.  But we will send an Oncotype score because of the small chance that there is a higher recurrence risk than expected based on her pathology in tumor size.  I think it is very reasonable to get genetic testing because of her family history although it is unlikely to change our  treatment.  I've asked her to hold off and vaginal estrogens for the time being and to discuss this with her gynecologist.  She will stop her Zoloft and we can prescribe Effexor if needed      we discussed her case at the multiple this may conference because of the close margins for high-grade DCIS and we all felt that radiation would take care of this and no further surgery was needed.    We discussed the low risk Oncotype in the lack of benefit for chemotherapy in this situation which we had already discussed with her on the phone and we talked about tamoxifen.The side effects and toxicities of Tamoxifen were discussed with the patient, including hot flashes, mood swings,depression, DVT and endometrial cancer. A list of drugs that interfere with the efficacy of tamoxifen and are to be avoided were given to the patient.    At her last visit she was having a lot of pelvic pain with her menstrual cycle and an ultrasound which showed some adenomyosis and her ovarian cyst had disappeared thankfully-she started menstruating regularly-we ordered CAT scans because of the pain in her left side and thankfully chest and abdomen are negative except for thickening of her bladder from her interstitial cystitis and she had fibroids-this discomfort has gradually improved with time        Father  with metastatic thyroid cancer I have again stressed the need for genetic testing and she did this and it was thankfully benign except for 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  This is actually gotten better and she thinks the Lexapro has helped this also there may have been a component of anxiety.  I told her if the dysphagia recurs she needs an EGD          Current Outpatient Medications on File Prior to Visit   Medication Sig Dispense Refill   • acetaminophen (TYLENOL) 325 MG tablet Take 2 tablets by mouth Every 4 (Four) Hours As Needed for Mild Pain  .     • baclofen (LIORESAL) 20 MG tablet Take 1 tablet QAM, 1 tablet midday, 1.5 tablets QHS     • bisacodyl (DULCOLAX) 10 MG suppository Insert 1 suppository into the rectum Every Other Day.     • ciprofloxacin (CIPRO) 500 MG tablet Take 1 tablet by mouth 2 (Two) Times a Day.     • DULoxetine (CYMBALTA) 30 MG capsule Take 1 capsule by mouth Daily.     • HYDROcodone-acetaminophen (NORCO) 5-325 MG per tablet TAKE 1 TABLET BY MOUTH EVERY 4 HOURS AS NEEDED WITH FOOD FOR PAIN     • hydrOXYzine (ATARAX) 25 MG tablet Take 1 tablet by mouth 3 (Three) Times a Day As Needed for Itching or Anxiety.     • pregabalin (LYRICA) 100 MG capsule Take 1 capsule by mouth Every 12 (Twelve) Hours.     • promethazine (PHENERGAN) 25 MG tablet TAKE 1 TABLET BY MOUTH EVERY 4 TO 6 HOURS AS NEEDED     • tamoxifen (NOLVADEX) 20 MG chemo tablet Take 1 tablet by mouth Daily. 90 tablet 3   • traMADol (ULTRAM) 50 MG tablet Take 1 tablet by mouth Every 6 (Six) Hours As Needed for Moderate Pain.     • Tranexamic Acid 650 MG tablet As Needed.     • dantrolene (DANTRIUM) 50 MG capsule Take 50 mg by mouth 3 (Three) Times a Day.     • gabapentin (NEURONTIN) 100 MG capsule Take 200 mg by mouth 3 (Three) Times a Day.     • [DISCONTINUED] ondansetron ODT (ZOFRAN-ODT) 4 MG disintegrating tablet Place 1 tablet on the tongue Every 8 (Eight) Hours As Needed for Nausea or Vomiting. 21 tablet 0     No current facility-administered medications on file prior to visit.        ALLERGIES:  No Known Allergies     Social History     Socioeconomic History   • Marital status:      Spouse name: Nikita Workman   • Number of children: 3   • Years of education: College   Tobacco Use   • Smoking status: Former     Packs/day: 0.50     Years: 2.00     Pack years: 1.00     Types: Cigarettes     Start date:      Quit date:      Years since quittin.2   • Smokeless tobacco: Never   Vaping Use   • Vaping Use: Never used   Substance and Sexual Activity   • Alcohol  "use: Yes     Comment: RARE MONTHLY   • Drug use: No   • Sexual activity: Defer     Birth control/protection: None        Family History   Problem Relation Age of Onset   • Breast cancer Maternal Aunt         In her 60s   • Breast cancer Paternal Aunt    • Hyperlipidemia Mother    • Asthma Father    • Diabetes Father    • Hearing loss Father    • Heart disease Father    • Hypertension Father    • Thyroid cancer Father 64        Follicular Tyroid Cancer   • Alcohol abuse Sister    • Depression Sister    • Diabetes Maternal Grandmother    • Bone cancer Maternal Grandfather    • Breast cancer Cousin    • Malig Hyperthermia Neg Hx         Review of Systems   Constitutional: Negative for chills, fatigue, fever and unexpected weight change.   HENT: Negative for congestion, mouth sores, nosebleeds and trouble swallowing.    Respiratory: Negative for choking, chest tightness and shortness of breath.    Cardiovascular: Negative for chest pain.   Gastrointestinal: Negative for constipation, diarrhea, nausea and vomiting.   Genitourinary: Negative for difficulty urinating and pelvic pain.        Continued decreased libido   Musculoskeletal: Positive for arthralgias (hands and thumbs intermittently). Negative for back pain and gait problem.   Neurological: Negative for dizziness, numbness and headaches (Chronic migraine headaches).   Psychiatric/Behavioral: Positive for decreased concentration (Word finding issues). The patient is not nervous/anxious.         Objective     Vitals:    03/20/23 0850   BP: 116/79   Pulse: 82   Resp: 16   Temp: 98.4 °F (36.9 °C)   TempSrc: Temporal   SpO2: 98%   Weight: 64.5 kg (142 lb 1.6 oz)   Height: 157.5 cm (62.01\")   PainSc:   6   PainLoc: Generalized  Comment: Lower body legs, back and chest       Current Status 3/20/2023   ECOG score 1       Physical Exam    GENERAL:  Well-developed, well-nourished in no acute distress.   SKIN:  Warm, dry without rashes, purpura or petechiae.  EYES:  Pupils " equal, round and reactive to light.  EOMs intact.  Conjunctivae normal.  EARS:  Hearing intact.  NOSE:  Septum midline.  No excoriations or nasal discharge.  MOUTH:  Tongue is well-papillated; no stomatitis or ulcers.  Lips normal.  THROAT:  Oropharynx without lesions or exudates.  NECK:  Supple with good range of motion; no thyromegaly or masses, no JVD.  LYMPHATICS:  No cervical, supraclavicular, axillary adenopathy.  CHEST:  Lungs clear to auscultation. Good airflow.  BREASTS: Right breast is benign left  shows a lumpectomy scar with minimal scarring at   the lumpectomy site, otherwise soft, no nodules, no skin changes, no nipple discharge.  Left breast and axilla soft, no nodules, no skin changes, no nipple discharge.  Right axilla is tender to palpation no definite adenopathy  CARDIAC:  Regular rate and rhythm without murmurs, rubs or gallops. Normal S1,S2.  ABDOMEN:  Soft, minimal left lower quadrant tenderness without mass - with no hepatosplenomegaly .  EXTREMITIES:  No clubbing, cyanosis or edema.  NEUROLOGICAL:  Cranial Nerves II-XII grossly intact.  Foot drop bilaterally left greater than right and atrophy of calf muscles   PSYCHIATRIC:  Normal affect and mood.    I have reexamined the patient and the results are consistent with the previously documented exam. Dick Gutierrez MD         RECENT LABS:  Hematology WBC   Date Value Ref Range Status   03/20/2023 9.56 3.40 - 10.80 10*3/mm3 Final     RBC   Date Value Ref Range Status   03/20/2023 4.37 3.77 - 5.28 10*6/mm3 Final     Hemoglobin   Date Value Ref Range Status   03/20/2023 12.7 12.0 - 15.9 g/dL Final     Hematocrit   Date Value Ref Range Status   03/20/2023 38.9 34.0 - 46.6 % Final     Platelets   Date Value Ref Range Status   03/20/2023 213 140 - 450 10*3/mm3 Final         IMPRESSION:  1. Biopsy-proven malignancy in the left breast at the 11:30 position  measuring on the order of 2.1 cm in greatest dimension. No other  suspicious findings are  "seen within the left breast and there is no  evidence for left axillary adenopathy.  2. There are no findings suspicious for malignancy in the right breast.     BI-RADS Category 6: Known biopsy-proven malignancy.     4/11/18  1. \"LEFT BREAST MASS,\" WIRE GUIDED LUMPECTOMY:             INVASIVE DUCTAL CARCINOMA, INTERMEDIATE GRADE, 8 MM, MARGINS CLEAR.             CLOSEST MARGIN TO INVASIVE TUMOR: 1.5 MM (INFERIOR).             ASSOCIATED HIGH GRADE DUCTAL CARCINOMA IN SITU SPANNING APPROXIMATELY 16 MM (FOUR                    BLOCKS x 4 MM PER BLOCK).             CLOSEST MARGIN TO DUCTAL CARCINOMA IN SITU: 0.5 MM (LATERAL AND MEDIAL MARGINS).             HORMONE RECEPTORS REPORTED ON PRIOR BIOPSY, [facility] [id number]. REPEAT/                   CONFIRMATORY STUDIES ARE AVAILABLE UPON REQUEST.                         BIOPSY SITE CHANGES.     NOTE: ADDITIONAL MARGINS SUBMITTED AS PARTS 2, 3, AND 4.        2. \"LEFT BREAST NEW ANTERIOR MARGIN\":              BENIGN FIBROADIPOSE TISSUE.              NEW MARGIN - NO ATYPIA.     3. \"LEFT BREAST NEW SUPERIOR MARGIN\":             BENIGN BREAST TISSUE.             NEW MARGIN - NO ATYPIA.     4. \"LEFT BREAST NEW LATERAL MARGIN\":             BENIGN BREAST TISSUE.             NEW MARGIN - NO ATYPIA.     5. \"SENTINEL NODE #1\":             LYMPH NODE, MULTIPLE STEP SECTIONS: NO METASTATIC CARCINOMA SEEN.     6. \"SENTINEL NODE #2\":             LYMPH NODE, MULTIPLE STEP SECTIONS: NO METASTATIC CARCINOMA SEEN.     THM/th IHC/a/CMK                                Electronically signed by Darin Claire MD on 4/12/2018 at 1511   Synoptic Checklist   INVASIVE CARCINOMA OF THE BREAST   Breast Invasive - All Specimens   SPECIMEN   Procedure  Excision (less than total mastectomy)   Specimen Laterality  Left   TUMOR   Histologic Type  Invasive carcinoma of no special type (ductal, not otherwise specified)   Histologic Grade ([place] Histologic Score)     Glandular (Acinar) / " Tubular Differentiation  Score 3 (< 10% of tumor area forming glandular / tubular structures)   Nuclear Pleomorphism  Score 2 (Cells larger than normal with open vesicular nuclei, visible nucleoli, and moderate variability in both size and shape)   Mitotic Rate  Score 1 (<=3 mitoses per mm2)   Overall Grade  Grade 2 (scores of 6 or 7)   Tumor Size  Greatest dimension of largest invasive focus > 1 mm (indicate exact measurement in Millimeters):: 8 mm   Tumor Focality  Single focus of invasive carcinoma   Ductal Carcinoma In Situ (DCIS)  DCIS is present in specimen   Ductal Carcinoma In Situ (DCIS)  Positive for EIC   Size (Extent) of DCIS  Estimated size (extent) of DCIS (greatest dimension using gross and microscopic evaluation) in Millimeters (mm) is at least: 16  mm   Nuclear Grade  Grade III (high)   Lymphovascular Invasion  Not identified   Treatment Effect  No known presurgical therapy   MARGINS   Invasive Carcinoma Margins  Uninvolved by invasive carcinoma   Distance from Closest Margin in Millimeters (mm)  Specify distance: 1.5 mm   Closest Margin  Inferior   DCIS Margins  Uninvolved by DCIS (DCIS present in specimen)   Distance of DCIS from Closest Margin in Millimeters (mm)  Specify distance: 0.5 mm   Closest Margin  Medial     Lateral   LYMPH NODES   Regional Lymph Nodes     Number of Lymph Nodes with Macrometastases (> 2 mm)  Specify number: 0   Number of Lymph Nodes with Micrometastases (> 0.2 mm to 2 mm and / or > 200 cells)  Specify number: 0   Number of Lymph Nodes with Isolated Tumor Cells (<= 0.2 mm and <= 200 cells)  Specify number: 0   Number of Lymph Nodes Examined  Specify number: 2   Number of [place] Nodes Examined  Specify number: 2   PATHOLOGIC STAGE CLASSIFICATION (pTNM)   Primary Tumor (Invasive Carcinoma) (pT)  pT1c: Tumor > 10 mm but <= 20 mm in greatest dimension   Modifier  (sn): Only sentinel node(s) evaluated. If 6 or more nodes (sentinel or nonsentinel) are removed, this modifier  should not be used.   Category (pN)  pN0: No regional lymph node metastasis identified or ITCs only   .        COMPLETE PELVIC SONOGRAM   FINDINGS: Transabdominal and transvaginal pelvic sonography was  performed. The uterus is anteflexed and measures 12 x 5 x 7 cm. The  endometrial bilayer measures 0.9 cm in thickness. Scattered  heterogeneity is seen throughout the uterus. Within the posterior mid  uterine body there is a 1.9 x 1.4 x 1.3 cm complex hypoechoic lesion. In  the mid uterine body located in the anterior aspect of the uterus there  is a 1.6 x 1.4 x 1.9 cm hypoechoic lesion.     The right ovary measures 4.2 x 1.9 x 3.2 cm and the left ovary measures  3.4 x 1.9 x 1.4 cm. Normal intraovarian venous vascularity is seen in  both ovaries. Normal follicles are seen within both ovaries. No free  fluid is seen within the pelvic cul-de-sac.     IMPRESSION:  1. Enlarged uterus with 2 focal myometrial lesions measuring on the  order of 1.9 cm in greatest dimension. The sonographic features suggest  adenomyosis, possibly with focal adenomyomas versus fibroids. It is  difficult to differentiate an adenomyoma versus a fibroid, but I believe  adenomyosis is likely present given the enlarged heterogenous appearance  of the uterus. If imaging differentiation is desired it can be performed  with a pelvic MRI without and with contrast.  2. Normal sonographic appearance of both ovaries.     This report was finalized on 9/21/2018     Interpretation Summary 11/18            · Normal bilateral lower extremity venous duplex scan.                     Assessment & Plan   1.S1tY1P6 HER-2 negative grade 2 left breast cancer post lumpectomy and sentinel node biopsy-with close margins from high-grade DCIS medially-Oncotype score 16.  Tamoxifen initiated 7/2018  · 1 month break from tamoxifen to see if her memory issues improve and then resume  · Doing well on tamoxifen in 3/22-becoming perimenopausal    2.  Interstitial cystitis  undergoing instillation of lidocaine/heparin in  the bladder intermittently.  Pelvic discomfort approximately 10 days prior to menses currently.  Ovarian cyst found by gynecology as possible culprit versus interstitial cystitis potential flare prior to menses.    3.  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 Worsening dysphagia to solids-ENT exam negative symptoms improved with Lexapro-but persistent as of 5/20's referred to GI  · [place] to be more anxiety than mechanical issues    6.  Depression.  Patient on Lexapro, initiated during the time of family deaths and recovery from her breast surgery.  Patient feels she has improved would like to wean off the medication  · Patient weaning off Lexapro and 9/21.    7.  Cord compression from a disc at T10-slow rehab underway    Plan  1.continue tamoxifen 20 mg daily   2.  Follow-up with Dr. Gutierrez in 6 months,   3.  Mammogram and DEXA to be completed around May , 2023  4.  She feels comfortable continuing to year 7 and opted not to do breast cancer index-but she wants to wait till her next visit which will be her 5-year anisha from starting tamoxifen to be sure

## 2023-03-22 ENCOUNTER — APPOINTMENT (OUTPATIENT)
Dept: BONE DENSITY | Facility: HOSPITAL | Age: 53
End: 2023-03-22
Payer: COMMERCIAL

## 2023-03-22 DIAGNOSIS — M85.80 OSTEOPENIA, UNSPECIFIED LOCATION: ICD-10-CM

## 2023-03-22 PROCEDURE — 77080 DXA BONE DENSITY AXIAL: CPT

## 2023-04-12 ENCOUNTER — LAB (OUTPATIENT)
Dept: LAB | Facility: HOSPITAL | Age: 53
End: 2023-04-12
Payer: COMMERCIAL

## 2023-04-12 ENCOUNTER — TRANSCRIBE ORDERS (OUTPATIENT)
Dept: ADMINISTRATIVE | Facility: HOSPITAL | Age: 53
End: 2023-04-12
Payer: COMMERCIAL

## 2023-04-12 DIAGNOSIS — N39.0 URINARY TRACT INFECTION WITHOUT HEMATURIA, SITE UNSPECIFIED: ICD-10-CM

## 2023-04-12 DIAGNOSIS — N39.0 URINARY TRACT INFECTION WITHOUT HEMATURIA, SITE UNSPECIFIED: Primary | ICD-10-CM

## 2023-04-12 PROCEDURE — 87086 URINE CULTURE/COLONY COUNT: CPT

## 2023-04-12 PROCEDURE — 87186 SC STD MICRODIL/AGAR DIL: CPT

## 2023-04-12 PROCEDURE — 87077 CULTURE AEROBIC IDENTIFY: CPT

## 2023-04-14 LAB — BACTERIA SPEC AEROBE CULT: ABNORMAL

## 2023-05-02 ENCOUNTER — LAB (OUTPATIENT)
Dept: LAB | Facility: HOSPITAL | Age: 53
End: 2023-05-02
Payer: COMMERCIAL

## 2023-05-02 ENCOUNTER — TRANSCRIBE ORDERS (OUTPATIENT)
Dept: ADMINISTRATIVE | Facility: HOSPITAL | Age: 53
End: 2023-05-02
Payer: COMMERCIAL

## 2023-05-02 DIAGNOSIS — N39.0 URINARY TRACT INFECTION WITHOUT HEMATURIA, SITE UNSPECIFIED: Primary | ICD-10-CM

## 2023-05-02 DIAGNOSIS — N39.0 URINARY TRACT INFECTION WITHOUT HEMATURIA, SITE UNSPECIFIED: ICD-10-CM

## 2023-05-02 PROCEDURE — 87086 URINE CULTURE/COLONY COUNT: CPT

## 2023-05-02 PROCEDURE — 87077 CULTURE AEROBIC IDENTIFY: CPT

## 2023-05-02 PROCEDURE — 87186 SC STD MICRODIL/AGAR DIL: CPT

## 2023-05-05 ENCOUNTER — HOSPITAL ENCOUNTER (OUTPATIENT)
Dept: MAMMOGRAPHY | Facility: HOSPITAL | Age: 53
Discharge: HOME | End: 2023-05-05
Admitting: SPECIALIST
Payer: COMMERCIAL

## 2023-05-05 DIAGNOSIS — Z12.31 SCREENING MAMMOGRAM, ENCOUNTER FOR: ICD-10-CM

## 2023-05-05 LAB — BACTERIA SPEC AEROBE CULT: ABNORMAL

## 2023-05-05 PROCEDURE — 77067 SCR MAMMO BI INCL CAD: CPT

## 2023-05-05 PROCEDURE — 77063 BREAST TOMOSYNTHESIS BI: CPT

## 2023-06-12 ENCOUNTER — TRANSCRIBE ORDERS (OUTPATIENT)
Dept: ADMINISTRATIVE | Facility: HOSPITAL | Age: 53
End: 2023-06-12
Payer: COMMERCIAL

## 2023-06-12 ENCOUNTER — LAB (OUTPATIENT)
Dept: LAB | Facility: HOSPITAL | Age: 53
End: 2023-06-12
Payer: COMMERCIAL

## 2023-06-12 DIAGNOSIS — N39.0 URINARY TRACT INFECTION WITHOUT HEMATURIA, SITE UNSPECIFIED: ICD-10-CM

## 2023-06-12 DIAGNOSIS — N39.0 URINARY TRACT INFECTION WITHOUT HEMATURIA, SITE UNSPECIFIED: Primary | ICD-10-CM

## 2023-06-12 PROCEDURE — 87186 SC STD MICRODIL/AGAR DIL: CPT

## 2023-06-12 PROCEDURE — 87086 URINE CULTURE/COLONY COUNT: CPT

## 2023-06-12 PROCEDURE — 87088 URINE BACTERIA CULTURE: CPT

## 2023-06-14 LAB — BACTERIA SPEC AEROBE CULT: ABNORMAL

## 2023-08-02 ENCOUNTER — LAB (OUTPATIENT)
Dept: LAB | Facility: HOSPITAL | Age: 53
End: 2023-08-02
Payer: COMMERCIAL

## 2023-08-02 ENCOUNTER — TRANSCRIBE ORDERS (OUTPATIENT)
Dept: ADMINISTRATIVE | Facility: HOSPITAL | Age: 53
End: 2023-08-02
Payer: COMMERCIAL

## 2023-08-02 DIAGNOSIS — N39.0 URINARY TRACT INFECTION WITHOUT HEMATURIA, SITE UNSPECIFIED: ICD-10-CM

## 2023-08-02 DIAGNOSIS — N39.0 URINARY TRACT INFECTION WITHOUT HEMATURIA, SITE UNSPECIFIED: Primary | ICD-10-CM

## 2023-08-02 PROCEDURE — 87077 CULTURE AEROBIC IDENTIFY: CPT

## 2023-08-02 PROCEDURE — 87086 URINE CULTURE/COLONY COUNT: CPT

## 2023-08-02 PROCEDURE — 87186 SC STD MICRODIL/AGAR DIL: CPT

## 2023-08-04 LAB — BACTERIA SPEC AEROBE CULT: ABNORMAL

## 2023-09-25 ENCOUNTER — LAB (OUTPATIENT)
Dept: OTHER | Facility: HOSPITAL | Age: 53
End: 2023-09-25
Payer: COMMERCIAL

## 2023-09-25 ENCOUNTER — OFFICE VISIT (OUTPATIENT)
Dept: ONCOLOGY | Facility: CLINIC | Age: 53
End: 2023-09-25

## 2023-09-25 VITALS
BODY MASS INDEX: 27.62 KG/M2 | SYSTOLIC BLOOD PRESSURE: 128 MMHG | RESPIRATION RATE: 18 BRPM | WEIGHT: 150.1 LBS | HEART RATE: 86 BPM | DIASTOLIC BLOOD PRESSURE: 81 MMHG | HEIGHT: 62 IN | TEMPERATURE: 97.9 F | OXYGEN SATURATION: 96 %

## 2023-09-25 DIAGNOSIS — C50.212 MALIGNANT NEOPLASM OF UPPER-INNER QUADRANT OF LEFT BREAST IN FEMALE, ESTROGEN RECEPTOR POSITIVE: ICD-10-CM

## 2023-09-25 DIAGNOSIS — C50.212 MALIGNANT NEOPLASM OF UPPER-INNER QUADRANT OF LEFT BREAST IN FEMALE, ESTROGEN RECEPTOR POSITIVE: Primary | ICD-10-CM

## 2023-09-25 DIAGNOSIS — Z17.0 MALIGNANT NEOPLASM OF UPPER-INNER QUADRANT OF LEFT BREAST IN FEMALE, ESTROGEN RECEPTOR POSITIVE: Primary | ICD-10-CM

## 2023-09-25 DIAGNOSIS — Z17.0 MALIGNANT NEOPLASM OF UPPER-INNER QUADRANT OF LEFT BREAST IN FEMALE, ESTROGEN RECEPTOR POSITIVE: ICD-10-CM

## 2023-09-25 LAB
ALBUMIN SERPL-MCNC: 4.3 G/DL (ref 3.5–5.2)
ALBUMIN/GLOB SERPL: 1.8 G/DL
ALP SERPL-CCNC: 31 U/L (ref 39–117)
ALT SERPL W P-5'-P-CCNC: 16 U/L (ref 1–33)
ANION GAP SERPL CALCULATED.3IONS-SCNC: 11 MMOL/L (ref 5–15)
AST SERPL-CCNC: 19 U/L (ref 1–32)
BASOPHILS # BLD AUTO: 0.09 10*3/MM3 (ref 0–0.2)
BASOPHILS NFR BLD AUTO: 0.9 % (ref 0–1.5)
BILIRUB SERPL-MCNC: 0.3 MG/DL (ref 0–1.2)
BUN SERPL-MCNC: 16 MG/DL (ref 6–20)
BUN/CREAT SERPL: 21.9 (ref 7–25)
CALCIUM SPEC-SCNC: 9.1 MG/DL (ref 8.6–10.5)
CHLORIDE SERPL-SCNC: 109 MMOL/L (ref 98–107)
CO2 SERPL-SCNC: 20 MMOL/L (ref 22–29)
CREAT SERPL-MCNC: 0.73 MG/DL (ref 0.57–1)
DEPRECATED RDW RBC AUTO: 48.5 FL (ref 37–54)
EGFRCR SERPLBLD CKD-EPI 2021: 99.1 ML/MIN/1.73
EOSINOPHIL # BLD AUTO: 0.18 10*3/MM3 (ref 0–0.4)
EOSINOPHIL NFR BLD AUTO: 1.7 % (ref 0.3–6.2)
ERYTHROCYTE [DISTWIDTH] IN BLOOD BY AUTOMATED COUNT: 13.7 % (ref 12.3–15.4)
GLOBULIN UR ELPH-MCNC: 2.4 GM/DL
GLUCOSE SERPL-MCNC: 156 MG/DL (ref 65–99)
HCT VFR BLD AUTO: 39.8 % (ref 34–46.6)
HGB BLD-MCNC: 12.4 G/DL (ref 12–15.9)
IMM GRANULOCYTES # BLD AUTO: 0.06 10*3/MM3 (ref 0–0.05)
IMM GRANULOCYTES NFR BLD AUTO: 0.6 % (ref 0–0.5)
LYMPHOCYTES # BLD AUTO: 2.52 10*3/MM3 (ref 0.7–3.1)
LYMPHOCYTES NFR BLD AUTO: 24.3 % (ref 19.6–45.3)
MCH RBC QN AUTO: 30.4 PG (ref 26.6–33)
MCHC RBC AUTO-ENTMCNC: 31.2 G/DL (ref 31.5–35.7)
MCV RBC AUTO: 97.5 FL (ref 79–97)
MONOCYTES # BLD AUTO: 0.42 10*3/MM3 (ref 0.1–0.9)
MONOCYTES NFR BLD AUTO: 4 % (ref 5–12)
NEUTROPHILS NFR BLD AUTO: 68.5 % (ref 42.7–76)
NEUTROPHILS NFR BLD AUTO: 7.12 10*3/MM3 (ref 1.7–7)
NRBC BLD AUTO-RTO: 0 /100 WBC (ref 0–0.2)
PLATELET # BLD AUTO: 234 10*3/MM3 (ref 140–450)
PMV BLD AUTO: 10.7 FL (ref 6–12)
POTASSIUM SERPL-SCNC: 3.7 MMOL/L (ref 3.5–5.2)
PROT SERPL-MCNC: 6.7 G/DL (ref 6–8.5)
RBC # BLD AUTO: 4.08 10*6/MM3 (ref 3.77–5.28)
SODIUM SERPL-SCNC: 140 MMOL/L (ref 136–145)
WBC NRBC COR # BLD: 10.39 10*3/MM3 (ref 3.4–10.8)

## 2023-09-25 PROCEDURE — 85025 COMPLETE CBC W/AUTO DIFF WBC: CPT | Performed by: INTERNAL MEDICINE

## 2023-09-25 PROCEDURE — 80053 COMPREHEN METABOLIC PANEL: CPT | Performed by: INTERNAL MEDICINE

## 2023-09-25 PROCEDURE — 36415 COLL VENOUS BLD VENIPUNCTURE: CPT

## 2023-09-25 RX ORDER — SULFAMETHOXAZOLE AND TRIMETHOPRIM 800; 160 MG/1; MG/1
1 TABLET ORAL EVERY 12 HOURS SCHEDULED
COMMUNITY
Start: 2023-09-23

## 2023-09-25 RX ORDER — POLYETHYLENE GLYCOL 3350 17 G/17G
17 POWDER, FOR SOLUTION ORAL DAILY
Qty: 30 PACKET | Refills: 11 | COMMUNITY
Start: 2022-10-12 | End: 2023-10-12

## 2023-09-25 RX ORDER — MELOXICAM 15 MG/1
TABLET ORAL
COMMUNITY

## 2023-09-25 RX ORDER — NITROFURANTOIN 25; 75 MG/1; MG/1
100 CAPSULE ORAL 2 TIMES DAILY
COMMUNITY

## 2023-09-25 NOTE — PROGRESS NOTES
Subjective     REASON FOR CONSULTATION:  1.  T1bN0 M0 strongly ER/SD positive HER-2 negative grade 2 left breast cancer post lumpectomy with close margins for DCIS-Oncotype score of 16 radiation and tamoxifen started 7/18                             REQUESTING PHYSICIAN:  Adrián Pereyra M.D. and Nikita Gallegos M.D.      History of Present Illness is a 52-year-old perimenopausal female with a T1b N0 strongly ER/SD positive breast cancer with a low Oncotype score of 16.  She completed radiation and began tamoxifen 7/20/2018.    Patient's last mammogram was in April 2022 and was benign.  Her mammogram is set up for May of this year along with a DEXA scan    Patient denies any concerns on self breast exams.  Occasionally she has tenderness at the area of the lumpectomy and lymph node removal but this is been stable since surgery.    She declined the breast cancer index because she was worried about the cost and therefore we do not have this data but she had a very small 8 mm tumor with a low Oncotype but since she is worried about it we probably will continue through-year 7 although I do not feel strongly about continuing because of the size of the tumor but because of the borderline Oncotype score we will continue    Unfortunately she developed cord compression from a disc which was calcified and had a long stay in rehab and is finally home walking with a walker  She still has no bladder sensation and has to Catheterize herself and she is very depressed about this    Thankfully she has not developed any DVTs   She is vaccinated against COVID-19    Past Medical History:   Diagnosis Date    Anxiety     Breast cancer 02/14/2018    Left breast, Upper Inner Quadrant, Invasive Mammary Carcinoma, Intermediate grade, measuring at least 0.2 cm in dimension, with associated high grade solid and cribiform ductal carcinoma in situ with comedonecrosis, ER/SD positive, [id number] negative    Heartburn     Infectious mononucleosis      Migraines     PONV (postoperative nausea and vomiting)     Pre-diabetes     PVC's (premature ventricular contractions)     Wears contact lenses         Past Surgical History:   Procedure Laterality Date    BREAST BIOPSY Left 02/14/2018    Left breast stereotactic biopsy w/ marker clip placement & positive radiograph-Dr. Lashaun Gamble, [facility]    BREAST BIOPSY Left 02/12/2018    INCOMPLETE - Biopsy could not be completed as patient had severe vasovagal reaction & subsequent syncope-[facility]    BREAST BIOPSY Left 5/23/2018    Procedure: REEXCISION OF POSITIVE LUMPECTOMY MARGIN LEFT BREAST;  Surgeon: Adrián Pereyra MD;  Location: [facility];  Service: General    BREAST LUMPECTOMY WITH SENTINEL NODE BIOPSY Left 4/11/2018    Procedure: BREAST LUMPECTOMY WITH SENTINEL NODE BIOPSY AND NEEDLE LOCALIZATION;  Surgeon: Adrián Pereyra MD;  Location: [facility];  Service: General    THORACIC DECOMPRESSION POSTERIOR FUSION WITH INSTRUMENTATION Left 5/8/2022    Procedure: T10-T12 BILATERAL POSTERIOR LATERAL FUSION WITH T11 LEFT SIDED TRANSPEDICULAR DISCECTOMY FOR SPINAL CANAL DECOMPRESSION;  Surgeon: Brady Vega MD;  Location: [facility];  Service: Neurosurgery;  Laterality: Left;      ONCOLOGIC HISTORY:  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  A diagnostic mammogram gram showed a 2.3 cm area of clustered microcalcifications very suspicious for malignancy and the patient underwent a stereotactic biopsy on 2/14/18 with the findings of intermediate grade invasive mammary carcinoma measuring 2 mm with associated high-grade and solid DCIS--ER was 65% MT was 96% HER-2 0 Ki-67 was 2%.  The patient was referred to Dr. Pereyra and underwent bilateral breast MRIs with the findings of a 2.1 cm abnormality in the left  breast with no axillary adenopathy and she was taken for surgery when lumpectomy and sentinel node biopsy was performed on 18.  This revealed a residual 8 mm area of invasive carcinoma grade 2 with associated high-grade DCIS spanning 16 mm-the lateral and medial margins were 0.5 mm from the DCIS-new anterior, superior and lateral margins were obtained which were negative but the medial margin was still felt to be close.  She went to see Dr. Pereyra to wanted to discuss further the status of her margins but sent her to us also to discuss adjuvant treatment for her invasive cancer.    She is healing well from surgery.  She is obviously anxious about the margins but has no qualms about having further surgery even if the cosmesis is not perfect because she is more worried about recurrence of her cancer.  She is  3 para 3 -Menarche was age 14 she is premenopausal first childbirth was age 28 she breast-fed for 9 months she's been on no hormonal replacement since her last child 13 years ago  She is having bladder issues and her gynecologist just prescribed vaginal estrogens which she has not yet started.    Family history is positive for her father having thyroid cancer age 64 and he is currently dying from this is a 78 she's one sister is healthy her mother's 72 and healthy she has a maternal aunt with breast cancers at an unknown age paternal aunt with breast cancer in her 60s paternal grandfather with bone cancer and a maternal grandfather with bone cancer is no ovarian cancer in the family.      I explained to Cesia and her  that her tumor is very unlikely to have a high Oncotype score and it is very likely that the mainstay of her treatment would be tamoxifen.  But we will send an Oncotype score because of the small chance that there is a higher recurrence risk than expected based on her pathology in tumor size.  I think it is very reasonable to get genetic testing because of her family history  although it is unlikely to change our treatment.  I've asked her to hold off and vaginal estrogens for the time being and to discuss this with her gynecologist.  She will stop her Zoloft and we can prescribe Effexor if needed      we discussed her case at the [facility] this may conference because of the close margins for high-grade DCIS and we all felt that radiation would take care of this and no further surgery was needed.    We discussed the low risk Oncotype in the lack of benefit for chemotherapy in this situation which we had already discussed with her on the phone and we talked about tamoxifen.The side effects and toxicities of Tamoxifen were discussed with the patient, including hot flashes, mood swings,depression, DVT and endometrial cancer. A list of drugs that interfere with the efficacy of tamoxifen and are to be avoided were given to the patient.    At her last visit she was having a lot of pelvic pain with her menstrual cycle and an ultrasound which showed some adenomyosis and her ovarian cyst had disappeared thankfully-she started menstruating regularly-we ordered CAT scans because of the pain in her left side and thankfully chest and abdomen are negative except for thickening of her bladder from her interstitial cystitis and she had fibroids-this discomfort has gradually improved with time        Father  with metastatic thyroid cancer I have again stressed the need for genetic testing and she did this and it was thankfully benign except for 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  This is actually gotten better and she thinks the Lexapro has helped this also there may have been a component of anxiety.  I told her if the dysphagia recurs she needs an EGD          Current Outpatient Medications on File Prior to Visit   Medication Sig Dispense Refill    acetaminophen (TYLENOL) 325 MG tablet Take 2 tablets by mouth Every 4  (Four) Hours As Needed for Mild Pain .      baclofen (LIORESAL) 20 MG tablet Take 1 tablet QAM, 1 tablet midday, 1.5 tablets QHS      bisacodyl (DULCOLAX) 10 MG suppository Insert 1 suppository into the rectum Every Other Day.      ciprofloxacin (CIPRO) 500 MG tablet Take 1 tablet by mouth 2 (Two) Times a Day.      DULoxetine (CYMBALTA) 30 MG capsule Take 1 capsule by mouth Daily.      HYDROcodone-acetaminophen (NORCO) 5-325 MG per tablet TAKE 1 TABLET BY MOUTH EVERY 4 HOURS AS NEEDED WITH FOOD FOR PAIN      hydrOXYzine (ATARAX) 25 MG tablet Take 1 tablet by mouth 3 (Three) Times a Day As Needed for Itching or Anxiety.      meloxicam (MOBIC) 15 MG tablet TAKE 1 TABLET BY MOUTH ONCE DAILY AS NEEDED WITH FOOD      nitrofurantoin, macrocrystal-monohydrate, (MACROBID) 100 MG capsule Take 1 capsule by mouth 2 (Two) Times a Day.      polyethylene glycol (MIRALAX) 17 g packet Take 17 g by mouth Daily. 30 packet 11    pregabalin (LYRICA) 100 MG capsule Take 1 capsule by mouth Every 12 (Twelve) Hours.      promethazine (PHENERGAN) 25 MG tablet TAKE 1 TABLET BY MOUTH EVERY 4 TO 6 HOURS AS NEEDED      sulfamethoxazole-trimethoprim (BACTRIM DS,SEPTRA DS) 800-160 MG per tablet Take 1 tablet by mouth Every 12 (Twelve) Hours.      tamoxifen (NOLVADEX) 20 MG chemo tablet Take 1 tablet by mouth Daily. 90 tablet 3    traMADol (ULTRAM) 50 MG tablet Take 1 tablet by mouth Every 6 (Six) Hours As Needed for Moderate Pain.      Tranexamic Acid 650 MG tablet As Needed.      dantrolene (DANTRIUM) 50 MG capsule Take 50 mg by mouth 3 (Three) Times a Day.      gabapentin (NEURONTIN) 100 MG capsule Take 200 mg by mouth 3 (Three) Times a Day.       No current facility-administered medications on file prior to visit.        ALLERGIES:  No Known Allergies     Social History     Socioeconomic History    Marital status:      Spouse name: Nikita Workman    Number of children: 3    Years of education: College   Tobacco Use    Smoking status:  "Former     Packs/day: 0.50     Years: 2.00     Pack years: 1.00     Types: Cigarettes     Start date:      Quit date:      Years since quittin.7    Smokeless tobacco: Never   Vaping Use    Vaping Use: Never used   Substance and Sexual Activity    Alcohol use: Yes     Comment: RARE MONTHLY    Drug use: No    Sexual activity: Defer     Birth control/protection: None        Family History   Problem Relation Age of Onset    Breast cancer Maternal Aunt         In her 60s    Breast cancer Paternal Aunt     Hyperlipidemia Mother     Asthma Father     Diabetes Father     Hearing loss Father     Heart disease Father     Hypertension Father     Thyroid cancer Father 64        Follicular Tyroid Cancer    Alcohol abuse Sister     Depression Sister     Diabetes Maternal Grandmother     Bone cancer Maternal Grandfather     Breast cancer Cousin     Malig Hyperthermia Neg Hx         Review of Systems   Constitutional:  Negative for chills, fatigue, fever and unexpected weight change.   HENT:  Negative for congestion, mouth sores, nosebleeds and trouble swallowing.    Respiratory:  Negative for choking, chest tightness and shortness of breath.    Cardiovascular:  Negative for chest pain.   Gastrointestinal:  Negative for constipation, diarrhea, nausea and vomiting.   Genitourinary:  Negative for difficulty urinating and pelvic pain.        Continued decreased libido   Musculoskeletal:  Positive for arthralgias (hands and thumbs intermittently). Negative for back pain and gait problem.   Neurological:  Negative for dizziness, numbness and headaches (Chronic migraine headaches).   Psychiatric/Behavioral:  Positive for decreased concentration (Word finding issues). The patient is not nervous/anxious.         Objective     Vitals:    23 0920   Resp: 18   Temp: 97.9 °F (36.6 °C)   TempSrc: Temporal   Weight: 68.1 kg (150 lb 1.6 oz)   Height: 157.5 cm (62.01\")   PainSc:   4   PainLoc: Back           2023     9:19 " AM   Current Status   ECOG score 1       Physical Exam    GENERAL:  Well-developed, well-nourished in no acute distress.   SKIN:  Warm, dry without rashes, purpura or petechiae.  EYES:  Pupils equal, round and reactive to light.  EOMs intact.  Conjunctivae normal.  EARS:  Hearing intact.  NOSE:  Septum midline.  No excoriations or nasal discharge.  MOUTH:  Tongue is well-papillated; no stomatitis or ulcers.  Lips normal.  THROAT:  Oropharynx without lesions or exudates.  NECK:  Supple with good range of motion; no thyromegaly or masses, no JVD.  LYMPHATICS:  No cervical, supraclavicular, axillary adenopathy.  CHEST:  Lungs clear to auscultation. Good airflow.  BREASTS: Right breast is benign left  shows a lumpectomy scar with minimal scarring at   the lumpectomy site, otherwise soft, no nodules, no skin changes, no nipple discharge.  Left breast and axilla soft, no nodules, no skin changes, no nipple discharge.  Right axilla is tender to palpation no definite adenopathy  CARDIAC:  Regular rate and rhythm without murmurs, rubs or gallops. Normal S1,S2.  ABDOMEN:  Soft, minimal left lower quadrant tenderness without mass - with no hepatosplenomegaly .  EXTREMITIES:  No clubbing, cyanosis or edema.  NEUROLOGICAL:  Cranial Nerves II-XII grossly intact.  Foot drop bilaterally left greater than right and atrophy of calf muscles   PSYCHIATRIC:  Normal affect and mood.    I have reexamined the patient and the results are consistent with the previously documented exam. Guerda Ying MA         RECENT LABS:  Hematology WBC   Date Value Ref Range Status   03/20/2023 9.56 3.40 - 10.80 10*3/mm3 Final     RBC   Date Value Ref Range Status   03/20/2023 4.37 3.77 - 5.28 10*6/mm3 Final     Hemoglobin   Date Value Ref Range Status   03/20/2023 12.7 12.0 - 15.9 g/dL Final     Hematocrit   Date Value Ref Range Status   03/20/2023 38.9 34.0 - 46.6 % Final     Platelets   Date Value Ref Range Status   03/20/2023 213 140 - 450 10*3/mm3  "Final         IMPRESSION:  1. Biopsy-proven malignancy in the left breast at the 11:30 position  measuring on the order of 2.1 cm in greatest dimension. No other  suspicious findings are seen within the left breast and there is no  evidence for left axillary adenopathy.  2. There are no findings suspicious for malignancy in the right breast.     BI-RADS Category 6: Known biopsy-proven malignancy.     4/11/18  1. \"LEFT BREAST MASS,\" WIRE GUIDED LUMPECTOMY:             INVASIVE DUCTAL CARCINOMA, INTERMEDIATE GRADE, 8 MM, MARGINS CLEAR.             CLOSEST MARGIN TO INVASIVE TUMOR: 1.5 MM (INFERIOR).             ASSOCIATED HIGH GRADE DUCTAL CARCINOMA IN SITU SPANNING APPROXIMATELY 16 MM (FOUR                    BLOCKS x 4 MM PER BLOCK).             CLOSEST MARGIN TO DUCTAL CARCINOMA IN SITU: 0.5 MM (LATERAL AND MEDIAL MARGINS).             HORMONE RECEPTORS REPORTED ON PRIOR BIOPSY, [facility] [id number]. REPEAT/                   CONFIRMATORY STUDIES ARE AVAILABLE UPON REQUEST.                         BIOPSY SITE CHANGES.     NOTE: ADDITIONAL MARGINS SUBMITTED AS PARTS 2, 3, AND 4.        2. \"LEFT BREAST NEW ANTERIOR MARGIN\":              BENIGN FIBROADIPOSE TISSUE.              NEW MARGIN - NO ATYPIA.     3. \"LEFT BREAST NEW SUPERIOR MARGIN\":             BENIGN BREAST TISSUE.             NEW MARGIN - NO ATYPIA.     4. \"LEFT BREAST NEW LATERAL MARGIN\":             BENIGN BREAST TISSUE.             NEW MARGIN - NO ATYPIA.     5. \"SENTINEL NODE #1\":             LYMPH NODE, MULTIPLE STEP SECTIONS: NO METASTATIC CARCINOMA SEEN.     6. \"SENTINEL NODE #2\":             LYMPH NODE, MULTIPLE STEP SECTIONS: NO METASTATIC CARCINOMA SEEN.     THM/th IHC/a/CMK                                Electronically signed by Darin Claire MD on 4/12/2018 at 1511   Synoptic Checklist   INVASIVE CARCINOMA OF THE BREAST   Breast Invasive - All Specimens   SPECIMEN   Procedure  Excision (less than total mastectomy)   Specimen " Laterality  Left   TUMOR   Histologic Type  Invasive carcinoma of no special type (ductal, not otherwise specified)   Histologic Grade (Leeds Histologic Score)     Glandular (Acinar) / Tubular Differentiation  Score 3 (< 10% of tumor area forming glandular / tubular structures)   Nuclear Pleomorphism  Score 2 (Cells larger than normal with open vesicular nuclei, visible nucleoli, and moderate variability in both size and shape)   Mitotic Rate  Score 1 (<=3 mitoses per mm2)   Overall Grade  Grade 2 (scores of 6 or 7)   Tumor Size  Greatest dimension of largest invasive focus > 1 mm (indicate exact measurement in Millimeters):: 8 mm   Tumor Focality  Single focus of invasive carcinoma   Ductal Carcinoma In Situ (DCIS)  DCIS is present in specimen   Ductal Carcinoma In Situ (DCIS)  Positive for EIC   Size (Extent) of DCIS  Estimated size (extent) of DCIS (greatest dimension using gross and microscopic evaluation) in Millimeters (mm) is at least: 16  mm   Nuclear Grade  Grade III (high)   Lymphovascular Invasion  Not identified   Treatment Effect  No known presurgical therapy   MARGINS   Invasive Carcinoma Margins  Uninvolved by invasive carcinoma   Distance from Closest Margin in Millimeters (mm)  Specify distance: 1.5 mm   Closest Margin  Inferior   DCIS Margins  Uninvolved by DCIS (DCIS present in specimen)   Distance of DCIS from Closest Margin in Millimeters (mm)  Specify distance: 0.5 mm   Closest Margin  Medial     Lateral   LYMPH NODES   Regional Lymph Nodes     Number of Lymph Nodes with Macrometastases (> 2 mm)  Specify number: 0   Number of Lymph Nodes with Micrometastases (> 0.2 mm to 2 mm and / or > 200 cells)  Specify number: 0   Number of Lymph Nodes with Isolated Tumor Cells (<= 0.2 mm and <= 200 cells)  Specify number: 0   Number of Lymph Nodes Examined  Specify number: 2   Number of [place] Nodes Examined  Specify number: 2   PATHOLOGIC STAGE CLASSIFICATION (pTNM)   Primary Tumor (Invasive  Carcinoma) (pT)  pT1c: Tumor > 10 mm but <= 20 mm in greatest dimension   Modifier  (sn): Only sentinel node(s) evaluated. If 6 or more nodes (sentinel or nonsentinel) are removed, this modifier should not be used.   Category (pN)  pN0: No regional lymph node metastasis identified or ITCs only   .        COMPLETE PELVIC SONOGRAM   FINDINGS: Transabdominal and transvaginal pelvic sonography was  performed. The uterus is anteflexed and measures 12 x 5 x 7 cm. The  endometrial bilayer measures 0.9 cm in thickness. Scattered  heterogeneity is seen throughout the uterus. Within the posterior mid  uterine body there is a 1.9 x 1.4 x 1.3 cm complex hypoechoic lesion. In  the mid uterine body located in the anterior aspect of the uterus there  is a 1.6 x 1.4 x 1.9 cm hypoechoic lesion.     The right ovary measures 4.2 x 1.9 x 3.2 cm and the left ovary measures  3.4 x 1.9 x 1.4 cm. Normal intraovarian venous vascularity is seen in  both ovaries. Normal follicles are seen within both ovaries. No free  fluid is seen within the pelvic cul-de-sac.     IMPRESSION:  1. Enlarged uterus with 2 focal myometrial lesions measuring on the  order of 1.9 cm in greatest dimension. The sonographic features suggest  adenomyosis, possibly with focal adenomyomas versus fibroids. It is  difficult to differentiate an adenomyoma versus a fibroid, but I believe  adenomyosis is likely present given the enlarged heterogenous appearance  of the uterus. If imaging differentiation is desired it can be performed  with a pelvic MRI without and with contrast.  2. Normal sonographic appearance of both ovaries.     This report was finalized on 9/21/2018     Interpretation Summary 11/18            Normal bilateral lower extremity venous duplex scan.                     Assessment & Plan   1.[id number] HER-2 negative grade 2 left breast cancer post lumpectomy and sentinel node biopsy-with close margins from high-grade DCIS medially-Oncotype score 16.  Tamoxifen  initiated 7/2018  1 month break from tamoxifen to see if her memory issues improve and then resume  Doing well on tamoxifen in 3/22-becoming perimenopausal    2.  Interstitial cystitis undergoing instillation of lidocaine/heparin in  the bladder intermittently.  Pelvic discomfort approximately 10 days prior to menses currently.  Ovarian cyst found by gynecology as possible culprit versus interstitial cystitis potential flare prior to menses.    3.  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 Worsening dysphagia to solids-ENT exam negative symptoms improved with Lexapro-but persistent as of 5/20's referred to GI  [facility] to be more anxiety than mechanical issues    6.  Depression.  Patient on Lexapro, initiated during the time of family deaths and recovery from her breast surgery.  Patient feels she has improved would like to wean off the medication  Patient weaning off Lexapro and 9/21.    7.  Cord compression from a disc at T10-slow rehab underway    Plan  1.continue tamoxifen 20 mg daily for 2 more years  2.  Follow-up with Dr. Gutierrez in 12 months,   3.  Mammogram and DEXA to be completed around May , 2024  4.  She feels comfortable continuing to year 7 and opted not to do breast cancer index-

## 2023-10-12 ENCOUNTER — TRANSCRIBE ORDERS (OUTPATIENT)
Dept: ADMINISTRATIVE | Facility: HOSPITAL | Age: 53
End: 2023-10-12
Payer: COMMERCIAL

## 2023-10-12 ENCOUNTER — LAB (OUTPATIENT)
Dept: LAB | Facility: HOSPITAL | Age: 53
End: 2023-10-12
Payer: COMMERCIAL

## 2023-10-12 DIAGNOSIS — R73.03 PREDIABETES: ICD-10-CM

## 2023-10-12 DIAGNOSIS — E03.9 HYPOTHYROIDISM, UNSPECIFIED TYPE: ICD-10-CM

## 2023-10-12 DIAGNOSIS — Z00.00 ROUTINE GENERAL MEDICAL EXAMINATION AT A HEALTH CARE FACILITY: Primary | ICD-10-CM

## 2023-10-12 DIAGNOSIS — E78.5 HYPERLIPIDEMIA, UNSPECIFIED HYPERLIPIDEMIA TYPE: ICD-10-CM

## 2023-10-12 DIAGNOSIS — Z00.00 ROUTINE GENERAL MEDICAL EXAMINATION AT A HEALTH CARE FACILITY: ICD-10-CM

## 2023-10-12 LAB
ALBUMIN SERPL-MCNC: 4.1 G/DL (ref 3.5–5.2)
ALBUMIN/GLOB SERPL: 1.9 G/DL
ALP SERPL-CCNC: 27 U/L (ref 39–117)
ALT SERPL W P-5'-P-CCNC: 18 U/L (ref 1–33)
ANION GAP SERPL CALCULATED.3IONS-SCNC: 9 MMOL/L (ref 5–15)
AST SERPL-CCNC: 19 U/L (ref 1–32)
BACTERIA UR QL AUTO: ABNORMAL /HPF
BASOPHILS # BLD AUTO: 0.05 10*3/MM3 (ref 0–0.2)
BASOPHILS NFR BLD AUTO: 0.7 % (ref 0–1.5)
BILIRUB SERPL-MCNC: 0.3 MG/DL (ref 0–1.2)
BILIRUB UR QL STRIP: NEGATIVE
BUN SERPL-MCNC: 17 MG/DL (ref 6–20)
BUN/CREAT SERPL: 25 (ref 7–25)
CALCIUM SPEC-SCNC: 8.8 MG/DL (ref 8.6–10.5)
CHLORIDE SERPL-SCNC: 110 MMOL/L (ref 98–107)
CHOLEST SERPL-MCNC: 182 MG/DL (ref 0–200)
CLARITY UR: CLEAR
CO2 SERPL-SCNC: 22 MMOL/L (ref 22–29)
COLOR UR: YELLOW
CREAT SERPL-MCNC: 0.68 MG/DL (ref 0.57–1)
DEPRECATED RDW RBC AUTO: 44.6 FL (ref 37–54)
EGFRCR SERPLBLD CKD-EPI 2021: 104.9 ML/MIN/1.73
EOSINOPHIL # BLD AUTO: 0.2 10*3/MM3 (ref 0–0.4)
EOSINOPHIL NFR BLD AUTO: 2.8 % (ref 0.3–6.2)
ERYTHROCYTE [DISTWIDTH] IN BLOOD BY AUTOMATED COUNT: 13 % (ref 12.3–15.4)
GLOBULIN UR ELPH-MCNC: 2.2 GM/DL
GLUCOSE SERPL-MCNC: 103 MG/DL (ref 65–99)
GLUCOSE UR STRIP-MCNC: NEGATIVE MG/DL
HBA1C MFR BLD: 5.5 % (ref 4.8–5.6)
HCT VFR BLD AUTO: 39 % (ref 34–46.6)
HDLC SERPL-MCNC: 63 MG/DL (ref 40–60)
HGB BLD-MCNC: 12.8 G/DL (ref 12–15.9)
HGB UR QL STRIP.AUTO: NEGATIVE
HYALINE CASTS UR QL AUTO: ABNORMAL /LPF
IMM GRANULOCYTES # BLD AUTO: 0.05 10*3/MM3 (ref 0–0.05)
IMM GRANULOCYTES NFR BLD AUTO: 0.7 % (ref 0–0.5)
KETONES UR QL STRIP: NEGATIVE
LDLC SERPL CALC-MCNC: 102 MG/DL (ref 0–100)
LDLC/HDLC SERPL: 1.58 {RATIO}
LEUKOCYTE ESTERASE UR QL STRIP.AUTO: NEGATIVE
LYMPHOCYTES # BLD AUTO: 2.66 10*3/MM3 (ref 0.7–3.1)
LYMPHOCYTES NFR BLD AUTO: 37 % (ref 19.6–45.3)
MCH RBC QN AUTO: 31.1 PG (ref 26.6–33)
MCHC RBC AUTO-ENTMCNC: 32.8 G/DL (ref 31.5–35.7)
MCV RBC AUTO: 94.7 FL (ref 79–97)
MONOCYTES # BLD AUTO: 0.59 10*3/MM3 (ref 0.1–0.9)
MONOCYTES NFR BLD AUTO: 8.2 % (ref 5–12)
NEUTROPHILS NFR BLD AUTO: 3.64 10*3/MM3 (ref 1.7–7)
NEUTROPHILS NFR BLD AUTO: 50.6 % (ref 42.7–76)
NITRITE UR QL STRIP: POSITIVE
NRBC BLD AUTO-RTO: 0 /100 WBC (ref 0–0.2)
PH UR STRIP.AUTO: 6.5 [PH] (ref 5–8)
PLATELET # BLD AUTO: 234 10*3/MM3 (ref 140–450)
PMV BLD AUTO: 10.4 FL (ref 6–12)
POTASSIUM SERPL-SCNC: 4.4 MMOL/L (ref 3.5–5.2)
PROT SERPL-MCNC: 6.3 G/DL (ref 6–8.5)
PROT UR QL STRIP: NEGATIVE
RBC # BLD AUTO: 4.12 10*6/MM3 (ref 3.77–5.28)
RBC # UR STRIP: ABNORMAL /HPF
REF LAB TEST METHOD: ABNORMAL
SODIUM SERPL-SCNC: 141 MMOL/L (ref 136–145)
SP GR UR STRIP: 1.01 (ref 1–1.03)
SQUAMOUS #/AREA URNS HPF: ABNORMAL /HPF
TRIGL SERPL-MCNC: 97 MG/DL (ref 0–150)
TSH SERPL DL<=0.05 MIU/L-ACNC: 0.74 UIU/ML (ref 0.27–4.2)
UROBILINOGEN UR QL STRIP: ABNORMAL
VLDLC SERPL-MCNC: 17 MG/DL (ref 5–40)
WBC # UR STRIP: ABNORMAL /HPF
WBC NRBC COR # BLD: 7.19 10*3/MM3 (ref 3.4–10.8)

## 2023-10-12 PROCEDURE — 80061 LIPID PANEL: CPT

## 2023-10-12 PROCEDURE — 80050 GENERAL HEALTH PANEL: CPT

## 2023-10-12 PROCEDURE — 36415 COLL VENOUS BLD VENIPUNCTURE: CPT

## 2023-10-12 PROCEDURE — 81001 URINALYSIS AUTO W/SCOPE: CPT

## 2023-10-12 PROCEDURE — 83036 HEMOGLOBIN GLYCOSYLATED A1C: CPT

## 2023-10-18 RX ORDER — TAMOXIFEN CITRATE 20 MG/1
20 TABLET ORAL DAILY
Qty: 90 TABLET | Refills: 3 | Status: SHIPPED | OUTPATIENT
Start: 2023-10-18

## 2023-12-20 ENCOUNTER — OFFICE VISIT (OUTPATIENT)
Dept: ORTHOPEDIC SURGERY | Facility: CLINIC | Age: 53
End: 2023-12-20
Payer: COMMERCIAL

## 2023-12-20 VITALS — WEIGHT: 156.6 LBS | HEIGHT: 62 IN | BODY MASS INDEX: 28.82 KG/M2 | TEMPERATURE: 98.6 F

## 2023-12-20 DIAGNOSIS — M70.22 OLECRANON BURSITIS OF LEFT ELBOW: Primary | ICD-10-CM

## 2023-12-20 DIAGNOSIS — M25.522 ELBOW PAIN, LEFT: ICD-10-CM

## 2023-12-20 RX ORDER — ERGOCALCIFEROL 1.25 MG/1
1 CAPSULE ORAL WEEKLY
COMMUNITY
Start: 2023-10-20

## 2023-12-20 RX ORDER — NITROFURANTOIN MACROCRYSTALS 50 MG/1
1 CAPSULE ORAL DAILY
COMMUNITY
Start: 2023-12-05

## 2023-12-20 RX ORDER — MELATONIN
1000
COMMUNITY

## 2023-12-20 RX ORDER — NITROFURANTOIN 25; 75 MG/1; MG/1
50 CAPSULE ORAL
COMMUNITY

## 2023-12-20 RX ORDER — PREGABALIN 150 MG/1
1 CAPSULE ORAL EVERY 12 HOURS SCHEDULED
COMMUNITY
Start: 2023-12-02

## 2023-12-20 RX ORDER — POLYETHYLENE GLYCOL 3350 17 G/17G
17 POWDER, FOR SOLUTION ORAL DAILY
COMMUNITY
Start: 2023-12-01 | End: 2023-12-31

## 2023-12-20 RX ORDER — BACLOFEN 10 MG/1
TABLET ORAL
COMMUNITY
Start: 2023-10-20

## 2023-12-20 RX ORDER — DULOXETIN HYDROCHLORIDE 60 MG/1
1 CAPSULE, DELAYED RELEASE ORAL DAILY
COMMUNITY
Start: 2023-12-02
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
## 2024-02-06 ENCOUNTER — TELEPHONE (OUTPATIENT)
Dept: ONCOLOGY | Facility: CLINIC | Age: 54
End: 2024-02-06
Payer: COMMERCIAL

## 2024-02-06 NOTE — TELEPHONE ENCOUNTER
Called the patient and she had a hysterectomy and she had a repair done as well. She states she is 6 weeks out and things are taking awhile to heal and her OB recommended the estrogen cream for healing short term. I let her know I would discuss with Dr. Mckay and get back to her and she v/u.

## 2024-02-06 NOTE — TELEPHONE ENCOUNTER
Called the patient to let her know I talked with Dr. Mckay and she stated she could uise the estrogen cream 1-2 x a week sparingly and she v/u.

## 2024-02-06 NOTE — TELEPHONE ENCOUNTER
Caller: Cesia Workman    Relationship: Self    Best call back number: [phone]    What is the best time to reach you: ANY    Who are you requesting to speak with (clinical staff, provider,  specific staff member): CLINICAL         What was the call regarding: PATIENT CALLED TO ASK DR BRADLEY IF SHE CAN START TAKING ESTROGEN CREAM TEMPORARILY.. IF SO, PATIENT WANTED TO KNOW IF SHE NEEDS TO DISCONTINUE TAKING TAMOXIFEN     Is it okay if the provider responds through MyChart: NO

## 2024-03-20 ENCOUNTER — LAB (OUTPATIENT)
Dept: LAB | Facility: HOSPITAL | Age: 54
End: 2024-03-20
Payer: COMMERCIAL

## 2024-03-20 ENCOUNTER — TRANSCRIBE ORDERS (OUTPATIENT)
Dept: ADMINISTRATIVE | Facility: HOSPITAL | Age: 54
End: 2024-03-20
Payer: COMMERCIAL

## 2024-03-20 DIAGNOSIS — N39.0 URINARY TRACT INFECTION WITHOUT HEMATURIA, SITE UNSPECIFIED: Primary | ICD-10-CM

## 2024-03-20 DIAGNOSIS — N39.0 URINARY TRACT INFECTION WITHOUT HEMATURIA, SITE UNSPECIFIED: ICD-10-CM

## 2024-03-20 PROCEDURE — 87077 CULTURE AEROBIC IDENTIFY: CPT

## 2024-03-20 PROCEDURE — 87186 SC STD MICRODIL/AGAR DIL: CPT

## 2024-03-20 PROCEDURE — 87086 URINE CULTURE/COLONY COUNT: CPT

## 2024-03-22 LAB — BACTERIA SPEC AEROBE CULT: ABNORMAL

## 2024-03-23 ENCOUNTER — TELEMEDICINE (OUTPATIENT)
Dept: FAMILY MEDICINE CLINIC | Facility: TELEHEALTH | Age: 54
End: 2024-03-23
Payer: COMMERCIAL

## 2024-03-23 DIAGNOSIS — N30.00 ACUTE CYSTITIS WITHOUT HEMATURIA: Primary | ICD-10-CM

## 2024-03-23 RX ORDER — ESTRADIOL 0.1 MG/G
1 CREAM VAGINAL 2 TIMES WEEKLY
COMMUNITY
Start: 2024-02-08

## 2024-03-23 RX ORDER — SULFAMETHOXAZOLE AND TRIMETHOPRIM 800; 160 MG/1; MG/1
1 TABLET ORAL 2 TIMES DAILY
Qty: 10 TABLET | Refills: 0 | Status: SHIPPED | OUTPATIENT
Start: 2024-03-23 | End: 2024-03-28

## 2024-03-23 NOTE — PROGRESS NOTES
Subjective   Chief Complaint   Patient presents with    Urinary Tract Infection              Cesia Workman is a 53 y.o. female.     History of Present Illness  Patient was seen a couple days ago for urinary symptoms.  She had a urine culture done that resulted today positive for Klebsiella.  She reports symptoms of urine odor, fatigue, back pain, mild dysuria and bladder spasms. Symptoms are worse today and she does think she can wait until Monday when her doctor's office opens for treatment.  She has a history of recurrent UTIs post spinal cord injury.  She is currently on Macrobid 50 mg prophylactically.  Klebsiella is resistant to Macrobid per culture.  Urinary Tract Infection   This is a new problem. Episode onset: 2 days. The problem occurs constantly. The problem has been worse. The quality of the pain is described as burning. There has been no fever. Associated symptoms include hesitancy. Pertinent negatives include no chills, discharge, flank pain, frequency, hematuria, nausea, possible pregnancy, sweats, urgency or vomiting. Her past medical history is significant for recurrent UTIs.        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 Lashaun Gamble, [facility]    BREAST BIOPSY Left 02/12/2018    INCOMPLETE - Biopsy could not be completed as patient  had severe vasovagal reaction & subsequent syncope-[facility]    BREAST BIOPSY Left 2018    Procedure: REEXCISION OF POSITIVE LUMPECTOMY MARGIN LEFT BREAST;  Surgeon: Adrián Pereyra MD;  Location: [facility];  Service: General    BREAST LUMPECTOMY WITH SENTINEL NODE BIOPSY Left 2018    Procedure: BREAST LUMPECTOMY WITH SENTINEL NODE BIOPSY AND NEEDLE LOCALIZATION;  Surgeon: Adrián Pereyra MD;  Location: [facility];  Service: General    THORACIC DECOMPRESSION POSTERIOR FUSION WITH INSTRUMENTATION Left 2022    Procedure: T10-T12 BILATERAL POSTERIOR LATERAL FUSION WITH T11 LEFT SIDED TRANSPEDICULAR DISCECTOMY FOR SPINAL CANAL DECOMPRESSION;  Surgeon: Brady Vega MD;  Location: [facility];  Service: Neurosurgery;  Laterality: Left;       Social History     Socioeconomic History    Marital status:      Spouse name: Nikita Workman    Number of children: 3    Years of education: College   Tobacco Use    Smoking status: Former     Current packs/day: 0.00     Average packs/day: 0.5 packs/day for 2.0 years (1.0 ttl pk-yrs)     Types: Cigarettes     Start date:      Quit date:      Years since quittin.2    Smokeless tobacco: Never   Vaping Use    Vaping status: Never Used   Substance and Sexual Activity    Alcohol use: Yes     Comment: RARE MONTHLY    Drug use: No    Sexual activity: Defer     Birth control/protection: None       Family History   Problem Relation Age of Onset    Breast cancer Maternal Aunt         In her 60s    Breast cancer Paternal Aunt     Hyperlipidemia Mother     Asthma Father     Diabetes Father     Hearing loss Father     Heart disease Father     Hypertension Father     Thyroid cancer Father 64        Follicular Tyroid Cancer    Alcohol abuse Sister     Depression Sister     Diabetes Maternal Grandmother     Bone cancer Maternal Grandfather     Breast cancer Cousin     Malig Hyperthermia Neg Hx          Current Outpatient Medications:     estradiol  (ESTRACE) 0.1 MG/GM vaginal cream, Insert 1 g into the vagina 2 (Two) Times a Week., Disp: , Rfl:     acetaminophen (TYLENOL) 325 MG tablet, Take 2 tablets by mouth Every 4 (Four) Hours As Needed for Mild Pain ., Disp: , Rfl:     baclofen (LIORESAL) 20 MG tablet, Take 1 tablet QAM, 1 tablet midday, 1.5 tablets QHS, Disp: , Rfl:     dantrolene (DANTRIUM) 50 MG capsule, Take 50 mg by mouth 3 (Three) Times a Day., Disp: , Rfl:     DULoxetine (CYMBALTA) 30 MG capsule, Take 1 capsule by mouth Daily., Disp: , Rfl:     DULoxetine (CYMBALTA) 60 MG capsule, Take 1 capsule by mouth Daily., Disp: , Rfl:     HYDROcodone-acetaminophen (NORCO) 5-325 MG per tablet, TAKE 1 TABLET BY MOUTH EVERY 4 HOURS AS NEEDED WITH FOOD FOR PAIN, Disp: , Rfl:     hydrOXYzine (ATARAX) 25 MG tablet, Take 1 tablet by mouth 3 (Three) Times a Day As Needed for Itching or Anxiety., Disp: , Rfl:     meloxicam (MOBIC) 15 MG tablet, TAKE 1 TABLET BY MOUTH ONCE DAILY AS NEEDED WITH FOOD (Patient not taking: Reported on 12/20/2023), Disp: , Rfl:     nitrofurantoin (MACRODANTIN) 50 MG capsule, Take 1 capsule by mouth Daily., Disp: , Rfl:     pregabalin (LYRICA) 150 MG capsule, Take 1 capsule by mouth Every 12 (Twelve) Hours., Disp: , Rfl:     promethazine (PHENERGAN) 25 MG tablet, TAKE 1 TABLET BY MOUTH EVERY 4 TO 6 HOURS AS NEEDED, Disp: , Rfl:     sulfamethoxazole-trimethoprim (Bactrim DS) 800-160 MG per tablet, Take 1 tablet by mouth 2 (Two) Times a Day for 5 days., Disp: 10 tablet, Rfl: 0    tamoxifen (NOLVADEX) 20 MG chemo tablet, Take 1 tablet by mouth once daily, Disp: 90 tablet, Rfl: 3    vitamin D (ERGOCALCIFEROL) 1.25 MG (95056 UT) capsule capsule, Take 1 capsule by mouth 1 (One) Time Per Week., Disp: , Rfl:       Review of Systems   Constitutional:  Positive for fatigue. Negative for chills, diaphoresis and fever.   Gastrointestinal:  Negative for abdominal distention, abdominal pain, nausea and vomiting.   Genitourinary:  Positive for dysuria,  hesitancy and pelvic pressure. Negative for decreased urine volume, flank pain, frequency, hematuria, urgency, urinary incontinence, vaginal bleeding, vaginal discharge and vaginal pain.   Musculoskeletal:  Positive for back pain.        There were no vitals filed for this visit.    Objective   Physical Exam  Constitutional:       General: She is not in acute distress.     Appearance: Normal appearance. She is not ill-appearing, toxic-appearing or diaphoretic.   HENT:      Head: Normocephalic.      Mouth/Throat:      Lips: Pink.      Mouth: Mucous membranes are moist.   Pulmonary:      Effort: Pulmonary effort is normal.   Abdominal:      Tenderness: There is no abdominal tenderness (per pt).   Neurological:      Mental Status: She is alert and oriented to person, place, and time.          Procedures     Assessment & Plan   Diagnoses and all orders for this visit:    1. Acute cystitis without hematuria (Primary)  -     sulfamethoxazole-trimethoprim (Bactrim DS) 800-160 MG per tablet; Take 1 tablet by mouth 2 (Two) Times a Day for 5 days.  Dispense: 10 tablet; Refill: 0    Wipe front to back, increase water to flush the kidneys and avoid bladder irritants such as caffeine and alcohol.    -Warning signs: severe abdominal/pelvic/back pain, fever >101, blood in urine - seek medical attention as soon as possible for a hands on/objective exam and possible labs.     If symptoms worsen or do not improve follow up with your PCP or visit your nearest Urgent Care Center or ER.    Results for orders placed or performed in visit on 03/20/24   Urine Culture - Urine, Urine, Random Void    Specimen: Urine, Random Void   Result Value Ref Range    Urine Culture (A)      >100,000 CFU/mL Klebsiella pneumoniae ssp pneumoniae       Susceptibility    Klebsiella pneumoniae ssp pneumoniae - ANDREE     Amoxicillin + Clavulanate  Susceptible ug/ml     Ampicillin  Resistant ug/ml     Ampicillin + Sulbactam  Susceptible ug/ml     Cefazolin   Susceptible ug/ml     Cefepime  Susceptible ug/ml     Ceftazidime  Susceptible ug/ml     Ceftriaxone  Susceptible ug/ml     Gentamicin  Susceptible ug/ml     Levofloxacin  Intermediate ug/ml     Nitrofurantoin  Resistant ug/ml     Piperacillin + Tazobactam  Susceptible ug/ml     Trimethoprim + Sulfamethoxazole  Susceptible ug/ml       PLAN: Discussed dosing, side effects, recommended other symptomatic care.  Patient should follow up with primary care provider, Urgent Care or ER if symptoms worsen, fail to resolve or other symptoms need attention. Patient/family agree to the above.         EDGAR Monzon     The use of a video visit has been reviewed with the patient and verbal informed consent has been obtained. Myself and Cesia Workman participated in this visit. The patient is located at [address] [place] [address]. I am located in [place], [place]. Mychart and Zoom were utilized.        This visit was performed via Telehealth.  This patient has been instructed to follow-up with their primary care provider if their symptoms worsen or the treatment provided does not resolve their illness.

## 2024-03-25 ENCOUNTER — TRANSCRIBE ORDERS (OUTPATIENT)
Dept: BONE DENSITY | Facility: HOSPITAL | Age: 54
End: 2024-03-25
Payer: COMMERCIAL

## 2024-03-25 DIAGNOSIS — Z79.811 LONG TERM CURRENT USE OF AROMATASE INHIBITOR: ICD-10-CM

## 2024-03-25 DIAGNOSIS — M85.80 OSTEOPENIA, UNSPECIFIED LOCATION: Primary | ICD-10-CM

## 2024-04-11 ENCOUNTER — TRANSCRIBE ORDERS (OUTPATIENT)
Dept: ADMINISTRATIVE | Facility: HOSPITAL | Age: 54
End: 2024-04-11
Payer: COMMERCIAL

## 2024-04-11 DIAGNOSIS — Z12.31 VISIT FOR SCREENING MAMMOGRAM: Primary | ICD-10-CM

## 2024-04-16 ENCOUNTER — TRANSCRIBE ORDERS (OUTPATIENT)
Dept: ADMINISTRATIVE | Facility: HOSPITAL | Age: 54
End: 2024-04-16
Payer: COMMERCIAL

## 2024-04-16 ENCOUNTER — APPOINTMENT (OUTPATIENT)
Dept: BONE DENSITY | Facility: HOSPITAL | Age: 54
End: 2024-04-16
Payer: COMMERCIAL

## 2024-04-16 ENCOUNTER — LAB (OUTPATIENT)
Dept: LAB | Facility: HOSPITAL | Age: 54
End: 2024-04-16
Payer: COMMERCIAL

## 2024-04-16 DIAGNOSIS — M85.80 MELORHEOSTOSIS: ICD-10-CM

## 2024-04-16 DIAGNOSIS — E55.9 AVITAMINOSIS D: ICD-10-CM

## 2024-04-16 DIAGNOSIS — Z79.811 USE OF AROMATASE INHIBITORS: ICD-10-CM

## 2024-04-16 DIAGNOSIS — Z76.89 MENSTRUAL EXTRACTION: ICD-10-CM

## 2024-04-16 DIAGNOSIS — M85.80 OSTEOPENIA, UNSPECIFIED LOCATION: ICD-10-CM

## 2024-04-16 DIAGNOSIS — Z79.811 LONG TERM CURRENT USE OF AROMATASE INHIBITOR: ICD-10-CM

## 2024-04-16 DIAGNOSIS — Z76.89 MENSTRUAL EXTRACTION: Primary | ICD-10-CM

## 2024-04-16 LAB
25(OH)D3 SERPL-MCNC: 59.7 NG/ML (ref 30–100)
ANION GAP SERPL CALCULATED.3IONS-SCNC: 9.6 MMOL/L (ref 5–15)
BUN SERPL-MCNC: 17 MG/DL (ref 6–20)
BUN/CREAT SERPL: 29.3 (ref 7–25)
CALCIUM SPEC-SCNC: 8.8 MG/DL (ref 8.6–10.5)
CHLORIDE SERPL-SCNC: 109 MMOL/L (ref 98–107)
CO2 SERPL-SCNC: 21.4 MMOL/L (ref 22–29)
CREAT SERPL-MCNC: 0.58 MG/DL (ref 0.57–1)
EGFRCR SERPLBLD CKD-EPI 2021: 109 ML/MIN/1.73
GLUCOSE SERPL-MCNC: 146 MG/DL (ref 65–99)
POTASSIUM SERPL-SCNC: 4.3 MMOL/L (ref 3.5–5.2)
SODIUM SERPL-SCNC: 140 MMOL/L (ref 136–145)

## 2024-04-16 PROCEDURE — 36415 COLL VENOUS BLD VENIPUNCTURE: CPT

## 2024-04-16 PROCEDURE — 77080 DXA BONE DENSITY AXIAL: CPT

## 2024-04-16 PROCEDURE — 80048 BASIC METABOLIC PNL TOTAL CA: CPT

## 2024-04-16 PROCEDURE — 82306 VITAMIN D 25 HYDROXY: CPT

## 2024-05-07 ENCOUNTER — HOSPITAL ENCOUNTER (OUTPATIENT)
Dept: MAMMOGRAPHY | Facility: HOSPITAL | Age: 54
Discharge: HOME | End: 2024-05-07
Admitting: INTERNAL MEDICINE
Payer: COMMERCIAL

## 2024-05-07 DIAGNOSIS — Z12.31 VISIT FOR SCREENING MAMMOGRAM: ICD-10-CM

## 2024-05-07 PROCEDURE — 77067 SCR MAMMO BI INCL CAD: CPT

## 2024-05-07 PROCEDURE — 77063 BREAST TOMOSYNTHESIS BI: CPT

## 2024-05-07 PROCEDURE — 77067 SCR MAMMO BI INCL CAD: CPT | Performed by: RADIOLOGY

## 2024-05-07 PROCEDURE — 77063 BREAST TOMOSYNTHESIS BI: CPT | Performed by: RADIOLOGY

## 2024-06-14 ENCOUNTER — LAB (OUTPATIENT)
Dept: LAB | Facility: HOSPITAL | Age: 54
End: 2024-06-14
Payer: COMMERCIAL

## 2024-06-14 ENCOUNTER — TRANSCRIBE ORDERS (OUTPATIENT)
Dept: ADMINISTRATIVE | Facility: HOSPITAL | Age: 54
End: 2024-06-14
Payer: COMMERCIAL

## 2024-06-14 DIAGNOSIS — Z79.810 CARE RELATED TO CURRENT TAMOXIFEN USE: ICD-10-CM

## 2024-06-14 DIAGNOSIS — E83.41 HYPERMAGNESEMIA: Primary | ICD-10-CM

## 2024-06-14 DIAGNOSIS — M85.80 SCLEROSTEOSIS: ICD-10-CM

## 2024-06-14 DIAGNOSIS — M85.80 SCLEROSTEOSIS: Primary | ICD-10-CM

## 2024-06-14 DIAGNOSIS — E83.41 HYPERMAGNESEMIA: ICD-10-CM

## 2024-06-14 LAB — MAGNESIUM SERPL-MCNC: 2.2 MG/DL (ref 1.6–2.6)

## 2024-06-14 PROCEDURE — 83735 ASSAY OF MAGNESIUM: CPT

## 2024-06-14 PROCEDURE — 82523 COLLAGEN CROSSLINKS: CPT

## 2024-06-14 PROCEDURE — 36415 COLL VENOUS BLD VENIPUNCTURE: CPT

## 2024-06-20 ENCOUNTER — APPOINTMENT (OUTPATIENT)
Dept: MRI IMAGING | Facility: HOSPITAL | Age: 54
End: 2024-06-20
Payer: COMMERCIAL

## 2024-06-20 ENCOUNTER — APPOINTMENT (OUTPATIENT)
Dept: CT IMAGING | Facility: HOSPITAL | Age: 54
End: 2024-06-20
Payer: COMMERCIAL

## 2024-06-20 ENCOUNTER — HOSPITAL ENCOUNTER (OUTPATIENT)
Facility: HOSPITAL | Age: 54
Setting detail: OBSERVATION
Discharge: HOME | End: 2024-06-21
Attending: STUDENT IN AN ORGANIZED HEALTH CARE EDUCATION/TRAINING PROGRAM | Admitting: EMERGENCY MEDICINE
Payer: COMMERCIAL

## 2024-06-20 DIAGNOSIS — M54.6 ACUTE MIDLINE THORACIC BACK PAIN: Primary | ICD-10-CM

## 2024-06-20 PROBLEM — M54.50 LOW BACK PAIN: Status: ACTIVE | Noted: 2024-06-20

## 2024-06-20 LAB
ALBUMIN SERPL-MCNC: 5 G/DL (ref 3.5–5.2)
ALBUMIN/GLOB SERPL: 1.7 G/DL
ALP SERPL-CCNC: 43 U/L (ref 39–117)
ALT SERPL W P-5'-P-CCNC: 19 U/L (ref 1–33)
ANION GAP SERPL CALCULATED.3IONS-SCNC: 11.8 MMOL/L (ref 5–15)
AST SERPL-CCNC: 22 U/L (ref 1–32)
BACTERIA UR QL AUTO: ABNORMAL /HPF
BASOPHILS # BLD AUTO: 0.08 10*3/MM3 (ref 0–0.2)
BASOPHILS NFR BLD AUTO: 0.9 % (ref 0–1.5)
BILIRUB SERPL-MCNC: <0.2 MG/DL (ref 0–1.2)
BILIRUB UR QL STRIP: NEGATIVE
BUN SERPL-MCNC: 13 MG/DL (ref 6–20)
BUN/CREAT SERPL: 19.7 (ref 7–25)
CALCIUM SPEC-SCNC: 10.1 MG/DL (ref 8.6–10.5)
CHLORIDE SERPL-SCNC: 107 MMOL/L (ref 98–107)
CLARITY UR: CLEAR
CO2 SERPL-SCNC: 24.2 MMOL/L (ref 22–29)
COLOR UR: YELLOW
CREAT SERPL-MCNC: 0.66 MG/DL (ref 0.57–1)
DEPRECATED RDW RBC AUTO: 43.7 FL (ref 37–54)
EGFRCR SERPLBLD CKD-EPI 2021: 105.7 ML/MIN/1.73
EOSINOPHIL # BLD AUTO: 0.24 10*3/MM3 (ref 0–0.4)
EOSINOPHIL NFR BLD AUTO: 2.8 % (ref 0.3–6.2)
ERYTHROCYTE [DISTWIDTH] IN BLOOD BY AUTOMATED COUNT: 13 % (ref 12.3–15.4)
GLOBULIN UR ELPH-MCNC: 3 GM/DL
GLUCOSE SERPL-MCNC: 89 MG/DL (ref 65–99)
GLUCOSE UR STRIP-MCNC: ABNORMAL MG/DL
HCT VFR BLD AUTO: 45.9 % (ref 34–46.6)
HGB BLD-MCNC: 15 G/DL (ref 12–15.9)
HGB UR QL STRIP.AUTO: ABNORMAL
HYALINE CASTS UR QL AUTO: ABNORMAL /LPF
IMM GRANULOCYTES # BLD AUTO: 0.04 10*3/MM3 (ref 0–0.05)
IMM GRANULOCYTES NFR BLD AUTO: 0.5 % (ref 0–0.5)
KETONES UR QL STRIP: NEGATIVE
LEUKOCYTE ESTERASE UR QL STRIP.AUTO: ABNORMAL
LYMPHOCYTES # BLD AUTO: 2.98 10*3/MM3 (ref 0.7–3.1)
LYMPHOCYTES NFR BLD AUTO: 34.8 % (ref 19.6–45.3)
MCH RBC QN AUTO: 29.9 PG (ref 26.6–33)
MCHC RBC AUTO-ENTMCNC: 32.7 G/DL (ref 31.5–35.7)
MCV RBC AUTO: 91.6 FL (ref 79–97)
MONOCYTES # BLD AUTO: 0.49 10*3/MM3 (ref 0.1–0.9)
MONOCYTES NFR BLD AUTO: 5.7 % (ref 5–12)
NEUTROPHILS NFR BLD AUTO: 4.73 10*3/MM3 (ref 1.7–7)
NEUTROPHILS NFR BLD AUTO: 55.3 % (ref 42.7–76)
NITRITE UR QL STRIP: NEGATIVE
NRBC BLD AUTO-RTO: 0 /100 WBC (ref 0–0.2)
PH UR STRIP.AUTO: 6.5 [PH] (ref 5–8)
PLATELET # BLD AUTO: 229 10*3/MM3 (ref 140–450)
PMV BLD AUTO: 10.2 FL (ref 6–12)
POTASSIUM SERPL-SCNC: 3.9 MMOL/L (ref 3.5–5.2)
PROT SERPL-MCNC: 8 G/DL (ref 6–8.5)
PROT UR QL STRIP: NEGATIVE
RBC # BLD AUTO: 5.01 10*6/MM3 (ref 3.77–5.28)
RBC # UR STRIP: ABNORMAL /HPF
REF LAB TEST METHOD: ABNORMAL
SODIUM SERPL-SCNC: 143 MMOL/L (ref 136–145)
SP GR UR STRIP: 1.01 (ref 1–1.03)
SQUAMOUS #/AREA URNS HPF: ABNORMAL /HPF
UROBILINOGEN UR QL STRIP: ABNORMAL
WBC # UR STRIP: ABNORMAL /HPF
WBC NRBC COR # BLD AUTO: 8.56 10*3/MM3 (ref 3.4–10.8)

## 2024-06-20 PROCEDURE — 80053 COMPREHEN METABOLIC PANEL: CPT | Performed by: STUDENT IN AN ORGANIZED HEALTH CARE EDUCATION/TRAINING PROGRAM

## 2024-06-20 PROCEDURE — 96372 THER/PROPH/DIAG INJ SC/IM: CPT

## 2024-06-20 PROCEDURE — G0378 HOSPITAL OBSERVATION PER HR: HCPCS

## 2024-06-20 PROCEDURE — 72158 MRI LUMBAR SPINE W/O & W/DYE: CPT

## 2024-06-20 PROCEDURE — 25010000002 DEXAMETHASONE PER 1 MG: Performed by: NURSE PRACTITIONER

## 2024-06-20 PROCEDURE — 81001 URINALYSIS AUTO W/SCOPE: CPT | Performed by: NURSE PRACTITIONER

## 2024-06-20 PROCEDURE — 72131 CT LUMBAR SPINE W/O DYE: CPT

## 2024-06-20 PROCEDURE — 87086 URINE CULTURE/COLONY COUNT: CPT | Performed by: NURSE PRACTITIONER

## 2024-06-20 PROCEDURE — 36415 COLL VENOUS BLD VENIPUNCTURE: CPT

## 2024-06-20 PROCEDURE — 85025 COMPLETE CBC W/AUTO DIFF WBC: CPT | Performed by: STUDENT IN AN ORGANIZED HEALTH CARE EDUCATION/TRAINING PROGRAM

## 2024-06-20 PROCEDURE — 96374 THER/PROPH/DIAG INJ IV PUSH: CPT

## 2024-06-20 PROCEDURE — 87077 CULTURE AEROBIC IDENTIFY: CPT | Performed by: NURSE PRACTITIONER

## 2024-06-20 PROCEDURE — 25010000002 KETOROLAC TROMETHAMINE PER 15 MG: Performed by: STUDENT IN AN ORGANIZED HEALTH CARE EDUCATION/TRAINING PROGRAM

## 2024-06-20 PROCEDURE — 0 GADOBENATE DIMEGLUMINE 529 MG/ML SOLUTION: Performed by: EMERGENCY MEDICINE

## 2024-06-20 PROCEDURE — A9577 INJ MULTIHANCE: HCPCS | Performed by: EMERGENCY MEDICINE

## 2024-06-20 PROCEDURE — 63710000001 DEXAMETHASONE PER 0.25 MG: Performed by: STUDENT IN AN ORGANIZED HEALTH CARE EDUCATION/TRAINING PROGRAM

## 2024-06-20 PROCEDURE — 72128 CT CHEST SPINE W/O DYE: CPT

## 2024-06-20 PROCEDURE — 87186 SC STD MICRODIL/AGAR DIL: CPT | Performed by: NURSE PRACTITIONER

## 2024-06-20 PROCEDURE — 99285 EMERGENCY DEPT VISIT HI MDM: CPT

## 2024-06-20 PROCEDURE — 99214 OFFICE O/P EST MOD 30 MIN: CPT | Performed by: NURSE PRACTITIONER

## 2024-06-20 PROCEDURE — 72157 MRI CHEST SPINE W/O & W/DYE: CPT

## 2024-06-20 RX ORDER — BISACODYL 5 MG/1
5 TABLET, DELAYED RELEASE ORAL DAILY PRN
Status: DISCONTINUED | OUTPATIENT
Start: 2024-06-20 | End: 2024-06-21 | Stop reason: HOSPADM

## 2024-06-20 RX ORDER — NITROGLYCERIN 0.4 MG/1
0.4 TABLET SUBLINGUAL
Status: DISCONTINUED | OUTPATIENT
Start: 2024-06-20 | End: 2024-06-21 | Stop reason: HOSPADM

## 2024-06-20 RX ORDER — DIAZEPAM 5 MG/1
5 TABLET ORAL EVERY 12 HOURS PRN
Status: DISCONTINUED | OUTPATIENT
Start: 2024-06-20 | End: 2024-06-21 | Stop reason: HOSPADM

## 2024-06-20 RX ORDER — SODIUM CHLORIDE 0.9 % (FLUSH) 0.9 %
10 SYRINGE (ML) INJECTION AS NEEDED
Status: DISCONTINUED | OUTPATIENT
Start: 2024-06-20 | End: 2024-06-21 | Stop reason: HOSPADM

## 2024-06-20 RX ORDER — SODIUM CHLORIDE 0.9 % (FLUSH) 0.9 %
10 SYRINGE (ML) INJECTION EVERY 12 HOURS SCHEDULED
Status: DISCONTINUED | OUTPATIENT
Start: 2024-06-20 | End: 2024-06-21 | Stop reason: HOSPADM

## 2024-06-20 RX ORDER — METHOCARBAMOL 750 MG/1
750 TABLET, FILM COATED ORAL ONCE
Status: COMPLETED | OUTPATIENT
Start: 2024-06-20 | End: 2024-06-20

## 2024-06-20 RX ORDER — AMOXICILLIN 250 MG
2 CAPSULE ORAL 2 TIMES DAILY PRN
Status: DISCONTINUED | OUTPATIENT
Start: 2024-06-20 | End: 2024-06-21 | Stop reason: HOSPADM

## 2024-06-20 RX ORDER — LIDOCAINE 4 G/G
2 PATCH TOPICAL
Status: DISCONTINUED | OUTPATIENT
Start: 2024-06-20 | End: 2024-06-21 | Stop reason: HOSPADM

## 2024-06-20 RX ORDER — TIZANIDINE HYDROCHLORIDE 4 MG/1
4-8 CAPSULE, GELATIN COATED ORAL EVERY 6 HOURS PRN
COMMUNITY
Start: 2024-06-06 | End: 2024-06-21 | Stop reason: HOSPADM

## 2024-06-20 RX ORDER — DULOXETIN HYDROCHLORIDE 30 MG/1
30 CAPSULE, DELAYED RELEASE ORAL DAILY
Status: DISCONTINUED | OUTPATIENT
Start: 2024-06-21 | End: 2024-06-21 | Stop reason: HOSPADM

## 2024-06-20 RX ORDER — PANTOPRAZOLE SODIUM 40 MG/1
40 TABLET, DELAYED RELEASE ORAL
Status: DISCONTINUED | OUTPATIENT
Start: 2024-06-21 | End: 2024-06-21 | Stop reason: HOSPADM

## 2024-06-20 RX ORDER — DEXAMETHASONE SODIUM PHOSPHATE 4 MG/ML
4 INJECTION, SOLUTION INTRA-ARTICULAR; INTRALESIONAL; INTRAMUSCULAR; INTRAVENOUS; SOFT TISSUE EVERY 6 HOURS
Status: DISCONTINUED | OUTPATIENT
Start: 2024-06-20 | End: 2024-06-21 | Stop reason: HOSPADM

## 2024-06-20 RX ORDER — POLYETHYLENE GLYCOL 3350 17 G/17G
17 POWDER, FOR SOLUTION ORAL DAILY PRN
Status: DISCONTINUED | OUTPATIENT
Start: 2024-06-20 | End: 2024-06-21 | Stop reason: HOSPADM

## 2024-06-20 RX ORDER — DULOXETIN HYDROCHLORIDE 60 MG/1
60 CAPSULE, DELAYED RELEASE ORAL DAILY
Status: DISCONTINUED | OUTPATIENT
Start: 2024-06-21 | End: 2024-06-21 | Stop reason: HOSPADM

## 2024-06-20 RX ORDER — DIAZEPAM 5 MG/1
1 TABLET ORAL EVERY 12 HOURS SCHEDULED
COMMUNITY
Start: 2024-06-04

## 2024-06-20 RX ORDER — KETOROLAC TROMETHAMINE 30 MG/ML
30 INJECTION, SOLUTION INTRAMUSCULAR; INTRAVENOUS ONCE
Status: COMPLETED | OUTPATIENT
Start: 2024-06-20 | End: 2024-06-20

## 2024-06-20 RX ORDER — METHOCARBAMOL 750 MG/1
750 TABLET, FILM COATED ORAL 4 TIMES DAILY
Status: DISCONTINUED | OUTPATIENT
Start: 2024-06-20 | End: 2024-06-21 | Stop reason: HOSPADM

## 2024-06-20 RX ORDER — DEXAMETHASONE 4 MG/1
10 TABLET ORAL ONCE
Status: COMPLETED | OUTPATIENT
Start: 2024-06-20 | End: 2024-06-20

## 2024-06-20 RX ORDER — PREGABALIN 75 MG/1
150 CAPSULE ORAL EVERY 12 HOURS SCHEDULED
Status: DISCONTINUED | OUTPATIENT
Start: 2024-06-20 | End: 2024-06-21 | Stop reason: HOSPADM

## 2024-06-20 RX ORDER — TAMOXIFEN CITRATE 10 MG/1
20 TABLET ORAL DAILY
Status: DISCONTINUED | OUTPATIENT
Start: 2024-06-21 | End: 2024-06-21 | Stop reason: HOSPADM

## 2024-06-20 RX ORDER — DANTROLENE SODIUM 25 MG/1
50 CAPSULE ORAL 3 TIMES DAILY PRN
Status: DISCONTINUED | OUTPATIENT
Start: 2024-06-20 | End: 2024-06-21 | Stop reason: HOSPADM

## 2024-06-20 RX ORDER — BISACODYL 10 MG
10 SUPPOSITORY, RECTAL RECTAL DAILY PRN
Status: DISCONTINUED | OUTPATIENT
Start: 2024-06-20 | End: 2024-06-21 | Stop reason: HOSPADM

## 2024-06-20 RX ORDER — HYDROXYZINE HYDROCHLORIDE 25 MG/1
25 TABLET, FILM COATED ORAL 3 TIMES DAILY PRN
Status: DISCONTINUED | OUTPATIENT
Start: 2024-06-20 | End: 2024-06-21 | Stop reason: HOSPADM

## 2024-06-20 RX ORDER — SODIUM CHLORIDE 9 MG/ML
40 INJECTION, SOLUTION INTRAVENOUS AS NEEDED
Status: DISCONTINUED | OUTPATIENT
Start: 2024-06-20 | End: 2024-06-21 | Stop reason: HOSPADM

## 2024-06-20 RX ADMIN — DEXAMETHASONE 10 MG: 4 TABLET ORAL at 14:58

## 2024-06-20 RX ADMIN — KETOROLAC TROMETHAMINE 30 MG: 30 INJECTION, SOLUTION INTRAMUSCULAR at 12:23

## 2024-06-20 RX ADMIN — Medication 10 ML: at 22:39

## 2024-06-20 RX ADMIN — METHOCARBAMOL TABLETS 750 MG: 750 TABLET, COATED ORAL at 21:35

## 2024-06-20 RX ADMIN — DEXAMETHASONE SODIUM PHOSPHATE 4 MG: 4 INJECTION, SOLUTION INTRAMUSCULAR; INTRAVENOUS at 21:35

## 2024-06-20 RX ADMIN — GADOBENATE DIMEGLUMINE 14 ML: 529 INJECTION, SOLUTION INTRAVENOUS at 19:47

## 2024-06-20 RX ADMIN — METHOCARBAMOL TABLETS 750 MG: 750 TABLET, COATED ORAL at 14:58

## 2024-06-20 RX ADMIN — PREGABALIN 150 MG: 75 CAPSULE ORAL at 21:35

## 2024-06-20 RX ADMIN — LIDOCAINE 2 PATCH: 4 PATCH TOPICAL at 12:54

## 2024-06-20 NOTE — ED TRIAGE NOTES
Patient reports sudden onset of lower back pain, increased muscle tone, and nerve pain that started yesterday. Patient denies any new injury or over exertion. Has a history of ruptured disc with spinal cord compression, has spinal fusion of T-10 through T-12. Patient denies any sensory or motor changes.

## 2024-06-20 NOTE — ED NOTES
Patient to ER via car form home able to walk with walker  Patient reports new back pain  Has hx of spinal cord injury

## 2024-06-20 NOTE — ED PROVIDER NOTES
EMERGENCY DEPARTMENT ENCOUNTER  Room Number:  40/40  PCP: Nikita Gallegos MD  Independent Historians: Patient      HPI:  Chief Complaint: had concerns including Back Pain.     Context: Cesia Workman is a 53 y.o. female with a medical history of breast cancer, thoracic spinal injury who presents to the ED c/o acute back pain.  Patient states she has a history of emergent laminectomy and discectomy approximately 2 years ago in the T10/T11 area and has had improvement in her ability to walk due to extensive physical therapy however, over the last 24 hours she has had significant worsening of pain in the low thoracic region.  Patient states she has had no new injuries to the area.  Patient has noted no new neurological deficits but has poor bowel and bladder control at baseline due to previous spinal cord injury.  Patient additionally notes baseline sensory deficits that do not appear any worse.  She has noted increased contracture of her lower extremities.  Patient is able to walk with assistance of walker.    Review of prior external notes (non-ED) -and- Review of prior external test results outside of this encounter: Office visit with neurosurgery from 7/28/2022 reviewed and notable for weakness of both lower extremities.  Plan was to obtain a thoracic spine x-ray.  Patient noted to have a history of spinal cord injury after thoracic disc herniation and emergent T10/T11 discectomy and fusion May 2022.    Prescription drug monitoring program review:         PAST MEDICAL HISTORY  Active Ambulatory Problems     Diagnosis Date Noted    Malignant neoplasm of upper-inner quadrant of left breast in female, estrogen receptor positive 03/19/2018    Long-term current use of tamoxifen 03/22/2021    Peroneal tendinitis 07/28/2021    Arthritis of carpometacarpal (CMC) joint of right thumb 07/28/2021    Metatarsalgia of both feet 07/28/2021    Weakness of both lower extremities 05/08/2022    Bilateral leg numbness 05/08/2022     Bilateral leg weakness 05/08/2022    Urinary retention 05/08/2022     Resolved Ambulatory Problems     Diagnosis Date Noted    No Resolved Ambulatory Problems     Past Medical History:   Diagnosis Date    Anxiety     Breast cancer 02/14/2018    Heartburn     Hx of radiation therapy     Infectious mononucleosis     Migraines     PONV (postoperative nausea and vomiting)     Pre-diabetes     PVC's (premature ventricular contractions)     Wears contact lenses          PAST SURGICAL HISTORY  Past Surgical History:   Procedure Laterality Date    BREAST BIOPSY Left 02/14/2018    Left breast stereotactic biopsy w/ marker clip placement & positive radiograph-Dr. Lashaun Gamble, [facility]    BREAST BIOPSY Left 02/12/2018    INCOMPLETE - Biopsy could not be completed as patient had severe vasovagal reaction & subsequent syncope-[facility]    BREAST BIOPSY Left 5/23/2018    Procedure: REEXCISION OF POSITIVE LUMPECTOMY MARGIN LEFT BREAST;  Surgeon: Adrián Pereyra MD;  Location: [facility];  Service: General    BREAST LUMPECTOMY WITH SENTINEL NODE BIOPSY Left 4/11/2018    Procedure: BREAST LUMPECTOMY WITH SENTINEL NODE BIOPSY AND NEEDLE LOCALIZATION;  Surgeon: Adrián Pereyra MD;  Location: [facility];  Service: General    THORACIC DECOMPRESSION POSTERIOR FUSION WITH INSTRUMENTATION Left 5/8/2022    Procedure: T10-T12 BILATERAL POSTERIOR LATERAL FUSION WITH T11 LEFT SIDED TRANSPEDICULAR DISCECTOMY FOR SPINAL CANAL DECOMPRESSION;  Surgeon: Brady Vega MD;  Location: [facility];  Service: Neurosurgery;  Laterality: Left;         FAMILY HISTORY  Family History   Problem Relation Age of Onset    Breast cancer Maternal Aunt         In her 60s    Breast cancer Paternal Aunt     Hyperlipidemia Mother     Asthma Father     Diabetes Father     Hearing loss Father     Heart disease Father     Hypertension Father     Thyroid cancer Father 64        Follicular Tyroid Cancer    Alcohol abuse Sister     Depression Sister      Diabetes Maternal Grandmother     Bone cancer Maternal Grandfather     Breast cancer Cousin     Jenna Hyperthermia Neg Hx          SOCIAL HISTORY  Social History     Socioeconomic History    Marital status:      Spouse name: Nikita Workman    Number of children: 3    Years of education: College   Tobacco Use    Smoking status: Former     Current packs/day: 0.00     Average packs/day: 0.5 packs/day for 2.0 years (1.0 ttl pk-yrs)     Types: Cigarettes     Start date:      Quit date:      Years since quittin.4    Smokeless tobacco: Never   Vaping Use    Vaping status: Never Used   Substance and Sexual Activity    Alcohol use: Yes     Comment: RARE MONTHLY    Drug use: No    Sexual activity: Defer     Birth control/protection: None         ALLERGIES  Patient has no known allergies.      REVIEW OF SYSTEMS  Review of Systems  Included in HPI  All systems reviewed and negative except for those discussed in HPI.      PHYSICAL EXAM    I have reviewed the triage vital signs and nursing notes.    ED Triage Vitals [24 1152]   Temp Heart Rate Resp BP SpO2   97.3 °F (36.3 °C) 97 16 -- 97 %      Temp src Heart Rate Source Patient Position BP Location FiO2 (%)   -- -- -- -- --       Physical Exam  GENERAL: alert, no acute distress  SKIN: Warm, dry  HENT: Normocephalic, atraumatic  EYES: no scleral icterus  CV: regular rhythm, regular rate  RESPIRATORY: normal effort, lungs clear  ABDOMEN: soft, nontender, nondistended  MUSCULOSKELETAL: no deformity.  Surgical scar overlying the low thoracic region with no significant tenderness to palpation, swelling, erythema, induration or fluctuance noted  NEURO: alert, moves all extremities, follows commands            LAB RESULTS  No results found for this or any previous visit (from the past 24 hour(s)).      RADIOLOGY  CT Lumbar Spine Without Contrast, CT Thoracic Spine Without Contrast    Result Date: 2024  CT THORACIC SPINE AND LUMBAR SPINE WITHOUT CONTRAST   HISTORY: Discectomy, laminectomy, back pain.  COMPARISON: MRI examination of the thoracic and lumbar spine 05/08/2022.  CT LUMBAR SPINE WITHOUT CONTRAST:  The alignment of the lumbar spine is within normal limits. There is no evidence of disc herniation. There is prominence of the collecting system and of the renal pelvis on the right. The right ureter is prominent. The left ureter and renal pelvis are also prominent but not as prominent as compared to the right. Clinical correlation is recommended. Further evaluation could be performed with a CT examination of the abdomen and pelvis.      1.  There is no evidence of disc herniation. Further evaluation could be performed with MRI examination of the lumbar spine with and without contrast. 2.  Prominence of the ureters appreciated bilaterally (only partially visualized) more prominent on the right. The renal pelvises are also prominent bilaterally and again more prominent on the right. Clinical correlation is recommended. Further evaluation could be performed with a CT examination of the abdomen and pelvis as indicated.   CT EXAMINATION OF THE THORACIC SPINE WITHOUT CONTRAST:  The patient has undergone fusion from T10 to T12 with posterior rods and screws. Bilateral screws are present at T10 and T12 and there is a right-sided screw at T11. A decompressive laminectomy has been performed at T10. There is no evidence of fracture or failure of the josé antonio/screws. Moderate foraminal stenosis is appreciated to the left at C6-7 secondary to uncovertebral degenerative disease. Evaluation of the spinal canal is hampered somewhat by technique. No obvious disc herniation is appreciated. Further evaluation could be performed with a MRI examination of the thoracic spine with and without contrast.   Radiation dose reduction techniques were utilized, including automated exposure control and exposure modulation based on body size.          MEDICATIONS GIVEN IN ER  Medications   Lidocaine  4 % 2 patch (2 patches Transdermal Medication Applied 6/20/24 1254)   dexAMETHasone (DECADRON) tablet 10 mg (has no administration in time range)   methocarbamol (ROBAXIN) tablet 750 mg (has no administration in time range)   methocarbamol (ROBAXIN) tablet 750 mg (has no administration in time range)   dexAMETHasone (DECADRON) injection 4 mg (has no administration in time range)   ketorolac (TORADOL) injection 30 mg (30 mg Intramuscular Given 6/20/24 1223)         ORDERS PLACED DURING THIS VISIT:  Orders Placed This Encounter   Procedures    CT Lumbar Spine Without Contrast    CT Thoracic Spine Without Contrast    MRI Lumbar Spine Without Contrast    MRI Thoracic Spine Without Contrast    Neurosurgery (on-call MD unless specified)    Initiate ED Observation Status         OUTPATIENT MEDICATION MANAGEMENT:  Current Facility-Administered Medications Ordered in Epic   Medication Dose Route Frequency Provider Last Rate Last Admin    dexAMETHasone (DECADRON) injection 4 mg  4 mg Intravenous Q6H Jannie Rivera APRN        dexAMETHasone (DECADRON) tablet 10 mg  10 mg Oral Once Flavio Purcell MD        Lidocaine 4 % 2 patch  2 patch Transdermal Q24H Flavio Purcell MD   2 patch at 06/20/24 1254    methocarbamol (ROBAXIN) tablet 750 mg  750 mg Oral Once Flavio Purcell MD        methocarbamol (ROBAXIN) tablet 750 mg  750 mg Oral 4x Daily Jannie Rivera APRN         Current Outpatient Medications Ordered in Epic   Medication Sig Dispense Refill    acetaminophen (TYLENOL) 325 MG tablet Take 2 tablets by mouth Every 4 (Four) Hours As Needed for Mild Pain .      baclofen (LIORESAL) 20 MG tablet Take 1 tablet QAM, 1 tablet midday, 1.5 tablets QHS      dantrolene (DANTRIUM) 50 MG capsule Take 50 mg by mouth 3 (Three) Times a Day.      DULoxetine (CYMBALTA) 30 MG capsule Take 1 capsule by mouth Daily.      DULoxetine (CYMBALTA) 60 MG capsule Take 1 capsule by mouth Daily.      estradiol (ESTRACE) 0.1 MG/GM  vaginal cream Insert 1 g into the vagina 2 (Two) Times a Week.      HYDROcodone-acetaminophen (NORCO) 5-325 MG per tablet TAKE 1 TABLET BY MOUTH EVERY 4 HOURS AS NEEDED WITH FOOD FOR PAIN      hydrOXYzine (ATARAX) 25 MG tablet Take 1 tablet by mouth 3 (Three) Times a Day As Needed for Itching or Anxiety.      meloxicam (MOBIC) 15 MG tablet TAKE 1 TABLET BY MOUTH ONCE DAILY AS NEEDED WITH FOOD (Patient not taking: Reported on 12/20/2023)      nitrofurantoin (MACRODANTIN) 50 MG capsule Take 1 capsule by mouth Daily.      pregabalin (LYRICA) 150 MG capsule Take 1 capsule by mouth Every 12 (Twelve) Hours.      promethazine (PHENERGAN) 25 MG tablet TAKE 1 TABLET BY MOUTH EVERY 4 TO 6 HOURS AS NEEDED      tamoxifen (NOLVADEX) 20 MG chemo tablet Take 1 tablet by mouth once daily 90 tablet 3    vitamin D (ERGOCALCIFEROL) 1.25 MG (52779 UT) capsule capsule Take 1 capsule by mouth 1 (One) Time Per Week.           PROCEDURES  Procedures            PROGRESS, DATA ANALYSIS, CONSULTS, AND MEDICAL DECISION MAKING  All labs have been independently interpreted by me.  All radiology studies have been reviewed by me. All EKG's have been independently viewed and interpreted by me.  Discussion below represents my analysis of pertinent findings related to patient's condition, differential diagnosis, treatment plan and final disposition.    Differential diagnosis includes but is not limited to muscle strain, cauda equina syndrome, disc bulge    Clinical Scores:                   ED Course as of 06/20/24 1432   Thu Jun 20, 2024   1409 Discussed with Jannie HERNÁNDEZ with neurosurgery who will evaluate patient at bedside.  At this time she recommends oral Decadron and oral Robaxin as trial of symptomatic management.  Will proceed with admission for MRI thoracic and lumbar spine and symptomatic management. [MW]   1431 Discussed with Barbara VELAZCO with [facility] who agrees to admit [MW]      ED Course User Index  [MW] Flavio Purcell MD              AS OF 14:32 EDT VITALS:    BP - 142/89  HR - 97  TEMP - 97.3 °F (36.3 °C)  O2 SATS - 97%    COMPLEXITY OF CARE  The patient requires admission.      DIAGNOSIS  Final diagnoses:   Acute midline thoracic back pain         DISPOSITION  ED Disposition       ED Disposition   Decision to Admit    Condition   --    Comment   --                Please note that portions of this document were completed with a voice recognition program.    Note Disclaimer: At [facility], we believe that sharing information builds trust and better relationships. You are receiving this note because you recently visited [facility]. It is possible you will see health information before a provider has talked with you about it. This kind of information can be easy to misunderstand. To help you fully understand what it means for your health, we urge you to discuss this note with your provider.         Flavio Purcell MD  06/20/24 9402

## 2024-06-20 NOTE — ED NOTES
..Nursing report ED to floor  Cesia Workman  53 y.o.  female    HPI :  HPI (Adult)  Stated Reason for Visit: back pain    Chief Complaint  Chief Complaint   Patient presents with    Back Pain       Admitting doctor:   Adrián Ventura MD    Admitting diagnosis:   The encounter diagnosis was Acute midline thoracic back pain.    Code status:   Current Code Status       Date Active Code Status Order ID Comments User Context       6/20/2024 1434 CPR (Attempt to Resuscitate) [id number]  Barbara Cary APRN ED        Question Answer    Code Status (Patient has no pulse and is not breathing) CPR (Attempt to Resuscitate)    Medical Interventions (Patient has pulse or is breathing) Full Support    Level Of Support Discussed With Patient                    Allergies:   Patient has no known allergies.    Isolation:   No active isolations    Intake and Output  No intake or output data in the 24 hours ending 06/20/24 1440    Weight:   There were no vitals filed for this visit.    Most recent vitals:   Vitals:    06/20/24 1152 06/20/24 1158   BP:  142/89   Pulse: 97    Resp: 16    Temp: 97.3 °F (36.3 °C)    SpO2: 97%        Active LDAs/IV Access:   Lines, Drains & Airways       Active LDAs       Name Placement date Placement time Site Days    Urethral Catheter Silicone 05/17/22  0213  -- 765                    Labs (abnormal labs have a star):   Labs Reviewed   COMPREHENSIVE METABOLIC PANEL   CBC WITH AUTO DIFFERENTIAL   CBC AND DIFFERENTIAL    Narrative:     The following orders were created for panel order CBC & Differential.  Procedure                               Abnormality         Status                     ---------                               -----------         ------                     CBC Auto Differential[[id number]]                                                         Please view results for these tests on the individual orders.       EKG:   No orders to display       Meds given in ED:   Medications    Lidocaine 4 % 2 patch (2 patches Transdermal Medication Applied 6/20/24 1254)   dexAMETHasone (DECADRON) tablet 10 mg (has no administration in time range)   methocarbamol (ROBAXIN) tablet 750 mg (has no administration in time range)   methocarbamol (ROBAXIN) tablet 750 mg (has no administration in time range)   dexAMETHasone (DECADRON) injection 4 mg (has no administration in time range)   sodium chloride 0.9 % flush 10 mL (has no administration in time range)   sodium chloride 0.9 % flush 10 mL (has no administration in time range)   sodium chloride 0.9 % infusion 40 mL (has no administration in time range)   nitroglycerin (NITROSTAT) SL tablet 0.4 mg (has no administration in time range)   sennosides-docusate (PERICOLACE) 8.6-50 MG per tablet 2 tablet (has no administration in time range)     And   polyethylene glycol (MIRALAX) packet 17 g (has no administration in time range)     And   bisacodyl (DULCOLAX) EC tablet 5 mg (has no administration in time range)     And   bisacodyl (DULCOLAX) suppository 10 mg (has no administration in time range)   ketorolac (TORADOL) injection 30 mg (30 mg Intramuscular Given 6/20/24 1223)       Imaging results:  CT Lumbar Spine Without Contrast    Result Date: 6/20/2024  1.  There is no evidence of disc herniation. Further evaluation could be performed with MRI examination of the lumbar spine with and without contrast. 2.  Prominence of the ureters appreciated bilaterally (only partially visualized) more prominent on the right. The renal pelvises are also prominent bilaterally and again more prominent on the right. Clinical correlation is recommended. Further evaluation could be performed with a CT examination of the abdomen and pelvis as indicated.   CT EXAMINATION OF THE THORACIC SPINE WITHOUT CONTRAST:  The patient has undergone fusion from T10 to T12 with posterior rods and screws. Bilateral screws are present at T10 and T12 and there is a right-sided screw at T11. A  decompressive laminectomy has been performed at T10. There is no evidence of fracture or failure of the josé antonio/screws. Moderate foraminal stenosis is appreciated to the left at C6-7 secondary to uncovertebral degenerative disease. Evaluation of the spinal canal is hampered somewhat by technique. No obvious disc herniation is appreciated. Further evaluation could be performed with a MRI examination of the thoracic spine with and without contrast.   Radiation dose reduction techniques were utilized, including automated exposure control and exposure modulation based on body size.       CT Thoracic Spine Without Contrast    Result Date: 6/20/2024  1.  There is no evidence of disc herniation. Further evaluation could be performed with MRI examination of the lumbar spine with and without contrast. 2.  Prominence of the ureters appreciated bilaterally (only partially visualized) more prominent on the right. The renal pelvises are also prominent bilaterally and again more prominent on the right. Clinical correlation is recommended. Further evaluation could be performed with a CT examination of the abdomen and pelvis as indicated.   CT EXAMINATION OF THE THORACIC SPINE WITHOUT CONTRAST:  The patient has undergone fusion from T10 to T12 with posterior rods and screws. Bilateral screws are present at T10 and T12 and there is a right-sided screw at T11. A decompressive laminectomy has been performed at T10. There is no evidence of fracture or failure of the josé antonio/screws. Moderate foraminal stenosis is appreciated to the left at C6-7 secondary to uncovertebral degenerative disease. Evaluation of the spinal canal is hampered somewhat by technique. No obvious disc herniation is appreciated. Further evaluation could be performed with a MRI examination of the thoracic spine with and without contrast.   Radiation dose reduction techniques were utilized, including automated exposure control and exposure modulation based on body size.         Ambulatory status:   - 1 person assist    Social issues:   Social History     Socioeconomic History    Marital status:      Spouse name: Nikita Workman    Number of children: 3    Years of education: College   Tobacco Use    Smoking status: Former     Current packs/day: 0.00     Average packs/day: 0.5 packs/day for 2.0 years (1.0 ttl pk-yrs)     Types: Cigarettes     Start date:      Quit date:      Years since quittin.4    Smokeless tobacco: Never   Vaping Use    Vaping status: Never Used   Substance and Sexual Activity    Alcohol use: Yes     Comment: RARE MONTHLY    Drug use: No    Sexual activity: Defer     Birth control/protection: None       Peripheral Neurovascular  Peripheral Neurovascular (Adult)  Peripheral Neurovascular WDL: WDL    Neuro Cognitive  Neuro Cognitive (Adult)  Cognitive/Neuro/Behavioral WDL: WDL    Learning  Learning Assessment (Adult)  Learning Readiness and Ability: no barriers identified    Respiratory  Respiratory WDL  Respiratory WDL: WDL    Abdominal Pain       Pain Assessments  Pain (Adult)  (0-10) Pain Rating: Rest: 8    NIH Stroke Scale       Birdie Al RN  24 14:40 EDT

## 2024-06-20 NOTE — PROGRESS NOTES
Clinical Pharmacy Services: Medication History    Cesia Workman is a 53 y.o. female presenting to [facility] for   Chief Complaint   Patient presents with    Back Pain       She  has a past medical history of Anxiety, Breast cancer (02/14/2018), Heartburn, radiation therapy, Infectious mononucleosis, Migraines, PONV (postoperative nausea and vomiting), Pre-diabetes, PVC's (premature ventricular contractions), and Wears contact lenses.    Allergies as of 06/20/2024    (No Known Allergies)       Medication information was obtained from: Patient   Pharmacy and Phone Number:     Prior to Admission Medications       Prescriptions Last Dose Informant Patient Reported? Taking?    baclofen (LIORESAL) 20 MG tablet 6/19/2024 Self Yes Yes    Take 1 tablet by mouth Every Night.    dantrolene (DANTRIUM) 50 MG capsule 6/20/2024 Self Yes Yes    Take 1 capsule by mouth 3 (Three) Times a Day As Needed.    diazePAM (VALIUM) 5 MG tablet 6/19/2024 Self Yes Yes    Take 1 tablet by mouth Every 12 (Twelve) Hours.    DULoxetine (CYMBALTA) 30 MG capsule 6/20/2024 Self, Family Member Yes Yes    Take 1 capsule by mouth Daily. Takes with 60 mg to make a total dose of  90 mg daily    DULoxetine (CYMBALTA) 60 MG capsule 6/20/2024 Self Yes Yes    Take 1 capsule by mouth Daily. Takes with 30 mg to make a total dose of 90 mg Daily.    hydrOXYzine (ATARAX) 25 MG tablet 6/20/2024 Self No Yes    Take 1 tablet by mouth 3 (Three) Times a Day As Needed for Itching or Anxiety.    meloxicam (MOBIC) 15 MG tablet 6/19/2024 Self Yes Yes    Take 1 tablet by mouth Daily.    nitrofurantoin (MACRODANTIN) 50 MG capsule 6/19/2024 Self Yes Yes    Take 1 capsule by mouth Daily.    pregabalin (LYRICA) 150 MG capsule 6/20/2024 Self Yes Yes    Take 1 capsule by mouth Every 12 (Twelve) Hours.    tamoxifen (NOLVADEX) 20 MG chemo tablet 6/20/2024 Self No Yes    Take 1 tablet by mouth once daily    TiZANidine (ZANAFLEX) 4 MG capsule   Yes Yes    Take 1-2  capsules by mouth Every 6 (Six) Hours As Needed. **Not started**    vitamin D (ERGOCALCIFEROL) 1.25 MG (06489 UT) capsule capsule Past Week Self Yes Yes    Take 1 capsule by mouth 1 (One) Time Per Week. Sundays              Medication notes:     This medication list is complete to the best of my knowledge as of 6/20/2024    Please call if questions.    E[facility] [place]  Medication History Technician  [phone]    6/20/2024 15:05 EDT

## 2024-06-20 NOTE — H&P
[facility]   HISTORY AND PHYSICAL    Patient Name: Cesia Workman  : 1970  MRN: [id number]  Primary Care Physician:  Nikita Gallegos MD  Date of admission: 2024    Subjective   Subjective     Chief Complaint:   Chief Complaint   Patient presents with    Back Pain         HPI:    Cesia Workman is a 53 y.o. female admitted to the observation unit for further evaluation due to low back pain.  Past medical history is significant for not limited to breast cancer, anxiety, migraines, prediabetes.  She had an acute T10-T11 disc herniation resulting in acute paraplegia in May 2022 and had an emergent discectomy and fusion.  She chronically deals with back pain but says it was more intense last night and she noticed new right leg numbness/tingling.  She has issues with urinary retention/constipation at baseline due to her spinal cord injury.  She self caths regularly at home.  She had a CT scan in the emergency department which did not show any acute issues.  Neurosurgery was consulted and they recommended admitting the patient with plans for MRI of thoracic and lumbar spine to further evaluate spinal cord.  We will continue IV steroids, Robaxin, Lyrica.  She will be admitted to the observation unit.    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 Lashaun Gamble, [facility]    BREAST BIOPSY Left 02/12/2018    INCOMPLETE - Biopsy could not be completed as patient had severe vasovagal reaction & subsequent syncope-[facility]    BREAST BIOPSY Left 5/23/2018    Procedure: REEXCISION OF POSITIVE LUMPECTOMY MARGIN LEFT BREAST;  Surgeon: Adrián Pereyra MD;  Location: [facility];  Service: General    BREAST LUMPECTOMY WITH SENTINEL NODE BIOPSY Left 4/11/2018    Procedure: BREAST LUMPECTOMY WITH SENTINEL NODE BIOPSY AND NEEDLE LOCALIZATION;  Surgeon: Adrián Pereyra MD;  Location: [facility];  Service: General    THORACIC DECOMPRESSION POSTERIOR FUSION WITH INSTRUMENTATION Left 5/8/2022    Procedure: T10-T12 BILATERAL POSTERIOR LATERAL FUSION WITH T11 LEFT SIDED TRANSPEDICULAR DISCECTOMY FOR SPINAL CANAL DECOMPRESSION;  Surgeon: Brady Vega MD;  Location: [facility];  Service: Neurosurgery;  Laterality: Left;       Family History: family history includes Alcohol abuse in her sister; Asthma in her father; Bone cancer in her maternal grandfather; Breast cancer in her cousin, maternal aunt, and paternal aunt; Depression in her sister; Diabetes in her father and maternal grandmother; Hearing loss in her father; Heart disease in her father; Hyperlipidemia in her mother; Hypertension in her father; Thyroid cancer (age of onset: 64) in her father. Otherwise pertinent FHx was reviewed and not pertinent to current issue.    Social History:  reports that she quit smoking about 34 years ago. Her smoking use included cigarettes. She started smoking about 36 years ago. She has a 1 pack-year smoking history. She has never been exposed to tobacco smoke. She has never used smokeless tobacco. She reports current alcohol use. She reports that she does not use drugs.    Home Medications:  DULoxetine, TiZANidine, baclofen,  dantrolene, diazePAM, hydrOXYzine, meloxicam, nitrofurantoin, pregabalin, tamoxifen, and vitamin D    Allergies:  No Known Allergies    Objective   Objective     Vitals:   Temp:  [97.3 °F (36.3 °C)-98.2 °F (36.8 °C)] 98.2 °F (36.8 °C)  Heart Rate:  [62-97] 65  Resp:  [16-18] 18  BP: (128-142)/(80-89) 128/80  Physical Exam    Constitutional: Awake, alert   Eyes: PERRLA, sclerae anicteric, no conjunctival injection   HENT: NCAT, mucous membranes moist   Neck: Supple, no thyromegaly, no lymphadenopathy, trachea midline   Respiratory: Clear to auscultation bilaterally, nonlabored respirations    Cardiovascular: RRR, no murmurs, rubs, or gallops, palpable pedal pulses bilaterally   Gastrointestinal: Positive bowel sounds, soft, nontender, nondistended   Musculoskeletal: No bilateral ankle edema, no clubbing or cyanosis to extremities   Psychiatric: Appropriate affect, cooperative   Neurologic: Oriented x 3, strength symmetric in all extremities, Cranial Nerves grossly intact to confrontation, speech clear   Skin: No rashes     Result Review    Result Review:  I have personally reviewed the results from the time of this admission to 6/20/2024 16:49 EDT and agree with these findings:  [x]  Laboratory list / accordion  []  Microbiology  [x]  Radiology  [x]  EKG/Telemetry   []  Cardiology/Vascular   []  Pathology  []  Old records  []  Other:  Most notable findings include: Lab work largely unremarkable, CT lumbar and thoracic spine shows no acute issues.      Assessment & Plan   Assessment / Plan     Brief Patient Summary:  Cesia Workman is a 53 y.o. female who will be admitted to the observation unit for further evaluation due to worsening low back pain.  Neurosurgery was consulted.  MRI thoracic and lumbar spine pending.    Active Hospital Problems:  Active Hospital Problems    Diagnosis     **Low back pain      Plan:     Low back pain  -CT scan imaging of thoracic and lumbar spine shows nothing acute  -Vital signs  every 4 hours  -Routine neurovascular checks  -Neurosurgery consulted  -Notify them of any neurological changes  -Check urinalysis    History of breast cancer  -Continue tamoxifen    VTE Prophylaxis:  Mechanical VTE prophylaxis orders are present.    CODE STATUS:    Level Of Support Discussed With: Patient  Code Status (Patient has no pulse and is not breathing): CPR (Attempt to Resuscitate)  Medical Interventions (Patient has pulse or is breathing): Full Support    Admission Status:  I believe this patient meets observation status.    Electronically signed by EDGAR Jacobo, 06/20/24, 4:49 PM EDT.    75 minutes has been spent by [facility] providers in the care of this patient while under observation status    I have worn appropriate PPE during this patient encounter, sanitized my hands both with entering and exiting patient's room.    I have discussed plan of care with patient including advance care plan and/or surrogate decision maker.  Patient advises that their , Nikita will be their primary surrogate decision maker

## 2024-06-20 NOTE — CONSULTS
[facility] THORACIC/LUMBAR NEUROSURGERY PROGRESS NOTE      CC:acute low back pain with associated bilateral lower extremity radiculopathy, right anterior thigh numbness/tingling       Subjective     Interval History: 53-year-old female with a history of acute T10-T11 disc herniation resulting in acute paraplegia status post T10-T12 transpedicular discectomy and fusion 5/8/2022.     She presents back to the ED with complaints of acute back pain associated with bilateral radiculopathy and new right anterior thigh numbness/tingling. She has a chronic left foot drop and self caths due to previous injury. She reports pain this time is worse than last time but denies any new lower extremity weakness. Typically uses a walker to ambulate.     ROS:  Constitutional: No fever, chills  MS: + back pain  Neuro: + numbness, + tingling, no weakness,  no balance difficulties  : + self catheterization     Objective     Vital signs in last 24 hours:  Temp:  [97.3 °F (36.3 °C)] 97.3 °F (36.3 °C)  Heart Rate:  [97] 97  Resp:  [16] 16  BP: (142)/(89) 142/89    Intake/Output this shift:  No intake/output data recorded.    LABS:  Results from last 7 days   Lab Units 06/20/24  1505   WBC 10*3/mm3 8.56   HEMOGLOBIN g/dL 15.0   HEMATOCRIT % 45.9   PLATELETS 10*3/mm3 229     Results from last 7 days   Lab Units 06/20/24  1505   SODIUM mmol/L 143   POTASSIUM mmol/L 3.9   CHLORIDE mmol/L 107   CO2 mmol/L 24.2   BUN mg/dL 13   CREATININE mg/dL 0.66   GLUCOSE mg/dL 89   CALCIUM mg/dL 10.1               IMAGING STUDIES:  CT Lumbar Spine Without Contrast    Result Date: 6/20/2024  1.  There is no evidence of disc herniation. Further evaluation could be performed with MRI examination of the lumbar spine with and without contrast. 2.  Prominence of the ureters appreciated bilaterally (only partially visualized) more prominent on the right. The renal pelvises are also prominent bilaterally and again more prominent on the right. Clinical correlation is  recommended. Further evaluation could be performed with a CT examination of the abdomen and pelvis as indicated.   CT EXAMINATION OF THE THORACIC SPINE WITHOUT CONTRAST:  The patient has undergone fusion from T10 to T12 with posterior rods and screws. Bilateral screws are present at T10 and T12 and there is a right-sided screw at T11. A decompressive laminectomy has been performed at T10. There is no evidence of fracture or failure of the josé antonio/screws. Moderate foraminal stenosis is appreciated to the left at C6-7 secondary to uncovertebral degenerative disease. Evaluation of the spinal canal is hampered somewhat by technique. No obvious disc herniation is appreciated. Further evaluation could be performed with a MRI examination of the thoracic spine with and without contrast.   Radiation dose reduction techniques were utilized, including automated exposure control and exposure modulation based on body size.       CT Thoracic Spine Without Contrast    Result Date: 6/20/2024  1.  There is no evidence of disc herniation. Further evaluation could be performed with MRI examination of the lumbar spine with and without contrast. 2.  Prominence of the ureters appreciated bilaterally (only partially visualized) more prominent on the right. The renal pelvises are also prominent bilaterally and again more prominent on the right. Clinical correlation is recommended. Further evaluation could be performed with a CT examination of the abdomen and pelvis as indicated.   CT EXAMINATION OF THE THORACIC SPINE WITHOUT CONTRAST:  The patient has undergone fusion from T10 to T12 with posterior rods and screws. Bilateral screws are present at T10 and T12 and there is a right-sided screw at T11. A decompressive laminectomy has been performed at T10. There is no evidence of fracture or failure of the josé antonio/screws. Moderate foraminal stenosis is appreciated to the left at C6-7 secondary to uncovertebral degenerative disease. Evaluation of the  spinal canal is hampered somewhat by technique. No obvious disc herniation is appreciated. Further evaluation could be performed with a MRI examination of the thoracic spine with and without contrast.   Radiation dose reduction techniques were utilized, including automated exposure control and exposure modulation based on body size.          I personally viewed and interpreted the patient's chart.    Meds reviewed/changed: Yes  Scheduled Meds:dexAMETHasone, 4 mg, Intravenous, Q6H  Lidocaine, 2 patch, Transdermal, Q24H  methocarbamol, 750 mg, Oral, 4x Daily  [START ON 6/21/2024] pantoprazole, 40 mg, Oral, Q AM  sodium chloride, 10 mL, Intravenous, Q12H      Continuous Infusions:   PRN Meds:.  senna-docusate sodium **AND** polyethylene glycol **AND** bisacodyl **AND** bisacodyl    nitroglycerin    sodium chloride    sodium chloride      Physical exam  Awake, alert, oriented x3  Pupils equal round reactive to light  Extraocular muscles intact  Face symmetric  Speech is fluent and clear  No pronator drift  Motor exam  Bilateral deltoids 5/5, bilateral biceps 5/5, bilateral triceps 5/5, bilateral wrist extension 5/5 bilateral hand  5/5  Bilateral hip flexion 5/5, bilateral knee extension 5/5, bilateral R DF/RPF 5/5  L foot drop- chronic   decreased sensation in right anterior thigh   No clonus  No Barbie's reflex  gait deferred  Able to detect  light touch in all 4 extremities      Assessment & Plan     ASSESSMENT:      Low back pain    53-year-old female with a history of acute T10-T11 disc herniation resulting in acute paraplegia status post T-10-T12 transpedicular discectomy and fusion 5/8/2022.     She presents back to the ED with complaints of acute back pain associated with bilateral radiculopathy and new right anterior thigh numbness/tingling. She has a chronic left foot drop and self caths due to previous injury. She reports pain this time is worse than last time but denies any new lower extremity weakness.  "Uses a walker to ambulate.      PLAN:     CT scan looks ok, however given previous injury would like to check MRI thoracic lumbar to rule out any further cord injury in which the patient is agreeable to  add steroids and GI prophylaxis  change baclofen to robaxin  continue lyrica  contact [facility] for any acute neuro changes    I discussed the patient's findings and my recommendations with patient, family, and primary care team    I spent 45 minutes caring for Cesia Workman on this date of service. This time includes time spent by me in the following activities: preparing for the visit, reviewing tests, obtaining and/or reviewing a separately obtained history, performing a medically appropriate examination and/or evaluation, counseling and educating the patient/family/caregiver, ordering medications, tests, or procedures, referring and communicating with other health care professionals, documenting information in the medical record, independently interpreting results and communicating that information with the patient/family/caregiver, and care coordination          LOS: 0 days       Jannie Rivera, APRN  6/20/2024  14:38 EDT    \"Dictated utilizing Dragon dictation\".      "

## 2024-06-21 ENCOUNTER — TELEPHONE (OUTPATIENT)
Dept: NEUROSURGERY | Facility: CLINIC | Age: 54
End: 2024-06-21
Payer: COMMERCIAL

## 2024-06-21 VITALS
BODY MASS INDEX: 27.79 KG/M2 | SYSTOLIC BLOOD PRESSURE: 118 MMHG | HEIGHT: 62 IN | TEMPERATURE: 98.4 F | HEART RATE: 80 BPM | OXYGEN SATURATION: 95 % | DIASTOLIC BLOOD PRESSURE: 77 MMHG | RESPIRATION RATE: 18 BRPM | WEIGHT: 151 LBS

## 2024-06-21 LAB
ANION GAP SERPL CALCULATED.3IONS-SCNC: 9 MMOL/L (ref 5–15)
BUN SERPL-MCNC: 21 MG/DL (ref 6–20)
BUN/CREAT SERPL: 38.9 (ref 7–25)
CALCIUM SPEC-SCNC: 8.9 MG/DL (ref 8.6–10.5)
CHLORIDE SERPL-SCNC: 109 MMOL/L (ref 98–107)
CO2 SERPL-SCNC: 23 MMOL/L (ref 22–29)
COLLAGEN CTX SERPL-MCNC: 210 PG/ML
CREAT SERPL-MCNC: 0.54 MG/DL (ref 0.57–1)
DEPRECATED RDW RBC AUTO: 44.4 FL (ref 37–54)
EGFRCR SERPLBLD CKD-EPI 2021: 110.9 ML/MIN/1.73
ERYTHROCYTE [DISTWIDTH] IN BLOOD BY AUTOMATED COUNT: 13.2 % (ref 12.3–15.4)
GLUCOSE SERPL-MCNC: 168 MG/DL (ref 65–99)
HCT VFR BLD AUTO: 40 % (ref 34–46.6)
HGB BLD-MCNC: 13.1 G/DL (ref 12–15.9)
MCH RBC QN AUTO: 30 PG (ref 26.6–33)
MCHC RBC AUTO-ENTMCNC: 32.8 G/DL (ref 31.5–35.7)
MCV RBC AUTO: 91.7 FL (ref 79–97)
PLATELET # BLD AUTO: 217 10*3/MM3 (ref 140–450)
PMV BLD AUTO: 10.5 FL (ref 6–12)
POTASSIUM SERPL-SCNC: 3.7 MMOL/L (ref 3.5–5.2)
RBC # BLD AUTO: 4.36 10*6/MM3 (ref 3.77–5.28)
SODIUM SERPL-SCNC: 141 MMOL/L (ref 136–145)
WBC NRBC COR # BLD AUTO: 10.5 10*3/MM3 (ref 3.4–10.8)

## 2024-06-21 PROCEDURE — 80048 BASIC METABOLIC PNL TOTAL CA: CPT | Performed by: NURSE PRACTITIONER

## 2024-06-21 PROCEDURE — 25010000002 CEFTRIAXONE PER 250 MG: Performed by: NURSE PRACTITIONER

## 2024-06-21 PROCEDURE — 25010000002 DEXAMETHASONE PER 1 MG: Performed by: NURSE PRACTITIONER

## 2024-06-21 PROCEDURE — G0378 HOSPITAL OBSERVATION PER HR: HCPCS

## 2024-06-21 PROCEDURE — 99232 SBSQ HOSP IP/OBS MODERATE 35: CPT | Performed by: NURSE PRACTITIONER

## 2024-06-21 PROCEDURE — 85027 COMPLETE CBC AUTOMATED: CPT | Performed by: NURSE PRACTITIONER

## 2024-06-21 PROCEDURE — 96376 TX/PRO/DX INJ SAME DRUG ADON: CPT

## 2024-06-21 RX ORDER — METHOCARBAMOL 750 MG/1
750 TABLET, FILM COATED ORAL 4 TIMES DAILY PRN
Qty: 30 TABLET | Refills: 0 | Status: SHIPPED | OUTPATIENT
Start: 2024-06-21

## 2024-06-21 RX ORDER — SULFAMETHOXAZOLE AND TRIMETHOPRIM 800; 160 MG/1; MG/1
1 TABLET ORAL 2 TIMES DAILY
Qty: 10 TABLET | Refills: 0 | Status: SHIPPED | OUTPATIENT
Start: 2024-06-21 | End: 2024-06-26

## 2024-06-21 RX ORDER — CEFDINIR 300 MG/1
300 CAPSULE ORAL 2 TIMES DAILY
Qty: 14 CAPSULE | Refills: 0 | Status: SHIPPED | OUTPATIENT
Start: 2024-06-21 | End: 2024-06-28

## 2024-06-21 RX ADMIN — DEXAMETHASONE SODIUM PHOSPHATE 4 MG: 4 INJECTION, SOLUTION INTRAMUSCULAR; INTRAVENOUS at 06:07

## 2024-06-21 RX ADMIN — CEFTRIAXONE 2000 MG: 2 INJECTION, POWDER, FOR SOLUTION INTRAMUSCULAR; INTRAVENOUS at 08:38

## 2024-06-21 RX ADMIN — DULOXETINE HYDROCHLORIDE 30 MG: 60 CAPSULE, DELAYED RELEASE ORAL at 08:07

## 2024-06-21 RX ADMIN — Medication 10 ML: at 08:10

## 2024-06-21 RX ADMIN — PREGABALIN 150 MG: 75 CAPSULE ORAL at 08:07

## 2024-06-21 RX ADMIN — LIDOCAINE 2 PATCH: 4 PATCH TOPICAL at 08:07

## 2024-06-21 RX ADMIN — TAMOXIFEN CITRATE 20 MG: 10 TABLET ORAL at 08:07

## 2024-06-21 RX ADMIN — PANTOPRAZOLE SODIUM 40 MG: 40 TABLET, DELAYED RELEASE ORAL at 06:06

## 2024-06-21 RX ADMIN — DIAZEPAM 5 MG: 5 TABLET ORAL at 00:09

## 2024-06-21 RX ADMIN — DULOXETINE HYDROCHLORIDE 60 MG: 60 CAPSULE, DELAYED RELEASE ORAL at 08:10

## 2024-06-21 RX ADMIN — METHOCARBAMOL TABLETS 750 MG: 750 TABLET, COATED ORAL at 08:10

## 2024-06-21 NOTE — PLAN OF CARE
Goal Outcome Evaluation:         Patient admitted to ED observation for evaluation of increasing lower back pain. CT and MRI completed with not acute findings. UA taken, patient concerned with findings and discussed with RN previous bouts of UTI. Teaching done on ways to minimize potential future UTI's. Patient receptive to teaching. Pain managed on current medication, states her pain is at about a 6 on 0-10 scale. PRN and scheduled medications given. On recheck patient states pain is now around a 5. She is agreeable to current plan of care. Neurosurgery consult for AM.

## 2024-06-21 NOTE — TELEPHONE ENCOUNTER
RENEE FOR PATIENT INFORMING OF APPT WITH DR. MAXWELL ON 8/1/2024 @ 10:45    HUB OK TO GIVE MESSAGE

## 2024-06-21 NOTE — DISCHARGE INSTRUCTIONS
You have been diagnosed with UTI therefore was started on Bactrim twice daily for 5 days  Follow-up with your PCP patient  Return to the emergency department for symptoms recurrence

## 2024-06-21 NOTE — PROGRESS NOTES
ED OBSERVATION PROGRESS/DISCHARGE SUMMARY    Date of Admission: 6/20/2024   LOS: 0 days   PCP: Nikita Gallegos MD          Subjective     Hospital Outcome:   53-year-old female admitted to the observation unit with a complaint of low back pain that is associated with bilateral radiculopathy and new right anterior thigh numbness and tingling.  UA shows TNTC WBC and 4+ bacteria.  CT lumbar and thoracic shows no evidence of disc herniation.  Neurosurgery evaluated patient yesterday and has added steroids, changed baclofen to Robaxin and continued Lyrica.    MRI lumbar and thoracic spine shows postoperative changes of T10-T12 otherwise no evidence of acute fracture, compressive disc herniation or significant spinal canal stenosis.  Neurosurgery reevaluated patient this morning and concluded that patient's symptoms are most likely secondary to UTI and recommends discharging home on Robaxin and to follow-up outpatient.    ROS:  General: no fevers, chills  Respiratory: no cough, dyspnea  Cardiovascular: no chest pain, palpitations  Abdomen: No abdominal pain, nausea, vomiting, or diarrhea  Neurologic: No focal weakness    Objective   Physical Exam:  I have reviewed the vital signs.  Temp:  [97.3 °F (36.3 °C)-98.2 °F (36.8 °C)] 98.2 °F (36.8 °C)  Heart Rate:  [62-97] 82  Resp:  [16-18] 18  BP: ()/(64-89) 96/64  General Appearance:    Alert, cooperative, no distress  Head:    Normocephalic, atraumatic  Eyes:    Sclerae anicteric  Neck:   Supple, no mass  Lungs: Clear to auscultation bilaterally, respirations unlabored  Heart: Regular rate and rhythm, S1 and S2 normal, no murmur, rub or gallop  Abdomen:  Soft, non-tender, bowel sounds active, nondistended  Extremities: No clubbing, cyanosis, or edema to lower extremities  Pulses:  2+ and symmetric in distal lower extremities  Skin: No rashes   Neurologic: Oriented x3, Normal strength to extremities    Results Review:    I have reviewed the labs, radiology results and  diagnostic studies.    Results from last 7 days   Lab Units 06/21/24  0417   WBC 10*3/mm3 10.50   HEMOGLOBIN g/dL 13.1   HEMATOCRIT % 40.0   PLATELETS 10*3/mm3 217     Results from last 7 days   Lab Units 06/21/24 0417 06/20/24  1505   SODIUM mmol/L 141 143   POTASSIUM mmol/L 3.7 3.9   CHLORIDE mmol/L 109* 107   CO2 mmol/L 23.0 24.2   BUN mg/dL 21* 13   CREATININE mg/dL 0.54* 0.66   CALCIUM mg/dL 8.9 10.1   BILIRUBIN mg/dL  --  <0.2   ALK PHOS U/L  --  43   ALT (SGPT) U/L  --  19   AST (SGOT) U/L  --  22   GLUCOSE mg/dL 168* 89     Imaging Results (Last 24 Hours)       Procedure Component Value Units Date/Time    CT Lumbar Spine Without Contrast [[id number]] Collected: 06/20/24 1321     Updated: 06/20/24 2327    Narrative:      CT THORACIC SPINE AND LUMBAR SPINE WITHOUT CONTRAST     HISTORY: Discectomy, laminectomy, back pain.     COMPARISON: MRI examination of the thoracic and lumbar spine 05/08/2022.     CT LUMBAR SPINE WITHOUT CONTRAST:     The alignment of the lumbar spine is within normal limits. There is no  evidence of disc herniation. There is prominence of the collecting  system and of the renal pelvis on the right. The right ureter is  prominent. The left ureter and renal pelvis are also prominent but not  as prominent as compared to the right. Clinical correlation is  recommended. Further evaluation could be performed with a CT examination  of the abdomen and pelvis.       Impression:      1.  There is no evidence of disc herniation. Further evaluation could be  performed with MRI examination of the lumbar spine with and without  contrast.  2.  Prominence of the ureters appreciated bilaterally (only partially  visualized) more prominent on the right. The renal pelvises are also  prominent bilaterally and again more prominent on the right. Clinical  correlation is recommended. Further evaluation could be performed with a  CT examination of the abdomen and pelvis as indicated.        CT EXAMINATION OF THE  THORACIC SPINE WITHOUT CONTRAST:      The patient has undergone fusion from T10 to T12 with posterior rods and  screws. Bilateral screws are present at T10 and T12 and there is a  right-sided screw at T11. A decompressive laminectomy has been performed  at T10. There is no evidence of fracture or failure of the josé antonio/screws.  Moderate foraminal stenosis is appreciated to the left at C6-7 secondary  to uncovertebral degenerative disease. Evaluation of the spinal canal is  hampered somewhat by technique. No obvious disc herniation is  appreciated. Further evaluation could be performed with a MRI  examination of the thoracic spine with and without contrast.        Radiation dose reduction techniques were utilized, including automated  exposure control and exposure modulation based on body size.        This report was finalized on 6/20/2024 11:24 PM by Dr. Wilfredo Tomas M.D on Workstation: [id number]       CT Thoracic Spine Without Contrast [[id number]] Collected: 06/20/24 1321     Updated: 06/20/24 2327    Narrative:      CT THORACIC SPINE AND LUMBAR SPINE WITHOUT CONTRAST     HISTORY: Discectomy, laminectomy, back pain.     COMPARISON: MRI examination of the thoracic and lumbar spine 05/08/2022.     CT LUMBAR SPINE WITHOUT CONTRAST:     The alignment of the lumbar spine is within normal limits. There is no  evidence of disc herniation. There is prominence of the collecting  system and of the renal pelvis on the right. The right ureter is  prominent. The left ureter and renal pelvis are also prominent but not  as prominent as compared to the right. Clinical correlation is  recommended. Further evaluation could be performed with a CT examination  of the abdomen and pelvis.       Impression:      1.  There is no evidence of disc herniation. Further evaluation could be  performed with MRI examination of the lumbar spine with and without  contrast.  2.  Prominence of the ureters appreciated bilaterally (only  partially  visualized) more prominent on the right. The renal pelvises are also  prominent bilaterally and again more prominent on the right. Clinical  correlation is recommended. Further evaluation could be performed with a  CT examination of the abdomen and pelvis as indicated.        CT EXAMINATION OF THE THORACIC SPINE WITHOUT CONTRAST:      The patient has undergone fusion from T10 to T12 with posterior rods and  screws. Bilateral screws are present at T10 and T12 and there is a  right-sided screw at T11. A decompressive laminectomy has been performed  at T10. There is no evidence of fracture or failure of the josé antonio/screws.  Moderate foraminal stenosis is appreciated to the left at C6-7 secondary  to uncovertebral degenerative disease. Evaluation of the spinal canal is  hampered somewhat by technique. No obvious disc herniation is  appreciated. Further evaluation could be performed with a MRI  examination of the thoracic spine with and without contrast.        Radiation dose reduction techniques were utilized, including automated  exposure control and exposure modulation based on body size.        This report was finalized on 6/20/2024 11:24 PM by Dr. Wilfredo Tomas M.D on Workstation: [id number]       MRI Lumbar Spine With & Without Contrast [[id number]] Collected: 06/20/24 2055     Updated: 06/20/24 2056    Narrative:      MRI THORACIC SPINE W WO CONTRAST-, MRI LUMBAR SPINE W WO  CONTRAST-06/20/2024     HISTORY: Back pain.     TECHNIQUE:  Multiple pre and postcontrast images were obtained through  the thoracic spine and multiple pre and postcontrast images were  obtained through the lumbar spine.     FINDINGS: There are right pedicle screws at T10-T11 and T12 and left  pedicle screws at T10 and T12. These pedicle screws produce some  susceptibility artifact.     No compressive disc herniation is seen in the thoracic spine. The  contour of the spinal cord at T10 appears slightly irregular but some of  this may  be artifact and may be of no clinical significance. Thoracic  spinal cord otherwise appears within normal limits.     In the lumbar spine the lumbar intervertebral discs appear relatively  well-maintained. There is mild multilevel facet joint arthropathy. No  significant canal stenosis is seen. No lumbar fractures are seen.       Impression:      1. Postoperative changes of T10-T12.  2. No acute fractures, compressive disc herniation or significant spinal  canal stenosis is seen.                   MRI Thoracic Spine With & Without Contrast [[id number]] Collected: 06/20/24 2055     Updated: 06/20/24 2056    Narrative:      MRI THORACIC SPINE W WO CONTRAST-, MRI LUMBAR SPINE W WO  CONTRAST-06/20/2024     HISTORY: Back pain.     TECHNIQUE:  Multiple pre and postcontrast images were obtained through  the thoracic spine and multiple pre and postcontrast images were  obtained through the lumbar spine.     FINDINGS: There are right pedicle screws at T10-T11 and T12 and left  pedicle screws at T10 and T12. These pedicle screws produce some  susceptibility artifact.     No compressive disc herniation is seen in the thoracic spine. The  contour of the spinal cord at T10 appears slightly irregular but some of  this may be artifact and may be of no clinical significance. Thoracic  spinal cord otherwise appears within normal limits.     In the lumbar spine the lumbar intervertebral discs appear relatively  well-maintained. There is mild multilevel facet joint arthropathy. No  significant canal stenosis is seen. No lumbar fractures are seen.       Impression:      1. Postoperative changes of T10-T12.  2. No acute fractures, compressive disc herniation or significant spinal  canal stenosis is seen.                           I have reviewed the medications.  ---------------------------------------------------------------------------------------------  Assessment & Plan   Assessment/Problem List    Low back pain      Plan:.    Low  back pain  -CT scan imaging of thoracic and lumbar spine shows nothing acute  -Vital signs every 4 hours  -Routine neurovascular checks  -Neurosurgery consulted  -Notify them of any neurological changes  -Check urinalysis     UTI  -IV Rocephin  -Discharge on oral antibiotic  -Urine culture pending    History of breast cancer  -Continue tamoxifen    Disposition: Home    Follow-up after Discharge: PCP and CRISTINA    This note will serve as a discharge summary    Glo Holland, APRN 06/21/24 04:52 EDT    I have worn appropriate PPE during this patient encounter, sanitized my hands both with entering and exiting patient's room.      56 minutes has been spent by [facility] providers in the care of this patient while under observation status

## 2024-06-21 NOTE — PLAN OF CARE
Goal Outcome Evaluation:   Pt is being discharged. Pain is ongoing intermittently. IV rocephin received prior to discharge. Pt to follow up with PCP in 1 week. Pt to follow up with neurosurgery in 6 to 8 weeks. Pt started on cefdinir and robaxin. Pt has no questions or concerns at this time.

## 2024-06-21 NOTE — PROGRESS NOTES
[facility] NEUROSURGERY  PROGRESS NOTE    PATIENT IDENTIFICATION:   Name:  Cesia Workman      MRN:  [id number]     53 y.o.  female               CC: Acute low back pain      Subjective     Interval History: No events overnight.    ROS:  Constitutional: No fever, chills  HEENT: No headache, no vision changes, no tinnitus, no hearing difficulties  Neck: no stiffness  GI: No nausea, vomiting, no swallow difficulties  Neuro: No numbness, tingling, or weakness, no speech difficulties, no balance difficulties    Objective     Vital signs in last 24 hours:  Temp:  [97.3 °F (36.3 °C)-98.4 °F (36.9 °C)] 98.4 °F (36.9 °C)  Heart Rate:  [62-97] 80  Resp:  [16-18] 18  BP: ()/(64-89) 118/77      Intake/Output this shift:  No intake/output data recorded.      Intake/Output last 3 shifts:  I/O last 3 completed shifts:  In: -   Out: 600 [Urine:600]    LABS:  Results from last 7 days   Lab Units 06/21/24  0417 06/20/24  1505   WBC 10*3/mm3 10.50 8.56   HEMOGLOBIN g/dL 13.1 15.0   HEMATOCRIT % 40.0 45.9   PLATELETS 10*3/mm3 217 229       Results from last 7 days   Lab Units 06/21/24  0417 06/20/24  1505   SODIUM mmol/L 141 143   POTASSIUM mmol/L 3.7 3.9   CHLORIDE mmol/L 109* 107   CO2 mmol/L 23.0 24.2   BUN mg/dL 21* 13   CREATININE mg/dL 0.54* 0.66   GLUCOSE mg/dL 168* 89   CALCIUM mg/dL 8.9 10.1          IMAGING STUDIES:  CT Lumbar Spine Without Contrast    Result Date: 6/20/2024  1.  There is no evidence of disc herniation. Further evaluation could be performed with MRI examination of the lumbar spine with and without contrast. 2.  Prominence of the ureters appreciated bilaterally (only partially visualized) more prominent on the right. The renal pelvises are also prominent bilaterally and again more prominent on the right. Clinical correlation is recommended. Further evaluation could be performed with a CT examination of the abdomen and pelvis as indicated.   CT EXAMINATION OF THE THORACIC SPINE WITHOUT CONTRAST:  The  patient has undergone fusion from T10 to T12 with posterior rods and screws. Bilateral screws are present at T10 and T12 and there is a right-sided screw at T11. A decompressive laminectomy has been performed at T10. There is no evidence of fracture or failure of the josé antonio/screws. Moderate foraminal stenosis is appreciated to the left at C6-7 secondary to uncovertebral degenerative disease. Evaluation of the spinal canal is hampered somewhat by technique. No obvious disc herniation is appreciated. Further evaluation could be performed with a MRI examination of the thoracic spine with and without contrast.   Radiation dose reduction techniques were utilized, including automated exposure control and exposure modulation based on body size.   This report was finalized on 6/20/2024 11:24 PM by Dr. Wilfredo Tomas M.D on Workstation: [id number]      CT Thoracic Spine Without Contrast    Result Date: 6/20/2024  1.  There is no evidence of disc herniation. Further evaluation could be performed with MRI examination of the lumbar spine with and without contrast. 2.  Prominence of the ureters appreciated bilaterally (only partially visualized) more prominent on the right. The renal pelvises are also prominent bilaterally and again more prominent on the right. Clinical correlation is recommended. Further evaluation could be performed with a CT examination of the abdomen and pelvis as indicated.   CT EXAMINATION OF THE THORACIC SPINE WITHOUT CONTRAST:  The patient has undergone fusion from T10 to T12 with posterior rods and screws. Bilateral screws are present at T10 and T12 and there is a right-sided screw at T11. A decompressive laminectomy has been performed at T10. There is no evidence of fracture or failure of the josé antonio/screws. Moderate foraminal stenosis is appreciated to the left at C6-7 secondary to uncovertebral degenerative disease. Evaluation of the spinal canal is hampered somewhat by technique. No obvious disc  herniation is appreciated. Further evaluation could be performed with a MRI examination of the thoracic spine with and without contrast.   Radiation dose reduction techniques were utilized, including automated exposure control and exposure modulation based on body size.   This report was finalized on 6/20/2024 11:24 PM by Dr. Wilfredo Tomas M.D on Workstation: [id number]      MRI Lumbar Spine With & Without Contrast    Result Date: 6/20/2024  1. Postoperative changes of T10-T12. 2. No acute fractures, compressive disc herniation or significant spinal canal stenosis is seen.          MRI Thoracic Spine With & Without Contrast    Result Date: 6/20/2024  1. Postoperative changes of T10-T12. 2. No acute fractures, compressive disc herniation or significant spinal canal stenosis is seen.             I personally viewed and interpreted the patient's chart.    Meds reviewed/changed: Yes  Scheduled Meds:dexAMETHasone, 4 mg, Intravenous, Q6H  DULoxetine, 30 mg, Oral, Daily  DULoxetine, 60 mg, Oral, Daily  Lidocaine, 2 patch, Transdermal, Q24H  methocarbamol, 750 mg, Oral, 4x Daily  pantoprazole, 40 mg, Oral, Q AM  pregabalin, 150 mg, Oral, Q12H  sodium chloride, 10 mL, Intravenous, Q12H  tamoxifen, 20 mg, Oral, Daily      Continuous Infusions:   PRN Meds:.  senna-docusate sodium **AND** polyethylene glycol **AND** bisacodyl **AND** bisacodyl    dantrolene    diazePAM    hydrOXYzine    nitroglycerin    sodium chloride    sodium chloride          Assessment & Plan     ASSESSMENT:      Low back pain      PLAN:     MRI reviewed with Dr. Mcintosh and appears a stable.  There are no acute fractures or compressive disc herniations or significant spinal canal stenosis.  Okay for discharge home today on Robaxin.  She was noted to also have a UTI which could be adding to her symptoms.  She should follow-up with us in about 6 to 8 weeks to establish care as she previously followed with Dr. Vega    I discussed the patient's findings  "and my recommendations with Dr. Mcintosh, patient, nursing staff, and primary care team    I spent 35 minutes caring for Cesia Workman on this date of service. This time includes time spent by me in the following activities: preparing for the visit, reviewing tests, obtaining and/or reviewing a separately obtained history, performing a medically appropriate examination and/or evaluation, counseling and educating the patient/family/caregiver, ordering medications, tests, or procedures, referring and communicating with other health care professionals, documenting information in the medical record, independently interpreting results and communicating that information with the patient/family/caregiver, and care coordination          LOS: 0 days       Jannie Rivera, APRN  6/21/2024  09:07 EDT    \"Dictated utilizing Dragon dictation\".      "

## 2024-06-22 LAB — BACTERIA SPEC AEROBE CULT: ABNORMAL

## 2024-07-12 ENCOUNTER — TRANSCRIBE ORDERS (OUTPATIENT)
Dept: ADMINISTRATIVE | Facility: HOSPITAL | Age: 54
End: 2024-07-12
Payer: COMMERCIAL

## 2024-07-12 DIAGNOSIS — R10.9 ABDOMINAL PAIN, UNSPECIFIED ABDOMINAL LOCATION: Primary | ICD-10-CM

## 2024-07-30 ENCOUNTER — HOSPITAL ENCOUNTER (OUTPATIENT)
Dept: CT IMAGING | Facility: HOSPITAL | Age: 54
Discharge: HOME | End: 2024-07-30
Admitting: INTERNAL MEDICINE
Payer: COMMERCIAL

## 2024-07-30 DIAGNOSIS — R10.9 ABDOMINAL PAIN, UNSPECIFIED ABDOMINAL LOCATION: ICD-10-CM

## 2024-07-30 PROCEDURE — 74177 CT ABD & PELVIS W/CONTRAST: CPT

## 2024-07-30 PROCEDURE — 25510000001 IOPAMIDOL 61 % SOLUTION: Performed by: INTERNAL MEDICINE

## 2024-07-30 RX ADMIN — IOPAMIDOL 85 ML: 612 INJECTION, SOLUTION INTRAVENOUS at 08:54

## 2024-07-31 NOTE — PROGRESS NOTES
"Subjective   Patient ID: Cesia Workman is a 54 y.o. female is here today for hospital  follow-up on back pain.          History of Present Illness    This patient returns today.  She is feeling a lot better than when she arrived in the hospital.  She thinks it was probably her urinary tract infection.  She does have occasional numbness in her neck and down her arm.    The following portions of the patient's history were reviewed and updated as appropriate: allergies, current medications, past family history, past medical history, past social history, past surgical history, and problem list.    Review of Systems   Genitourinary:  Negative for difficulty urinating and urgency.   Musculoskeletal:  Positive for back pain and gait problem.   Neurological:  Positive for weakness and numbness.   All other systems reviewed and are negative.      I reviewed the review of systems listed by the patient and discussed by my MA    Objective     Vitals:    08/01/24 1049   BP: 118/78   Pulse: 80   Temp: 97.8 °F (36.6 °C)   SpO2: 99%   Weight: 70.6 kg (155 lb 11.2 oz)   Height: 157.5 cm (62\")     Body mass index is 28.48 kg/m².    Tobacco Use: Medium Risk (8/1/2024)    Patient History     Smoking Tobacco Use: Former     Smokeless Tobacco Use: Never     Passive Exposure: Never          Physical Exam  Neurological:      Mental Status: She is alert and oriented to person, place, and time.       Neurologic Exam     Mental Status   Oriented to person, place, and time.           Assessment & Plan   Independent Review of Radiographic Studies:      I personally reviewed the images from the following studies.    I reviewed her MRIs which were done when she was in the hospital.  There is some question of cervical stenosis at the C6-7 but there has been no formal cervical MRI.    Medical Decision Making:      Suggested we go ahead and get a MRI of the cervical spine.  We will also get her into see only a management doctor.  We can do a " telephone visit to go over the results of the MRI.    Diagnoses and all orders for this visit:    1. Chronic bilateral low back pain with bilateral sciatica (Primary)  -     Ambulatory Referral to Pain Management    2. Cervical radiculitis  -     MRI Cervical Spine Without Contrast; Future      Return for After radiology test.

## 2024-08-01 ENCOUNTER — OFFICE VISIT (OUTPATIENT)
Dept: NEUROSURGERY | Facility: CLINIC | Age: 54
End: 2024-08-01
Payer: COMMERCIAL

## 2024-08-01 VITALS
HEIGHT: 62 IN | OXYGEN SATURATION: 99 % | SYSTOLIC BLOOD PRESSURE: 118 MMHG | DIASTOLIC BLOOD PRESSURE: 78 MMHG | HEART RATE: 80 BPM | BODY MASS INDEX: 28.65 KG/M2 | TEMPERATURE: 97.8 F | WEIGHT: 155.7 LBS

## 2024-08-01 DIAGNOSIS — M54.42 CHRONIC BILATERAL LOW BACK PAIN WITH BILATERAL SCIATICA: Primary | ICD-10-CM

## 2024-08-01 DIAGNOSIS — M54.41 CHRONIC BILATERAL LOW BACK PAIN WITH BILATERAL SCIATICA: Primary | ICD-10-CM

## 2024-08-01 DIAGNOSIS — M54.12 CERVICAL RADICULITIS: ICD-10-CM

## 2024-08-01 DIAGNOSIS — G89.29 CHRONIC BILATERAL LOW BACK PAIN WITH BILATERAL SCIATICA: Primary | ICD-10-CM

## 2024-08-15 ENCOUNTER — HOSPITAL ENCOUNTER (OUTPATIENT)
Dept: MRI IMAGING | Facility: HOSPITAL | Age: 54
Discharge: HOME | End: 2024-08-15
Admitting: NEUROLOGICAL SURGERY
Payer: COMMERCIAL

## 2024-08-15 DIAGNOSIS — M54.12 CERVICAL RADICULITIS: ICD-10-CM

## 2024-08-15 PROCEDURE — 72141 MRI NECK SPINE W/O DYE: CPT

## 2024-08-16 ENCOUNTER — TELEPHONE (OUTPATIENT)
Dept: NEUROSURGERY | Facility: CLINIC | Age: 54
End: 2024-08-16
Payer: COMMERCIAL

## 2024-08-16 NOTE — PROGRESS NOTES
Subjective   Patient ID: Cesia Workman is a 54 y.o. female is here today via telephone for follow-up with a new MRI C-spine done on 8/15/2024.    You have chosen to receive care through a telephone visit. Do you consent to use a telephone visit for your medical care today? Yes     We had a telephone visit today.  The patient was at home and I was in the office.  We talked for 5 minutes.    History of Present Illness    This patient continues with numbness in her neck and pain in her neck as well as numbness and tingling down her arm primarily on the right.    The following portions of the patient's history were reviewed and updated as appropriate: allergies, current medications, past family history, past medical history, past social history, past surgical history, and problem list.    Review of Systems    I reviewed the review of systems listed by the patient and discussed by my MA    Objective       Tobacco Use: Medium Risk (8/19/2024)    Patient History     Smoking Tobacco Use: Former     Smokeless Tobacco Use: Never     Passive Exposure: Never          Physical Exam  Neurological:      Mental Status: She is alert and oriented to person, place, and time.       Neurologic Exam     Mental Status   Oriented to person, place, and time.           Assessment & Plan   Independent Review of Radiographic Studies:      I personally reviewed the images from the following studies.    I reviewed her MRI which was done on 15 August.  This does show a disc spur to the right at C5-6 and to the left at C6-7.  I believe that this explains her symptoms fairly well but there is certainly nothing dangerous.  There is a little cord deviation but no cord compression.    Medical Decision Making:      I told the patient to advance her physical therapy to her neck as well.  She can discuss with the pain management doctors about doing any type of injections in her neck also.  But unless this gets worse I would not recommend any surgery.   She agrees.    Diagnoses and all orders for this visit:    1. Cervical radiculitis (Primary)      Return if symptoms worsen or fail to improve.

## 2024-08-16 NOTE — TELEPHONE ENCOUNTER
S/W PATIENT AND OFFERED APPT W/ DR. MAXWELL ON 8/19/2024, PATIENT ACCEPTED APPT ----- Message from Da Maxwell sent at 8/16/2024 10:05 AM EDT -----  This patient needs a follow-up appointment to go over the MRI.  It can be a telephone visit.  ----- Message -----  From: Interface, Rad Results [place] In  Sent: 8/16/2024   7:10 AM EDT  To: Da Maxwell MD

## 2024-08-19 ENCOUNTER — CLINICAL SUPPORT (OUTPATIENT)
Dept: NEUROSURGERY | Facility: CLINIC | Age: 54
End: 2024-08-19
Payer: COMMERCIAL

## 2024-08-19 DIAGNOSIS — M54.12 CERVICAL RADICULITIS: Primary | ICD-10-CM

## 2024-08-19 PROCEDURE — 99441 PR PHYS/QHP TELEPHONE EVALUATION 5-10 MIN: CPT | Performed by: NEUROLOGICAL SURGERY

## 2024-08-21 ENCOUNTER — TELEPHONE (OUTPATIENT)
Dept: NEUROSURGERY | Facility: CLINIC | Age: 54
End: 2024-08-21
Payer: COMMERCIAL

## 2024-08-21 NOTE — TELEPHONE ENCOUNTER
Left message for patient regarding scheduling her an follow up appointment per Dr Mcintosh. Patient was told to ask for Ashlyn or Veronica.

## 2024-10-07 ENCOUNTER — LAB (OUTPATIENT)
Dept: OTHER | Facility: HOSPITAL | Age: 54
End: 2024-10-07
Payer: COMMERCIAL

## 2024-10-07 ENCOUNTER — OFFICE VISIT (OUTPATIENT)
Dept: ONCOLOGY | Facility: CLINIC | Age: 54
End: 2024-10-07
Payer: COMMERCIAL

## 2024-10-07 VITALS
SYSTOLIC BLOOD PRESSURE: 121 MMHG | HEIGHT: 62 IN | BODY MASS INDEX: 28.37 KG/M2 | HEART RATE: 77 BPM | DIASTOLIC BLOOD PRESSURE: 81 MMHG | RESPIRATION RATE: 16 BRPM | TEMPERATURE: 98 F | WEIGHT: 154.2 LBS | OXYGEN SATURATION: 98 %

## 2024-10-07 DIAGNOSIS — Z17.0 MALIGNANT NEOPLASM OF UPPER-INNER QUADRANT OF LEFT BREAST IN FEMALE, ESTROGEN RECEPTOR POSITIVE: ICD-10-CM

## 2024-10-07 DIAGNOSIS — Z17.0 MALIGNANT NEOPLASM OF UPPER-INNER QUADRANT OF LEFT BREAST IN FEMALE, ESTROGEN RECEPTOR POSITIVE: Primary | ICD-10-CM

## 2024-10-07 DIAGNOSIS — C50.212 MALIGNANT NEOPLASM OF UPPER-INNER QUADRANT OF LEFT BREAST IN FEMALE, ESTROGEN RECEPTOR POSITIVE: Primary | ICD-10-CM

## 2024-10-07 DIAGNOSIS — C50.212 MALIGNANT NEOPLASM OF UPPER-INNER QUADRANT OF LEFT BREAST IN FEMALE, ESTROGEN RECEPTOR POSITIVE: ICD-10-CM

## 2024-10-07 LAB
ALBUMIN SERPL-MCNC: 4.1 G/DL (ref 3.5–5.2)
ALBUMIN/GLOB SERPL: 1.9 G/DL
ALP SERPL-CCNC: 35 U/L (ref 39–117)
ALT SERPL W P-5'-P-CCNC: 22 U/L (ref 1–33)
ANION GAP SERPL CALCULATED.3IONS-SCNC: 7.4 MMOL/L (ref 5–15)
AST SERPL-CCNC: 26 U/L (ref 1–32)
BASOPHILS # BLD AUTO: 0.06 10*3/MM3 (ref 0–0.2)
BASOPHILS NFR BLD AUTO: 0.9 % (ref 0–1.5)
BILIRUB SERPL-MCNC: 0.2 MG/DL (ref 0–1.2)
BUN SERPL-MCNC: 17 MG/DL (ref 6–20)
BUN/CREAT SERPL: 25.8 (ref 7–25)
CALCIUM SPEC-SCNC: 8.9 MG/DL (ref 8.6–10.5)
CHLORIDE SERPL-SCNC: 106 MMOL/L (ref 98–107)
CO2 SERPL-SCNC: 26.6 MMOL/L (ref 22–29)
CREAT SERPL-MCNC: 0.66 MG/DL (ref 0.57–1)
DEPRECATED RDW RBC AUTO: 45.4 FL (ref 37–54)
EGFRCR SERPLBLD CKD-EPI 2021: 105 ML/MIN/1.73
EOSINOPHIL # BLD AUTO: 0.26 10*3/MM3 (ref 0–0.4)
EOSINOPHIL NFR BLD AUTO: 4 % (ref 0.3–6.2)
ERYTHROCYTE [DISTWIDTH] IN BLOOD BY AUTOMATED COUNT: 13 % (ref 12.3–15.4)
GLOBULIN UR ELPH-MCNC: 2.2 GM/DL
GLUCOSE SERPL-MCNC: 127 MG/DL (ref 65–99)
HCT VFR BLD AUTO: 39.9 % (ref 34–46.6)
HGB BLD-MCNC: 12.5 G/DL (ref 12–15.9)
IMM GRANULOCYTES # BLD AUTO: 0.02 10*3/MM3 (ref 0–0.05)
IMM GRANULOCYTES NFR BLD AUTO: 0.3 % (ref 0–0.5)
LYMPHOCYTES # BLD AUTO: 2.23 10*3/MM3 (ref 0.7–3.1)
LYMPHOCYTES NFR BLD AUTO: 33.9 % (ref 19.6–45.3)
MCH RBC QN AUTO: 30 PG (ref 26.6–33)
MCHC RBC AUTO-ENTMCNC: 31.3 G/DL (ref 31.5–35.7)
MCV RBC AUTO: 95.9 FL (ref 79–97)
MONOCYTES # BLD AUTO: 0.44 10*3/MM3 (ref 0.1–0.9)
MONOCYTES NFR BLD AUTO: 6.7 % (ref 5–12)
NEUTROPHILS NFR BLD AUTO: 3.57 10*3/MM3 (ref 1.7–7)
NEUTROPHILS NFR BLD AUTO: 54.2 % (ref 42.7–76)
NRBC BLD AUTO-RTO: 0 /100 WBC (ref 0–0.2)
PLATELET # BLD AUTO: 171 10*3/MM3 (ref 140–450)
PMV BLD AUTO: 10.8 FL (ref 6–12)
POTASSIUM SERPL-SCNC: 4.2 MMOL/L (ref 3.5–5.2)
PROT SERPL-MCNC: 6.3 G/DL (ref 6–8.5)
RBC # BLD AUTO: 4.16 10*6/MM3 (ref 3.77–5.28)
SODIUM SERPL-SCNC: 140 MMOL/L (ref 136–145)
WBC NRBC COR # BLD AUTO: 6.58 10*3/MM3 (ref 3.4–10.8)

## 2024-10-07 PROCEDURE — 85025 COMPLETE CBC W/AUTO DIFF WBC: CPT | Performed by: INTERNAL MEDICINE

## 2024-10-07 PROCEDURE — 99214 OFFICE O/P EST MOD 30 MIN: CPT | Performed by: INTERNAL MEDICINE

## 2024-10-07 PROCEDURE — 80053 COMPREHEN METABOLIC PANEL: CPT | Performed by: INTERNAL MEDICINE

## 2024-10-07 PROCEDURE — 36415 COLL VENOUS BLD VENIPUNCTURE: CPT

## 2024-10-07 NOTE — PROGRESS NOTES
Subjective     REASON FOR CONSULTATION:  1.  T1bN0 M0 strongly ER/MT positive HER-2 negative grade 2 left breast cancer post lumpectomy with close margins for DCIS-Oncotype score of 16 radiation and tamoxifen started 7/18                             REQUESTING PHYSICIAN:  Adrián Pereyra M.D. and Nikita Gallegos M.D.      History of Present Illness is a 54-year-old perimenopausal female with a T1b N0 strongly ER/MT positive breast cancer with a low Oncotype score of 16.  She completed radiation and began tamoxifen 7/20/2018.    Patient's last mammogram was in April 2024 and was benign.  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  Occasionally she has tenderness at the area of the lumpectomy and lymph node removal but this is been stable since surgery.    She continues to have incontinence issues and self caths and had a recent hysterectomy and since then she her incontinence better    She declined the breast cancer index because she was worried about the cost and therefore we do not have this data but she had a very small 8 mm tumor with a low Oncotype but since she is worried about it we probably will continue through-year 7 although I do not feel strongly about continuing because of the size of the tumor but because of the borderline Oncotype score we will continue    Thankfully she has not developed any DVTs   She is vaccinated against COVID-19    Past Medical History:   Diagnosis Date    Anxiety     Breast cancer 02/14/2018    Left breast, Upper Inner Quadrant, Invasive Mammary Carcinoma, Intermediate grade,  measuring at least 0.2 cm in dimension, with associated high grade solid and cribiform ductal carcinoma in situ with comedonecrosis, ER/UT positive, IXT2gbr negative    Heartburn     Hx of radiation therapy     2018, left breast ca    Infectious mononucleosis     Migraines     PONV (postoperative nausea and vomiting)     Pre-diabetes     PVC's (premature ventricular contractions)     Wears contact lenses         Past Surgical History:   Procedure Laterality Date    BREAST BIOPSY Left 02/14/2018    Left breast stereotactic biopsy w/ marker clip placement & positive radiograph-Dr. Lashaun Gamble, [facility]    BREAST BIOPSY Left 02/12/2018    INCOMPLETE - Biopsy could not be completed as patient had severe vasovagal reaction & subsequent syncope-[facility]    BREAST BIOPSY Left 5/23/2018    Procedure: REEXCISION OF POSITIVE LUMPECTOMY MARGIN LEFT BREAST;  Surgeon: Adrián Pereyra MD;  Location: [facility];  Service: General    BREAST LUMPECTOMY WITH SENTINEL NODE BIOPSY Left 4/11/2018    Procedure: BREAST LUMPECTOMY WITH SENTINEL NODE BIOPSY AND NEEDLE LOCALIZATION;  Surgeon: Adrián Pereyra MD;  Location: [facility];  Service: General    THORACIC DECOMPRESSION POSTERIOR FUSION WITH INSTRUMENTATION Left 5/8/2022    Procedure: T10-T12 BILATERAL POSTERIOR LATERAL FUSION WITH T11 LEFT SIDED TRANSPEDICULAR DISCECTOMY FOR SPINAL CANAL DECOMPRESSION;  Surgeon: Brady Vega MD;  Location: [facility];  Service: Neurosurgery;  Laterality: Left;      ONCOLOGIC HISTORY:  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  A diagnostic mammogram gram showed a 2.3 cm area of clustered microcalcifications very suspicious for malignancy and the patient underwent a stereotactic biopsy on 2/14/18 with the findings of intermediate  grade invasive mammary carcinoma measuring 2 mm with associated high-grade and solid DCIS--ER was 65% IN was 96% HER-2 0 Ki-67 was 2%.  The patient was referred to Dr. Pereyra and underwent bilateral breast MRIs with the findings of a 2.1 cm abnormality in the left breast with no axillary adenopathy and she was taken for surgery when lumpectomy and sentinel node biopsy was performed on 18.  This revealed a residual 8 mm area of invasive carcinoma grade 2 with associated high-grade DCIS spanning 16 mm-the lateral and medial margins were 0.5 mm from the DCIS-new anterior, superior and lateral margins were obtained which were negative but the medial margin was still felt to be close.  She went to see Dr. Pereyra to wanted to discuss further the status of her margins but sent her to us also to discuss adjuvant treatment for her invasive cancer.    She is healing well from surgery.  She is obviously anxious about the margins but has no qualms about having further surgery even if the cosmesis is not perfect because she is more worried about recurrence of her cancer.  She is  3 para 3 -Menarche was age 14 she is premenopausal first childbirth was age 28 she breast-fed for 9 months she's been on no hormonal replacement since her last child 13 years ago  She is having bladder issues and her gynecologist just prescribed vaginal estrogens which she has not yet started.    Family history is positive for her father having thyroid cancer age 64 and he is currently dying from this is a 78 she's one sister is healthy her mother's 72 and healthy she has a maternal aunt with breast cancers at an unknown age paternal aunt with breast cancer in her 60s paternal grandfather with bone cancer and a maternal grandfather with bone cancer is no ovarian cancer in the family.      I explained to Cesia and her  that her tumor is very unlikely to have a high Oncotype score and it is very likely that the mainstay of her  treatment would be tamoxifen.  But we will send an Oncotype score because of the small chance that there is a higher recurrence risk than expected based on her pathology in tumor size.  I think it is very reasonable to get genetic testing because of her family history although it is unlikely to change our treatment.  I've asked her to hold off and vaginal estrogens for the time being and to discuss this with her gynecologist.  She will stop her Zoloft and we can prescribe Effexor if needed      we discussed her case at the multiple this may conference because of the close margins for high-grade DCIS and we all felt that radiation would take care of this and no further surgery was needed.    We discussed the low risk Oncotype in the lack of benefit for chemotherapy in this situation which we had already discussed with her on the phone and we talked about tamoxifen.The side effects and toxicities of Tamoxifen were discussed with the patient, including hot flashes, mood swings,depression, DVT and endometrial cancer. A list of drugs that interfere with the efficacy of tamoxifen and are to be avoided were given to the patient.    At her last visit she was having a lot of pelvic pain with her menstrual cycle and an ultrasound which showed some adenomyosis and her ovarian cyst had disappeared thankfully-she started menstruating regularly-we ordered CAT scans because of the pain in her left side and thankfully chest and abdomen are negative except for thickening of her bladder from her interstitial cystitis and she had fibroids-this discomfort has gradually improved with time        Father  with metastatic thyroid cancer I have again stressed the need for genetic testing and she did this and it was thankfully benign except for 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  This is actually gotten better and she thinks the Lexapro has helped this  also there may have been a component of anxiety.  I told her if the dysphagia recurs she needs an EGD          Current Outpatient Medications on File Prior to Visit   Medication Sig Dispense Refill    dantrolene (DANTRIUM) 50 MG capsule Take 1 capsule by mouth 3 (Three) Times a Day As Needed.      diazePAM (VALIUM) 5 MG tablet Take 1 tablet by mouth Every 12 (Twelve) Hours.      DULoxetine (CYMBALTA) 30 MG capsule Take 1 capsule by mouth Daily. Takes with 60 mg to make a total dose of  90 mg daily      DULoxetine (CYMBALTA) 60 MG capsule Take 1 capsule by mouth Daily. Takes with 30 mg to make a total dose of 90 mg Daily.      hydrOXYzine (ATARAX) 25 MG tablet Take 1 tablet by mouth 3 (Three) Times a Day As Needed for Itching or Anxiety.      meloxicam (MOBIC) 15 MG tablet Take 1 tablet by mouth Daily.      methocarbamol (ROBAXIN) 750 MG tablet Take 1 tablet by mouth 4 (Four) Times a Day As Needed for Muscle Spasms. 30 tablet 0    nitrofurantoin (MACRODANTIN) 50 MG capsule Take 1 capsule by mouth Daily.      pregabalin (LYRICA) 150 MG capsule Take 1 capsule by mouth Every 12 (Twelve) Hours.      tamoxifen (NOLVADEX) 20 MG chemo tablet Take 1 tablet by mouth once daily 90 tablet 3    vitamin D (ERGOCALCIFEROL) 1.25 MG (42750 UT) capsule capsule Take 1 capsule by mouth 1 (One) Time Per Week. Sundays       No current facility-administered medications on file prior to visit.        ALLERGIES:  No Known Allergies     Social History     Socioeconomic History    Marital status:      Spouse name: Nikita Workman    Number of children: 3    Years of education: College   Tobacco Use    Smoking status: Former     Current packs/day: 0.00     Average packs/day: 0.5 packs/day for 2.0 years (1.0 ttl pk-yrs)     Types: Cigarettes     Start date:      Quit date:      Years since quittin.7     Passive exposure: Never    Smokeless tobacco: Never   Vaping Use    Vaping status: Never Used   Substance and Sexual Activity     Alcohol use: Yes     Comment: RARE MONTHLY    Drug use: No    Sexual activity: Defer     Birth control/protection: None        Family History   Problem Relation Age of Onset    Breast cancer Maternal Aunt         In her 60s    Breast cancer Paternal Aunt     Hyperlipidemia Mother     Asthma Father     Diabetes Father     Hearing loss Father     Heart disease Father     Hypertension Father     Thyroid cancer Father 64        Follicular Tyroid Cancer    Alcohol abuse Sister     Depression Sister     Diabetes Maternal Grandmother     Bone cancer Maternal Grandfather     Breast cancer Cousin     Malig Hyperthermia Neg Hx         Review of Systems   Constitutional:  Negative for chills, fatigue, fever and unexpected weight change.   HENT:  Negative for congestion, mouth sores, nosebleeds and trouble swallowing.    Respiratory:  Negative for choking, chest tightness and shortness of breath.    Cardiovascular:  Negative for chest pain.   Gastrointestinal:  Negative for constipation, diarrhea, nausea and vomiting.   Genitourinary:  Negative for difficulty urinating and pelvic pain.        Continued decreased libido   Musculoskeletal:  Positive for arthralgias (hands and thumbs intermittently). Negative for back pain and gait problem.   Neurological:  Negative for dizziness, numbness and headaches (Chronic migraine headaches).   Psychiatric/Behavioral:  Positive for decreased concentration (Word finding issues). The patient is not nervous/anxious.         Objective     There were no vitals filed for this visit.          9/25/2023     9:19 AM   Current Status   ECOG score 1       Physical Exam    GENERAL:  Well-developed, well-nourished in no acute distress.   SKIN:  Warm, dry without rashes, purpura or petechiae.  EYES:  Pupils equal, round and reactive to light.  EOMs intact.  Conjunctivae normal.  EARS:  Hearing intact.  NOSE:  Septum midline.  No excoriations or nasal discharge.  MOUTH:  Tongue is well-papillated; no  stomatitis or ulcers.  Lips normal.  THROAT:  Oropharynx without lesions or exudates.  NECK:  Supple with good range of motion; no thyromegaly or masses, no JVD.  LYMPHATICS:  No cervical, supraclavicular, axillary adenopathy.  CHEST:  Lungs clear to auscultation. Good airflow.  BREASTS: Right breast is benign left  shows a lumpectomy scar with minimal scarring at   the lumpectomy site, otherwise soft, no nodules, no skin changes, no nipple discharge.  Left breast and axilla soft, no nodules, no skin changes, no nipple discharge.  Right axilla is tender to palpation no definite adenopathy  CARDIAC:  Regular rate and rhythm without murmurs, rubs or gallops. Normal S1,S2.  ABDOMEN:  Soft, minimal left lower quadrant tenderness without mass - with no hepatosplenomegaly .  EXTREMITIES:  No clubbing, cyanosis or edema.  NEUROLOGICAL:  Cranial Nerves II-XII grossly intact.  Foot drop bilaterally left greater than right and atrophy of calf muscles   PSYCHIATRIC:  Normal affect and mood.    I have reexamined the patient and the results are consistent with the previously documented exam. Onelia Pierre MA         RECENT LABS:  Hematology WBC   Date Value Ref Range Status   06/21/2024 10.50 3.40 - 10.80 10*3/mm3 Final   12/27/2023 14.78 (H) 4.5 - 11.0 10*3/uL Final     RBC   Date Value Ref Range Status   06/21/2024 4.36 3.77 - 5.28 10*6/mm3 Final   12/27/2023 3.42 (L) 4.0 - 5.2 10*6/uL Final     Hemoglobin   Date Value Ref Range Status   06/21/2024 13.1 12.0 - 15.9 g/dL Final   12/27/2023 10.6 (L) 12.0 - 16.0 g/dL Final     Hematocrit   Date Value Ref Range Status   06/21/2024 40.0 34.0 - 46.6 % Final   12/27/2023 33.6 (L) 36.0 - 46.0 % Final     Platelets   Date Value Ref Range Status   06/21/2024 217 140 - 450 10*3/mm3 Final   12/27/2023 197 140 - 440 10*3/uL Final         IMPRESSION:  1. Biopsy-proven malignancy in the left breast at the 11:30 position  measuring on the order of 2.1 cm in greatest dimension. No  "other  suspicious findings are seen within the left breast and there is no  evidence for left axillary adenopathy.  2. There are no findings suspicious for malignancy in the right breast.     BI-RADS Category 6: Known biopsy-proven malignancy.     4/11/18  1. \"LEFT BREAST MASS,\" WIRE GUIDED LUMPECTOMY:             INVASIVE DUCTAL CARCINOMA, INTERMEDIATE GRADE, 8 MM, MARGINS CLEAR.             CLOSEST MARGIN TO INVASIVE TUMOR: 1.5 MM (INFERIOR).             ASSOCIATED HIGH GRADE DUCTAL CARCINOMA IN SITU SPANNING APPROXIMATELY 16 MM (FOUR                    BLOCKS x 4 MM PER BLOCK).             CLOSEST MARGIN TO DUCTAL CARCINOMA IN SITU: 0.5 MM (LATERAL AND MEDIAL MARGINS).             HORMONE RECEPTORS REPORTED ON PRIOR BIOPSY, [facility] [id number]. REPEAT/                   CONFIRMATORY STUDIES ARE AVAILABLE UPON REQUEST.                         BIOPSY SITE CHANGES.     NOTE: ADDITIONAL MARGINS SUBMITTED AS PARTS 2, 3, AND 4.        2. \"LEFT BREAST NEW ANTERIOR MARGIN\":              BENIGN FIBROADIPOSE TISSUE.              NEW MARGIN - NO ATYPIA.     3. \"LEFT BREAST NEW SUPERIOR MARGIN\":             BENIGN BREAST TISSUE.             NEW MARGIN - NO ATYPIA.     4. \"LEFT BREAST NEW LATERAL MARGIN\":             BENIGN BREAST TISSUE.             NEW MARGIN - NO ATYPIA.     5. \"SENTINEL NODE #1\":             LYMPH NODE, MULTIPLE STEP SECTIONS: NO METASTATIC CARCINOMA SEEN.     6. \"SENTINEL NODE #2\":             LYMPH NODE, MULTIPLE STEP SECTIONS: NO METASTATIC CARCINOMA SEEN.     THM/th IHC/a/CMK                                Electronically signed by Darin Claire MD on 4/12/2018 at 1511   Synoptic Checklist   INVASIVE CARCINOMA OF THE BREAST   Breast Invasive - All Specimens   SPECIMEN   Procedure  Excision (less than total mastectomy)   Specimen Laterality  Left   TUMOR   Histologic Type  Invasive carcinoma of no special type (ductal, not otherwise specified)   Histologic Grade (Jessie Histologic Score)  "    Glandular (Acinar) / Tubular Differentiation  Score 3 (< 10% of tumor area forming glandular / tubular structures)   Nuclear Pleomorphism  Score 2 (Cells larger than normal with open vesicular nuclei, visible nucleoli, and moderate variability in both size and shape)   Mitotic Rate  Score 1 (<=3 mitoses per mm2)   Overall Grade  Grade 2 (scores of 6 or 7)   Tumor Size  Greatest dimension of largest invasive focus > 1 mm (indicate exact measurement in Millimeters):: 8 mm   Tumor Focality  Single focus of invasive carcinoma   Ductal Carcinoma In Situ (DCIS)  DCIS is present in specimen   Ductal Carcinoma In Situ (DCIS)  Positive for EIC   Size (Extent) of DCIS  Estimated size (extent) of DCIS (greatest dimension using gross and microscopic evaluation) in Millimeters (mm) is at least: 16  mm   Nuclear Grade  Grade III (high)   Lymphovascular Invasion  Not identified   Treatment Effect  No known presurgical therapy   MARGINS   Invasive Carcinoma Margins  Uninvolved by invasive carcinoma   Distance from Closest Margin in Millimeters (mm)  Specify distance: 1.5 mm   Closest Margin  Inferior   DCIS Margins  Uninvolved by DCIS (DCIS present in specimen)   Distance of DCIS from Closest Margin in Millimeters (mm)  Specify distance: 0.5 mm   Closest Margin  Medial     Lateral   LYMPH NODES   Regional Lymph Nodes     Number of Lymph Nodes with Macrometastases (> 2 mm)  Specify number: 0   Number of Lymph Nodes with Micrometastases (> 0.2 mm to 2 mm and / or > 200 cells)  Specify number: 0   Number of Lymph Nodes with Isolated Tumor Cells (<= 0.2 mm and <= 200 cells)  Specify number: 0   Number of Lymph Nodes Examined  Specify number: 2   Number of [place] Nodes Examined  Specify number: 2   PATHOLOGIC STAGE CLASSIFICATION (pTNM)   Primary Tumor (Invasive Carcinoma) (pT)  pT1c: Tumor > 10 mm but <= 20 mm in greatest dimension   Modifier  (sn): Only sentinel node(s) evaluated. If 6 or more nodes (sentinel or nonsentinel) are  removed, this modifier should not be used.   Category (pN)  pN0: No regional lymph node metastasis identified or ITCs only   .        COMPLETE PELVIC SONOGRAM   FINDINGS: Transabdominal and transvaginal pelvic sonography was  performed. The uterus is anteflexed and measures 12 x 5 x 7 cm. The  endometrial bilayer measures 0.9 cm in thickness. Scattered  heterogeneity is seen throughout the uterus. Within the posterior mid  uterine body there is a 1.9 x 1.4 x 1.3 cm complex hypoechoic lesion. In  the mid uterine body located in the anterior aspect of the uterus there  is a 1.6 x 1.4 x 1.9 cm hypoechoic lesion.     The right ovary measures 4.2 x 1.9 x 3.2 cm and the left ovary measures  3.4 x 1.9 x 1.4 cm. Normal intraovarian venous vascularity is seen in  both ovaries. Normal follicles are seen within both ovaries. No free  fluid is seen within the pelvic cul-de-sac.     IMPRESSION:  1. Enlarged uterus with 2 focal myometrial lesions measuring on the  order of 1.9 cm in greatest dimension. The sonographic features suggest  adenomyosis, possibly with focal adenomyomas versus fibroids. It is  difficult to differentiate an adenomyoma versus a fibroid, but I believe  adenomyosis is likely present given the enlarged heterogenous appearance  of the uterus. If imaging differentiation is desired it can be performed  with a pelvic MRI without and with contrast.  2. Normal sonographic appearance of both ovaries.     This report was finalized on 9/21/2018     Interpretation Summary 11/18            Normal bilateral lower extremity venous duplex scan.                     Assessment & Plan   1.[id number] HER-2 negative grade 2 left breast cancer post lumpectomy and sentinel node biopsy-with close margins from high-grade DCIS medially-Oncotype score 16.  Tamoxifen initiated 7/2018  1 month break from tamoxifen to see if her memory issues improve and then resume  Doing well on tamoxifen in 3/22-becoming perimenopausal    2.   Interstitial cystitis undergoing instillation of lidocaine/heparin in  the bladder intermittently.  Pelvic discomfort approximately 10 days prior to menses currently.  Ovarian cyst found by gynecology as possible culprit versus interstitial cystitis potential flare prior to menses.    3.  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 Worsening dysphagia to solids-ENT exam negative symptoms improved with Lexapro-but persistent as of 5/20's referred to GI  [place] to be more anxiety than mechanical issues    6.  Depression.  Patient on Lexapro, initiated during the time of family deaths and recovery from her breast surgery.  Patient feels she has improved would like to wean off the medication  Patient weaning off Lexapro and 9/21.    7.  Cord compression from a disc at T10-slow rehab underway    Plan  1.continue tamoxifen 20 mg daily for 1 more year  2.  Follow-up with Dr. Gutierrez in 12 months,   3.  Mammogram  to be completed around May , 2025  4.  She feels comfortable continuing to year 7 and opted not to do breast cancer index-

## 2024-10-08 ENCOUNTER — PREP FOR SURGERY (OUTPATIENT)
Dept: OTHER | Facility: HOSPITAL | Age: 54
End: 2024-10-08
Payer: COMMERCIAL

## 2024-10-08 DIAGNOSIS — Z12.11 SCREEN FOR COLON CANCER: Primary | ICD-10-CM

## 2024-11-07 ENCOUNTER — PREP FOR SURGERY (OUTPATIENT)
Dept: OTHER | Facility: HOSPITAL | Age: 54
End: 2024-11-07
Payer: COMMERCIAL

## 2024-11-07 DIAGNOSIS — Z12.11 COLON CANCER SCREENING: Primary | ICD-10-CM

## 2024-11-18 ENCOUNTER — OFFICE VISIT (OUTPATIENT)
Dept: PAIN MEDICINE | Facility: CLINIC | Age: 54
End: 2024-11-18
Payer: COMMERCIAL

## 2024-11-18 VITALS
HEIGHT: 62 IN | WEIGHT: 152.2 LBS | BODY MASS INDEX: 28.01 KG/M2 | HEART RATE: 78 BPM | SYSTOLIC BLOOD PRESSURE: 118 MMHG | TEMPERATURE: 96 F | DIASTOLIC BLOOD PRESSURE: 72 MMHG | OXYGEN SATURATION: 99 %

## 2024-11-18 DIAGNOSIS — M54.16 LUMBAR RADICULOPATHY: ICD-10-CM

## 2024-11-18 DIAGNOSIS — M96.1 POSTLAMINECTOMY SYNDROME: ICD-10-CM

## 2024-11-18 DIAGNOSIS — G89.4 CHRONIC PAIN SYNDROME: ICD-10-CM

## 2024-11-18 DIAGNOSIS — M54.12 CERVICAL RADICULITIS: ICD-10-CM

## 2024-11-18 DIAGNOSIS — M47.14 THORACIC MYELOPATHY: Primary | ICD-10-CM

## 2024-11-18 NOTE — LETTER
"November 23, 2024     Odessa Hendrickson DO  [address] [address]    Patient: Cesia Workman   YOB: 1970   Date of Visit: 11/18/2024     Dear Odessa Hendrickson DO:       Thank you for referring Cesia Workman to me for evaluation. Below are the relevant portions of my assessment and plan of care.    If you have questions, please do not hesitate to call me. I look forward to following Cesia along with you.         Sincerely,        Roger Lux MD        CC: No Recipients    Roger Lux MD  11/23/24 1019  Sign when Signing Visit  CHIEF COMPLAINT    Back and bilateral leg pain    Subjective  Cesia Workman is a 54 y.o. female.   She presents to the office for evaluation of back and bilateral leg pain. She was referred here by Da Mcintosh MD. The patient states she had a spinal cord injury May of 2022 from a ruptured disc (unknown injury), she had fusion surgery, and since that time has had lumbar pain with bilateral leg pain as well. The pain is described as burning with tight muscles. Bending over does worsen the pain, as well as standing, sitting or walking for long periods of time. She is currently in PT, she has seen a chiropractor in the past, which increased the pain and discontinued, she does get some massage while in PT. She does have a TENS unit. The patient does take methocarbamol prn, as well as dantrolene 100 mg every a.m., Baclofen 40 mg in the evening. She is weaning off Lyrica 150 mg currently, to see if it is actually helpful. She is taking 50 mg BID, and states she is \"struggling\" all week on this dose. She has had 2 epidurals at [facility] which she did not find helpful/    ---    She has a spinal cord injury dating back to May 7, 2022, a T10 disc herniation with cord compression.   She had T10-T12 posterolateral fusion, also left word T11 discectomy, on May 8, 2022.  Incomplete paraplegia, and also follows with urologist.  Past medical history also " includes a history of breast cancer in 2018.    When she saw the neurosurgeon and was was referred to us by the neurosurgery group, she was also having a lot of neck pain and arm radiating pain radicular type symptoms.  Since then that has settled down somewhat, and her primary most bothersome pain complaint is her chronic mid low back pain and bilateral leg pain.    ---        Back Pain  This is a chronic problem. The current episode started more than 1 year ago. The problem occurs constantly. The problem is unchanged. The pain is present in the lumbar spine and thoracic spine. The quality of the pain is described as aching and burning. The pain radiates to the left buttock, right buttock, left anterolateral lower leg, right anterolateral lower leg, left foot and right foot. The pain is moderate. Associated symptoms include numbness (tingling and decreased sensation bilateral leg, and at area of spinal cord injury (thoracic area)) and weakness (bilateral leg). Pertinent negatives include no fever. Risk factors: SCI 2022.   Neck Pain   This is a chronic problem. The current episode started more than 1 month ago. The problem occurs daily. The pain is mild. Associated symptoms include numbness (tingling and decreased sensation bilateral leg, and at area of spinal cord injury (thoracic area)) and weakness (bilateral leg). Pertinent negatives include no fever.        PEG Assessment   What number best describes your pain on average in the past week?6  What number best describes how, during the past week, pain has interfered with your enjoyment of life?9  What number best describes how, during the past week, pain has interfered with your general activity?  9    --  The aforementioned information the Chief Complaint section and above subjective data including any HPI data, and also the Review of Systems data, has been personally reviewed and affirmed.  --      Current Outpatient Medications:   •  diazePAM (VALIUM) 5 MG  tablet, Take 1 tablet by mouth Every 12 (Twelve) Hours., Disp: , Rfl:   •  DULoxetine (CYMBALTA) 30 MG capsule, Take 1 capsule by mouth Daily. Takes with 60 mg to make a total dose of  90 mg daily, Disp: , Rfl:   •  DULoxetine (CYMBALTA) 60 MG capsule, Take 1 capsule by mouth Daily. Takes with 30 mg to make a total dose of 90 mg Daily., Disp: , Rfl:   •  hydrOXYzine (ATARAX) 25 MG tablet, Take 1 tablet by mouth 3 (Three) Times a Day As Needed for Itching or Anxiety., Disp: , Rfl:   •  meloxicam (MOBIC) 15 MG tablet, Take 1 tablet by mouth Daily., Disp: , Rfl:   •  methocarbamol (ROBAXIN) 750 MG tablet, Take 1 tablet by mouth 4 (Four) Times a Day As Needed for Muscle Spasms., Disp: 30 tablet, Rfl: 0  •  nitrofurantoin (MACRODANTIN) 50 MG capsule, Take 1 capsule by mouth Daily., Disp: , Rfl:   •  pregabalin (LYRICA) 150 MG capsule, Take 1 capsule by mouth Every 12 (Twelve) Hours. Currently taking 50 mg BID, Disp: , Rfl:   •  tamoxifen (NOLVADEX) 20 MG chemo tablet, Take 1 tablet by mouth once daily, Disp: 90 tablet, Rfl: 3  •  dantrolene (DANTRIUM) 50 MG capsule, Take 1 capsule by mouth 3 (Three) Times a Day As Needed., Disp: , Rfl:   •  vitamin D (ERGOCALCIFEROL) 1.25 MG (75990 UT) capsule capsule, Take 1 capsule by mouth 1 (One) Time Per Week. Sundays, Disp: , Rfl:     The following portions of the patient's history were reviewed and updated as appropriate: allergies, current medications, past family history, past medical history, past social history, past surgical history, and problem list.    -------    The following portions of the patient's history were reviewed and updated as appropriate: allergies, current medications, past family history, past medical history, past social history, past surgical history and problem list.    No Known Allergies    Current Outpatient Medications on File Prior to Visit   Medication Sig Dispense Refill   • diazePAM (VALIUM) 5 MG tablet Take 1 tablet by mouth Every 12 (Twelve)  Hours.     • DULoxetine (CYMBALTA) 30 MG capsule Take 1 capsule by mouth Daily. Takes with 60 mg to make a total dose of  90 mg daily     • DULoxetine (CYMBALTA) 60 MG capsule Take 1 capsule by mouth Daily. Takes with 30 mg to make a total dose of 90 mg Daily.     • hydrOXYzine (ATARAX) 25 MG tablet Take 1 tablet by mouth 3 (Three) Times a Day As Needed for Itching or Anxiety.     • meloxicam (MOBIC) 15 MG tablet Take 1 tablet by mouth Daily.     • methocarbamol (ROBAXIN) 750 MG tablet Take 1 tablet by mouth 4 (Four) Times a Day As Needed for Muscle Spasms. 30 tablet 0   • nitrofurantoin (MACRODANTIN) 50 MG capsule Take 1 capsule by mouth Daily.     • pregabalin (LYRICA) 150 MG capsule Take 1 capsule by mouth Every 12 (Twelve) Hours. Currently taking 50 mg BID     • tamoxifen (NOLVADEX) 20 MG chemo tablet Take 1 tablet by mouth once daily 90 tablet 3   • dantrolene (DANTRIUM) 50 MG capsule Take 1 capsule by mouth 3 (Three) Times a Day As Needed.     • vitamin D (ERGOCALCIFEROL) 1.25 MG (78579 UT) capsule capsule Take 1 capsule by mouth 1 (One) Time Per Week. Sundays       No current facility-administered medications on file prior to visit.         * No active hospital problems. *       Past Medical History:   Diagnosis Date   • Anxiety    • Breast cancer 02/14/2018    Left breast, Upper Inner Quadrant, Invasive Mammary Carcinoma, Intermediate grade, measuring at least 0.2 cm in dimension, with associated high grade solid and cribiform ductal carcinoma in situ with comedonecrosis, ER/UT positive, [id number] negative   • Cervical disc disorder    • Chronic pain disorder 5/5/2022   • Depression    • Extremity pain    • Heartburn    • Hx of radiation therapy     2018, left breast ca   • Infectious mononucleosis    • Low back pain    • Migraines    • Neck pain    • PONV (postoperative nausea and vomiting)    • Pre-diabetes    • PVC's (premature ventricular contractions)    • Wears contact lenses        Past Surgical  History:   Procedure Laterality Date   • BACK SURGERY  5/8/2022   • BREAST BIOPSY Left 02/14/2018    Left breast stereotactic biopsy w/ marker clip placement & positive radiograph-Dr. Lashaun Gamble, [facility]   • BREAST BIOPSY Left 02/12/2018    INCOMPLETE - Biopsy could not be completed as patient had severe vasovagal reaction & subsequent syncope-[facility]   • BREAST BIOPSY Left 05/23/2018    Procedure: REEXCISION OF POSITIVE LUMPECTOMY MARGIN LEFT BREAST;  Surgeon: Adrián Pereyra MD;  Location: [facility];  Service: General   • BREAST LUMPECTOMY WITH SENTINEL NODE BIOPSY Left 04/11/2018    Procedure: BREAST LUMPECTOMY WITH SENTINEL NODE BIOPSY AND NEEDLE LOCALIZATION;  Surgeon: Adrián Pereyra MD;  Location: [facility];  Service: General   • LAMINECTOMY     • SPINAL FUSION     • SPINE SURGERY     • THORACIC DECOMPRESSION POSTERIOR FUSION WITH INSTRUMENTATION Left 05/08/2022    Procedure: T10-T12 BILATERAL POSTERIOR LATERAL FUSION WITH T11 LEFT SIDED TRANSPEDICULAR DISCECTOMY FOR SPINAL CANAL DECOMPRESSION;  Surgeon: Brady Vega MD;  Location: [facility];  Service: Neurosurgery;  Laterality: Left;       Family History   Problem Relation Age of Onset   • Breast cancer Maternal Aunt         In her 60s   • Breast cancer Paternal Aunt    • Hyperlipidemia Mother    • Asthma Father    • Diabetes Father    • Hearing loss Father    • Heart disease Father    • Hypertension Father    • Thyroid cancer Father 64        Follicular Tyroid Cancer   • Alcohol abuse Sister    • Depression Sister    • Diabetes Maternal Grandmother    • Bone cancer Maternal Grandfather    • Breast cancer Cousin    • Malig Hyperthermia Neg Hx        Social History     Socioeconomic History   • Marital status:      Spouse name: Nikita Workman   • Number of children: 3   • Years of education: College   Tobacco Use   • Smoking status: Former     Current packs/day: 0.00     Average packs/day: 0.5 packs/day for 2.0 years (1.0 ttl  pk-yrs)     Types: Cigarettes     Start date: 1988     Quit date:      Years since quittin.9     Passive exposure: Never   • Smokeless tobacco: Never   Vaping Use   • Vaping status: Never Used   Substance and Sexual Activity   • Alcohol use: Yes     Comment: Occasional   • Drug use: No   • Sexual activity: Yes     Partners: Male     Birth control/protection: Vasectomy, Hysterectomy       -------      REVIEW OF PERTINENT MEDICAL DATA    E-warren report is reviewed:  I reviewed the document in the electronic form under the PDMP tab in the Epic EMR...  - In this function, the report is a current report in as close to real-time as possible.  - The report was available for immediate review.    - I did anisha the report as reviewed.  - There is not concern for aberrant behavior based on this ekasper review.      [place] = 69      -------    Prelim UDS Immunoassay Today:    AMP (amphet) negative  BAR (barbituate) negative  BUP (buprenorph) negative  BZO (benzodiaz) negative  MOHAMUD (cocaine) negative  MET (methamph) negative  MDMA (ecstacy) negative  MTD (methadone) negative  OPI (opiate) negative  PCP (pcp)  negative  OXY (oxycodone) negative  THC  (marijuana)  Positive ... She noted use of THC gummies    GreenTemp warm  Appearance WNL    -------      Review of Systems   Constitutional:  Negative for chills and fever.   Respiratory:  Negative for cough and shortness of breath.    Gastrointestinal:  Positive for constipation. Negative for diarrhea.   Genitourinary:  Positive for difficulty urinating (self urinary catheterization).   Musculoskeletal:  Positive for back pain, gait problem and neck pain.   Neurological:  Positive for weakness (bilateral leg) and numbness (tingling and decreased sensation bilateral leg, and at area of spinal cord injury (thoracic area)). Negative for dizziness and light-headedness.   Psychiatric/Behavioral:  Negative for agitation.            Vitals:    24 1044   BP: 118/72   BP  "Location: Right arm   Patient Position: Sitting   Pulse: 78   Temp: 96 °F (35.6 °C)   TempSrc: Temporal   SpO2: 99%   Weight: 69 kg (152 lb 3.2 oz)   Height: 157 cm (61.81\")   PainSc:   6   PainLoc: Back         Objective      Physical Exam  Vitals and nursing note reviewed.   Constitutional:       General: She is not in acute distress.     Appearance: Normal appearance. She is well-developed. She is not toxic-appearing.   HENT:      Head: Normocephalic and atraumatic.      Right Ear: Hearing and external ear normal.      Left Ear: Hearing and external ear normal.      Nose: Nose normal.   Eyes:      General: Lids are normal.      Conjunctiva/sclera: Conjunctivae normal.      Pupils: Pupils are equal, round, and reactive to light.   Pulmonary:      Effort: Pulmonary effort is normal. No respiratory distress.   Musculoskeletal:      Thoracic back: Deformity (postop) present. No swelling, edema or tenderness. Decreased range of motion.      Lumbar back: Decreased range of motion. Negative right straight leg raise test and negative left straight leg raise test.   Neurological:      Mental Status: She is alert and oriented to person, place, and time.      Cranial Nerves: Cranial nerves 2-12 are intact. No cranial nerve deficit.      Sensory: Sensory deficit present.      Motor: Weakness and atrophy present.      Gait: Gait abnormal.      Deep Tendon Reflexes:      Reflex Scores:       Patellar reflexes are 3+ on the right side and 3+ on the left side.     Comments: Foot drop left greater than right.  5- out of 5 bilateral knee and ankle flexion and extension.  Diminished light touch sensation throughout lower extremities.   Psychiatric:         Behavior: Behavior normal.         Assessment & Plan  Diagnoses and all orders for this visit:    1. Thoracic myelopathy (Primary)  -     Urine Drug Screen Confirmation - Urine, Clean Catch; Future  -     POC Urine Drug Screen, Triage    2. Chronic pain syndrome    3. Lumbar " radiculopathy    4. Postlaminectomy syndrome    5. Cervical radiculitis        --- Cesia Workman reports a pain score of 6.  Given her pain assessment as noted, treatment options were discussed and the following options were decided upon as a follow-up plan to address the patient's pain: prescription for non-opiod analgesics.    --- Follow-up 1 month    -- set up education session with [facility]erna     -- MetaNx samples... she asks about potential regenerative therapies, and this product, while not having any evidence that I know of for central nervous system injury, does have some limited evidence for improving nerve ending density in peripheral neuropathy.  As a supplement, it seems that to be quite low risk to trial, and the recommendation from  would be to trial for 6-12 months and monitor for improvement.  So, she is given informative brochure, and samples to try, as she considers it    -- Urine Drug Screen per routine; send out for confirmation    -- trial of Low Dose Naltrexone, starting at 1mg per day.  Sent to [facility].  Plan to escalate if tolerated well but if there is need to titrate dose.    --With regards to neck and arm pain, we could consider a cervical epidural injection.  She notes that epidurals on the lower levels in the cervical spine had not been helpful for her previously.  All those are seemingly not unreasonable, I would concur that I do not think there is clinical reason to consider more thoracolumbar epidurals for that allergy, but a cervical radiculitis flare is a different pathology, not related to her SCI and may be independently amenable to a cervical epidural steroid injection.  She can keep that in mind consider that if cervical radicular symptoms flare again in the future    -- of note, no plans for long term opioid therapy as I do not feel that would be in her best interest           RAMILA REPORT  RAMILA report has been reviewed and scanned into the  patient's chart.  Date of last RAMILA : as above.  No current use of opioids.    --  ----------  Education about SCS therapy:    -  This was an extended office visit in which we entered into discussion about advanced pain relieving techniques, and discussed implantable pain therapies.  We discussed advanced neuromodulation in the form of Spinal Cord Stimulation.  This is a reasonable therapy for patients who have exhausted basic nonnarcotic options, basic modalities and physical therapies, and do not have any other reasonable surgical options.  This therapy as an alternative to long term high dose opioid therapy.    -  Risks include but are not limited to bleeding, infection, injury, paralysis, nerve injury, dural puncture, and risk for postprocedural pain.  Implanted equipment risks include but are not limited to lead migration, lead fracture, risk of loss of pain relieving stimulation, risk of electrical shock, and risk of system failure.    - We discussed the theory and basic science behind SCS therapy including but not limited to energy delivery and relevant anatomy, in terms that are easy to understand and also with use of illustrative devices.  Spinal Cord Stimulation therapies apply an electromagnetic field to a specific area on the spinal cord (Dorsal Column) to attempt to block transmission of painful signals from the peripheral nerves to the brain.    -  We discussed that prior to trialing, I request that patients review relevant materials and perform some research, and also have a follow up education session with a device specialist from the .  Also, insurance requires a presurgical psychological evaluation.  When these have been completed, and all the patient's questions have been answered to their satisfaction, then we will plan to request authorization for trialing.   - We discussed the trialing process (aka Phase 1)  that usually lasts a week, and the temporary nature of this trial.  Trial  success will determine whether or not we proceed to implant.  We discussed reasonable expectations, and that I feel that consistent 50% pain relief is medically successful and is a reasonable expectation to justify moving forward to permanent implant.    -  Additional risks of Phase 1 include but are not limited to bleeding on insertion, bleeding on lead removal, and procedural site soreness.  - We discussed the percutaneous surgical implant, including postsurgical restrictions, risks, and alternatives.   For spinal cord stimulation implanted device (aka SCS Phase 2) there is usually a midline vertical incision for the spinally implanted leads, and also a horizontal incision in the posterior lumbar flank for implantation of the battery & computer (aka IPG).  The leads are tunneled from the midline incision to the medial aspect of the battery pocket incision.    -  Postoperative restrictions include limiting the following activity as much as possible for 90 days:  Lifting >10 lbs, bending at the waist, stretching/reaching overhead, and twisting.  ----------    -------  Education:  Spinal Cord Stimulation Therapy versus Intrathecal Pump Therapy    We entered into discussion and education session about these two different forms of advanced neuromodulation.  We compared & contrasted the therapies.     IT pump Therapy involves the placing a catheter into the intrathecal space, which holds the spinal fluid, and in which the spinal cord resides, and using the catheter to infuse medication directly into the spinal fluid bathing the spinal cord.      SCS therapy involves placing catheters containing electrical wires, and electrical contacts on the distal end of each catheter, to place an electromagnetic shield onto neural structures to attempt to block pain transmissions through the central nervous system.      Risks of both include but are not limited to bleeding & infection & injury & risk of neural injury or paralysis or  coma or death, and risks of failure of the electronic devices, risks of worsening of clinical picture.    Intrathecal pump therapy risks include risks of increasing and pain as well as withdrawal symptoms depending on drug choice if the system fails.  Risks of granuloma.  Risk of catheter fracture.  Risk of drug side effects specific to each drug and risk of overdose and death.    Spinal cord stimulation does not involve drug infusion.  There is risk of electrical shock.  There is no risk of drug overdose.  There is risk of lead migration, which can usually be overcome by reprogramming, but may require repositioning of the leads.      In general, I feel that SCS therapy offers the chance of higher degrees of pain relief and less risk of side effects due to a lack of drug use.  In general, I feel that SCS therapy is preferable when there is no contraindications.  Intrathecal pump therapy may be a very reasonable alternative if SCS trialing does not prove effective or if SCS therapy is failing to provide desired results.  We discussed reasonable expectations.      ---------        ---    Dictated utilizing Dragon dictation.  EMR Dragon/Transcription disclaimer:   Much of this encounter note is an electronic transcription/translation of spoken language to printed text. The electronic translation of spoken language may permit erroneous, or at times, nonsensical words or phrases to be inadvertently transcribed; Although I have reviewed the note for such errors, some may still exist.        --      Vitals:    11/18/24 1044   PainSc:   6   PainLoc: Back            Very extended office visit, 67 minutes in duration.  5 minutes of chart review prior to entering room with the patient.  53 minutes in the room time in discussion and answering questions and counseling.  More than 40 minutes was spent in education counseling about various treatment options reflected in the plan of care above.  10 minutes of coordination of care  after the visit, that included and gathering education materials about the above-mentioned treatment items listed in the plan of care as well as some information on intrathecal pump therapies, also preparing and sending initial prescription of low-dose naltrexone to the pharmacy, paperwork collection and review of urine drug screen point-of-care and sending off for confirmation.

## 2024-11-18 NOTE — PROGRESS NOTES
"CHIEF COMPLAINT    Back and bilateral leg pain    Subjective   Cesia Workman is a 54 y.o. female.   She presents to the office for evaluation of back and bilateral leg pain. She was referred here by Da Mcintosh MD. The patient states she had a spinal cord injury May of 2022 from a ruptured disc (unknown injury), she had fusion surgery, and since that time has had lumbar pain with bilateral leg pain as well. The pain is described as burning with tight muscles. Bending over does worsen the pain, as well as standing, sitting or walking for long periods of time. She is currently in PT, she has seen a chiropractor in the past, which increased the pain and discontinued, she does get some massage while in PT. She does have a TENS unit. The patient does take methocarbamol prn, as well as dantrolene 100 mg every a.m., Baclofen 40 mg in the evening. She is weaning off Lyrica 150 mg currently, to see if it is actually helpful. She is taking 50 mg BID, and states she is \"struggling\" all week on this dose. She has had 2 epidurals at [facility] which she did not find helpful/    ---    She has a spinal cord injury dating back to May 7, 2022, a T10 disc herniation with cord compression.   She had T10-T12 posterolateral fusion, also left word T11 discectomy, on May 8, 2022.  Incomplete paraplegia, and also follows with urologist.  Past medical history also includes a history of breast cancer in 2018.    When she saw the neurosurgeon and was was referred to us by the neurosurgery group, she was also having a lot of neck pain and arm radiating pain radicular type symptoms.  Since then that has settled down somewhat, and her primary most bothersome pain complaint is her chronic mid low back pain and bilateral leg pain.    ---        Back Pain  This is a chronic problem. The current episode started more than 1 year ago. The problem occurs constantly. The problem is unchanged. The pain is present in the lumbar spine and thoracic spine. " The quality of the pain is described as aching and burning. The pain radiates to the left buttock, right buttock, left anterolateral lower leg, right anterolateral lower leg, left foot and right foot. The pain is moderate. Associated symptoms include numbness (tingling and decreased sensation bilateral leg, and at area of spinal cord injury (thoracic area)) and weakness (bilateral leg). Pertinent negatives include no fever. Risk factors: SCI 2022.   Neck Pain   This is a chronic problem. The current episode started more than 1 month ago. The problem occurs daily. The pain is mild. Associated symptoms include numbness (tingling and decreased sensation bilateral leg, and at area of spinal cord injury (thoracic area)) and weakness (bilateral leg). Pertinent negatives include no fever.        PEG Assessment   What number best describes your pain on average in the past week?6  What number best describes how, during the past week, pain has interfered with your enjoyment of life?9  What number best describes how, during the past week, pain has interfered with your general activity?  9    --  The aforementioned information the Chief Complaint section and above subjective data including any HPI data, and also the Review of Systems data, has been personally reviewed and affirmed.  --      Current Outpatient Medications:     diazePAM (VALIUM) 5 MG tablet, Take 1 tablet by mouth Every 12 (Twelve) Hours., Disp: , Rfl:     DULoxetine (CYMBALTA) 30 MG capsule, Take 1 capsule by mouth Daily. Takes with 60 mg to make a total dose of  90 mg daily, Disp: , Rfl:     DULoxetine (CYMBALTA) 60 MG capsule, Take 1 capsule by mouth Daily. Takes with 30 mg to make a total dose of 90 mg Daily., Disp: , Rfl:     hydrOXYzine (ATARAX) 25 MG tablet, Take 1 tablet by mouth 3 (Three) Times a Day As Needed for Itching or Anxiety., Disp: , Rfl:     meloxicam (MOBIC) 15 MG tablet, Take 1 tablet by mouth Daily., Disp: , Rfl:     methocarbamol (ROBAXIN)  750 MG tablet, Take 1 tablet by mouth 4 (Four) Times a Day As Needed for Muscle Spasms., Disp: 30 tablet, Rfl: 0    nitrofurantoin (MACRODANTIN) 50 MG capsule, Take 1 capsule by mouth Daily., Disp: , Rfl:     pregabalin (LYRICA) 150 MG capsule, Take 1 capsule by mouth Every 12 (Twelve) Hours. Currently taking 50 mg BID, Disp: , Rfl:     tamoxifen (NOLVADEX) 20 MG chemo tablet, Take 1 tablet by mouth once daily, Disp: 90 tablet, Rfl: 3    dantrolene (DANTRIUM) 50 MG capsule, Take 1 capsule by mouth 3 (Three) Times a Day As Needed., Disp: , Rfl:     vitamin D (ERGOCALCIFEROL) 1.25 MG (11444 UT) capsule capsule, Take 1 capsule by mouth 1 (One) Time Per Week. Sundays, Disp: , Rfl:     The following portions of the patient's history were reviewed and updated as appropriate: allergies, current medications, past family history, past medical history, past social history, past surgical history, and problem list.    -------    The following portions of the patient's history were reviewed and updated as appropriate: allergies, current medications, past family history, past medical history, past social history, past surgical history and problem list.    No Known Allergies    Current Outpatient Medications on File Prior to Visit   Medication Sig Dispense Refill    diazePAM (VALIUM) 5 MG tablet Take 1 tablet by mouth Every 12 (Twelve) Hours.      DULoxetine (CYMBALTA) 30 MG capsule Take 1 capsule by mouth Daily. Takes with 60 mg to make a total dose of  90 mg daily      DULoxetine (CYMBALTA) 60 MG capsule Take 1 capsule by mouth Daily. Takes with 30 mg to make a total dose of 90 mg Daily.      hydrOXYzine (ATARAX) 25 MG tablet Take 1 tablet by mouth 3 (Three) Times a Day As Needed for Itching or Anxiety.      meloxicam (MOBIC) 15 MG tablet Take 1 tablet by mouth Daily.      methocarbamol (ROBAXIN) 750 MG tablet Take 1 tablet by mouth 4 (Four) Times a Day As Needed for Muscle Spasms. 30 tablet 0    nitrofurantoin (MACRODANTIN) 50  MG capsule Take 1 capsule by mouth Daily.      pregabalin (LYRICA) 150 MG capsule Take 1 capsule by mouth Every 12 (Twelve) Hours. Currently taking 50 mg BID      tamoxifen (NOLVADEX) 20 MG chemo tablet Take 1 tablet by mouth once daily 90 tablet 3    dantrolene (DANTRIUM) 50 MG capsule Take 1 capsule by mouth 3 (Three) Times a Day As Needed.      vitamin D (ERGOCALCIFEROL) 1.25 MG (89089 UT) capsule capsule Take 1 capsule by mouth 1 (One) Time Per Week. Sundays       No current facility-administered medications on file prior to visit.         * No active hospital problems. *       Past Medical History:   Diagnosis Date    Anxiety     Breast cancer 02/14/2018    Left breast, Upper Inner Quadrant, Invasive Mammary Carcinoma, Intermediate grade, measuring at least 0.2 cm in dimension, with associated high grade solid and cribiform ductal carcinoma in situ with comedonecrosis, ER/IL positive, [id number] negative    Cervical disc disorder     Chronic pain disorder 5/5/2022    Depression     Extremity pain     Heartburn     Hx of radiation therapy     2018, left breast ca    Infectious mononucleosis     Low back pain     Migraines     Neck pain     PONV (postoperative nausea and vomiting)     Pre-diabetes     PVC's (premature ventricular contractions)     Wears contact lenses        Past Surgical History:   Procedure Laterality Date    BACK SURGERY  5/8/2022    BREAST BIOPSY Left 02/14/2018    Left breast stereotactic biopsy w/ marker clip placement & positive radiograph-Dr. Lashaun Gamble, [facility]    BREAST BIOPSY Left 02/12/2018    INCOMPLETE - Biopsy could not be completed as patient had severe vasovagal reaction & [facility]    BREAST BIOPSY Left 05/23/2018    Procedure: REEXCISION OF POSITIVE LUMPECTOMY MARGIN LEFT BREAST;  Surgeon: Adrián Pereyra MD;  Location: [facility];  Service: General    BREAST LUMPECTOMY WITH SENTINEL NODE BIOPSY Left 04/11/2018    Procedure: BREAST LUMPECTOMY WITH SENTINEL NODE  BIOPSY AND NEEDLE LOCALIZATION;  Surgeon: Adrián Pereyra MD;  Location: [facility] OR;  Service: General    LAMINECTOMY      SPINAL FUSION      SPINE SURGERY      THORACIC DECOMPRESSION POSTERIOR FUSION WITH INSTRUMENTATION Left 2022    Procedure: T10-T12 BILATERAL POSTERIOR LATERAL FUSION WITH T11 LEFT SIDED TRANSPEDICULAR DISCECTOMY FOR SPINAL CANAL DECOMPRESSION;  Surgeon: Brady Vega MD;  Location: [facility] OR;  Service: Neurosurgery;  Laterality: Left;       Family History   Problem Relation Age of Onset    Breast cancer Maternal Aunt         In her 60s    Breast cancer Paternal Aunt     Hyperlipidemia Mother     Asthma Father     Diabetes Father     Hearing loss Father     Heart disease Father     Hypertension Father     Thyroid cancer Father 64        Follicular Tyroid Cancer    Alcohol abuse Sister     Depression Sister     Diabetes Maternal Grandmother     Bone cancer Maternal Grandfather     Breast cancer Cousin     Malig Hyperthermia Neg Hx        Social History     Socioeconomic History    Marital status:      Spouse name: Nikita Workman    Number of children: 3    Years of education: College   Tobacco Use    Smoking status: Former     Current packs/day: 0.00     Average packs/day: 0.5 packs/day for 2.0 years (1.0 ttl pk-yrs)     Types: Cigarettes     Start date: 1988     Quit date:      Years since quittin.9     Passive exposure: Never    Smokeless tobacco: Never   Vaping Use    Vaping status: Never Used   Substance and Sexual Activity    Alcohol use: Yes     Comment: Occasional    Drug use: No    Sexual activity: Yes     Partners: Male     Birth control/protection: Vasectomy, Hysterectomy       -------      REVIEW OF PERTINENT MEDICAL DATA    E-warren report is reviewed:  I reviewed the document in the electronic form under the PDMP tab in the Epic EMR...  - In this function, the report is a current report in as close to real-time as possible.  - The report was  "available for immediate review.    - I did anisha the report as reviewed.  - There is not concern for aberrant behavior based on this ekasper review.      [place] = 69      -------    Prelim UDS Immunoassay Today:    AMP (amphet) negative  BAR (barbituate) negative  BUP (buprenorph) negative  BZO (benzodiaz) negative  MOHAMUD (cocaine) negative  MET (methamph) negative  MDMA (ecstacy) negative  MTD (methadone) negative  OPI (opiate) negative  PCP (pcp)  negative  OXY (oxycodone) negative  THC  (marijuana)  Positive ... She noted use of THC gummies    GreenTemp warm  Appearance WNL    -------      Review of Systems   Constitutional:  Negative for chills and fever.   Respiratory:  Negative for cough and shortness of breath.    Gastrointestinal:  Positive for constipation. Negative for diarrhea.   Genitourinary:  Positive for difficulty urinating (self urinary catheterization).   Musculoskeletal:  Positive for back pain, gait problem and neck pain.   Neurological:  Positive for weakness (bilateral leg) and numbness (tingling and decreased sensation bilateral leg, and at area of spinal cord injury (thoracic area)). Negative for dizziness and light-headedness.   Psychiatric/Behavioral:  Negative for agitation.            Vitals:    11/18/24 1044   BP: 118/72   BP Location: Right arm   Patient Position: Sitting   Pulse: 78   Temp: 96 °F (35.6 °C)   TempSrc: Temporal   SpO2: 99%   Weight: 69 kg (152 lb 3.2 oz)   Height: 157 cm (61.81\")   PainSc:   6   PainLoc: Back         Objective       Physical Exam  Vitals and nursing note reviewed.   Constitutional:       General: She is not in acute distress.     Appearance: Normal appearance. She is well-developed. She is not toxic-appearing.   HENT:      Head: Normocephalic and atraumatic.      Right Ear: Hearing and external ear normal.      Left Ear: Hearing and external ear normal.      Nose: Nose normal.   Eyes:      General: Lids are normal.      Conjunctiva/sclera: Conjunctivae " normal.      Pupils: Pupils are equal, round, and reactive to light.   Pulmonary:      Effort: Pulmonary effort is normal. No respiratory distress.   Musculoskeletal:      Thoracic back: Deformity (postop) present. No swelling, edema or tenderness. Decreased range of motion.      Lumbar back: Decreased range of motion. Negative right straight leg raise test and negative left straight leg raise test.   Neurological:      Mental Status: She is alert and oriented to person, place, and time.      Cranial Nerves: Cranial nerves 2-12 are intact. No cranial nerve deficit.      Sensory: Sensory deficit present.      Motor: Weakness and atrophy present.      Gait: Gait abnormal.      Deep Tendon Reflexes:      Reflex Scores:       Patellar reflexes are 3+ on the right side and 3+ on the left side.     Comments: Foot drop left greater than right.  5- out of 5 bilateral knee and ankle flexion and extension.  Diminished light touch sensation throughout lower extremities.   Psychiatric:         Behavior: Behavior normal.         Assessment & Plan   Diagnoses and all orders for this visit:    1. Thoracic myelopathy (Primary)  -     Urine Drug Screen Confirmation - Urine, Clean Catch; Future  -     POC Urine Drug Screen, Triage    2. Chronic pain syndrome    3. Lumbar radiculopathy    4. Postlaminectomy syndrome    5. Cervical radiculitis        --- Cesia Workman reports a pain score of 6.  Given her pain assessment as noted, treatment options were discussed and the following options were decided upon as a follow-up plan to address the patient's pain: prescription for non-opiod analgesics.    --- Follow-up 1 month    -- set up education session with Cerna     -- MetaNx samples... she asks about potential regenerative therapies, and this product, while not having any evidence that I know of for central nervous system injury, does have some limited evidence for improving nerve ending density in peripheral neuropathy.  As a  supplement, it seems that to be quite low risk to trial, and the recommendation from  would be to trial for 6-12 months and monitor for improvement.  So, she is given informative brochure, and samples to try, as she considers it    -- Urine Drug Screen per routine; send out for confirmation    -- trial of Low Dose Naltrexone, starting at 1mg per day.  Sent to [facility].  Plan to escalate if tolerated well but if there is need to titrate dose.    --With regards to neck and arm pain, we could consider a cervical epidural injection.  She notes that epidurals on the lower levels in the cervical spine had not been helpful for her previously.  All those are seemingly not unreasonable, I would concur that I do not think there is clinical reason to consider more thoracolumbar epidurals for that allergy, but a cervical radiculitis flare is a different pathology, not related to her SCI and may be independently amenable to a cervical epidural steroid injection.  She can keep that in mind consider that if cervical radicular symptoms flare again in the future    -- of note, no plans for long term opioid therapy as I do not feel that would be in her best interest           RAMILA REPORT  RAMILA report has been reviewed and scanned into the patient's chart.  Date of last RAMILA : as above.  No current use of opioids.    --  ----------  Education about SCS therapy:    -  This was an extended office visit in which we entered into discussion about advanced pain relieving techniques, and discussed implantable pain therapies.  We discussed advanced neuromodulation in the form of Spinal Cord Stimulation.  This is a reasonable therapy for patients who have exhausted basic nonnarcotic options, basic modalities and physical therapies, and do not have any other reasonable surgical options.  This therapy as an alternative to long term high dose opioid therapy.    -  Risks include but are not limited to bleeding,  infection, injury, paralysis, nerve injury, dural puncture, and risk for postprocedural pain.  Implanted equipment risks include but are not limited to lead migration, lead fracture, risk of loss of pain relieving stimulation, risk of electrical shock, and risk of system failure.    - We discussed the theory and basic science behind SCS therapy including but not limited to energy delivery and relevant anatomy, in terms that are easy to understand and also with use of illustrative devices.  Spinal Cord Stimulation therapies apply an electromagnetic field to a specific area on the spinal cord (Dorsal Column) to attempt to block transmission of painful signals from the peripheral nerves to the brain.    -  We discussed that prior to trialing, I request that patients review relevant materials and perform some research, and also have a follow up education session with a device specialist from the .  Also, insurance requires a presurgical psychological evaluation.  When these have been completed, and all the patient's questions have been answered to their satisfaction, then we will plan to request authorization for trialing.   - We discussed the trialing process (aka Phase 1)  that usually lasts a week, and the temporary nature of this trial.  Trial success will determine whether or not we proceed to implant.  We discussed reasonable expectations, and that I feel that consistent 50% pain relief is medically successful and is a reasonable expectation to justify moving forward to permanent implant.    -  Additional risks of Phase 1 include but are not limited to bleeding on insertion, bleeding on lead removal, and procedural site soreness.  - We discussed the percutaneous surgical implant, including postsurgical restrictions, risks, and alternatives.   For spinal cord stimulation implanted device (aka SCS Phase 2) there is usually a midline vertical incision for the spinally implanted leads, and also a  horizontal incision in the posterior lumbar flank for implantation of the battery & computer (aka IPG).  The leads are tunneled from the midline incision to the medial aspect of the battery pocket incision.    -  Postoperative restrictions include limiting the following activity as much as possible for 90 days:  Lifting >10 lbs, bending at the waist, stretching/reaching overhead, and twisting.  ----------    -------  Education:  Spinal Cord Stimulation Therapy versus Intrathecal Pump Therapy    We entered into discussion and education session about these two different forms of advanced neuromodulation.  We compared & contrasted the therapies.     IT pump Therapy involves the placing a catheter into the intrathecal space, which holds the spinal fluid, and in which the spinal cord resides, and using the catheter to infuse medication directly into the spinal fluid bathing the spinal cord.      SCS therapy involves placing catheters containing electrical wires, and electrical contacts on the distal end of each catheter, to place an electromagnetic shield onto neural structures to attempt to block pain transmissions through the central nervous system.      Risks of both include but are not limited to bleeding & infection & injury & risk of neural injury or paralysis or coma or death, and risks of failure of the electronic devices, risks of worsening of clinical picture.    Intrathecal pump therapy risks include risks of increasing and pain as well as withdrawal symptoms depending on drug choice if the system fails.  Risks of granuloma.  Risk of catheter fracture.  Risk of drug side effects specific to each drug and risk of overdose and death.    Spinal cord stimulation does not involve drug infusion.  There is risk of electrical shock.  There is no risk of drug overdose.  There is risk of lead migration, which can usually be overcome by reprogramming, but may require repositioning of the leads.      In general, I feel  that SCS therapy offers the chance of higher degrees of pain relief and less risk of side effects due to a lack of drug use.  In general, I feel that SCS therapy is preferable when there is no contraindications.  Intrathecal pump therapy may be a very reasonable alternative if SCS trialing does not prove effective or if SCS therapy is failing to provide desired results.  We discussed reasonable expectations.      ---------        ---    Dictated utilizing Dragon dictation.  EMR Dragon/Transcription disclaimer:   Much of this encounter note is an electronic transcription/translation of spoken language to printed text. The electronic translation of spoken language may permit erroneous, or at times, nonsensical words or phrases to be inadvertently transcribed; Although I have reviewed the note for such errors, some may still exist.        --      Vitals:    11/18/24 1044   PainSc:   6   PainLoc: Back            Very extended office visit, 67 minutes in duration.  5 minutes of chart review prior to entering room with the patient.  53 minutes in the room time in discussion and answering questions and counseling.  More than 40 minutes was spent in education counseling about various treatment options reflected in the plan of care above.  10 minutes of coordination of care after the visit, that included and gathering education materials about the above-mentioned treatment items listed in the plan of care as well as some information on intrathecal pump therapies, also preparing and sending initial prescription of low-dose naltrexone to the pharmacy, paperwork collection and review of urine drug screen point-of-care and sending off for confirmation.

## 2024-11-19 LAB
POC AMPHETAMINES: NEGATIVE
POC BARBITURATES: NEGATIVE
POC BENZODIAZEPHINES: NEGATIVE
POC COCAINE: NEGATIVE
POC METHADONE: NEGATIVE
POC METHAMPHETAMINE SCREEN URINE: NEGATIVE
POC OPIATES: NEGATIVE
POC OXYCODONE: NEGATIVE
POC PHENCYCLIDINE: NEGATIVE
POC PROPOXYPHENE: NEGATIVE
POC THC: POSITIVE
POC TRICYCLIC ANTIDEPRESSANTS: NEGATIVE

## 2024-12-10 ENCOUNTER — ANESTHESIA EVENT (OUTPATIENT)
Dept: PERIOP | Facility: HOSPITAL | Age: 54
End: 2024-12-10
Payer: COMMERCIAL

## 2024-12-10 RX ORDER — NALTREXONE HYDROCHLORIDE 50 MG/1
50 TABLET, FILM COATED ORAL DAILY
COMMUNITY

## 2024-12-10 RX ORDER — MULTIPLE VITAMINS W/ MINERALS TAB 9MG-400MCG
1 TAB ORAL DAILY
COMMUNITY

## 2024-12-10 RX ORDER — SACCHAROMYCES BOULARDII 250 MG
250 CAPSULE ORAL 2 TIMES DAILY
COMMUNITY

## 2024-12-10 RX ORDER — PHENOL 1.4 %
600 AEROSOL, SPRAY (ML) MUCOUS MEMBRANE DAILY
COMMUNITY

## 2024-12-11 ENCOUNTER — ANESTHESIA (OUTPATIENT)
Dept: PERIOP | Facility: HOSPITAL | Age: 54
End: 2024-12-11
Payer: COMMERCIAL

## 2024-12-11 ENCOUNTER — HOSPITAL ENCOUNTER (OUTPATIENT)
Facility: HOSPITAL | Age: 54
Setting detail: HOSPITAL OUTPATIENT SURGERY
Discharge: HOME | End: 2024-12-11
Attending: SURGERY | Admitting: SURGERY
Payer: COMMERCIAL

## 2024-12-11 VITALS
HEART RATE: 72 BPM | RESPIRATION RATE: 15 BRPM | OXYGEN SATURATION: 97 % | WEIGHT: 146.2 LBS | DIASTOLIC BLOOD PRESSURE: 72 MMHG | BODY MASS INDEX: 26.9 KG/M2 | SYSTOLIC BLOOD PRESSURE: 122 MMHG | TEMPERATURE: 98.1 F

## 2024-12-11 PROCEDURE — S0260 H&P FOR SURGERY: HCPCS | Performed by: SURGERY

## 2024-12-11 PROCEDURE — 25010000002 LIDOCAINE 2% SOLUTION: Performed by: NURSE ANESTHETIST, CERTIFIED REGISTERED

## 2024-12-11 PROCEDURE — 45378 DIAGNOSTIC COLONOSCOPY: CPT | Performed by: SURGERY

## 2024-12-11 PROCEDURE — 25010000002 PROPOFOL 200 MG/20ML EMULSION: Performed by: NURSE ANESTHETIST, CERTIFIED REGISTERED

## 2024-12-11 RX ORDER — PROPOFOL 10 MG/ML
INJECTION, EMULSION INTRAVENOUS AS NEEDED
Status: DISCONTINUED | OUTPATIENT
Start: 2024-12-11 | End: 2024-12-11 | Stop reason: SURG

## 2024-12-11 RX ORDER — ONDANSETRON 2 MG/ML
4 INJECTION INTRAMUSCULAR; INTRAVENOUS ONCE AS NEEDED
Status: DISCONTINUED | OUTPATIENT
Start: 2024-12-11 | End: 2024-12-11 | Stop reason: HOSPADM

## 2024-12-11 RX ORDER — SODIUM CHLORIDE, SODIUM LACTATE, POTASSIUM CHLORIDE, CALCIUM CHLORIDE 600; 310; 30; 20 MG/100ML; MG/100ML; MG/100ML; MG/100ML
100 INJECTION, SOLUTION INTRAVENOUS ONCE
Status: DISCONTINUED | OUTPATIENT
Start: 2024-12-11 | End: 2024-12-11 | Stop reason: HOSPADM

## 2024-12-11 RX ORDER — SODIUM CHLORIDE 0.9 % (FLUSH) 0.9 %
10 SYRINGE (ML) INJECTION AS NEEDED
Status: DISCONTINUED | OUTPATIENT
Start: 2024-12-11 | End: 2024-12-11 | Stop reason: HOSPADM

## 2024-12-11 RX ORDER — LIDOCAINE HYDROCHLORIDE 20 MG/ML
INJECTION, SOLUTION INFILTRATION; PERINEURAL AS NEEDED
Status: DISCONTINUED | OUTPATIENT
Start: 2024-12-11 | End: 2024-12-11 | Stop reason: SURG

## 2024-12-11 RX ORDER — SODIUM CHLORIDE 9 MG/ML
40 INJECTION, SOLUTION INTRAVENOUS AS NEEDED
Status: DISCONTINUED | OUTPATIENT
Start: 2024-12-11 | End: 2024-12-11 | Stop reason: HOSPADM

## 2024-12-11 RX ORDER — SODIUM CHLORIDE, SODIUM LACTATE, POTASSIUM CHLORIDE, CALCIUM CHLORIDE 600; 310; 30; 20 MG/100ML; MG/100ML; MG/100ML; MG/100ML
9 INJECTION, SOLUTION INTRAVENOUS CONTINUOUS
Status: DISCONTINUED | OUTPATIENT
Start: 2024-12-11 | End: 2024-12-11 | Stop reason: HOSPADM

## 2024-12-11 RX ORDER — LIDOCAINE HYDROCHLORIDE 10 MG/ML
0.5 INJECTION, SOLUTION EPIDURAL; INFILTRATION; INTRACAUDAL; PERINEURAL ONCE AS NEEDED
Status: DISCONTINUED | OUTPATIENT
Start: 2024-12-11 | End: 2024-12-11 | Stop reason: HOSPADM

## 2024-12-11 RX ORDER — SODIUM CHLORIDE 0.9 % (FLUSH) 0.9 %
10 SYRINGE (ML) INJECTION EVERY 12 HOURS SCHEDULED
Status: DISCONTINUED | OUTPATIENT
Start: 2024-12-11 | End: 2024-12-11 | Stop reason: HOSPADM

## 2024-12-11 RX ADMIN — PROPOFOL 50 MG: 10 INJECTION, EMULSION INTRAVENOUS at 07:54

## 2024-12-11 RX ADMIN — LIDOCAINE HYDROCHLORIDE 80 MG: 20 INJECTION, SOLUTION INFILTRATION; PERINEURAL at 07:37

## 2024-12-11 RX ADMIN — PROPOFOL 50 MG: 10 INJECTION, EMULSION INTRAVENOUS at 07:56

## 2024-12-11 RX ADMIN — PROPOFOL 50 MG: 10 INJECTION, EMULSION INTRAVENOUS at 07:43

## 2024-12-11 RX ADMIN — PROPOFOL 50 MG: 10 INJECTION, EMULSION INTRAVENOUS at 07:39

## 2024-12-11 RX ADMIN — PROPOFOL 100 MG: 10 INJECTION, EMULSION INTRAVENOUS at 07:37

## 2024-12-11 RX ADMIN — PROPOFOL 50 MG: 10 INJECTION, EMULSION INTRAVENOUS at 07:51

## 2024-12-11 RX ADMIN — PROPOFOL 50 MG: 10 INJECTION, EMULSION INTRAVENOUS at 07:40

## 2024-12-11 RX ADMIN — PROPOFOL 50 MG: 10 INJECTION, EMULSION INTRAVENOUS at 07:47

## 2024-12-11 NOTE — H&P
Colorectal & General Surgery  History and Physical    Patient: Cesia Workman  YOB: 1970  MRN: [id number]      Assessment  Cesia Workman is a 54 y.o. female who presents for routine colorectal cancer screening today.  We discussed the risk, benefits, and alternatives to colonoscopy, and informed consent was obtained.    Plan  Proceed with colonoscopy      History of Present Illness   Cesia Workman is a 54 y.o. female who presents for routine colorectal cancer screening with no high risk features or concerning symptoms.    Past Medical History   Past Medical History:   Diagnosis Date    Anxiety     Breast cancer 02/14/2018    Left breast, Upper Inner Quadrant, Invasive Mammary Carcinoma, Intermediate grade, measuring at least 0.2 cm in dimension, with associated high grade solid and cribiform ductal carcinoma in situ with comedonecrosis, ER/W[place] positive, GBK1bmf negative    Cervical disc disorder     Chronic pain disorder 5/5/2022    Depression     Extremity pain     Heartburn     Hx of radiation therapy     2018, left breast ca    Infectious mononucleosis     Low back pain     Migraines     Neck pain     PONV (postoperative nausea and vomiting)     Pre-diabetes     PVC's (premature ventricular contractions)     spinal cord injury     Wears contact lenses         Past Surgical History   Past Surgical History:   Procedure Laterality Date    BACK SURGERY  5/8/2022    BREAST BIOPSY Left 02/14/2018    Left breast stereotactic biopsy w/ marker clip placement & positive radiograph-Dr. Lashaun Gamble, [facility]    BREAST BIOPSY Left 02/12/2018    INCOMPLETE - Biopsy could not be completed as patient had severe vasovagal reaction & subsequent syncope-[facility]    BREAST BIOPSY Left 05/23/2018    Procedure: REEXCISION OF POSITIVE LUMPECTOMY MARGIN LEFT BREAST;  Surgeon: Adrián Pereyra MD;  Location: [facility];  Service: General    BREAST LUMPECTOMY WITH SENTINEL NODE BIOPSY Left 04/11/2018    Procedure:  BREAST LUMPECTOMY WITH SENTINEL NODE BIOPSY AND NEEDLE LOCALIZATION;  Surgeon: Adrián Pereyra MD;  Location: [facility];  Service: General    LAMINECTOMY      SPINAL FUSION      SPINE SURGERY      THORACIC DECOMPRESSION POSTERIOR FUSION WITH INSTRUMENTATION Left 2022    Procedure: T10-T12 BILATERAL POSTERIOR LATERAL FUSION WITH T11 LEFT SIDED TRANSPEDICULAR DISCECTOMY FOR SPINAL CANAL DECOMPRESSION;  Surgeon: Brady Vega MD;  Location: [facility];  Service: Neurosurgery;  Laterality: Left;       Social History  Social History     Socioeconomic History    Marital status:      Spouse name: Nikita Workman    Number of children: 3    Years of education: College   Tobacco Use    Smoking status: Former     Current packs/day: 0.00     Average packs/day: 0.5 packs/day for 2.0 years (1.0 ttl pk-yrs)     Types: Cigarettes     Start date: 1988     Quit date:      Years since quittin.9     Passive exposure: Never    Smokeless tobacco: Never   Vaping Use    Vaping status: Never Used   Substance and Sexual Activity    Alcohol use: Yes     Comment: Occasional    Drug use: No     Comment: THC gummie q hs    Sexual activity: Yes     Partners: Male     Birth control/protection: Vasectomy, Hysterectomy       Family History  Family History   Problem Relation Age of Onset    Breast cancer Maternal Aunt         In her 60s    Breast cancer Paternal Aunt     Hyperlipidemia Mother     Asthma Father     Diabetes Father     Hearing loss Father     Heart disease Father     Hypertension Father     Thyroid cancer Father 64        Follicular Tyroid Cancer    Alcohol abuse Sister     Depression Sister     Diabetes Maternal Grandmother     Bone cancer Maternal Grandfather     Breast cancer Cousin     Malig Hyperthermia Neg Hx        Review of Systems  Negative except as documented in the HPI.     Allergies  No Known Allergies    Medications    Current Facility-Administered Medications:     lactated ringers  infusion, 9 mL/hr, Intravenous, Continuous, Cross, Shiloh C, CRNA    lidocaine PF 1% (XYLOCAINE) injection 0.5 mL, 0.5 mL, Intradermal, Once PRN, Cross, Shiloh C, CRNA    sodium chloride 0.9 % flush 10 mL, 10 mL, Intravenous, Q12H, Cross, Shiloh C, CRNA    sodium chloride 0.9 % flush 10 mL, 10 mL, Intravenous, PRN, Cross, Shiloh C, CRNA    sodium chloride 0.9 % infusion 40 mL, 40 mL, Intravenous, PRN, Cross, Shiloh C, CRNA    Vital Signs  Vitals:    12/11/24 0704   BP: 118/84   Pulse: 81   Resp: 20   Temp: 98.2 °F (36.8 °C)   SpO2: 99%        Physical Exam  Constitutional: Resting comfortably, no acute distress  Neck: Supple, trachea midline  Respiratory: No increased work of breathing, Symmetric excursion  Cardiovascular: Well pefursed, no jugular venous distention evident   Abdominal:  Soft, non-tender, non-distended  Lymphatics: No cervical or suprascapular adenopathy  Skin: Warm, dry, no rash on visualized skin surfaces  Psychiatric: Alert and oriented ×3, normal affect          Suleman Ramirez MD  Colorectal & General Surgery  [facility]    [address], [address]  [place], [place], [address]  P: [phone]  F: [phone]

## 2024-12-11 NOTE — OP NOTE
Colorectal & General Surgery  Operative Report    Patient: Cesia Workman  YOB: 1970  MRN: [id number]  DATE OF PROCEDURE: 12/11/24     PREOPERATIVE DIAGNOSIS:  Colon cancer screening    POSTOPERATIVE DIAGNOSIS:  Normal colonic and rectal mucosa  Significant tortuosity of the colon consistent with chronic constipation    PROCEDURE:  Colonoscopy to cecum     FINDINGS:  Normal rectal and colonic mucosa.  The colon was significantly tortuous, which is consistent with her history of chronic constipation.    RECOMMENDATIONS:   Repeat colonoscopy in 10 years for colorectal cancer screening purposes    SURGEON:  Suleman Ramirez MD    ANESTHESIA:  Monitored anesthesia care    EBL:  None    SPECIMEN:  None    OPERATIVE DESCRIPTION:  The patient was brought to the endoscopy suite under the care of the nursing staff.  A preoperative timeout was performed.  Monitored anesthesia care was initiated.  A digital rectal examination was performed.  The endoscope was inserted into the anus and advanced carefully to the cecum.  The endoscope was then withdrawn and the entire mucosal surface of the colon and rectum were visualized.    Retroflexion was performed in the rectum.  The scope was then withdrawn.    The patient tolerated the procedure well and was transferred to the postanesthesia care unit in stable condition.    Suleman Ramirez M.D.  Colorectal & General Surgery  [facility]    [address], [address]  [place], [place], [address]  P: [phone]  F: [phone]

## 2024-12-11 NOTE — ANESTHESIA PREPROCEDURE EVALUATION
Anesthesia Evaluation     Patient summary reviewed and Nursing notes reviewed   history of anesthetic complications:  PONV  NPO Solid Status: > 8 hours  NPO Liquid Status: > 4 hours           Airway   Mallampati: II  TM distance: >3 FB  Neck ROM: full  no difficulty expected  Dental - normal exam     Pulmonary - normal exam   (+) a smoker Former,  (-) COPD, asthma, lung cancer  Cardiovascular - negative cardio ROS and normal exam  Exercise tolerance: good (4-7 METS)    ECG reviewed  Rhythm: regular  Rate: normal    (-) hypertension, valvular problems/murmurs, past MI, CAD, dysrhythmias, angina, CHF, orthopnea, PND, cardiac stents, CABG, pericardial effusion      Neuro/Psych  (+) numbness (Weakness of LE, neurogenic bowel and bladder, chronic pain, trunk weakness, tingling in hands), psychiatric history  (-) seizures, TIA, CVA, headaches    ROS Comment:  c/o bilateral leg weakness for a couple of weeks, but has been unable to walk x1 day.  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  GI/Hepatic/Renal/Endo    (+) GERD (occc)    Musculoskeletal     (+) chronic pain (T10 down, spasms of legs at times), neck pain  Abdominal  - normal exam   Substance History - negative useDrug use: THC gummy at night.     OB/GYN          Other   arthritis,   history of cancer remission      Other Comment: Hx/o BRCA  ROS/Med Hx Other: 0230 finished prep  Walks with 2 canes                    Anesthesia Plan    ASA 2     MAC     intravenous induction     Anesthetic plan, risks, benefits, and alternatives have been provided, discussed and informed consent has been obtained with: patient.  Pre-procedure education provided  Use of blood products discussed with patient  Consented to blood products.    Plan discussed with CRNA.        CODE STATUS:

## 2024-12-11 NOTE — ANESTHESIA POSTPROCEDURE EVALUATION
Patient: Cesia Workman    Procedure Summary       Date: 12/11/24 Room / Location: [facility] ENDOSCOPY 2 /  [facility]    Anesthesia Start: 0734 Anesthesia Stop: 0803    Procedure: COLONOSCOPY Diagnosis:       Colon cancer screening      (Colon cancer screening [Z12.11])    Surgeons: Adryan Ramirez MD Provider: Dee Brown CRNA    Anesthesia Type: MAC ASA Status: 2            Anesthesia Type: MAC    Vitals  Vitals Value Taken Time   /73 12/11/24 0840   Temp 98.1 °F (36.7 °C) 12/11/24 0808   Pulse 70 12/11/24 0843   Resp 12 12/11/24 0820   SpO2 98 % 12/11/24 0843   Vitals shown include unfiled device data.        Post Anesthesia Care and Evaluation    Patient location during evaluation: bedside  Patient participation: complete - patient participated  Level of consciousness: awake and alert  Pain score: 0  Pain management: adequate    Airway patency: patent  Anesthetic complications: No anesthetic complications  PONV Status: none  Cardiovascular status: acceptable  Respiratory status: acceptable  Hydration status: acceptable  No anesthesia care post op

## 2024-12-18 ENCOUNTER — OFFICE VISIT (OUTPATIENT)
Dept: PAIN MEDICINE | Facility: CLINIC | Age: 54
End: 2024-12-18
Payer: COMMERCIAL

## 2024-12-18 VITALS
OXYGEN SATURATION: 98 % | HEIGHT: 62 IN | RESPIRATION RATE: 16 BRPM | DIASTOLIC BLOOD PRESSURE: 82 MMHG | WEIGHT: 151.2 LBS | SYSTOLIC BLOOD PRESSURE: 120 MMHG | HEART RATE: 84 BPM | BODY MASS INDEX: 27.82 KG/M2 | TEMPERATURE: 97.5 F

## 2024-12-18 DIAGNOSIS — M96.1 POSTLAMINECTOMY SYNDROME: Primary | ICD-10-CM

## 2024-12-18 DIAGNOSIS — M54.16 LUMBAR RADICULOPATHY: ICD-10-CM

## 2024-12-18 PROCEDURE — 99214 OFFICE O/P EST MOD 30 MIN: CPT | Performed by: PHYSICIAN ASSISTANT

## 2024-12-18 RX ORDER — TIZANIDINE HYDROCHLORIDE 4 MG/1
4 CAPSULE, GELATIN COATED ORAL AS NEEDED
COMMUNITY
Start: 2024-12-15

## 2024-12-18 NOTE — PROGRESS NOTES
CHIEF COMPLAINT  Back pain  Pain is consistent.      Subjective   Cesia Workman is a 54 y.o. female  who presents for follow-up.  She has a history of chronic low back and lower extremity pain.  Patient sustained a spinal cord injury in May 2022 from a ruptured disc (unknown injury) and subsequently under fusion surgery.  Since that time she continues with pain in a bandlike distribution across the lumbar spine radiating to the bilateral lower extremities which is aggravated with standing, sitting or walking for extended periods of time.  She also has secondary complaints of posterior cervical spine pain radiating to the bilateral upper extremities.    On her last office visit the patient was started on naltrexone 1 mg daily which she feels has been helpful though she does feel that it could be a little bit stronger.  She attempted downward titration of the Lyrica but found that she had significant increased pain therefore she resumed 150 mg twice daily dosing.    She has a spinal cord injury dating back to May 7, 2022, a T10 disc herniation with cord compression.   She had T10-T12 posterolateral fusion, also left word T11 discectomy, on May 8, 2022.  Incomplete paraplegia, and also follows with urologist.  Past medical history also includes a history of breast cancer in 2018.     Pain today 4/10 VAS in severity.        Back Pain  This is a chronic problem. The current episode started more than 1 year ago. The problem occurs constantly. The problem is unchanged. The pain is present in the lumbar spine and thoracic spine. The quality of the pain is described as aching, burning and shooting. The pain radiates to the left buttock, right buttock, left thigh, right thigh, left anterolateral lower leg, right anterolateral lower leg, right foot and left foot. The pain is at a severity of 4/10. The pain is moderate. The symptoms are aggravated by position and standing (Walking). Associated symptoms include leg pain,  numbness (lower extremities) and weakness (lower extremities). Pertinent negatives include no abdominal pain, chest pain, fever or headaches. She has tried ice and heat for the symptoms.        PEG Assessment   What number best describes your pain on average in the past week?7  What number best describes how, during the past week, pain has interfered with your enjoyment of life?7  What number best describes how, during the past week, pain has interfered with your general activity?  7    Review of Pertinent Medical Data ---  MRI THORACIC SPINE W WO CONTRAST-, MRI LUMBAR SPINE W WO  CONTRAST-06/20/2024     HISTORY: Back pain.     TECHNIQUE:  Multiple pre and postcontrast images were obtained through  the thoracic spine and multiple pre and postcontrast images were  obtained through the lumbar spine.     FINDINGS: There are right pedicle screws at T10-T11 and T12 and left  pedicle screws at T10 and T12. These pedicle screws produce some  susceptibility artifact.     No compressive disc herniation is seen in the thoracic spine. The  contour of the spinal cord at T10 appears slightly irregular but some of  this may be artifact and may be of no clinical significance. Thoracic  spinal cord otherwise appears within normal limits.     In the lumbar spine the lumbar intervertebral discs appear relatively  well-maintained. There is mild multilevel facet joint arthropathy. No  significant canal stenosis is seen. No lumbar fractures are seen. Lower  cervical degenerative changes are seen. Please see additional dictation  for today's MRI of the cervical spine.     IMPRESSION:  1. Postoperative changes of T10-T12.  2. No acute fractures, compressive disc herniation or significant spinal  canal stenosis is seen.     This report was finalized on 6/25/2024 11:18 PM by Dr. Darin Swartz M.D on Workstation: [id number]    The following portions of the patient's history were reviewed and updated as appropriate: allergies, current  "medications, past family history, past medical history, past social history, past surgical history, and problem list.    Review of Systems   Constitutional:  Negative for activity change, fatigue and fever.   Respiratory:  Negative for cough and chest tightness.    Cardiovascular:  Negative for chest pain.   Gastrointestinal:  Positive for constipation. Negative for abdominal pain and diarrhea.   Musculoskeletal:  Positive for back pain.   Neurological:  Positive for weakness (lower extremities) and numbness (lower extremities). Negative for dizziness, light-headedness and headaches.   Psychiatric/Behavioral:  Negative for agitation, sleep disturbance and suicidal ideas. The patient is not nervous/anxious.      I have reviewed and confirmed the accuracy of the ROS as documented by the MA/LPN/RN SUSANA Guzman  Vitals:    12/18/24 1004   BP: 120/82   BP Location: Left arm   Patient Position: Sitting   Cuff Size: Adult   Pulse: 84   Resp: 16   Temp: 97.5 °F (36.4 °C)   TempSrc: Temporal   SpO2: 98%   Weight: 68.6 kg (151 lb 3.2 oz)   Height: 157 cm (61.81\")   PainSc:   4         Objective   Physical Exam  Vitals and nursing note reviewed.   Constitutional:       Appearance: Normal appearance. She is normal weight.   HENT:      Head: Normocephalic.   Neurological:      Mental Status: She is alert and oriented to person, place, and time.      Cranial Nerves: Cranial nerves 2-12 are intact.      Sensory: Sensation is intact.      Motor: Weakness present.      Gait: Gait abnormal (ambulates with two canes; antalgic gait).   Psychiatric:         Mood and Affect: Mood normal.         Behavior: Behavior normal.         Thought Content: Thought content normal.         Judgment: Judgment normal.         Assessment & Plan   Diagnoses and all orders for this visit:    1. Postlaminectomy syndrome (Primary)  -     Ambulatory Referral to Psychology    2. Lumbar radiculopathy  -     Ambulatory Referral to " Psychology        Cesia Workman reports a pain score of 4.  Given her pain assessment as noted, treatment options were discussed and the following options were decided upon as a follow-up plan to address the patient's pain: continuation of current treatment plan for pain, prescription for non-opiod analgesics, and use of non-medical modalities (ice, heat, stretching and/or behavior modifications).      --- Follow-up 4-6 weeks for further evaluation and assessment to how she has responded to increase of naltrexone increase  ---Increase Naltrexone to 1.5 mg daily.  Prescription has been sent to [facility]  ---Continue with pregabalin 150 mg bid.  ---may consider a BRIGHT in the future for neck and BUE pain  --- Dr. Lux has made note that he has no plans for long-term opiate therapy as he does not feel that that would be in her best interest.  --- Patient has completed education today regarding SCS with the [employer] representative.  She would like to proceed with consideration therefore referral has been placed for psychological presurgical evaluation.    The urine drug screen confirmation from 11/18/2024 has been reviewed and the result is appropriate based on patient history and RAMILA report         RAMILA REPORT  As part of the patient's treatment plan, I am prescribing controlled substances. The patient has been made aware of appropriate use of such medications, including potential risk of somnolence, limited ability to drive and/or work safely, and the potential for dependence or overdose. It has also been made clear that these medications are for use by this patient only, without concomitant use of alcohol or other substances unless prescribed.     Patient has completed prescribing agreement detailing terms of continued prescribing of controlled substances, including monitoring RAMILA reports, urine drug screening, and pill counts if necessary. The patient is aware that inappropriate use will  results in cessation of prescribing such medications.    As the clinician, I personally reviewed the RAMILA from 12/18/2024 while the patient was in the office today.    History and physical exam exhibit continued safe and appropriate use of controlled substances.     Dictated utilizing Dragon dictation.

## 2024-12-19 ENCOUNTER — TELEPHONE (OUTPATIENT)
Dept: PAIN MEDICINE | Facility: CLINIC | Age: 54
End: 2024-12-19

## 2024-12-19 NOTE — TELEPHONE ENCOUNTER
Therapy Activity Session  Performed by Rehab Aide staff     MAP: Acute Mobility Activity Program activity plan was established by a licensed therapist and performed under the guidance of Nursing staff.      Pt seen on 9LM nursing unit                                                                                         PT Frequency Frequency Comments: MTWF see around 10AM as able per pt request 09-14 MAP 1 daily                                                                                  OT Frequency Frequency Comments: MTuWF (9/14)    SLP Frequency      Availability:  Patient available and per RN report, able to particiate.     Tolerance/Participation  Tolerated well, no shortness of breath or signs of discomfort., Participated and followed direction well during session.    SESSION    Activities/Exercises/Mobility completed this session:  Physical Therapy Exercises    TEP Follow Up Needed: Yes  Therapy Extender Program Discipline: PT     PT Session Length (min): 10 minutes (09/14/21 1415)  PT Frequency: DAILY MAP 1 josefa rahman [id number]    PT Task 1: seated or supine APs, TKEs or heel slides, marching or hip ADD/ABD, SLR  PT Reps for Task 1: 10  PT Sets for Task 1: 2  PT Resistance for Task 1: AAROM/AROM   PT Task 1 Completion: Completed (09/14/21 1415)                                                       Occupational Therapy Exercises    TEP Follow Up Needed: Yes  Therapy Extender Program Discipline: PT                                                                   Speech Therapy Exercises    TEP Follow Up Needed: Yes                                                                          Please see

## 2024-12-19 NOTE — TELEPHONE ENCOUNTER
Caller: AJIT ROWLAND     Phone Number: [phone] (home)     Reason for Call:   PATIENT SPOKE WITH [facility] PSYCHOLOGY THEY HAVE NOT RECEIVED ORDER FOR PATIENTS EVALUATION PLEASE REFAX.

## 2025-02-03 ENCOUNTER — OFFICE VISIT (OUTPATIENT)
Dept: PAIN MEDICINE | Facility: CLINIC | Age: 55
End: 2025-02-03
Payer: COMMERCIAL

## 2025-02-03 VITALS
RESPIRATION RATE: 18 BRPM | TEMPERATURE: 98 F | HEART RATE: 87 BPM | SYSTOLIC BLOOD PRESSURE: 123 MMHG | WEIGHT: 152.1 LBS | HEIGHT: 62 IN | OXYGEN SATURATION: 98 % | DIASTOLIC BLOOD PRESSURE: 82 MMHG | BODY MASS INDEX: 27.99 KG/M2

## 2025-02-03 DIAGNOSIS — M47.14 THORACIC MYELOPATHY: ICD-10-CM

## 2025-02-03 DIAGNOSIS — G89.4 CHRONIC PAIN SYNDROME: ICD-10-CM

## 2025-02-03 DIAGNOSIS — M54.16 LUMBAR RADICULOPATHY: ICD-10-CM

## 2025-02-03 DIAGNOSIS — M96.1 POSTLAMINECTOMY SYNDROME: Primary | ICD-10-CM

## 2025-02-03 PROCEDURE — 99214 OFFICE O/P EST MOD 30 MIN: CPT | Performed by: PHYSICIAN ASSISTANT

## 2025-02-03 RX ORDER — BACLOFEN 20 MG/1
1 TABLET ORAL 3 TIMES DAILY
COMMUNITY
Start: 2024-12-23

## 2025-02-03 RX ORDER — HYDROQUINONE 40 MG/G
CREAM TOPICAL
COMMUNITY
Start: 2024-11-06

## 2025-02-03 NOTE — PROGRESS NOTES
CHIEF COMPLAINT  Follow-up for back pain.    Subjective   Cesia Workman is a 54 y.o. female  who presents for follow-up.  She has a history of chronic low back and bilateral lower extremity pain.  This patient sustained a spinal cord injury in May 2022 from ruptured disc of unknown injury and subsequently underwent fusion surgery.  Continues to experience pain in the transverse distribution across the lumbar spine which radiates into the bilateral lower extremities and is aggravated with increasing physical activity, standing, sitting or walking for extended periods of time.  She also has secondary complaints of posterior cervical spine pain radiating into the bilateral upper extremities.    She has a spinal cord injury dating back to May 7, 2022, a T10 disc herniation with cord compression.   She had T10-T12 posterolateral fusion, also left word T11 discectomy, on May 8, 2022.  Incomplete paraplegia, and also follows with urologist.  Past medical history also includes a history of breast cancer in 2018.     Current medication regimen consists of naltrexone 1.5 mg, duloxetine 90 mg/day and pregabalin 150 mg p.o. twice daily.  She is tolerating both of these medications well without adverse side effects.  Overall she feels the naltrexone increase helped and is providing at least 60% pain relief however she continues to have increased levels of pain with increasing her physical activity.    Today 3/10 VAS in severity.      Back Pain  This is a chronic problem. The current episode started more than 1 year ago. The problem occurs constantly. The problem has been comes and goes since onset. The pain is present in the lumbar spine and thoracic spine. The quality of the pain is described as aching, burning and shooting. The pain radiates to the left buttock, right buttock, left thigh, right thigh, left anterolateral lower leg, right anterolateral lower leg, right foot and left foot. The pain is at a severity of 3/10.  The pain is moderate. The symptoms are aggravated by position and standing (Walking). Associated symptoms include leg pain, numbness and weakness. Pertinent negatives include no abdominal pain, chest pain, fever or headaches. She has tried ice and heat for the symptoms.        PEG Assessment   What number best describes your pain on average in the past week?4  What number best describes how, during the past week, pain has interfered with your enjoyment of life?4  What number best describes how, during the past week, pain has interfered with your general activity?  5    Review of Pertinent Medical Data ---  MRI THORACIC SPINE W WO CONTRAST-, MRI LUMBAR SPINE W WO  CONTRAST-06/20/2024     HISTORY: Back pain.     TECHNIQUE:  Multiple pre and postcontrast images were obtained through  the thoracic spine and multiple pre and postcontrast images were  obtained through the lumbar spine.     FINDINGS: There are right pedicle screws at T10-T11 and T12 and left  pedicle screws at T10 and T12. These pedicle screws produce some  susceptibility artifact.     No compressive disc herniation is seen in the thoracic spine. The  contour of the spinal cord at T10 appears slightly irregular but some of  this may be artifact and may be of no clinical significance. Thoracic  spinal cord otherwise appears within normal limits.     In the lumbar spine the lumbar intervertebral discs appear relatively  well-maintained. There is mild multilevel facet joint arthropathy. No  significant canal stenosis is seen. No lumbar fractures are seen. Lower  cervical degenerative changes are seen. Please see additional dictation  for today's MRI of the cervical spine.     IMPRESSION:  1. Postoperative changes of T10-T12.  2. No acute fractures, compressive disc herniation or significant spinal  canal stenosis is seen.     This report was finalized on 6/25/2024 11:18 PM by Dr. Darin Swartz M.D on Workstation: [id number]    The following portions of  "the patient's history were reviewed and updated as appropriate: allergies, current medications, past family history, past medical history, past social history, past surgical history, and problem list.    Review of Systems   Constitutional:  Negative for fever.   Cardiovascular:  Negative for chest pain.   Gastrointestinal:  Positive for constipation. Negative for abdominal pain and diarrhea.   Genitourinary:  Positive for difficulty urinating.   Musculoskeletal:  Positive for back pain.   Neurological:  Positive for weakness and numbness. Negative for headaches.   Psychiatric/Behavioral:  Positive for sleep disturbance. Negative for suicidal ideas. The patient is nervous/anxious.      I have reviewed and confirmed the accuracy of the ROS as documented by the MA/LPN/RN SUSANA Guzman  Vitals:    02/03/25 0923   BP: 123/82   Pulse: 87   Resp: 18   Temp: 98 °F (36.7 °C)   SpO2: 98%   Weight: 69 kg (152 lb 1.6 oz)   Height: 157 cm (61.81\")   PainSc:   3   PainLoc: Back         Objective   Physical Exam  Vitals and nursing note reviewed.   Constitutional:       Appearance: Normal appearance. She is normal weight.   HENT:      Head: Normocephalic.   Neurological:      Mental Status: She is alert and oriented to person, place, and time.      Cranial Nerves: Cranial nerves 2-12 are intact.      Sensory: Sensation is intact.      Motor: Weakness present.      Gait: Gait abnormal (ambulates with two canes; antalgic gait).   Psychiatric:         Mood and Affect: Mood normal.         Behavior: Behavior normal.         Thought Content: Thought content normal.         Judgment: Judgment normal.       PHQ-2 Depression Screening  Little interest or pleasure in doing things? Not at all   Feeling down, depressed, or hopeless? Not at all   PHQ-2 Total Score 0     Tobacco Use: Medium Risk (2/3/2025)    Patient History     Smoking Tobacco Use: Former     Smokeless Tobacco Use: Never     Passive Exposure: Never           Assessment & " Plan   Diagnoses and all orders for this visit:    1. Postlaminectomy syndrome (Primary)    2. Lumbar radiculopathy    3. Thoracic myelopathy    4. Chronic pain syndrome        Cesia Workman reports a pain score of 3.  Given her pain assessment as noted, treatment options were discussed and the following options were decided upon as a follow-up plan to address the patient's pain: continuation of current treatment plan for pain, prescription for non-opiod analgesics, and use of non-medical modalities (ice, heat, stretching and/or behavior modifications).      --- Follow-up 3 months or sooner for further evaluation and treatment recommendations  --- Patient is tolerating naltrexone 1.5 mg daily and noted improvement of pain but is still having fluctuating levels of pain particularly when trying to increase her physical activity.  Since she is tolerating the medication I will increase it to 2.5 mg daily.  Patient has been advised of the possible side effects of naltrexone which includes vivid dreams.  She will contact the office if she has any issues with the increase being made.  ---Dr. Lux has made note that he has no plans for long-term opiate therapy as he does not feel that that would be in her best interest.   --- Patient is holding off at this time with consideration for SCS      The urine drug screen confirmation from 11/18/2024 has been reviewed and the result is appropriate based on patient history and RAMILA report              RAMILA REPORT  As part of the patient's treatment plan, I am prescribing controlled substances. The patient has been made aware of appropriate use of such medications, including potential risk of somnolence, limited ability to drive and/or work safely, and the potential for dependence or overdose. It has also been made clear that these medications are for use by this patient only, without concomitant use of alcohol or other substances unless prescribed.     Patient has  completed prescribing agreement detailing terms of continued prescribing of controlled substances, including monitoring RAMILA reports, urine drug screening, and pill counts if necessary. The patient is aware that inappropriate use will results in cessation of prescribing such medications.    As the clinician, I personally reviewed the RAMILA from 2/3/2025 while the patient was in the office today.    History and physical exam exhibit continued safe and appropriate use of controlled substances.     Dictated utilizing Dragon dictation.

## 2025-02-14 RX ORDER — TAMOXIFEN CITRATE 20 MG/1
20 TABLET ORAL DAILY
Qty: 90 TABLET | Refills: 2 | Status: SHIPPED | OUTPATIENT
Start: 2025-02-14

## 2025-03-03 ENCOUNTER — TRANSCRIBE ORDERS (OUTPATIENT)
Dept: ADMINISTRATIVE | Facility: HOSPITAL | Age: 55
End: 2025-03-03
Payer: COMMERCIAL

## 2025-03-03 DIAGNOSIS — M85.80 OSTEOPENIA, UNSPECIFIED LOCATION: Primary | ICD-10-CM

## 2025-04-24 ENCOUNTER — PATIENT ROUNDING (BHMG ONLY) (OUTPATIENT)
Dept: FAMILY MEDICINE CLINIC | Facility: CLINIC | Age: 55
End: 2025-04-24

## 2025-04-24 ENCOUNTER — OFFICE VISIT (OUTPATIENT)
Dept: FAMILY MEDICINE CLINIC | Facility: CLINIC | Age: 55
End: 2025-04-24
Payer: COMMERCIAL

## 2025-04-24 VITALS
DIASTOLIC BLOOD PRESSURE: 82 MMHG | HEIGHT: 62 IN | TEMPERATURE: 97.8 F | SYSTOLIC BLOOD PRESSURE: 112 MMHG | WEIGHT: 154 LBS | HEART RATE: 81 BPM | OXYGEN SATURATION: 98 % | BODY MASS INDEX: 28.34 KG/M2

## 2025-04-24 DIAGNOSIS — M54.16 LUMBAR RADICULOPATHY: ICD-10-CM

## 2025-04-24 DIAGNOSIS — R29.898 WEAKNESS OF BOTH LOWER EXTREMITIES: ICD-10-CM

## 2025-04-24 DIAGNOSIS — M47.14 THORACIC MYELOPATHY: ICD-10-CM

## 2025-04-24 DIAGNOSIS — M96.1 POSTLAMINECTOMY SYNDROME: Primary | ICD-10-CM

## 2025-04-24 DIAGNOSIS — R29.898 BILATERAL LEG WEAKNESS: ICD-10-CM

## 2025-04-24 PROCEDURE — 99214 OFFICE O/P EST MOD 30 MIN: CPT | Performed by: FAMILY MEDICINE

## 2025-04-24 RX ORDER — SULFAMETHOXAZOLE AND TRIMETHOPRIM 800; 160 MG/1; MG/1
1 TABLET ORAL 2 TIMES DAILY
COMMUNITY
Start: 2025-04-22

## 2025-04-24 NOTE — PROGRESS NOTES
April 24, 2025    I am  with [facility]  [address] [address] [phone].    Tell me about your visit with us. What things went well?  Everything went well        We're always looking for ways to make our patients' experiences even better. Do you have recommendations on ways we may improve?  no    Overall were you satisfied with your first visit to our practice? yes       I appreciate you taking the time to speak with me today. Is there anything else I can do for you? no      Thank you, and have a great day.

## 2025-04-24 NOTE — PROGRESS NOTES
"  Subjective   Cesia Workman is a 54 y.o. female who is here for   Chief Complaint   Patient presents with    [facility]    Med Refill   .     History of Present Illness   History of Present Illness  Cesia Workman presents to the office to establish [facility].  Patient is a previous patient of Dr. Gallegos.  She has multiple medical problems including chronic pain secondary to postlaminectomy syndrome, History of breast cancer, lumbar radiculopathy, thoracic myelopathy, and cervical radiculitis.  Patient complains that she has a lot of pain and spasms frequently.  She is currently on disability secondary toBack issues.  Patient worked as a nurse previously.  Patient is currently taking Cymbalta 90 mg daily, Lyrica 150 mg twice daily nitrofurantoin 50 mg daily, meloxicam 15 mg daily, baclofen 40 mg nightly, and dantrolene.    Patient is currently seeing pain management.  She is also seeing urology for midline cystocele and rectocele.    Patient states she still has frequent spasms.  She has been trying to wean off her muscle spasm medication.    Review of Systems   Constitutional:  Negative for activity change, appetite change, chills, fatigue and fever.   Respiratory:  Negative for cough and shortness of breath.    Cardiovascular:  Negative for chest pain and leg swelling.   Gastrointestinal:  Negative for abdominal pain.       Objective   Vitals:    04/24/25 0914   BP: 112/82   BP Location: Left arm   Patient Position: Sitting   Cuff Size: Adult   Pulse: 81   Temp: 97.8 °F (36.6 °C)   SpO2: 98%   Weight: 69.9 kg (154 lb)   Height: 157 cm (61.81\")      Physical Exam  Vitals and nursing note reviewed.   Constitutional:       Appearance: Normal appearance. She is normal weight.   HENT:      Head: Normocephalic and atraumatic.   Cardiovascular:      Rate and Rhythm: Normal rate and regular rhythm.      Pulses: Normal pulses.      Heart sounds: No murmur heard.  Pulmonary:      Effort: Pulmonary effort is normal. No " respiratory distress.      Breath sounds: Normal breath sounds. No wheezing.   Skin:     General: Skin is warm and dry.   Neurological:      General: No focal deficit present.      Mental Status: She is alert.   Psychiatric:         Mood and Affect: Mood normal.         Thought Content: Thought content normal.       Physical Exam        Assessment & Plan   Assessment & Plan    Diagnoses and all orders for this visit:    1. Postlaminectomy syndrome (Primary)  Stable.  Continue follow-up with pain management.  Continue Lyrica.Patient is to take her baclofen 20 mg 3 times a day to help with muscle spasms.  2. Bilateral leg weakness  Stable.  3. Weakness of both lower extremities  Stable  4. Thoracic myelopathy  Continue follow-up with pain management.  5. Lumbar radiculopathy  Continue follow-up with pain management.    Results      There are no Patient Instructions on file for this visit.    Medications Discontinued During This Encounter   Medication Reason    methocarbamol (ROBAXIN) 750 MG tablet *Therapy completed    saccharomyces boulardii (FLORASTOR) 250 MG capsule *Therapy completed        Return in about 3 months (around 7/24/2025).  BMI is >= 25 and <30. (Overweight) The following options were offered after discussion;: exercise counseling/recommendations and nutrition counseling/recommendations          Nathan Boyd MD  Family Practice  [place], [place]

## 2025-04-28 ENCOUNTER — TELEPHONE (OUTPATIENT)
Dept: FAMILY MEDICINE CLINIC | Facility: CLINIC | Age: 55
End: 2025-04-28
Payer: COMMERCIAL

## 2025-04-28 NOTE — TELEPHONE ENCOUNTER
Patient is requesting a referral for a Mammogram please. She usually goes right beside our office to have this done.

## 2025-05-05 ENCOUNTER — OFFICE VISIT (OUTPATIENT)
Dept: PAIN MEDICINE | Facility: CLINIC | Age: 55
End: 2025-05-05
Payer: COMMERCIAL

## 2025-05-05 VITALS
HEART RATE: 84 BPM | HEIGHT: 62 IN | OXYGEN SATURATION: 97 % | BODY MASS INDEX: 28.71 KG/M2 | SYSTOLIC BLOOD PRESSURE: 120 MMHG | WEIGHT: 156 LBS | TEMPERATURE: 96 F | DIASTOLIC BLOOD PRESSURE: 86 MMHG

## 2025-05-05 DIAGNOSIS — M54.16 LUMBAR RADICULOPATHY: ICD-10-CM

## 2025-05-05 DIAGNOSIS — G89.4 CHRONIC PAIN SYNDROME: ICD-10-CM

## 2025-05-05 DIAGNOSIS — M47.14 THORACIC MYELOPATHY: ICD-10-CM

## 2025-05-05 DIAGNOSIS — M96.1 POSTLAMINECTOMY SYNDROME: Primary | ICD-10-CM

## 2025-05-05 DIAGNOSIS — M54.12 CERVICAL RADICULITIS: ICD-10-CM

## 2025-05-05 PROCEDURE — 99214 OFFICE O/P EST MOD 30 MIN: CPT | Performed by: PHYSICIAN ASSISTANT

## 2025-05-05 NOTE — PROGRESS NOTES
CHIEF COMPLAINT  Back pain. The patient states the pain has worsened since she discontinued dantrolene muscle relaxant--dantrolene    Subjective   Cesia Workman is a 54 y.o. female  who presents for follow-up.  She has a history of low back and bilateral lower extremity pain.  Cesia has history of spinal cord injury that occurred in May 2022 for ruptured disc of unknown injury and subsequently underwent fusion surgery.  She continues illustrate pain in a bandlike distribution across the lumbar spine radiating to the bilateral lower extremities.  She finds that her pain is aggravated with increased physical activity, standing, sitting, walking for extended periods of time.  She also has reported secondary complaints of posterior cervical spine pain which radiates into the bilateral upper extremities.  Reports increased pain as well as spasms now that she is no longer taking the dantrolene 100 mg daily.    She has a spinal cord injury dating back to May 7, 2022, a T10 disc herniation with cord compression.   She had T10-T12 posterolateral fusion, also left word T11 discectomy, on May 8, 2022.  Incomplete paraplegia, and also follows with urologist.  Past medical history also includes a history of breast cancer in 2018.     Current medication regimen consist of naltrexone 2.5 mg daily, duloxetine 90 mg/day, baclofen 20 mg p.o. 3 times daily, and pregabalin 150 mg p.o. twice daily.  She tolerates these medications well without adverse effects.  Last increase made of the naltrexone did provide some relief of pain however she continues to have breakthrough pain with increasing her physical activity.    Patient was prescribed post procedural oxycodone after having a bladder suspension and repair performed in the month of March.    Pain today 6/10 VAS in severity.      Back Pain  Chronicity:  Chronic  Onset:  More than 1 year ago  Frequency:  Constantly  Progression since onset:  Comes and goes  Pain location:  Lumbar  spine and thoracic spine  Pain quality:  Aching, burning and shooting  Radiates to:  Left buttock, right buttock, left thigh, right thigh, left anterolateral lower leg, right anterolateral lower leg, right foot and left foot  Pain-numeric:  6/10  Pain severity:  Moderate  Aggravated by:  Position and standing (Walking)  Associated symptoms: leg pain, numbness (bilateral hand as well as bilateral lower extremities) and weakness (bilateral leg)    Associated symptoms: no abdominal pain, no chest pain, no fever and no headaches    Treatments tried:  Ice and heat       PEG Assessment   What number best describes your pain on average in the past week?4  What number best describes how, during the past week, pain has interfered with your enjoyment of life?5  What number best describes how, during the past week, pain has interfered with your general activity?  5    Review of Pertinent Medical Data ---  MRI OF THE CERVICAL SPINE WITHOUT CONTRAST ON 08/15/2024     CLINICAL HISTORY: This is a 54-year-old female patient with neck pain  and arm numbness, cervical radiculopathy. The patient has a history of  breast cancer.     TECHNIQUE: Sagittal T1, gradient echo T2 and fat-suppressed fast  spin-echo T2 weighted images were obtained of the cervical spine. In  addition axial gradient echo and fast spin-echo T2 weighted images were  obtained from the skull base down to the T1 thoracic level and oblique  sagittal T2 weighted images were obtained angled through the right and  left cervical neural foramina.     COMPARISON: There are no prior cervical spine imaging studies from  [facility] for comparison.     FINDINGS: The craniocervical junction is normal in appearance. The  cervical cord and upper thoracic spinal cord is normal in signal  intensity.     At C1-2, there are minimal arthritic changes at the atlantodental  interval. Otherwise the C1-2 level is normal in appearance.     At C2-3, the disc space, facets and  uncovertebral joints are normal in  appearance with no canal or foraminal narrowing.     At C3-4, the disc space, facets and uncovertebral joints are normal in  appearance with no canal or foraminal narrowing.     At C4-5, there is minimal disc space narrowing, there is minimal  posterior endplate spurring but no canal narrowing. The facets are  normal, there is mild uncovertebral joint hypertrophy and there is mild  right and no left foraminal narrowing.     At C5-6, there is mild disc space narrowing, degenerative endplate  changes and there is mild diffuse posterior disc osteophyte complex that  extends eccentric right paracentrally where it abuts and minimally  deforms the right ventral surface of the cord mildly narrowing the right  side of the canal. The facets and uncovertebral joints are normal and  there is no foraminal narrowing.     At C6-7, there is mild disc space narrowing and degenerative endplate  changes. There is a mild diffuse posterior disc osteophyte complex that  abuts the ventral surface cord mildly narrowing the canal. The facets  are normal. There is minimal right uncovertebral joint hypertrophy  contributing to minimal right foraminal narrowing and there is eccentric  prominent left uncovertebral joint spurring into the left neural foramen  that moderately narrows the left foramen and could affect the exiting  left C7 nerve root.     At C7-T1, the posterior disc margin and facets are normal. There is no  canal or right foraminal narrowing, there is minimal left uncovertebral  joint spurring into the left foramen with mild left foraminal narrowing.     IMPRESSION:  1. I see no canal or foraminal narrowing from C1 to C4. At C4-5 there is  minimal posterior spurring but no canal narrowing, there is mild right  uncovertebral joint hypertrophy that results in mild right bony  foraminal narrowing at C4-5.     2. At C5-6, there is mild disc space narrowing, degenerative endplate  change with mild  diffuse posterior disc osteophyte complex eccentric  right paracentrally where it abuts and mildly deforms the right ventral  surface of the cord mildly narrowing the right side of the canal but  there is no foraminal narrowing at C5-6.     3. At C6-7, there is mild disc space narrowing and degenerative endplate  changes, and there is a mild diffuse posterior disc osteophyte complex  that abuts the ventral surface cord mildly narrowing the canal. There is  uncovertebral joint spurring into the foramina left more prominent than  right and there is minimal right foraminal narrowing. There is prominent  uncovertebral joint spurring into the left foramen at least moderately  narrows the left neural foramen and could affect the exiting left C7  nerve root.     4. At C7-T1 I see no canal or right foraminal narrowing. There is mild  uncovertebral joint spurring into the left neural foramen with mild left  foraminal narrowing at C7-T1. The remainder of the cervical spine MRI is  unremarkable, specifically I see no metastatic disease of the cervical  spine.     This report was finalized on 8/16/2024 7:06 AM by Dr. Marco A Long M.D      MRI THORACIC SPINE W WO CONTRAST-, MRI LUMBAR SPINE W WO  CONTRAST-06/20/2024     HISTORY: Back pain.     TECHNIQUE:  Multiple pre and postcontrast images were obtained through  the thoracic spine and multiple pre and postcontrast images were  obtained through the lumbar spine.     FINDINGS: There are right pedicle screws at T10-T11 and T12 and left  pedicle screws at T10 and T12. These pedicle screws produce some  susceptibility artifact.     No compressive disc herniation is seen in the thoracic spine. The  contour of the spinal cord at T10 appears slightly irregular but some of  this may be artifact and may be of no clinical significance. Thoracic  spinal cord otherwise appears within normal limits.     In the lumbar spine the lumbar intervertebral discs appear relatively  well-maintained.  "There is mild multilevel facet joint arthropathy. No  significant canal stenosis is seen. No lumbar fractures are seen. Lower  cervical degenerative changes are seen. Please see additional dictation  for today's MRI of the cervical spine.     IMPRESSION:  1. Postoperative changes of T10-T12.  2. No acute fractures, compressive disc herniation or significant spinal  canal stenosis is seen.     This report was finalized on 6/25/2024 11:18 PM by Dr. Darin Swartz M.D on Workstation: [email or web address]      The following portions of the patient's history were reviewed and updated as appropriate: allergies, current medications, past family history, past medical history, past social history, past surgical history, and problem list.    Review of Systems   Constitutional:  Negative for chills and fever.   Respiratory:  Negative for cough and shortness of breath.    Cardiovascular:  Negative for chest pain.   Gastrointestinal:  Positive for constipation. Negative for abdominal pain and diarrhea.   Genitourinary:  Positive for difficulty urinating (self catheterize).   Musculoskeletal:  Positive for back pain and gait problem.   Neurological:  Positive for weakness (bilateral leg) and numbness (bilateral hand as well as bilateral lower extremities). Negative for dizziness, light-headedness and headaches.     I have reviewed and confirmed the accuracy of the ROS as documented by the MA/LPN/RN SUSANA Guzman  Vitals:    05/05/25 0923   BP: 120/86   BP Location: Left arm   Patient Position: Sitting   Pulse: 84   Temp: 96 °F (35.6 °C)   TempSrc: Temporal   SpO2: 97%   Weight: 70.8 kg (156 lb)   Height: 157 cm (61.81\")   PainSc: 6          Objective   Physical Exam  Vitals and nursing note reviewed.   Constitutional:       Appearance: Normal appearance. She is normal weight.   HENT:      Head: Normocephalic.   Neurological:      Mental Status: She is alert and oriented to person, place, and time.      Cranial Nerves: Cranial " nerves 2-12 are intact.      Sensory: Sensation is intact.      Motor: Weakness present.      Gait: Gait abnormal (ambulates with two canes; antalgic gait).   Psychiatric:         Mood and Affect: Mood normal.         Behavior: Behavior normal.         Thought Content: Thought content normal.         Judgment: Judgment normal.         Assessment & Plan   Diagnoses and all orders for this visit:    1. Postlaminectomy syndrome (Primary)    2. Lumbar radiculopathy    3. Thoracic myelopathy    4. Cervical radiculitis    5. Chronic pain syndrome        Cesia Workman reports a pain score of 6.  Given her pain assessment as noted, treatment options were discussed and the following options were decided upon as a follow-up plan to address the patient's pain: continuation of current treatment plan for pain, prescription for non-opiod analgesics, and use of non-medical modalities (ice, heat, stretching and/or behavior modifications).    PHQ-2 Depression Screening  Little interest or pleasure in doing things? Several days   Feeling down, depressed, or hopeless? Several days   PHQ-2 Total Score 2     PHQ-9 Depression Screening  Little interest or pleasure in doing things? Several days   Feeling down, depressed, or hopeless? Several days   PHQ-2 Total Score 2   Trouble falling or staying asleep, or sleeping too much? Nearly every day   Feeling tired or having little energy? Nearly every day   Poor appetite or overeating? Several days   Feeling bad about yourself - or that you are a failure or have let yourself or your family down? Not at all   Trouble concentrating on things, such as reading the newspaper or watching television? Not at all   Moving or speaking so slowly that other people could have noticed? Or the opposite - being so fidgety or restless that you have been moving around a lot more than usual? Not at all     Thoughts that you would be better off dead, or of hurting yourself in some way? Not at all   PHQ-9 Total  Score 9   If you checked off any problems, how difficult have these problems made it for you to do your work, take care of things at home, or get along with other people? Somewhat difficult           --- Follow-up in 3 months or sooner for medication management  ---Dr. Lux has made note that he has no plans for long-term opiate therapy as he does not feel that that would be in her best interest.   --- Patient is holding off at this time with consideration for SCS  --- I will increase naltrexone to 3 mg/day for chronic pain syndrome.  Prescription will be sent over to [facility].               RAMILA REPORT  As part of the patient's treatment plan, I am prescribing controlled substances. The patient has been made aware of appropriate use of such medications, including potential risk of somnolence, limited ability to drive and/or work safely, and the potential for dependence or overdose. It has also been made clear that these medications are for use by this patient only, without concomitant use of alcohol or other substances unless prescribed.     Patient has completed prescribing agreement detailing terms of continued prescribing of controlled substances, including monitoring RAMILA reports, urine drug screening, and pill counts if necessary. The patient is aware that inappropriate use will results in cessation of prescribing such medications.    As the clinician, I personally reviewed the RAMILA from 5/5/2025 while the patient was in the office today.    History and physical exam exhibit continued safe and appropriate use of controlled substances.     Dictated utilizing Dragon dictation.

## 2025-05-06 ENCOUNTER — TELEPHONE (OUTPATIENT)
Dept: PAIN MEDICINE | Facility: CLINIC | Age: 55
End: 2025-05-06
Payer: COMMERCIAL

## 2025-05-06 ENCOUNTER — TELEPHONE (OUTPATIENT)
Dept: FAMILY MEDICINE CLINIC | Facility: CLINIC | Age: 55
End: 2025-05-06
Payer: COMMERCIAL

## 2025-05-06 DIAGNOSIS — Z12.31 SCREENING MAMMOGRAM FOR BREAST CANCER: Primary | ICD-10-CM

## 2025-05-06 NOTE — TELEPHONE ENCOUNTER
The order should have been faxed to [facility] on yesterday---I increased her dose to 3 mg/day.  Please check that they have received the order

## 2025-05-06 NOTE — TELEPHONE ENCOUNTER
Patient sent a message requesting an order for a mammogram. Can you order this or would you like to see the patient first?

## 2025-06-06 ENCOUNTER — HOSPITAL ENCOUNTER (OUTPATIENT)
Dept: MAMMOGRAPHY | Facility: HOSPITAL | Age: 55
Discharge: HOME | End: 2025-06-06
Admitting: FAMILY MEDICINE
Payer: COMMERCIAL

## 2025-06-06 DIAGNOSIS — Z12.31 SCREENING MAMMOGRAM FOR BREAST CANCER: ICD-10-CM

## 2025-06-06 PROCEDURE — 77067 SCR MAMMO BI INCL CAD: CPT

## 2025-06-06 PROCEDURE — 77063 BREAST TOMOSYNTHESIS BI: CPT

## 2025-07-22 ENCOUNTER — OFFICE VISIT (OUTPATIENT)
Dept: FAMILY MEDICINE CLINIC | Facility: CLINIC | Age: 55
End: 2025-07-22
Payer: COMMERCIAL

## 2025-07-22 VITALS
HEART RATE: 84 BPM | WEIGHT: 158 LBS | SYSTOLIC BLOOD PRESSURE: 112 MMHG | OXYGEN SATURATION: 98 % | DIASTOLIC BLOOD PRESSURE: 80 MMHG | TEMPERATURE: 97.3 F | HEIGHT: 62 IN | BODY MASS INDEX: 29.08 KG/M2

## 2025-07-22 DIAGNOSIS — R29.898 WEAKNESS OF BOTH LOWER EXTREMITIES: ICD-10-CM

## 2025-07-22 DIAGNOSIS — R29.898 BILATERAL LEG WEAKNESS: ICD-10-CM

## 2025-07-22 DIAGNOSIS — M47.14 THORACIC MYELOPATHY: ICD-10-CM

## 2025-07-22 DIAGNOSIS — C50.212 MALIGNANT NEOPLASM OF UPPER-INNER QUADRANT OF LEFT BREAST IN FEMALE, ESTROGEN RECEPTOR POSITIVE: ICD-10-CM

## 2025-07-22 DIAGNOSIS — M96.1 POSTLAMINECTOMY SYNDROME: Primary | ICD-10-CM

## 2025-07-22 DIAGNOSIS — Z17.0 MALIGNANT NEOPLASM OF UPPER-INNER QUADRANT OF LEFT BREAST IN FEMALE, ESTROGEN RECEPTOR POSITIVE: ICD-10-CM

## 2025-07-22 DIAGNOSIS — M54.16 LUMBAR RADICULOPATHY: ICD-10-CM

## 2025-07-22 DIAGNOSIS — M62.830 MUSCLE SPASM OF BACK: ICD-10-CM

## 2025-07-22 PROCEDURE — 99214 OFFICE O/P EST MOD 30 MIN: CPT | Performed by: FAMILY MEDICINE

## 2025-07-22 RX ORDER — CELECOXIB 200 MG/1
200 CAPSULE ORAL DAILY
Qty: 30 CAPSULE | Refills: 1 | Status: SHIPPED | OUTPATIENT
Start: 2025-07-22

## 2025-07-22 RX ORDER — DANTROLENE SODIUM 100 MG/1
100 CAPSULE ORAL 2 TIMES DAILY
Qty: 60 CAPSULE | Refills: 3 | Status: SHIPPED | OUTPATIENT
Start: 2025-07-22 | End: 2028-06-06

## 2025-07-22 RX ORDER — ESTRADIOL 0.1 MG/G
1 CREAM VAGINAL
COMMUNITY
Start: 2025-05-07

## 2025-07-22 NOTE — PROGRESS NOTES
"  Subjective   Cesia Workman is a 54 y.o. female who is here for   Chief Complaint   Patient presents with    Follow-up   .     History of Present Illness   History of Present Illness  Cesia Workman presents to the office for follow-up.  She has multiple medical problems including postlaminectomy syndrome, history of breast cancer (currently on tamoxifen), lumbar radiculopathy, thoracic myelopathy, and cervical radiculitis.  Patient has also been seeing pain management for management of her chronic pain.  She is currently taking Cymbalta 90 mg daily, Lyrica 150 mg twice daily baclofen and dantrolene.    She has seen urology and they are considering a bladder stimulator.    Patient states he dantrolene has been helping the most with her muscle spasms.    Review of Systems   Constitutional:  Negative for activity change, appetite change, chills, fatigue and fever.   Respiratory:  Negative for cough and shortness of breath.    Cardiovascular:  Negative for chest pain and leg swelling.   Gastrointestinal:  Positive for constipation. Negative for abdominal pain.   Genitourinary:  Negative for frequency.   Musculoskeletal:  Positive for arthralgias, back pain and gait problem.   Neurological:  Positive for numbness.       Objective   Vitals:    07/22/25 1000   BP: 112/80   BP Location: Left arm   Patient Position: Sitting   Cuff Size: Adult   Pulse: 84   Temp: 97.3 °F (36.3 °C)   SpO2: 98%   Weight: 71.7 kg (158 lb)   Height: 157 cm (61.81\")      Physical Exam  Vitals and nursing note reviewed.   Constitutional:       Appearance: Normal appearance. She is normal weight.   HENT:      Head: Normocephalic and atraumatic.   Cardiovascular:      Rate and Rhythm: Normal rate and regular rhythm.      Pulses: Normal pulses.      Heart sounds: No murmur heard.  Pulmonary:      Effort: Pulmonary effort is normal. No respiratory distress.      Breath sounds: Normal breath sounds. No wheezing.   Skin:     General: Skin is warm " and dry.   Neurological:      General: No focal deficit present.      Mental Status: She is alert.      Motor: Weakness present.      Gait: Gait abnormal.      Comments: Walks with bilateral canes   Psychiatric:         Mood and Affect: Mood normal.         Thought Content: Thought content normal.       Physical Exam        Assessment & Plan   Assessment & Plan    Diagnoses and all orders for this visit:    1. Postlaminectomy syndrome (Primary)  Discontinue Mobic.  Will try Celebrex.  Continue follow-up with pain management.  -     celecoxib (CeleBREX) 200 MG capsule; Take 1 capsule by mouth Daily.  Dispense: 30 capsule; Refill: 1    2. Lumbar radiculopathy  Discontinue Mobic.  Will try Celebrex.  Continue follow-up with pain management.  -     celecoxib (CeleBREX) 200 MG capsule; Take 1 capsule by mouth Daily.  Dispense: 30 capsule; Refill: 1    3. Bilateral leg weakness  Discontinue Mobic.  Will try Celebrex.  Continue follow-up with pain management.  -     celecoxib (CeleBREX) 200 MG capsule; Take 1 capsule by mouth Daily.  Dispense: 30 capsule; Refill: 1    4. Thoracic myelopathy  Discontinue Mobic.  Will try Celebrex.  Continue follow-up with pain management.  -     celecoxib (CeleBREX) 200 MG capsule; Take 1 capsule by mouth Daily.  Dispense: 30 capsule; Refill: 1    5. Weakness of both lower extremities    6. Malignant neoplasm of upper-inner quadrant of left breast in female, estrogen receptor positive  Stable.  Continue follow-up with hematology oncology.    7. Spasms increase dantrolene to 100 mg twice daily.    -     dantrolene (DANTRIUM) 100 MG capsule; Take 1 capsule by mouth 2 (Two) Times a Day. Indications: Muscle Spasticity  Dispense: 60 capsule; Refill: 3      Results      There are no Patient Instructions on file for this visit.    Medications Discontinued During This Encounter   Medication Reason    D-MANNOSE PO *Therapy completed    sulfamethoxazole-trimethoprim (BACTRIM DS,SEPTRA DS) 800-160 MG  per tablet *Therapy completed    hydroquinone 4 % cream *Therapy completed    meloxicam (MOBIC) 15 MG tablet     dantrolene (DANTRIUM) 100 MG capsule Reorder        Return in about 3 months (around 10/22/2025), or Spasms, for Recheck.         Nathan Boyd MD  [place], [place]

## 2025-08-04 ENCOUNTER — OFFICE VISIT (OUTPATIENT)
Dept: PAIN MEDICINE | Facility: CLINIC | Age: 55
End: 2025-08-04
Payer: COMMERCIAL

## 2025-08-04 VITALS
HEIGHT: 62 IN | DIASTOLIC BLOOD PRESSURE: 84 MMHG | SYSTOLIC BLOOD PRESSURE: 124 MMHG | OXYGEN SATURATION: 99 % | WEIGHT: 156.6 LBS | RESPIRATION RATE: 18 BRPM | TEMPERATURE: 97.8 F | HEART RATE: 77 BPM | BODY MASS INDEX: 28.82 KG/M2

## 2025-08-04 DIAGNOSIS — R25.2 SPASTICITY: ICD-10-CM

## 2025-08-04 DIAGNOSIS — G89.4 CHRONIC PAIN SYNDROME: Primary | ICD-10-CM

## 2025-08-04 DIAGNOSIS — M54.16 LUMBAR RADICULOPATHY: ICD-10-CM

## 2025-08-04 DIAGNOSIS — M96.1 POSTLAMINECTOMY SYNDROME: ICD-10-CM

## 2025-08-04 PROCEDURE — 99213 OFFICE O/P EST LOW 20 MIN: CPT | Performed by: ANESTHESIOLOGY

## 2025-08-09 PROBLEM — G89.21 CHRONIC PAIN DUE TO TRAUMA: Status: ACTIVE | Noted: 2025-08-09

## 2025-08-09 PROBLEM — R25.2 SPASTICITY: Status: ACTIVE | Noted: 2025-08-09

## 2025-08-09 PROBLEM — R25.2 POST-TRAUMATIC SPASTICITY: Status: ACTIVE | Noted: 2025-08-09

## 2025-08-22 ENCOUNTER — TELEPHONE (OUTPATIENT)
Dept: FAMILY MEDICINE CLINIC | Facility: CLINIC | Age: 55
End: 2025-08-22
Payer: COMMERCIAL

## 2025-08-22 DIAGNOSIS — M54.16 LUMBAR RADICULOPATHY: Primary | ICD-10-CM

## 2025-08-22 DIAGNOSIS — G89.4 CHRONIC PAIN SYNDROME: ICD-10-CM

## 2025-08-22 DIAGNOSIS — G89.21 CHRONIC PAIN DUE TO TRAUMA: ICD-10-CM

## 2025-08-22 DIAGNOSIS — R25.2 POST-TRAUMATIC SPASTICITY: ICD-10-CM

## 2025-08-22 RX ORDER — PREGABALIN 150 MG/1
150 CAPSULE ORAL EVERY 12 HOURS SCHEDULED
Qty: 60 CAPSULE | Refills: 3 | Status: SHIPPED | OUTPATIENT
Start: 2025-08-22

## 2025-08-25 DIAGNOSIS — G89.4 CHRONIC PAIN SYNDROME: ICD-10-CM

## 2025-08-25 DIAGNOSIS — G89.21 CHRONIC PAIN DUE TO TRAUMA: ICD-10-CM

## 2025-08-25 DIAGNOSIS — M54.16 LUMBAR RADICULOPATHY: ICD-10-CM

## 2025-08-25 DIAGNOSIS — R25.2 POST-TRAUMATIC SPASTICITY: ICD-10-CM

## 2025-08-25 RX ORDER — PREGABALIN 150 MG/1
150 CAPSULE ORAL EVERY 12 HOURS SCHEDULED
Qty: 60 CAPSULE | Refills: 3 | OUTPATIENT
Start: 2025-08-25

## (undated) DEVICE — INTENDED FOR TISSUE SEPARATION, AND OTHER PROCEDURES THAT REQUIRE A SHARP SURGICAL BLADE TO PUNCTURE OR CUT.: Brand: BARD-PARKER ® STAINLESS STEEL BLADES

## (undated) DEVICE — ELECTRD BLD EDGE/INSUL1P 2.4X5.1MM STRL

## (undated) DEVICE — DRN JP FLT NO TROC SIL FUL/PERF 7MM

## (undated) DEVICE — DRP MICROSCOPE 4 BINOCULAR CV 54X150IN

## (undated) DEVICE — JACKSON-PRATT 100CC BULB RESERVOIR: Brand: CARDINAL HEALTH

## (undated) DEVICE — ADHS SKIN PREMIERPRO EXOFIN TOPICAL HI/VISC .5ML

## (undated) DEVICE — TOOL MR8-15MH30 MR8 15CM MATCH 3MM: Brand: MIDAS REX MR8

## (undated) DEVICE — TRAP FLD MINIVAC MEGADYNE 100ML

## (undated) DEVICE — SUT SILK 2/0 FS BLK 18IN 685G

## (undated) DEVICE — CODMAN® SURGICAL PATTIES 3/4" X 3/4" (1.91CM X 1.91CM): Brand: CODMAN®

## (undated) DEVICE — SOL ISO/ALC RUB 70PCT 4OZ

## (undated) DEVICE — DRSNG TELFA PAD NONADH STR 1S 3X8IN

## (undated) DEVICE — LINER SURG CANSTR SXN S/RIGD 1500CC

## (undated) DEVICE — APPL CHLORAPREP HI/LITE 26ML ORNG

## (undated) DEVICE — SPNG GZ WOVN 4X4IN 12PLY 10/BX STRL

## (undated) DEVICE — ANTIBACTERIAL UNDYED BRAIDED (POLYGLACTIN 910), SYNTHETIC ABSORBABLE SUTURE: Brand: COATED VICRYL

## (undated) DEVICE — BW-412T DISP COMBO CLEANING BRUSH: Brand: SINGLE USE COMBINATION CLEANING BRUSH

## (undated) DEVICE — KT ORCA ORCAPOD DISP STRL

## (undated) DEVICE — NEEDLE, QUINCKE, 20GX3.5": Brand: MEDLINE

## (undated) DEVICE — PK CHST BRST 40

## (undated) DEVICE — 3M™ STERI-STRIP™ ANTIMICROBIAL SKIN CLOSURES 1/2 INCH X 4 INCHES 50/CARTON 4 CARTONS/CASE A1847: Brand: 3M™ STERI-STRIP™

## (undated) DEVICE — 3M™ STERI-STRIP™ COMPOUND BENZOIN TINCTURE 40 BAGS/CARTON 4 CARTONS/CASE C1544: Brand: 3M™ STERI-STRIP™

## (undated) DEVICE — SUT MNCRYL PLS ANTIB UD 4/0 PS2 18IN

## (undated) DEVICE — INTENDED FOR TISSUE SEPARATION, AND OTHER PROCEDURES THAT REQUIRE A SHARP SURGICAL BLADE TO PUNCTURE OR CUT.: Brand: BARD-PARKER ® CARBON RIB-BACK BLADES

## (undated) DEVICE — SYR CONTRL LUERLOK 10CC

## (undated) DEVICE — 3M™ STERI-STRIP™ REINFORCED ADHESIVE SKIN CLOSURES, R1547, 1/2 IN X 4 IN (12 MM X 100 MM), 6 STRIPS/ENVELOPE: Brand: 3M™ STERI-STRIP™

## (undated) DEVICE — TUBING, SUCTION, 1/4" X 20', STRAIGHT: Brand: MEDLINE INDUSTRIES, INC.

## (undated) DEVICE — SKIN PREP TRAY W/CHG: Brand: MEDLINE INDUSTRIES, INC.

## (undated) DEVICE — TOOL MR8-15BA50T MR8 15CM BAL SYMTRI 5MM: Brand: MIDAS REX MR8

## (undated) DEVICE — DRSNG SURESITE WNDW 4X4.5

## (undated) DEVICE — DRP STRL STERILEZ ZFLD

## (undated) DEVICE — GLV SURG SENSICARE W/ALOE PF LF 7.5 STRL

## (undated) DEVICE — ADAPT CLN BIOGUARD AIR/H2O DISP

## (undated) DEVICE — DRSNG WND GZ PAD BORDERED 4X8IN STRL

## (undated) DEVICE — SUT VIC 3/0 TIES 18IN J110T

## (undated) DEVICE — NDL HYPO PRECISIONGLIDE REG 25G 1 1/2

## (undated) DEVICE — TBG PENCL TELESCP MEGADYNE SMOKE EVAC 10FT

## (undated) DEVICE — DISPOSABLE IRRIGATION BIPOLAR CORD, M1000 TYPE: Brand: KIRWAN

## (undated) DEVICE — SPONGE,NEURO,.75"X.75",XR,STRL,LF,10/PK: Brand: MEDLINE

## (undated) DEVICE — GLV SURG BIOGEL LTX PF 7

## (undated) DEVICE — ENCORE® LATEX ORTHO SIZE 8, STERILE LATEX POWDER-FREE SURGICAL GLOVE: Brand: ENCORE

## (undated) DEVICE — PENCL E/S HNDSWTCH SMOKEEVAC HOLSTR 10FT

## (undated) DEVICE — ADHS LIQ MASTISOL 2/3ML

## (undated) DEVICE — LOU MINOR PROCEDURE: Brand: MEDLINE INDUSTRIES, INC.

## (undated) DEVICE — VIAL FORMALIN CAP 10P 40ML

## (undated) DEVICE — PK NEURO SPINE 40

## (undated) DEVICE — HYBRID TUBING/CAP SET FOR OLYMPUS® SCOPES: Brand: ERBE

## (undated) DEVICE — SYR LUERLOK 5CC

## (undated) DEVICE — IRRIGATOR BULB ASEPTO 60CC STRL

## (undated) DEVICE — PATIENT RETURN ELECTRODE, SINGLE-USE, CONTACT QUALITY MONITORING, ADULT, WITH 9FT CORD, FOR PATIENTS WEIGING OVER 33LBS. (15KG): Brand: MEGADYNE

## (undated) DEVICE — NDL HYPO ECLPS SFTY 22G 1 1/2IN

## (undated) DEVICE — CONN TBG Y 5 IN 1 LF STRL

## (undated) DEVICE — CVR TRANSD CIV FLX TPR 11.9 TO 3.8X61CM

## (undated) DEVICE — Device

## (undated) DEVICE — SMOKE EVACUATION TUBING WITH 7/8 IN TO 1/4 IN REDUCER: Brand: BUFFALO FILTER